# Patient Record
Sex: MALE | Race: WHITE | Employment: OTHER | URBAN - NONMETROPOLITAN AREA
[De-identification: names, ages, dates, MRNs, and addresses within clinical notes are randomized per-mention and may not be internally consistent; named-entity substitution may affect disease eponyms.]

---

## 2017-03-08 ENCOUNTER — OFFICE VISIT (OUTPATIENT)
Dept: FAMILY MEDICINE CLINIC | Age: 82
End: 2017-03-08

## 2017-03-08 VITALS
HEART RATE: 62 BPM | SYSTOLIC BLOOD PRESSURE: 138 MMHG | DIASTOLIC BLOOD PRESSURE: 74 MMHG | BODY MASS INDEX: 30.99 KG/M2 | WEIGHT: 216 LBS | RESPIRATION RATE: 20 BRPM | OXYGEN SATURATION: 98 %

## 2017-03-08 DIAGNOSIS — E11.9 TYPE 2 DIABETES MELLITUS WITHOUT COMPLICATION, WITHOUT LONG-TERM CURRENT USE OF INSULIN (HCC): Primary | ICD-10-CM

## 2017-03-08 DIAGNOSIS — I10 ESSENTIAL HYPERTENSION: ICD-10-CM

## 2017-03-08 DIAGNOSIS — D64.9 ANEMIA, UNSPECIFIED TYPE: ICD-10-CM

## 2017-03-08 DIAGNOSIS — Z13.220 SCREENING FOR LIPID DISORDERS: ICD-10-CM

## 2017-03-08 DIAGNOSIS — Z12.5 SPECIAL SCREENING FOR MALIGNANT NEOPLASM OF PROSTATE: ICD-10-CM

## 2017-03-08 LAB — HBA1C MFR BLD: 7 %

## 2017-03-08 PROCEDURE — G8417 CALC BMI ABV UP PARAM F/U: HCPCS | Performed by: NURSE PRACTITIONER

## 2017-03-08 PROCEDURE — G8484 FLU IMMUNIZE NO ADMIN: HCPCS | Performed by: NURSE PRACTITIONER

## 2017-03-08 PROCEDURE — G8427 DOCREV CUR MEDS BY ELIG CLIN: HCPCS | Performed by: NURSE PRACTITIONER

## 2017-03-08 PROCEDURE — 1036F TOBACCO NON-USER: CPT | Performed by: NURSE PRACTITIONER

## 2017-03-08 PROCEDURE — 83036 HEMOGLOBIN GLYCOSYLATED A1C: CPT | Performed by: NURSE PRACTITIONER

## 2017-03-08 PROCEDURE — 99214 OFFICE O/P EST MOD 30 MIN: CPT | Performed by: NURSE PRACTITIONER

## 2017-03-08 PROCEDURE — 1123F ACP DISCUSS/DSCN MKR DOCD: CPT | Performed by: NURSE PRACTITIONER

## 2017-03-08 PROCEDURE — 4040F PNEUMOC VAC/ADMIN/RCVD: CPT | Performed by: NURSE PRACTITIONER

## 2017-03-08 RX ORDER — LISINOPRIL 30 MG/1
30 TABLET ORAL DAILY
Qty: 90 TABLET | Refills: 1 | Status: SHIPPED | OUTPATIENT
Start: 2017-03-08 | End: 2017-08-30 | Stop reason: SDUPTHER

## 2017-03-08 RX ORDER — BISOPROLOL FUMARATE AND HYDROCHLOROTHIAZIDE 10; 6.25 MG/1; MG/1
2 TABLET ORAL DAILY
Qty: 180 TABLET | Refills: 1 | Status: SHIPPED | OUTPATIENT
Start: 2017-03-08 | End: 2017-08-30 | Stop reason: SDUPTHER

## 2017-03-08 ASSESSMENT — ENCOUNTER SYMPTOMS
DIARRHEA: 0
COLOR CHANGE: 0
BACK PAIN: 0
SORE THROAT: 0
SHORTNESS OF BREATH: 0
NAUSEA: 0
VOMITING: 0
ABDOMINAL PAIN: 0
SINUS PRESSURE: 0

## 2017-06-08 ENCOUNTER — OFFICE VISIT (OUTPATIENT)
Dept: FAMILY MEDICINE CLINIC | Age: 82
End: 2017-06-08

## 2017-06-08 VITALS
OXYGEN SATURATION: 98 % | HEIGHT: 70 IN | RESPIRATION RATE: 20 BRPM | BODY MASS INDEX: 31.07 KG/M2 | DIASTOLIC BLOOD PRESSURE: 72 MMHG | HEART RATE: 60 BPM | WEIGHT: 217 LBS | SYSTOLIC BLOOD PRESSURE: 136 MMHG

## 2017-06-08 DIAGNOSIS — I10 ESSENTIAL HYPERTENSION: ICD-10-CM

## 2017-06-08 DIAGNOSIS — N40.0 BENIGN PROSTATIC HYPERPLASIA, PRESENCE OF LOWER URINARY TRACT SYMPTOMS UNSPECIFIED, UNSPECIFIED MORPHOLOGY: ICD-10-CM

## 2017-06-08 DIAGNOSIS — E11.9 TYPE 2 DIABETES MELLITUS WITHOUT COMPLICATION, WITHOUT LONG-TERM CURRENT USE OF INSULIN (HCC): Primary | ICD-10-CM

## 2017-06-08 LAB — HBA1C MFR BLD: 6.7 %

## 2017-06-08 PROCEDURE — 1123F ACP DISCUSS/DSCN MKR DOCD: CPT | Performed by: NURSE PRACTITIONER

## 2017-06-08 PROCEDURE — G8417 CALC BMI ABV UP PARAM F/U: HCPCS | Performed by: NURSE PRACTITIONER

## 2017-06-08 PROCEDURE — 99214 OFFICE O/P EST MOD 30 MIN: CPT | Performed by: NURSE PRACTITIONER

## 2017-06-08 PROCEDURE — 4040F PNEUMOC VAC/ADMIN/RCVD: CPT | Performed by: NURSE PRACTITIONER

## 2017-06-08 PROCEDURE — G8427 DOCREV CUR MEDS BY ELIG CLIN: HCPCS | Performed by: NURSE PRACTITIONER

## 2017-06-08 PROCEDURE — 1036F TOBACCO NON-USER: CPT | Performed by: NURSE PRACTITIONER

## 2017-06-08 PROCEDURE — 83036 HEMOGLOBIN GLYCOSYLATED A1C: CPT | Performed by: NURSE PRACTITIONER

## 2017-06-10 ASSESSMENT — ENCOUNTER SYMPTOMS
BACK PAIN: 0
DIARRHEA: 0
SINUS PRESSURE: 0
VOMITING: 0
COUGH: 0
SORE THROAT: 0
NAUSEA: 0
WHEEZING: 0
SHORTNESS OF BREATH: 0
COLOR CHANGE: 0
ABDOMINAL PAIN: 0
ABDOMINAL DISTENTION: 0

## 2017-09-07 ENCOUNTER — OFFICE VISIT (OUTPATIENT)
Dept: FAMILY MEDICINE CLINIC | Age: 82
End: 2017-09-07
Payer: MEDICARE

## 2017-09-07 VITALS
OXYGEN SATURATION: 97 % | RESPIRATION RATE: 16 BRPM | BODY MASS INDEX: 31.14 KG/M2 | HEART RATE: 55 BPM | WEIGHT: 217 LBS | DIASTOLIC BLOOD PRESSURE: 78 MMHG | SYSTOLIC BLOOD PRESSURE: 152 MMHG

## 2017-09-07 DIAGNOSIS — E11.9 TYPE 2 DIABETES MELLITUS WITHOUT COMPLICATION, WITHOUT LONG-TERM CURRENT USE OF INSULIN (HCC): Primary | ICD-10-CM

## 2017-09-07 DIAGNOSIS — I10 ESSENTIAL HYPERTENSION: ICD-10-CM

## 2017-09-07 DIAGNOSIS — N40.0 BENIGN PROSTATIC HYPERPLASIA WITHOUT LOWER URINARY TRACT SYMPTOMS, UNSPECIFIED MORPHOLOGY: ICD-10-CM

## 2017-09-07 DIAGNOSIS — R01.1 HEART MURMUR: ICD-10-CM

## 2017-09-07 PROCEDURE — 99214 OFFICE O/P EST MOD 30 MIN: CPT | Performed by: NURSE PRACTITIONER

## 2017-09-07 PROCEDURE — 1036F TOBACCO NON-USER: CPT | Performed by: NURSE PRACTITIONER

## 2017-09-07 PROCEDURE — G8417 CALC BMI ABV UP PARAM F/U: HCPCS | Performed by: NURSE PRACTITIONER

## 2017-09-07 PROCEDURE — 4040F PNEUMOC VAC/ADMIN/RCVD: CPT | Performed by: NURSE PRACTITIONER

## 2017-09-07 PROCEDURE — G8427 DOCREV CUR MEDS BY ELIG CLIN: HCPCS | Performed by: NURSE PRACTITIONER

## 2017-09-07 PROCEDURE — 1123F ACP DISCUSS/DSCN MKR DOCD: CPT | Performed by: NURSE PRACTITIONER

## 2017-09-07 RX ORDER — LISINOPRIL 20 MG/1
40 TABLET ORAL DAILY
Qty: 180 TABLET | Refills: 0 | Status: SHIPPED | OUTPATIENT
Start: 2017-09-07 | End: 2017-12-04 | Stop reason: SDUPTHER

## 2017-09-07 ASSESSMENT — ENCOUNTER SYMPTOMS
ABDOMINAL PAIN: 0
DIARRHEA: 0
VOMITING: 0
WHEEZING: 0
SORE THROAT: 0
CONSTIPATION: 0
CHEST TIGHTNESS: 0
STRIDOR: 0
ABDOMINAL DISTENTION: 0
BACK PAIN: 0
SINUS PRESSURE: 0
COLOR CHANGE: 0
NAUSEA: 0
SHORTNESS OF BREATH: 0
COUGH: 0

## 2017-09-14 ENCOUNTER — HOSPITAL ENCOUNTER (OUTPATIENT)
Dept: NON INVASIVE DIAGNOSTICS | Age: 82
Discharge: HOME | End: 2017-09-14
Payer: MEDICARE

## 2017-09-14 DIAGNOSIS — R01.1 HEART MURMUR: ICD-10-CM

## 2017-09-14 LAB
EKG ATRIAL RATE: 56 BPM
EKG P AXIS: 53 DEGREES
EKG P-R INTERVAL: 168 MS
EKG Q-T INTERVAL: 456 MS
EKG QRS DURATION: 118 MS
EKG QTC CALCULATION (BAZETT): 440 MS
EKG R AXIS: -56 DEGREES
EKG T AXIS: 30 DEGREES
EKG VENTRICULAR RATE: 56 BPM
LV EF: 60 %
LVEF MODALITY: NORMAL

## 2017-09-14 PROCEDURE — 93005 ELECTROCARDIOGRAM TRACING: CPT

## 2017-09-14 PROCEDURE — 93306 TTE W/DOPPLER COMPLETE: CPT

## 2017-12-04 RX ORDER — LISINOPRIL 20 MG/1
TABLET ORAL
Qty: 180 TABLET | Refills: 0 | Status: SHIPPED | OUTPATIENT
Start: 2017-12-04 | End: 2018-03-08 | Stop reason: SDUPTHER

## 2017-12-07 ENCOUNTER — OFFICE VISIT (OUTPATIENT)
Dept: FAMILY MEDICINE CLINIC | Age: 82
End: 2017-12-07
Payer: MEDICARE

## 2017-12-07 VITALS
RESPIRATION RATE: 20 BRPM | HEIGHT: 70 IN | OXYGEN SATURATION: 98 % | SYSTOLIC BLOOD PRESSURE: 147 MMHG | BODY MASS INDEX: 30.49 KG/M2 | DIASTOLIC BLOOD PRESSURE: 76 MMHG | WEIGHT: 213 LBS | HEART RATE: 62 BPM

## 2017-12-07 DIAGNOSIS — I35.0 NONRHEUMATIC AORTIC VALVE STENOSIS: ICD-10-CM

## 2017-12-07 DIAGNOSIS — I10 ESSENTIAL HYPERTENSION: ICD-10-CM

## 2017-12-07 DIAGNOSIS — E11.9 TYPE 2 DIABETES MELLITUS WITHOUT COMPLICATION, WITHOUT LONG-TERM CURRENT USE OF INSULIN (HCC): Primary | ICD-10-CM

## 2017-12-07 DIAGNOSIS — I51.7 LEFT ATRIAL ENLARGEMENT: ICD-10-CM

## 2017-12-07 LAB — HBA1C MFR BLD: 6.7 %

## 2017-12-07 PROCEDURE — G8484 FLU IMMUNIZE NO ADMIN: HCPCS | Performed by: NURSE PRACTITIONER

## 2017-12-07 PROCEDURE — G8417 CALC BMI ABV UP PARAM F/U: HCPCS | Performed by: NURSE PRACTITIONER

## 2017-12-07 PROCEDURE — 83036 HEMOGLOBIN GLYCOSYLATED A1C: CPT | Performed by: NURSE PRACTITIONER

## 2017-12-07 PROCEDURE — 99214 OFFICE O/P EST MOD 30 MIN: CPT | Performed by: NURSE PRACTITIONER

## 2017-12-07 PROCEDURE — 4040F PNEUMOC VAC/ADMIN/RCVD: CPT | Performed by: NURSE PRACTITIONER

## 2017-12-07 PROCEDURE — 1123F ACP DISCUSS/DSCN MKR DOCD: CPT | Performed by: NURSE PRACTITIONER

## 2017-12-07 PROCEDURE — 1036F TOBACCO NON-USER: CPT | Performed by: NURSE PRACTITIONER

## 2017-12-07 PROCEDURE — G8427 DOCREV CUR MEDS BY ELIG CLIN: HCPCS | Performed by: NURSE PRACTITIONER

## 2017-12-07 NOTE — PROGRESS NOTES
unspecified type diabetes mellitus without mention of complication, not stated as uncontrolled       Past Surgical History:   Procedure Laterality Date    TONSILLECTOMY AND ADENOIDECTOMY Bilateral     age 10        Family History   Problem Relation Age of Onset    Diabetes Paternal Aunt     Diabetes Paternal Uncle     Cancer Paternal Uncle     Diabetes Maternal Grandmother     Diabetes Maternal Grandfather     Diabetes Paternal Grandmother     Cancer Paternal Grandmother     Diabetes Paternal Grandfather        Social History   Substance Use Topics    Smoking status: Former Smoker     Types: Cigars     Quit date: 1/1/1991    Smokeless tobacco: Never Used      Comment: rarely smoked a cigar     Alcohol use No      Current Outpatient Prescriptions   Medication Sig Dispense Refill    lisinopril (PRINIVIL;ZESTRIL) 20 MG tablet TAKE TWO TABLETS BY MOUTH ONCE DAILY 180 tablet 0    metFORMIN (GLUCOPHAGE) 1000 MG tablet TAKE ONE TABLET BY MOUTH ONCE DAILY WITH BREAKFAST 90 tablet 1    bisoprolol-hydrochlorothiazide (ZIAC) 10-6.25 MG per tablet TAKE TWO TABLETS BY MOUTH ONCE DAILY 180 tablet 1     No current facility-administered medications for this visit. Allergies   Allergen Reactions    Norvasc [Amlodipine Besylate]     Other Rash     States allergic to several BP meds (unsure of name)       Health Maintenance   Topic Date Due    DTaP/Tdap/Td vaccine (1 - Tdap) 01/30/1950    Pneumococcal low/med risk (1 of 2 - PCV13) 01/30/1996    Flu vaccine (1) 09/01/2017    Zostavax vaccine  Addressed       There is no immunization history on file for this patient. Subjective:      Review of Systems   Constitutional: Negative for activity change, appetite change, chills, diaphoresis, fatigue, fever and unexpected weight change. HENT: Negative for congestion, ear pain, postnasal drip, sinus pressure and sore throat. Eyes: Negative for visual disturbance.    Respiratory: Negative for cough, chest distension and no mass. There is no hepatosplenomegaly. There is no tenderness. There is no CVA tenderness. Musculoskeletal: Normal range of motion. He exhibits no edema, tenderness or deformity. Lymphadenopathy:     He has no cervical adenopathy. Neurological: He is alert and oriented to person, place, and time. No cranial nerve deficit or sensory deficit. He exhibits normal muscle tone. Coordination and gait normal.   Skin: Skin is warm and dry. No rash noted. No erythema. Psychiatric: He has a normal mood and affect. His behavior is normal. Judgment and thought content normal.   Nursing note and vitals reviewed. BP (!) 147/76   Pulse 62   Resp 20   Ht 5' 10\" (1.778 m)   Wt 213 lb (96.6 kg)   SpO2 98%   BMI 30.56 kg/m²     Assessment:      1. Type 2 diabetes mellitus without complication, without long-term current use of insulin (HCC)  POCT glycosylated hemoglobin (Hb A1C)   2. Essential hypertension     3. Nonrheumatic aortic valve stenosis     4. Left atrial enlargement         Plan:     Goals      Exercise 3x per week (30 min per time) (pt-stated)            Patient Self-Management Goal for Health Maintenance  Goal: I will exercise for 30 minutes 3-5 days per week. Barriers: none  Plan for overcoming my barriers: N/A  Confidence: 7/10  Anticipated Goal Completion Date: 6-2017            Discussed use, benefit, and side effects of prescribed medications. Barriers to medication compliance addressed. All patient questions answered. Pt voiced understanding. Return if symptoms worsen or fail to improve. A1C 6.7 today  Monitor aortic stenosis. Chronic illnesses stable    Orders Placed This Encounter   Procedures    POCT glycosylated hemoglobin (Hb A1C)     No orders of the defined types were placed in this encounter. Patient given educational materials - see patient instructions. Discussed use, benefit, and side effects of prescribed medications.   All patient questions

## 2017-12-11 ASSESSMENT — ENCOUNTER SYMPTOMS
SORE THROAT: 0
CONSTIPATION: 0
BACK PAIN: 0
SHORTNESS OF BREATH: 0
NAUSEA: 0
ABDOMINAL DISTENTION: 0
CHEST TIGHTNESS: 0
STRIDOR: 0
ABDOMINAL PAIN: 0
COUGH: 0
WHEEZING: 0
VOMITING: 0
COLOR CHANGE: 0
SINUS PRESSURE: 0
DIARRHEA: 0

## 2018-03-08 ENCOUNTER — OFFICE VISIT (OUTPATIENT)
Dept: FAMILY MEDICINE CLINIC | Age: 83
End: 2018-03-08
Payer: MEDICARE

## 2018-03-08 VITALS
HEART RATE: 60 BPM | OXYGEN SATURATION: 98 % | SYSTOLIC BLOOD PRESSURE: 130 MMHG | WEIGHT: 217 LBS | DIASTOLIC BLOOD PRESSURE: 75 MMHG | BODY MASS INDEX: 31.07 KG/M2 | RESPIRATION RATE: 20 BRPM | HEIGHT: 70 IN

## 2018-03-08 DIAGNOSIS — E11.9 TYPE 2 DIABETES MELLITUS WITHOUT COMPLICATION, WITHOUT LONG-TERM CURRENT USE OF INSULIN (HCC): Primary | ICD-10-CM

## 2018-03-08 DIAGNOSIS — Z13.220 SCREENING FOR LIPID DISORDERS: ICD-10-CM

## 2018-03-08 DIAGNOSIS — I10 ESSENTIAL HYPERTENSION: ICD-10-CM

## 2018-03-08 DIAGNOSIS — D50.8 IRON DEFICIENCY ANEMIA SECONDARY TO INADEQUATE DIETARY IRON INTAKE: ICD-10-CM

## 2018-03-08 PROCEDURE — G8417 CALC BMI ABV UP PARAM F/U: HCPCS | Performed by: NURSE PRACTITIONER

## 2018-03-08 PROCEDURE — G8427 DOCREV CUR MEDS BY ELIG CLIN: HCPCS | Performed by: NURSE PRACTITIONER

## 2018-03-08 PROCEDURE — G8484 FLU IMMUNIZE NO ADMIN: HCPCS | Performed by: NURSE PRACTITIONER

## 2018-03-08 PROCEDURE — 4040F PNEUMOC VAC/ADMIN/RCVD: CPT | Performed by: NURSE PRACTITIONER

## 2018-03-08 PROCEDURE — 1123F ACP DISCUSS/DSCN MKR DOCD: CPT | Performed by: NURSE PRACTITIONER

## 2018-03-08 PROCEDURE — 1036F TOBACCO NON-USER: CPT | Performed by: NURSE PRACTITIONER

## 2018-03-08 PROCEDURE — 99214 OFFICE O/P EST MOD 30 MIN: CPT | Performed by: NURSE PRACTITIONER

## 2018-03-08 RX ORDER — BISOPROLOL FUMARATE AND HYDROCHLOROTHIAZIDE 10; 6.25 MG/1; MG/1
TABLET ORAL
Qty: 180 TABLET | Refills: 1 | Status: SHIPPED | OUTPATIENT
Start: 2018-03-08 | End: 2018-08-20 | Stop reason: SDUPTHER

## 2018-03-08 RX ORDER — LISINOPRIL 20 MG/1
TABLET ORAL
Qty: 180 TABLET | Refills: 0 | Status: SHIPPED | OUTPATIENT
Start: 2018-03-08 | End: 2018-05-23 | Stop reason: SDUPTHER

## 2018-03-08 ASSESSMENT — PATIENT HEALTH QUESTIONNAIRE - PHQ9
1. LITTLE INTEREST OR PLEASURE IN DOING THINGS: 0
SUM OF ALL RESPONSES TO PHQ QUESTIONS 1-9: 0
2. FEELING DOWN, DEPRESSED OR HOPELESS: 0
SUM OF ALL RESPONSES TO PHQ9 QUESTIONS 1 & 2: 0

## 2018-03-08 NOTE — PROGRESS NOTES
distension and no mass. There is no hepatosplenomegaly. There is no tenderness. There is no CVA tenderness. Musculoskeletal: Normal range of motion. He exhibits no edema, tenderness or deformity. Lymphadenopathy:     He has no cervical adenopathy. Neurological: He is alert and oriented to person, place, and time. No cranial nerve deficit or sensory deficit. He exhibits normal muscle tone. Coordination and gait normal.   Skin: Skin is warm and dry. No rash noted. No erythema. Psychiatric: He has a normal mood and affect. His behavior is normal. Judgment and thought content normal.   Nursing note and vitals reviewed. /75   Pulse 60   Resp 20   Ht 5' 10\" (1.778 m)   Wt 217 lb (98.4 kg)   SpO2 98%   BMI 31.14 kg/m²     Assessment:      1. Type 2 diabetes mellitus without complication, without long-term current use of insulin (HCC)  metFORMIN (GLUCOPHAGE) 1000 MG tablet    Comprehensive Metabolic Panel    Lipid Panel   2. Essential hypertension  bisoprolol-hydrochlorothiazide (ZIAC) 10-6.25 MG per tablet    lisinopril (PRINIVIL;ZESTRIL) 20 MG tablet    Lipid Panel   3. Iron deficiency anemia secondary to inadequate dietary iron intake  CBC Auto Differential   4. Screening for lipid disorders  Lipid Panel       Plan:       Discussed use, benefit, and side effects of prescribed medications. Barriers to medication compliance addressed. All patient questions answered. Pt voiced understanding. Return in about 3 months (around 6/8/2018).     meds as prescribed  Chronic illnesses stable  Obtain labs  Continue to eat healthy and be active    Orders Placed This Encounter   Procedures    Comprehensive Metabolic Panel     Standing Status:   Future     Standing Expiration Date:   3/8/2019    Lipid Panel     Standing Status:   Future     Standing Expiration Date:   3/8/2019     Order Specific Question:   Is Patient Fasting?/# of Hours     Answer:   requires 12 hour fast    CBC Auto Differential Standing Status:   Future     Standing Expiration Date:   3/8/2019     Orders Placed This Encounter   Medications    bisoprolol-hydrochlorothiazide (ZIAC) 10-6.25 MG per tablet     Sig: TAKE TWO TABLETS BY MOUTH ONCE DAILY     Dispense:  180 tablet     Refill:  1    lisinopril (PRINIVIL;ZESTRIL) 20 MG tablet     Sig: TAKE TWO TABLETS BY MOUTH ONCE DAILY     Dispense:  180 tablet     Refill:  0    metFORMIN (GLUCOPHAGE) 1000 MG tablet     Sig: TAKE ONE TABLET BY MOUTH ONCE DAILY WITH BREAKFAST     Dispense:  90 tablet     Refill:  1           Patient given educational materials - see patient instructions. Discussed use, benefit, and side effects of prescribed medications. All patient questions answered. Pt voiced understanding. Reviewed health maintenance. Instructed to continue current medications, diet and exercise. Patient agreed with treatment plan. Follow up as directed.      Electronically signed by Feng Mello CNP on 3/12/2018 at 1:45 PM

## 2018-03-12 ASSESSMENT — ENCOUNTER SYMPTOMS
COUGH: 0
STRIDOR: 0
SHORTNESS OF BREATH: 0
WHEEZING: 0
ABDOMINAL PAIN: 0
SINUS PRESSURE: 0
SORE THROAT: 0
ABDOMINAL DISTENTION: 0
CONSTIPATION: 0
COLOR CHANGE: 0
CHEST TIGHTNESS: 0
VOMITING: 0
NAUSEA: 0
DIARRHEA: 0
BACK PAIN: 0

## 2018-05-14 ENCOUNTER — OFFICE VISIT (OUTPATIENT)
Dept: FAMILY MEDICINE CLINIC | Age: 83
End: 2018-05-14
Payer: MEDICARE

## 2018-05-14 VITALS
OXYGEN SATURATION: 97 % | BODY MASS INDEX: 30.52 KG/M2 | SYSTOLIC BLOOD PRESSURE: 138 MMHG | RESPIRATION RATE: 12 BRPM | DIASTOLIC BLOOD PRESSURE: 78 MMHG | HEART RATE: 72 BPM | WEIGHT: 212.7 LBS

## 2018-05-14 DIAGNOSIS — M54.42 ACUTE LEFT-SIDED LOW BACK PAIN WITH LEFT-SIDED SCIATICA: ICD-10-CM

## 2018-05-14 DIAGNOSIS — I10 ESSENTIAL HYPERTENSION: ICD-10-CM

## 2018-05-14 DIAGNOSIS — M25.552 LEFT HIP PAIN: ICD-10-CM

## 2018-05-14 DIAGNOSIS — E11.9 TYPE 2 DIABETES MELLITUS WITHOUT COMPLICATION, WITHOUT LONG-TERM CURRENT USE OF INSULIN (HCC): Primary | ICD-10-CM

## 2018-05-14 PROCEDURE — 1123F ACP DISCUSS/DSCN MKR DOCD: CPT | Performed by: NURSE PRACTITIONER

## 2018-05-14 PROCEDURE — G8427 DOCREV CUR MEDS BY ELIG CLIN: HCPCS | Performed by: NURSE PRACTITIONER

## 2018-05-14 PROCEDURE — 1036F TOBACCO NON-USER: CPT | Performed by: NURSE PRACTITIONER

## 2018-05-14 PROCEDURE — 99214 OFFICE O/P EST MOD 30 MIN: CPT | Performed by: NURSE PRACTITIONER

## 2018-05-14 PROCEDURE — G8417 CALC BMI ABV UP PARAM F/U: HCPCS | Performed by: NURSE PRACTITIONER

## 2018-05-14 PROCEDURE — 83036 HEMOGLOBIN GLYCOSYLATED A1C: CPT | Performed by: NURSE PRACTITIONER

## 2018-05-14 PROCEDURE — 96372 THER/PROPH/DIAG INJ SC/IM: CPT | Performed by: NURSE PRACTITIONER

## 2018-05-14 PROCEDURE — 4040F PNEUMOC VAC/ADMIN/RCVD: CPT | Performed by: NURSE PRACTITIONER

## 2018-05-14 RX ORDER — TRIAMCINOLONE ACETONIDE 40 MG/ML
60 INJECTION, SUSPENSION INTRA-ARTICULAR; INTRAMUSCULAR ONCE
Status: COMPLETED | OUTPATIENT
Start: 2018-05-14 | End: 2018-05-14

## 2018-05-14 RX ADMIN — TRIAMCINOLONE ACETONIDE 60 MG: 40 INJECTION, SUSPENSION INTRA-ARTICULAR; INTRAMUSCULAR at 14:15

## 2018-05-15 ENCOUNTER — HOSPITAL ENCOUNTER (OUTPATIENT)
Dept: GENERAL RADIOLOGY | Age: 83
Discharge: HOME | End: 2018-05-15
Payer: MEDICARE

## 2018-05-15 ENCOUNTER — HOSPITAL ENCOUNTER (OUTPATIENT)
Age: 83
Discharge: HOME | End: 2018-05-15
Payer: MEDICARE

## 2018-05-15 DIAGNOSIS — I10 ESSENTIAL HYPERTENSION: ICD-10-CM

## 2018-05-15 DIAGNOSIS — D50.8 IRON DEFICIENCY ANEMIA SECONDARY TO INADEQUATE DIETARY IRON INTAKE: ICD-10-CM

## 2018-05-15 DIAGNOSIS — E11.9 TYPE 2 DIABETES MELLITUS WITHOUT COMPLICATION, WITHOUT LONG-TERM CURRENT USE OF INSULIN (HCC): ICD-10-CM

## 2018-05-15 DIAGNOSIS — Z13.220 SCREENING FOR LIPID DISORDERS: ICD-10-CM

## 2018-05-15 DIAGNOSIS — M25.552 LEFT HIP PAIN: ICD-10-CM

## 2018-05-15 LAB
ALBUMIN SERPL-MCNC: 3.7 G/DL (ref 3.5–5.1)
ALP BLD-CCNC: 56 U/L (ref 38–126)
ALT SERPL-CCNC: 18 U/L (ref 11–66)
ANION GAP SERPL CALCULATED.3IONS-SCNC: 13 MEQ/L (ref 8–16)
AST SERPL-CCNC: 14 U/L (ref 5–40)
BASOPHILS # BLD: 0.3 %
BASOPHILS ABSOLUTE: 0 THOU/MM3 (ref 0–0.1)
BILIRUB SERPL-MCNC: 0.7 MG/DL (ref 0.3–1.2)
BUN BLDV-MCNC: 23 MG/DL (ref 7–22)
CALCIUM SERPL-MCNC: 9.4 MG/DL (ref 8.5–10.5)
CHLORIDE BLD-SCNC: 99 MEQ/L (ref 98–111)
CHOLESTEROL, TOTAL: 146 MG/DL (ref 100–199)
CO2: 25 MEQ/L (ref 23–33)
CREAT SERPL-MCNC: 0.9 MG/DL (ref 0.4–1.2)
EOSINOPHIL # BLD: 1.2 %
EOSINOPHILS ABSOLUTE: 0.2 THOU/MM3 (ref 0–0.4)
GFR SERPL CREATININE-BSD FRML MDRD: 80 ML/MIN/1.73M2
GLUCOSE BLD-MCNC: 155 MG/DL (ref 70–108)
HBA1C MFR BLD: 6.2 %
HCT VFR BLD CALC: 37.6 % (ref 42–52)
HDLC SERPL-MCNC: 38 MG/DL
HEMOGLOBIN: 12.4 GM/DL (ref 14–18)
HYPOCHROMIA: ABNORMAL
LDL CHOLESTEROL CALCULATED: 90 MG/DL
LYMPHOCYTES # BLD: 12.3 %
LYMPHOCYTES ABSOLUTE: 1.6 THOU/MM3 (ref 1–4.8)
MCH RBC QN AUTO: 26.7 PG (ref 27–31)
MCHC RBC AUTO-ENTMCNC: 33.1 GM/DL (ref 33–37)
MCV RBC AUTO: 80.6 FL (ref 80–94)
MICROCYTES: ABNORMAL
MONOCYTES # BLD: 8.4 %
MONOCYTES ABSOLUTE: 1.1 THOU/MM3 (ref 0.4–1.3)
NUCLEATED RED BLOOD CELLS: 0 /100 WBC
PDW BLD-RTO: 13.3 % (ref 11.5–14.5)
PLATELET # BLD: 248 THOU/MM3 (ref 130–400)
PMV BLD AUTO: 12.4 FL (ref 7.4–10.4)
POTASSIUM SERPL-SCNC: 4.4 MEQ/L (ref 3.5–5.2)
RBC # BLD: 4.66 MILL/MM3 (ref 4.7–6.1)
SEG NEUTROPHILS: 77.8 %
SEGMENTED NEUTROPHILS ABSOLUTE COUNT: 10.3 THOU/MM3 (ref 1.8–7.7)
SODIUM BLD-SCNC: 137 MEQ/L (ref 135–145)
TOTAL PROTEIN: 7.5 G/DL (ref 6.1–8)
TRIGL SERPL-MCNC: 90 MG/DL (ref 0–199)
WBC # BLD: 13.3 THOU/MM3 (ref 4.8–10.8)

## 2018-05-15 PROCEDURE — 80061 LIPID PANEL: CPT

## 2018-05-15 PROCEDURE — 36415 COLL VENOUS BLD VENIPUNCTURE: CPT

## 2018-05-15 PROCEDURE — 73502 X-RAY EXAM HIP UNI 2-3 VIEWS: CPT

## 2018-05-15 PROCEDURE — 80053 COMPREHEN METABOLIC PANEL: CPT

## 2018-05-15 PROCEDURE — 72100 X-RAY EXAM L-S SPINE 2/3 VWS: CPT

## 2018-05-15 PROCEDURE — 85025 COMPLETE CBC W/AUTO DIFF WBC: CPT

## 2018-05-16 ASSESSMENT — ENCOUNTER SYMPTOMS
ABDOMINAL PAIN: 0
CONSTIPATION: 0
STRIDOR: 0
SINUS PRESSURE: 0
COUGH: 0
VOMITING: 0
SHORTNESS OF BREATH: 0
NAUSEA: 0
DIARRHEA: 0
COLOR CHANGE: 0
CHEST TIGHTNESS: 0
ABDOMINAL DISTENTION: 0
SORE THROAT: 0
BACK PAIN: 1
WHEEZING: 0

## 2018-06-21 ENCOUNTER — OFFICE VISIT (OUTPATIENT)
Dept: FAMILY MEDICINE CLINIC | Age: 83
End: 2018-06-21
Payer: MEDICARE

## 2018-06-21 VITALS
SYSTOLIC BLOOD PRESSURE: 150 MMHG | RESPIRATION RATE: 20 BRPM | HEIGHT: 71 IN | OXYGEN SATURATION: 98 % | WEIGHT: 209 LBS | DIASTOLIC BLOOD PRESSURE: 82 MMHG | HEART RATE: 64 BPM | BODY MASS INDEX: 29.26 KG/M2

## 2018-06-21 DIAGNOSIS — E11.9 TYPE 2 DIABETES MELLITUS WITHOUT COMPLICATION, WITHOUT LONG-TERM CURRENT USE OF INSULIN (HCC): ICD-10-CM

## 2018-06-21 DIAGNOSIS — R97.20 ELEVATED PSA: ICD-10-CM

## 2018-06-21 DIAGNOSIS — I10 ESSENTIAL HYPERTENSION: Primary | ICD-10-CM

## 2018-06-21 LAB
BILIRUBIN, POC: NORMAL
BLOOD URINE, POC: NORMAL
CLARITY, POC: NORMAL
COLOR, POC: YELLOW
CREATININE URINE POCT: NORMAL
GLUCOSE URINE, POC: NORMAL
KETONES, POC: NORMAL
LEUKOCYTE EST, POC: NORMAL
MICROALBUMIN/CREAT 24H UR: NORMAL MG/G{CREAT}
MICROALBUMIN/CREAT UR-RTO: NORMAL
NITRITE, POC: NORMAL
PH, POC: 6
PROTEIN, POC: NORMAL
SPECIFIC GRAVITY, POC: 1.02
UROBILINOGEN, POC: 1

## 2018-06-21 PROCEDURE — G8427 DOCREV CUR MEDS BY ELIG CLIN: HCPCS | Performed by: NURSE PRACTITIONER

## 2018-06-21 PROCEDURE — 1036F TOBACCO NON-USER: CPT | Performed by: NURSE PRACTITIONER

## 2018-06-21 PROCEDURE — 1123F ACP DISCUSS/DSCN MKR DOCD: CPT | Performed by: NURSE PRACTITIONER

## 2018-06-21 PROCEDURE — 4040F PNEUMOC VAC/ADMIN/RCVD: CPT | Performed by: NURSE PRACTITIONER

## 2018-06-21 PROCEDURE — G8417 CALC BMI ABV UP PARAM F/U: HCPCS | Performed by: NURSE PRACTITIONER

## 2018-06-21 PROCEDURE — 99214 OFFICE O/P EST MOD 30 MIN: CPT | Performed by: NURSE PRACTITIONER

## 2018-06-21 PROCEDURE — 81002 URINALYSIS NONAUTO W/O SCOPE: CPT | Performed by: NURSE PRACTITIONER

## 2018-06-21 PROCEDURE — 82044 UR ALBUMIN SEMIQUANTITATIVE: CPT | Performed by: NURSE PRACTITIONER

## 2018-06-21 RX ORDER — HYDROCHLOROTHIAZIDE 12.5 MG/1
12.5 TABLET ORAL DAILY
Qty: 90 TABLET | Refills: 0 | Status: SHIPPED | OUTPATIENT
Start: 2018-06-21 | End: 2018-08-28 | Stop reason: SDUPTHER

## 2018-06-26 ASSESSMENT — ENCOUNTER SYMPTOMS
CHEST TIGHTNESS: 0
BACK PAIN: 0
DIARRHEA: 0
COUGH: 0
SORE THROAT: 0
NAUSEA: 0
COLOR CHANGE: 0
SHORTNESS OF BREATH: 0
VOMITING: 0
WHEEZING: 0
SINUS PRESSURE: 0
ABDOMINAL DISTENTION: 0
CONSTIPATION: 0
STRIDOR: 0
ABDOMINAL PAIN: 0

## 2018-08-20 DIAGNOSIS — I10 ESSENTIAL HYPERTENSION: ICD-10-CM

## 2018-08-20 RX ORDER — BISOPROLOL FUMARATE AND HYDROCHLOROTHIAZIDE 10; 6.25 MG/1; MG/1
TABLET ORAL
Qty: 180 TABLET | Refills: 2 | Status: SHIPPED | OUTPATIENT
Start: 2018-08-20 | End: 2019-05-15 | Stop reason: SDUPTHER

## 2018-08-28 DIAGNOSIS — E11.9 TYPE 2 DIABETES MELLITUS WITHOUT COMPLICATION, WITHOUT LONG-TERM CURRENT USE OF INSULIN (HCC): ICD-10-CM

## 2018-08-29 RX ORDER — HYDROCHLOROTHIAZIDE 12.5 MG/1
TABLET ORAL
Qty: 90 TABLET | Refills: 1 | Status: SHIPPED | OUTPATIENT
Start: 2018-08-29 | End: 2019-02-26 | Stop reason: SDUPTHER

## 2018-09-19 ENCOUNTER — HOSPITAL ENCOUNTER (OUTPATIENT)
Age: 83
Discharge: HOME | End: 2018-09-19
Payer: MEDICARE

## 2018-09-19 DIAGNOSIS — R97.20 ELEVATED PSA: ICD-10-CM

## 2018-09-19 LAB — PROSTATE SPECIFIC ANTIGEN: 1.08 NG/ML (ref 0–1)

## 2018-09-19 PROCEDURE — 84153 ASSAY OF PSA TOTAL: CPT

## 2018-09-19 PROCEDURE — 36415 COLL VENOUS BLD VENIPUNCTURE: CPT

## 2018-09-20 ENCOUNTER — OFFICE VISIT (OUTPATIENT)
Dept: FAMILY MEDICINE CLINIC | Age: 83
End: 2018-09-20
Payer: MEDICARE

## 2018-09-20 VITALS
HEART RATE: 68 BPM | DIASTOLIC BLOOD PRESSURE: 62 MMHG | RESPIRATION RATE: 18 BRPM | BODY MASS INDEX: 30.41 KG/M2 | OXYGEN SATURATION: 98 % | SYSTOLIC BLOOD PRESSURE: 128 MMHG | WEIGHT: 215 LBS

## 2018-09-20 DIAGNOSIS — E11.9 TYPE 2 DIABETES MELLITUS WITHOUT COMPLICATION, WITHOUT LONG-TERM CURRENT USE OF INSULIN (HCC): Primary | ICD-10-CM

## 2018-09-20 DIAGNOSIS — I10 ESSENTIAL HYPERTENSION: ICD-10-CM

## 2018-09-20 LAB — HBA1C MFR BLD: 6.7 %

## 2018-09-20 PROCEDURE — 83036 HEMOGLOBIN GLYCOSYLATED A1C: CPT | Performed by: NURSE PRACTITIONER

## 2018-09-20 PROCEDURE — 1036F TOBACCO NON-USER: CPT | Performed by: NURSE PRACTITIONER

## 2018-09-20 PROCEDURE — 4040F PNEUMOC VAC/ADMIN/RCVD: CPT | Performed by: NURSE PRACTITIONER

## 2018-09-20 PROCEDURE — 1123F ACP DISCUSS/DSCN MKR DOCD: CPT | Performed by: NURSE PRACTITIONER

## 2018-09-20 PROCEDURE — 99214 OFFICE O/P EST MOD 30 MIN: CPT | Performed by: NURSE PRACTITIONER

## 2018-09-20 PROCEDURE — G8417 CALC BMI ABV UP PARAM F/U: HCPCS | Performed by: NURSE PRACTITIONER

## 2018-09-20 PROCEDURE — 1101F PT FALLS ASSESS-DOCD LE1/YR: CPT | Performed by: NURSE PRACTITIONER

## 2018-09-20 PROCEDURE — G8427 DOCREV CUR MEDS BY ELIG CLIN: HCPCS | Performed by: NURSE PRACTITIONER

## 2018-09-20 NOTE — PROGRESS NOTES
Never Used      Comment: rarely smoked a cigar     Alcohol use No      Current Outpatient Prescriptions   Medication Sig Dispense Refill    hydrochlorothiazide (HYDRODIURIL) 12.5 MG tablet TAKE 1 TABLET BY MOUTH ONCE DAILY 90 tablet 1    metFORMIN (GLUCOPHAGE) 1000 MG tablet TAKE 1 TABLET BY MOUTH ONCE DAILY WITH  BREAKFAST 90 tablet 1    bisoprolol-hydrochlorothiazide (ZIAC) 10-6.25 MG per tablet TAKE TWO TABLETS BY MOUTH ONCE DAILY 180 tablet 2    lisinopril (PRINIVIL;ZESTRIL) 20 MG tablet TAKE 2 TABLETS BY MOUTH ONCE DAILY 180 tablet 3     No current facility-administered medications for this visit. Allergies   Allergen Reactions    Norvasc [Amlodipine Besylate]     Other Rash     States allergic to several BP meds (unsure of name)       Health Maintenance   Topic Date Due    DTaP/Tdap/Td vaccine (1 - Tdap) 01/30/1950    Shingles Vaccine (1 of 2 - 2 Dose Series) 01/30/1981    Pneumococcal low/med risk (1 of 2 - PCV13) 01/30/1996    Flu vaccine (1) 09/01/2018       Subjective:      Review of Systems   Constitutional: Negative for activity change, appetite change, chills, diaphoresis, fatigue, fever and unexpected weight change. HENT: Negative for congestion, ear pain, postnasal drip, sinus pressure and sore throat. Eyes: Negative for visual disturbance. Respiratory: Negative for cough, chest tightness, shortness of breath, wheezing and stridor. Cardiovascular: Negative for chest pain, palpitations and leg swelling. Gastrointestinal: Negative for abdominal distention, abdominal pain, constipation, diarrhea, nausea and vomiting. Endocrine: Negative for polydipsia, polyphagia and polyuria. Genitourinary: Negative for decreased urine volume, difficulty urinating, dysuria, flank pain, frequency, hematuria and urgency. Musculoskeletal: Negative for arthralgias, back pain, gait problem, joint swelling, myalgias and neck pain. Skin: Negative for color change, pallor and rash.

## 2018-09-24 ASSESSMENT — ENCOUNTER SYMPTOMS
COLOR CHANGE: 0
COUGH: 0
DIARRHEA: 0
ABDOMINAL DISTENTION: 0
BACK PAIN: 0
NAUSEA: 0
SHORTNESS OF BREATH: 0
SINUS PRESSURE: 0
WHEEZING: 0
STRIDOR: 0
VOMITING: 0
CHEST TIGHTNESS: 0
ABDOMINAL PAIN: 0
CONSTIPATION: 0
SORE THROAT: 0

## 2018-12-12 ENCOUNTER — OFFICE VISIT (OUTPATIENT)
Dept: FAMILY MEDICINE CLINIC | Age: 83
End: 2018-12-12
Payer: MEDICARE

## 2018-12-12 VITALS
BODY MASS INDEX: 31.35 KG/M2 | SYSTOLIC BLOOD PRESSURE: 136 MMHG | DIASTOLIC BLOOD PRESSURE: 86 MMHG | RESPIRATION RATE: 16 BRPM | WEIGHT: 219 LBS | HEART RATE: 57 BPM | HEIGHT: 70 IN | OXYGEN SATURATION: 98 %

## 2018-12-12 DIAGNOSIS — I10 ESSENTIAL HYPERTENSION: ICD-10-CM

## 2018-12-12 DIAGNOSIS — E11.9 TYPE 2 DIABETES MELLITUS WITHOUT COMPLICATION, WITHOUT LONG-TERM CURRENT USE OF INSULIN (HCC): Primary | ICD-10-CM

## 2018-12-12 LAB — HBA1C MFR BLD: 6.8 %

## 2018-12-12 PROCEDURE — G8484 FLU IMMUNIZE NO ADMIN: HCPCS | Performed by: NURSE PRACTITIONER

## 2018-12-12 PROCEDURE — 1036F TOBACCO NON-USER: CPT | Performed by: NURSE PRACTITIONER

## 2018-12-12 PROCEDURE — 1101F PT FALLS ASSESS-DOCD LE1/YR: CPT | Performed by: NURSE PRACTITIONER

## 2018-12-12 PROCEDURE — 83036 HEMOGLOBIN GLYCOSYLATED A1C: CPT | Performed by: NURSE PRACTITIONER

## 2018-12-12 PROCEDURE — 1123F ACP DISCUSS/DSCN MKR DOCD: CPT | Performed by: NURSE PRACTITIONER

## 2018-12-12 PROCEDURE — 99214 OFFICE O/P EST MOD 30 MIN: CPT | Performed by: NURSE PRACTITIONER

## 2018-12-12 PROCEDURE — G8427 DOCREV CUR MEDS BY ELIG CLIN: HCPCS | Performed by: NURSE PRACTITIONER

## 2018-12-12 PROCEDURE — G8417 CALC BMI ABV UP PARAM F/U: HCPCS | Performed by: NURSE PRACTITIONER

## 2018-12-12 PROCEDURE — 4040F PNEUMOC VAC/ADMIN/RCVD: CPT | Performed by: NURSE PRACTITIONER

## 2018-12-12 ASSESSMENT — ENCOUNTER SYMPTOMS
CHEST TIGHTNESS: 0
COUGH: 0
SHORTNESS OF BREATH: 0
BACK PAIN: 0
STRIDOR: 0
NAUSEA: 0
SORE THROAT: 0
CONSTIPATION: 0
VOMITING: 0
ABDOMINAL DISTENTION: 0
ABDOMINAL PAIN: 0
WHEEZING: 0
SINUS PRESSURE: 0
DIARRHEA: 0
COLOR CHANGE: 0

## 2018-12-12 NOTE — PROGRESS NOTES
 Diabetes Paternal Grandfather        Social History   Substance Use Topics    Smoking status: Former Smoker     Types: Cigars     Quit date: 1/1/1991    Smokeless tobacco: Never Used      Comment: rarely smoked a cigar     Alcohol use No      Current Outpatient Prescriptions   Medication Sig Dispense Refill    hydrochlorothiazide (HYDRODIURIL) 12.5 MG tablet TAKE 1 TABLET BY MOUTH ONCE DAILY 90 tablet 1    metFORMIN (GLUCOPHAGE) 1000 MG tablet TAKE 1 TABLET BY MOUTH ONCE DAILY WITH  BREAKFAST 90 tablet 1    bisoprolol-hydrochlorothiazide (ZIAC) 10-6.25 MG per tablet TAKE TWO TABLETS BY MOUTH ONCE DAILY 180 tablet 2    lisinopril (PRINIVIL;ZESTRIL) 20 MG tablet TAKE 2 TABLETS BY MOUTH ONCE DAILY 180 tablet 3     No current facility-administered medications for this visit. Allergies   Allergen Reactions    Norvasc [Amlodipine Besylate]     Other Rash     States allergic to several BP meds (unsure of name)       Health Maintenance   Topic Date Due    DTaP/Tdap/Td vaccine (1 - Tdap) 01/30/1950    Shingles Vaccine (1 of 2 - 2 Dose Series) 01/30/1981    Pneumococcal low/med risk (1 of 2 - PCV13) 01/30/1996    Flu vaccine (1) 09/01/2018       Subjective:      Review of Systems   Constitutional: Negative for activity change, appetite change, chills, diaphoresis, fatigue, fever and unexpected weight change. HENT: Negative for congestion, ear pain, postnasal drip, sinus pressure and sore throat. Eyes: Negative for visual disturbance. Respiratory: Negative for cough, chest tightness, shortness of breath, wheezing and stridor. Cardiovascular: Negative for chest pain, palpitations and leg swelling. Gastrointestinal: Negative for abdominal distention, abdominal pain, constipation, diarrhea, nausea and vomiting. Endocrine: Negative for polydipsia, polyphagia and polyuria.    Genitourinary: Negative for decreased urine volume, difficulty urinating, dysuria, flank pain, frequency, hematuria and urgency. Musculoskeletal: Negative for arthralgias, back pain, gait problem, joint swelling, myalgias and neck pain. Skin: Negative for color change, pallor and rash. Neurological: Negative for dizziness, syncope, weakness, light-headedness, numbness and headaches. Hematological: Negative for adenopathy. Psychiatric/Behavioral: Negative for behavioral problems, self-injury and sleep disturbance. The patient is not nervous/anxious. Objective:     Physical Exam   Constitutional: He is oriented to person, place, and time. He appears well-developed and well-nourished. HENT:   Head: Normocephalic. Right Ear: Tympanic membrane and external ear normal.   Left Ear: Tympanic membrane and external ear normal.   Nose: Nose normal. Right sinus exhibits no maxillary sinus tenderness. Left sinus exhibits no maxillary sinus tenderness. Mouth/Throat: Uvula is midline, oropharynx is clear and moist and mucous membranes are normal.   Eyes: Pupils are equal, round, and reactive to light. Neck: Trachea normal and normal range of motion. Neck supple. No JVD present. Carotid bruit is not present. No thyromegaly present. Cardiovascular: Normal rate, regular rhythm, normal heart sounds and intact distal pulses. No murmur heard. Pulmonary/Chest: Effort normal and breath sounds normal. No respiratory distress. He has no decreased breath sounds. He has no wheezes. He has no rhonchi. He has no rales. He exhibits no tenderness. Abdominal: Soft. Normal appearance and bowel sounds are normal. He exhibits no distension and no mass. There is no hepatosplenomegaly. There is no tenderness. There is no CVA tenderness. Musculoskeletal: Normal range of motion. He exhibits no edema, tenderness or deformity. Lymphadenopathy:     He has no cervical adenopathy. Neurological: He is alert and oriented to person, place, and time. No cranial nerve deficit or sensory deficit. He exhibits normal muscle tone.  Coordination and

## 2019-02-06 ENCOUNTER — HOSPITAL ENCOUNTER (EMERGENCY)
Age: 84
Discharge: HOME | End: 2019-02-06
Attending: EMERGENCY MEDICINE
Payer: MEDICARE

## 2019-02-06 VITALS
DIASTOLIC BLOOD PRESSURE: 65 MMHG | RESPIRATION RATE: 16 BRPM | HEIGHT: 70 IN | OXYGEN SATURATION: 98 % | HEART RATE: 72 BPM | WEIGHT: 215 LBS | BODY MASS INDEX: 30.78 KG/M2 | SYSTOLIC BLOOD PRESSURE: 173 MMHG | TEMPERATURE: 98.2 F

## 2019-02-06 DIAGNOSIS — H11.32 SUBCONJUNCTIVAL HEMORRHAGE OF LEFT EYE: Primary | ICD-10-CM

## 2019-02-06 DIAGNOSIS — I10 ESSENTIAL HYPERTENSION: ICD-10-CM

## 2019-02-06 DIAGNOSIS — R09.81 NASAL CONGESTION: ICD-10-CM

## 2019-02-06 PROCEDURE — 99282 EMERGENCY DEPT VISIT SF MDM: CPT

## 2019-02-06 RX ORDER — FLUTICASONE PROPIONATE 50 MCG
1 SPRAY, SUSPENSION (ML) NASAL DAILY
Qty: 1 BOTTLE | Refills: 0 | Status: SHIPPED | OUTPATIENT
Start: 2019-02-06 | End: 2019-08-15

## 2019-02-06 ASSESSMENT — ENCOUNTER SYMPTOMS
EYE REDNESS: 1
ABDOMINAL PAIN: 0
COUGH: 1
DIARRHEA: 0
BLOOD IN STOOL: 0
VOMITING: 0
SORE THROAT: 0
WHEEZING: 0
EYE PAIN: 0
SHORTNESS OF BREATH: 0

## 2019-02-06 ASSESSMENT — VISUAL ACUITY
OD: 20/15
OS: 20/30
OU: 20/15

## 2019-02-07 ENCOUNTER — TELEPHONE (OUTPATIENT)
Dept: FAMILY MEDICINE CLINIC | Age: 84
End: 2019-02-07

## 2019-02-26 DIAGNOSIS — E11.9 TYPE 2 DIABETES MELLITUS WITHOUT COMPLICATION, WITHOUT LONG-TERM CURRENT USE OF INSULIN (HCC): ICD-10-CM

## 2019-02-27 RX ORDER — HYDROCHLOROTHIAZIDE 12.5 MG/1
TABLET ORAL
Qty: 90 TABLET | Refills: 1 | Status: SHIPPED | OUTPATIENT
Start: 2019-02-27 | End: 2019-05-15 | Stop reason: SDUPTHER

## 2019-03-06 ENCOUNTER — PATIENT MESSAGE (OUTPATIENT)
Dept: FAMILY MEDICINE CLINIC | Age: 84
End: 2019-03-06

## 2019-03-06 DIAGNOSIS — D64.9 ANEMIA, UNSPECIFIED TYPE: ICD-10-CM

## 2019-03-06 DIAGNOSIS — Z13.220 SCREENING FOR LIPID DISORDERS: ICD-10-CM

## 2019-03-06 DIAGNOSIS — I10 ESSENTIAL HYPERTENSION: ICD-10-CM

## 2019-03-06 DIAGNOSIS — E11.9 TYPE 2 DIABETES MELLITUS WITHOUT COMPLICATION, WITHOUT LONG-TERM CURRENT USE OF INSULIN (HCC): Primary | ICD-10-CM

## 2019-03-08 ENCOUNTER — HOSPITAL ENCOUNTER (OUTPATIENT)
Age: 84
Discharge: HOME | End: 2019-03-08
Payer: MEDICARE

## 2019-03-08 DIAGNOSIS — E11.9 TYPE 2 DIABETES MELLITUS WITHOUT COMPLICATION, WITHOUT LONG-TERM CURRENT USE OF INSULIN (HCC): ICD-10-CM

## 2019-03-08 DIAGNOSIS — D64.9 ANEMIA, UNSPECIFIED TYPE: ICD-10-CM

## 2019-03-08 DIAGNOSIS — Z13.220 SCREENING FOR LIPID DISORDERS: ICD-10-CM

## 2019-03-08 DIAGNOSIS — I10 ESSENTIAL HYPERTENSION: ICD-10-CM

## 2019-03-08 LAB
ALBUMIN SERPL-MCNC: 4 G/DL (ref 3.5–5.1)
ALP BLD-CCNC: 61 U/L (ref 38–126)
ALT SERPL-CCNC: 13 U/L (ref 11–66)
ANION GAP SERPL CALCULATED.3IONS-SCNC: 13 MEQ/L (ref 8–16)
AST SERPL-CCNC: 13 U/L (ref 5–40)
BASOPHILS # BLD: 0.6 %
BASOPHILS ABSOLUTE: 0.1 THOU/MM3 (ref 0–0.1)
BILIRUB SERPL-MCNC: 0.7 MG/DL (ref 0.3–1.2)
BUN BLDV-MCNC: 26 MG/DL (ref 7–22)
CALCIUM SERPL-MCNC: 9.4 MG/DL (ref 8.5–10.5)
CHLORIDE BLD-SCNC: 103 MEQ/L (ref 98–111)
CHOLESTEROL, TOTAL: 150 MG/DL (ref 100–199)
CO2: 24 MEQ/L (ref 23–33)
CREAT SERPL-MCNC: 1 MG/DL (ref 0.4–1.2)
EOSINOPHIL # BLD: 12.7 %
EOSINOPHILS ABSOLUTE: 1.1 THOU/MM3 (ref 0–0.4)
ERYTHROCYTE [DISTWIDTH] IN BLOOD BY AUTOMATED COUNT: 13.5 % (ref 11.5–14.5)
ERYTHROCYTE [DISTWIDTH] IN BLOOD BY AUTOMATED COUNT: 41.5 FL (ref 35–45)
GFR SERPL CREATININE-BSD FRML MDRD: 71 ML/MIN/1.73M2
GLUCOSE BLD-MCNC: 143 MG/DL (ref 70–108)
HCT VFR BLD CALC: 39.5 % (ref 42–52)
HDLC SERPL-MCNC: 36 MG/DL
HEMOGLOBIN: 12.6 GM/DL (ref 14–18)
IMMATURE GRANS (ABS): 0.05 THOU/MM3 (ref 0–0.07)
IMMATURE GRANULOCYTES: 0.6 %
LDL CHOLESTEROL CALCULATED: 88 MG/DL
LYMPHOCYTES # BLD: 24 %
LYMPHOCYTES ABSOLUTE: 2.1 THOU/MM3 (ref 1–4.8)
MCH RBC QN AUTO: 27 PG (ref 26–33)
MCHC RBC AUTO-ENTMCNC: 31.9 GM/DL (ref 32.2–35.5)
MCV RBC AUTO: 84.8 FL (ref 80–94)
MONOCYTES # BLD: 8.1 %
MONOCYTES ABSOLUTE: 0.7 THOU/MM3 (ref 0.4–1.3)
NUCLEATED RED BLOOD CELLS: 0 /100 WBC
PLATELET # BLD: 141 THOU/MM3 (ref 130–400)
PMV BLD AUTO: 13.4 FL (ref 9.4–12.4)
POTASSIUM SERPL-SCNC: 4.8 MEQ/L (ref 3.5–5.2)
RBC # BLD: 4.66 MILL/MM3 (ref 4.7–6.1)
SEG NEUTROPHILS: 54 %
SEGMENTED NEUTROPHILS ABSOLUTE COUNT: 4.8 THOU/MM3 (ref 1.8–7.7)
SODIUM BLD-SCNC: 140 MEQ/L (ref 135–145)
TOTAL PROTEIN: 6.8 G/DL (ref 6.1–8)
TRIGL SERPL-MCNC: 128 MG/DL (ref 0–199)
WBC # BLD: 8.8 THOU/MM3 (ref 4.8–10.8)

## 2019-03-08 PROCEDURE — 36415 COLL VENOUS BLD VENIPUNCTURE: CPT

## 2019-03-08 PROCEDURE — 80061 LIPID PANEL: CPT

## 2019-03-08 PROCEDURE — 85025 COMPLETE CBC W/AUTO DIFF WBC: CPT

## 2019-03-08 PROCEDURE — 80053 COMPREHEN METABOLIC PANEL: CPT

## 2019-03-12 ENCOUNTER — OFFICE VISIT (OUTPATIENT)
Dept: FAMILY MEDICINE CLINIC | Age: 84
End: 2019-03-12
Payer: MEDICARE

## 2019-03-12 VITALS
HEART RATE: 62 BPM | WEIGHT: 215 LBS | BODY MASS INDEX: 30.85 KG/M2 | OXYGEN SATURATION: 99 % | DIASTOLIC BLOOD PRESSURE: 76 MMHG | RESPIRATION RATE: 16 BRPM | SYSTOLIC BLOOD PRESSURE: 132 MMHG

## 2019-03-12 DIAGNOSIS — D64.9 ANEMIA, UNSPECIFIED TYPE: ICD-10-CM

## 2019-03-12 DIAGNOSIS — E11.9 TYPE 2 DIABETES MELLITUS WITHOUT COMPLICATION, WITHOUT LONG-TERM CURRENT USE OF INSULIN (HCC): Primary | ICD-10-CM

## 2019-03-12 DIAGNOSIS — I10 ESSENTIAL HYPERTENSION: ICD-10-CM

## 2019-03-12 DIAGNOSIS — M15.9 PRIMARY OSTEOARTHRITIS INVOLVING MULTIPLE JOINTS: ICD-10-CM

## 2019-03-12 LAB — HBA1C MFR BLD: 7.1 %

## 2019-03-12 PROCEDURE — 1101F PT FALLS ASSESS-DOCD LE1/YR: CPT | Performed by: NURSE PRACTITIONER

## 2019-03-12 PROCEDURE — 83036 HEMOGLOBIN GLYCOSYLATED A1C: CPT | Performed by: NURSE PRACTITIONER

## 2019-03-12 PROCEDURE — 99214 OFFICE O/P EST MOD 30 MIN: CPT | Performed by: NURSE PRACTITIONER

## 2019-03-12 PROCEDURE — G8484 FLU IMMUNIZE NO ADMIN: HCPCS | Performed by: NURSE PRACTITIONER

## 2019-03-12 PROCEDURE — 4040F PNEUMOC VAC/ADMIN/RCVD: CPT | Performed by: NURSE PRACTITIONER

## 2019-03-12 PROCEDURE — G8417 CALC BMI ABV UP PARAM F/U: HCPCS | Performed by: NURSE PRACTITIONER

## 2019-03-12 PROCEDURE — 1036F TOBACCO NON-USER: CPT | Performed by: NURSE PRACTITIONER

## 2019-03-12 PROCEDURE — G8427 DOCREV CUR MEDS BY ELIG CLIN: HCPCS | Performed by: NURSE PRACTITIONER

## 2019-03-12 PROCEDURE — 1123F ACP DISCUSS/DSCN MKR DOCD: CPT | Performed by: NURSE PRACTITIONER

## 2019-03-12 ASSESSMENT — PATIENT HEALTH QUESTIONNAIRE - PHQ9
SUM OF ALL RESPONSES TO PHQ9 QUESTIONS 1 & 2: 0
2. FEELING DOWN, DEPRESSED OR HOPELESS: 0
SUM OF ALL RESPONSES TO PHQ QUESTIONS 1-9: 0
1. LITTLE INTEREST OR PLEASURE IN DOING THINGS: 0
SUM OF ALL RESPONSES TO PHQ QUESTIONS 1-9: 0

## 2019-03-13 ASSESSMENT — ENCOUNTER SYMPTOMS
STRIDOR: 0
ABDOMINAL DISTENTION: 0
CONSTIPATION: 0
COUGH: 0
COLOR CHANGE: 0
SORE THROAT: 0
BACK PAIN: 0
SINUS PRESSURE: 0
CHEST TIGHTNESS: 0
SHORTNESS OF BREATH: 0
VOMITING: 0
ABDOMINAL PAIN: 0
WHEEZING: 0
NAUSEA: 0
DIARRHEA: 0

## 2019-04-24 ENCOUNTER — OFFICE VISIT (OUTPATIENT)
Dept: FAMILY MEDICINE CLINIC | Age: 84
End: 2019-04-24
Payer: MEDICARE

## 2019-04-24 VITALS
DIASTOLIC BLOOD PRESSURE: 74 MMHG | HEIGHT: 70 IN | BODY MASS INDEX: 31.92 KG/M2 | HEART RATE: 68 BPM | SYSTOLIC BLOOD PRESSURE: 136 MMHG | WEIGHT: 223 LBS | OXYGEN SATURATION: 98 % | RESPIRATION RATE: 20 BRPM

## 2019-04-24 DIAGNOSIS — I35.0 NONRHEUMATIC AORTIC VALVE STENOSIS: ICD-10-CM

## 2019-04-24 DIAGNOSIS — R60.0 BILATERAL LEG EDEMA: ICD-10-CM

## 2019-04-24 DIAGNOSIS — R01.1 HEART MURMUR: Primary | ICD-10-CM

## 2019-04-24 DIAGNOSIS — I10 ESSENTIAL HYPERTENSION: ICD-10-CM

## 2019-04-24 PROCEDURE — 1123F ACP DISCUSS/DSCN MKR DOCD: CPT | Performed by: NURSE PRACTITIONER

## 2019-04-24 PROCEDURE — 99214 OFFICE O/P EST MOD 30 MIN: CPT | Performed by: NURSE PRACTITIONER

## 2019-04-24 PROCEDURE — G8417 CALC BMI ABV UP PARAM F/U: HCPCS | Performed by: NURSE PRACTITIONER

## 2019-04-24 PROCEDURE — 4040F PNEUMOC VAC/ADMIN/RCVD: CPT | Performed by: NURSE PRACTITIONER

## 2019-04-24 PROCEDURE — G8427 DOCREV CUR MEDS BY ELIG CLIN: HCPCS | Performed by: NURSE PRACTITIONER

## 2019-04-24 PROCEDURE — 1036F TOBACCO NON-USER: CPT | Performed by: NURSE PRACTITIONER

## 2019-04-24 ASSESSMENT — ENCOUNTER SYMPTOMS
ABDOMINAL DISTENTION: 0
COLOR CHANGE: 0
SHORTNESS OF BREATH: 0
COUGH: 0
ROS SKIN COMMENTS: LEG EDEMA
SINUS PRESSURE: 0
CHEST TIGHTNESS: 0
BACK PAIN: 0
NAUSEA: 0
VOMITING: 0
SORE THROAT: 0
STRIDOR: 0
ABDOMINAL PAIN: 0
DIARRHEA: 0
CONSTIPATION: 0
WHEEZING: 0

## 2019-04-24 NOTE — PROGRESS NOTES
[facility]ae[facility]  [address] [place] [id number]  Dept: [phone]  Dept Fax: [phone]  Loc: [phone]    Eitan Grigsby is a 80 y.o. male who presents today for his medical conditions/complaints as noted below. Chief Complaint   Patient presents with    Swelling     bilateral leg and ankle swelling that has gotten worse gradually since march            HPI:     HPI    This is a 26-year-old male who presents today for some bilateral lower leg edema. He states he always had a little bit of ankle edema. He states it is now going up the leg some. Getting worse over the last month. Has been sitting more at the computer for long periods of time. Denies any cp or sob. Does have moderate aortic stenosis. Had echo 2 years ago and EF 60%. No hx of MI. Has been playing with BP medications to see if the ziac is the culprit.    He is now only taking 1 tab instead of 2.         recheck /84    Past Medical History:   Diagnosis Date    Diabetes mellitus ([facility] [address]     Hypertension     Type II or unspecified type diabetes mellitus without mention of complication, not stated as uncontrolled       Past Surgical History:   Procedure Laterality Date    TONSILLECTOMY AND ADENOIDECTOMY Bilateral     age 10        Family History   Problem Relation Age of Onset    Diabetes Paternal Aunt     Diabetes Paternal Uncle     Cancer Paternal Uncle     Diabetes Maternal Grandmother     Diabetes Maternal Grandfather     Diabetes Paternal Grandmother     Cancer Paternal Grandmother     Diabetes Paternal Grandfather        Social History     Tobacco Use    Smoking status: Former Smoker     Types: Cigars     Last attempt to quit: 1991     Years since quittin.3    Smokeless tobacco: Never Used    Tobacco comment: rarely smoked a cigar    Substance Use Topics    Alcohol use: No     Alcohol/week: 0.0 oz      Current Outpatient Medications   Medication Sig Dispense Refill    metFORMIN (GLUCOPHAGE) 1000 MG tablet TAKE 1 TABLET BY MOUTH ONCE DAILY WITH BREAKFAST 90 tablet 1    hydrochlorothiazide (HYDRODIURIL) 12.5 MG tablet TAKE 1 TABLET BY MOUTH ONCE DAILY 90 tablet 1    fluticasone (FLONASE) 50 MCG/ACT nasal spray 1 spray by Each Nare route daily For 7 days 1 Bottle 0    bisoprolol-hydrochlorothiazide (ZIAC) 10-6.25 MG per tablet TAKE TWO TABLETS BY MOUTH ONCE DAILY 180 tablet 2    lisinopril (PRINIVIL;ZESTRIL) 20 MG tablet TAKE 2 TABLETS BY MOUTH ONCE DAILY 180 tablet 3     No current facility-administered medications for this visit. Allergies   Allergen Reactions    Norvasc [Amlodipine Besylate]     Other Rash     States allergic to several BP meds (unsure of name)       Health Maintenance   Topic Date Due    DTaP/Tdap/Td vaccine (1 - Tdap) 01/30/1950    Shingles Vaccine (1 of 2) 01/30/1981    Pneumococcal 65+ years Vaccine (1 of 2 - PCV13) 01/30/1996    Flu vaccine (Season Ended) 09/01/2019       Subjective:      Review of Systems   Constitutional: Negative for activity change, appetite change, chills, diaphoresis, fatigue, fever and unexpected weight change. HENT: Negative for congestion, ear pain, postnasal drip, sinus pressure and sore throat. Eyes: Negative for visual disturbance. Respiratory: Negative for cough, chest tightness, shortness of breath, wheezing and stridor. Cardiovascular: Negative for chest pain, palpitations and leg swelling. Gastrointestinal: Negative for abdominal distention, abdominal pain, constipation, diarrhea, nausea and vomiting. Endocrine: Negative for polydipsia, polyphagia and polyuria. Genitourinary: Negative for decreased urine volume, difficulty urinating, dysuria, flank pain, frequency, hematuria and urgency. Musculoskeletal: Negative for arthralgias, back pain, gait problem, joint swelling, myalgias and neck pain.    Skin: Negative for color change, pallor and rash. Leg edema   Neurological: Negative for dizziness, syncope, weakness, light-headedness, numbness and headaches. Hematological: Negative for adenopathy. Psychiatric/Behavioral: Negative for behavioral problems, self-injury and sleep disturbance. The patient is not nervous/anxious. Objective:     Physical Exam   Constitutional: He is oriented to person, place, and time. He appears well-developed and well-nourished. HENT:   Head: Normocephalic. Right Ear: Tympanic membrane and external ear normal.   Left Ear: Tympanic membrane and external ear normal.   Nose: Nose normal. Right sinus exhibits no maxillary sinus tenderness. Left sinus exhibits no maxillary sinus tenderness. Mouth/Throat: Uvula is midline and mucous membranes are normal.   Eyes: Pupils are equal, round, and reactive to light. Neck: Trachea normal and normal range of motion. Neck supple. No JVD present. Carotid bruit is not present. No thyromegaly present. Cardiovascular: Normal rate, regular rhythm and intact distal pulses. Murmur heard. Pulmonary/Chest: Effort normal and breath sounds normal. No respiratory distress. He has no decreased breath sounds. He has no wheezes. He has no rhonchi. He has no rales. He exhibits no tenderness. Abdominal: Soft. Normal appearance and bowel sounds are normal. He exhibits no distension and no mass. There is no hepatosplenomegaly. There is no tenderness. There is no CVA tenderness. Musculoskeletal: Normal range of motion. He exhibits no edema, tenderness or deformity. Lymphadenopathy:     He has no cervical adenopathy. Neurological: He is alert and oriented to person, place, and time. No cranial nerve deficit or sensory deficit. He exhibits normal muscle tone. Coordination and gait normal.   Skin: Skin is warm and dry. No rash noted. No erythema. Bilateral mild-moderate lower leg edema up to mid calf. Cap refill <3sec. Psychiatric: He has a normal mood and affect.  His behavior is normal. Judgment and thought content normal.   Nursing note and vitals reviewed. /74   Pulse 68   Resp 20   Ht 5' 10\" (1.778 m)   Wt 223 lb (101.2 kg)   SpO2 98%   BMI 32.00 kg/m²     Assessment:       Diagnosis Orders   1. Heart murmur  EKG 12 Lead    ECHO Complete 2D W Doppler W Color   2. Nonrheumatic aortic valve stenosis  EKG 12 Lead    ECHO Complete 2D W Doppler W Color   3. Bilateral leg edema  Basic Metabolic Panel   4. Essential hypertension         Plan:     Obtain repeat echo  Obtain EKG  Obtain bmp  Take meds as prescribed  Return in 3 weeks for recheck BP and to discuss testing  Avoid all sodium  Wear compression stocking  Elevate legs when sitting    Discussed use, benefit, and side effects of prescribed medications. Barriers tomedication compliance addressed. All patient questions answered. Pt voiced understanding. Return in about 3 weeks (around 5/15/2019). Orders Placed This Encounter   Procedures    Basic Metabolic Panel     Standing Status:   Future     Standing Expiration Date:   4/23/2020    EKG 12 Lead     Standing Status:   Future     Standing Expiration Date:   4/23/2020     Order Specific Question:   Reason for Exam?     Answer:   Murmur    ECHO Complete 2D W Doppler W Color     Standing Status:   Future     Standing Expiration Date:   4/23/2020     Order Specific Question:   Reason for exam:     Answer:   heart murmer     Order Specific Question:   Reason for exam:     Answer:   aortic stenosis     No orders of the defined types were placed in this encounter. Reccommended tobacco cessation options including pharmacologicmethods, counseled great than 3 minutes during this visit:  Yes  []  No  []     Patient given educational materials - see patient instructions. Discussed use, benefit, and sideeffects of prescribed medications. All patient questions answered. Pt voiced understanding. Reviewed health maintenance.   Instructed to continue current

## 2019-04-30 ENCOUNTER — HOSPITAL ENCOUNTER (OUTPATIENT)
Dept: NON INVASIVE DIAGNOSTICS | Age: 84
Discharge: HOME | End: 2019-04-30
Payer: MEDICARE

## 2019-04-30 DIAGNOSIS — I35.0 NONRHEUMATIC AORTIC VALVE STENOSIS: ICD-10-CM

## 2019-04-30 DIAGNOSIS — R01.1 HEART MURMUR: ICD-10-CM

## 2019-04-30 LAB
LV EF: 60 %
LVEF MODALITY: NORMAL

## 2019-04-30 PROCEDURE — 93306 TTE W/DOPPLER COMPLETE: CPT

## 2019-05-02 ENCOUNTER — HOSPITAL ENCOUNTER (OUTPATIENT)
Age: 84
Discharge: HOME | End: 2019-05-02
Payer: MEDICARE

## 2019-05-02 DIAGNOSIS — R60.0 BILATERAL LEG EDEMA: ICD-10-CM

## 2019-05-02 LAB
ANION GAP SERPL CALCULATED.3IONS-SCNC: 11 MEQ/L (ref 8–16)
BUN BLDV-MCNC: 28 MG/DL (ref 7–22)
CALCIUM SERPL-MCNC: 9.1 MG/DL (ref 8.5–10.5)
CHLORIDE BLD-SCNC: 101 MEQ/L (ref 98–111)
CO2: 25 MEQ/L (ref 23–33)
CREAT SERPL-MCNC: 1.2 MG/DL (ref 0.4–1.2)
GFR SERPL CREATININE-BSD FRML MDRD: 57 ML/MIN/1.73M2
GLUCOSE BLD-MCNC: 199 MG/DL (ref 70–108)
POTASSIUM SERPL-SCNC: 4.4 MEQ/L (ref 3.5–5.2)
SODIUM BLD-SCNC: 137 MEQ/L (ref 135–145)

## 2019-05-02 PROCEDURE — 36415 COLL VENOUS BLD VENIPUNCTURE: CPT

## 2019-05-02 PROCEDURE — 80048 BASIC METABOLIC PNL TOTAL CA: CPT

## 2019-05-15 ENCOUNTER — TELEPHONE (OUTPATIENT)
Dept: FAMILY MEDICINE CLINIC | Age: 84
End: 2019-05-15

## 2019-05-15 ENCOUNTER — OFFICE VISIT (OUTPATIENT)
Dept: FAMILY MEDICINE CLINIC | Age: 84
End: 2019-05-15
Payer: MEDICARE

## 2019-05-15 VITALS
DIASTOLIC BLOOD PRESSURE: 76 MMHG | RESPIRATION RATE: 20 BRPM | HEIGHT: 70 IN | SYSTOLIC BLOOD PRESSURE: 136 MMHG | OXYGEN SATURATION: 97 % | WEIGHT: 217 LBS | BODY MASS INDEX: 31.07 KG/M2 | HEART RATE: 70 BPM

## 2019-05-15 DIAGNOSIS — I10 ESSENTIAL HYPERTENSION: Primary | ICD-10-CM

## 2019-05-15 DIAGNOSIS — E11.9 TYPE 2 DIABETES MELLITUS WITHOUT COMPLICATION, WITHOUT LONG-TERM CURRENT USE OF INSULIN (HCC): ICD-10-CM

## 2019-05-15 DIAGNOSIS — I35.0 NONRHEUMATIC AORTIC VALVE STENOSIS: ICD-10-CM

## 2019-05-15 PROCEDURE — G8427 DOCREV CUR MEDS BY ELIG CLIN: HCPCS | Performed by: NURSE PRACTITIONER

## 2019-05-15 PROCEDURE — 1123F ACP DISCUSS/DSCN MKR DOCD: CPT | Performed by: NURSE PRACTITIONER

## 2019-05-15 PROCEDURE — 1036F TOBACCO NON-USER: CPT | Performed by: NURSE PRACTITIONER

## 2019-05-15 PROCEDURE — 4040F PNEUMOC VAC/ADMIN/RCVD: CPT | Performed by: NURSE PRACTITIONER

## 2019-05-15 PROCEDURE — G8417 CALC BMI ABV UP PARAM F/U: HCPCS | Performed by: NURSE PRACTITIONER

## 2019-05-15 PROCEDURE — 99214 OFFICE O/P EST MOD 30 MIN: CPT | Performed by: NURSE PRACTITIONER

## 2019-05-15 RX ORDER — LISINOPRIL 20 MG/1
20 TABLET ORAL DAILY
Qty: 180 TABLET | Refills: 3 | Status: SHIPPED | OUTPATIENT
Start: 2019-05-15 | End: 2019-05-15 | Stop reason: SDUPTHER

## 2019-05-15 RX ORDER — LISINOPRIL 20 MG/1
40 TABLET ORAL DAILY
Qty: 180 TABLET | Refills: 3 | Status: SHIPPED | OUTPATIENT
Start: 2019-05-15 | End: 2020-05-06

## 2019-05-15 RX ORDER — BISOPROLOL FUMARATE AND HYDROCHLOROTHIAZIDE 10; 6.25 MG/1; MG/1
1 TABLET ORAL DAILY
Qty: 90 TABLET | Refills: 3 | Status: SHIPPED | OUTPATIENT
Start: 2019-05-15 | End: 2019-08-15 | Stop reason: SDUPTHER

## 2019-05-15 RX ORDER — HYDROCHLOROTHIAZIDE 12.5 MG/1
TABLET ORAL
Qty: 90 TABLET | Refills: 3 | Status: SHIPPED | OUTPATIENT
Start: 2019-05-15 | End: 2019-08-15

## 2019-05-15 NOTE — PROGRESS NOTES
Dominick Chisholm  [address] [place] [address]  Dept: [phone]  Dept Fax: [phone]  Loc: [phone]    Kaleb Cabrera is a 80 y.o. male who presents today for his medical conditions/complaints as noted below. Chief Complaint   Patient presents with   [facility]     here for 3 week check up feels good no complaints            HPI:     HPI    This is a 80year old gentleman who presents today for HTN check. Pt has been adjusting his medications. He is down to 1 ziac tablet daily. He is taking the other as prescribed. He has been doing ok and denies any complaints. Denies cp or sob. He does have several other chronic illness which are stable. These include diabetes and arotic stenosis. He did recently have echo which was unchanged from prior. His EF was 60%. His leg swelling is much better. Does have some ckd which is being monitor.       Past Medical History:   Diagnosis Date    Diabetes mellitus ([facility] [place] [address]     Hypertension     Type II or unspecified type diabetes mellitus without mention of complication, not stated as uncontrolled       Past Surgical History:   Procedure Laterality Date    TONSILLECTOMY AND ADENOIDECTOMY Bilateral     age 10        Family History   Problem Relation Age of Onset    Diabetes Paternal Aunt     Diabetes Paternal Uncle     Cancer Paternal Uncle     Diabetes Maternal Grandmother     Diabetes Maternal Grandfather     Diabetes Paternal Grandmother     Cancer Paternal Grandmother     Diabetes Paternal Grandfather        Social History     Tobacco Use    Smoking status: Former Smoker     Types: Cigars     Last attempt to quit: 1991     Years since quittin.3    Smokeless tobacco: Never Used    Tobacco comment: rarely smoked a cigar    Substance Use Topics    Alcohol use: No     Alcohol/week: 0.0 oz      Current Outpatient Medications   Medication Sig Dispense Refill  bisoprolol-hydrochlorothiazide (ZIAC) 10-6.25 MG per tablet Take 1 tablet by mouth daily 90 tablet 3    hydrochlorothiazide (HYDRODIURIL) 12.5 MG tablet TAKE 1 TABLET BY MOUTH ONCE DAILY 90 tablet 3    metFORMIN (GLUCOPHAGE) 1000 MG tablet TAKE 1 TABLET BY MOUTH ONCE DAILY WITH BREAKFAST 90 tablet 3    lisinopril (PRINIVIL;ZESTRIL) 20 MG tablet Take 2 tablets by mouth daily 180 tablet 3    fluticasone (FLONASE) 50 MCG/ACT nasal spray 1 spray by Each Nare route daily For 7 days 1 Bottle 0     No current facility-administered medications for this visit. Allergies   Allergen Reactions    Norvasc [Amlodipine Besylate]     Other Rash     States allergic to several BP meds (unsure of name)       Health Maintenance   Topic Date Due    DTaP/Tdap/Td vaccine (1 - Tdap) 01/30/1950    Shingles Vaccine (1 of 2) 01/30/1981    Pneumococcal 65+ years Vaccine (1 of 2 - PCV13) 01/30/1996    Flu vaccine (Season Ended) 09/01/2019       Subjective:      Review of Systems   Constitutional: Negative for activity change, appetite change, chills, diaphoresis, fatigue, fever and unexpected weight change. HENT: Negative for congestion, ear pain, postnasal drip, sinus pressure and sore throat. Eyes: Negative for visual disturbance. Respiratory: Negative for cough, chest tightness, shortness of breath, wheezing and stridor. Cardiovascular: Negative for chest pain, palpitations and leg swelling. Gastrointestinal: Negative for abdominal distention, abdominal pain, constipation, diarrhea, nausea and vomiting. Endocrine: Negative for polydipsia, polyphagia and polyuria. Genitourinary: Negative for decreased urine volume, difficulty urinating, dysuria, flank pain, frequency, hematuria and urgency. Musculoskeletal: Negative for arthralgias, back pain, gait problem, joint swelling, myalgias and neck pain. Skin: Negative for color change, pallor and rash.    Neurological: Negative for dizziness, syncope, weakness, Discussed use, benefit, and sideeffects of prescribed medications. All patient questions answered. Pt voiced understanding. Reviewed health maintenance. Instructed to continue current medications, diet and exercise. Patient agreed with treatment plan. Follow up as directed.      Electronically signed by EMRE Love CNP on 5/19/2019 at 10:12 AM

## 2019-05-15 NOTE — TELEPHONE ENCOUNTER
Lencho griffith Overton called regarding the prescription for bisoprolol-hydrochlorothiazide prescription that was sent in today. She stated it was prescribed for one a day and he had previously been taking two a day. Please advise.

## 2019-05-19 ASSESSMENT — ENCOUNTER SYMPTOMS
STRIDOR: 0
CHEST TIGHTNESS: 0
SINUS PRESSURE: 0
ABDOMINAL PAIN: 0
SORE THROAT: 0
COUGH: 0
ABDOMINAL DISTENTION: 0
COLOR CHANGE: 0
VOMITING: 0
DIARRHEA: 0
BACK PAIN: 0
NAUSEA: 0
SHORTNESS OF BREATH: 0
WHEEZING: 0
CONSTIPATION: 0

## 2019-08-09 ENCOUNTER — HOSPITAL ENCOUNTER (OUTPATIENT)
Age: 84
Discharge: HOME | End: 2019-08-09
Payer: MEDICARE

## 2019-08-09 DIAGNOSIS — I10 ESSENTIAL HYPERTENSION: ICD-10-CM

## 2019-08-09 LAB
ANION GAP SERPL CALCULATED.3IONS-SCNC: 10 MEQ/L (ref 8–16)
BUN BLDV-MCNC: 22 MG/DL (ref 7–22)
CALCIUM SERPL-MCNC: 9.3 MG/DL (ref 8.5–10.5)
CHLORIDE BLD-SCNC: 101 MEQ/L (ref 98–111)
CO2: 25 MEQ/L (ref 23–33)
CREAT SERPL-MCNC: 1 MG/DL (ref 0.4–1.2)
GFR SERPL CREATININE-BSD FRML MDRD: 70 ML/MIN/1.73M2
GLUCOSE BLD-MCNC: 154 MG/DL (ref 70–108)
POTASSIUM SERPL-SCNC: 4.4 MEQ/L (ref 3.5–5.2)
SODIUM BLD-SCNC: 136 MEQ/L (ref 135–145)

## 2019-08-09 PROCEDURE — 80048 BASIC METABOLIC PNL TOTAL CA: CPT

## 2019-08-09 PROCEDURE — 36415 COLL VENOUS BLD VENIPUNCTURE: CPT

## 2019-08-15 ENCOUNTER — OFFICE VISIT (OUTPATIENT)
Dept: FAMILY MEDICINE CLINIC | Age: 84
End: 2019-08-15
Payer: MEDICARE

## 2019-08-15 VITALS
HEIGHT: 70 IN | DIASTOLIC BLOOD PRESSURE: 80 MMHG | BODY MASS INDEX: 32.07 KG/M2 | OXYGEN SATURATION: 97 % | SYSTOLIC BLOOD PRESSURE: 134 MMHG | RESPIRATION RATE: 20 BRPM | WEIGHT: 224 LBS | HEART RATE: 70 BPM

## 2019-08-15 DIAGNOSIS — I10 ESSENTIAL HYPERTENSION: Primary | ICD-10-CM

## 2019-08-15 DIAGNOSIS — D64.9 ANEMIA, UNSPECIFIED TYPE: ICD-10-CM

## 2019-08-15 DIAGNOSIS — E11.9 TYPE 2 DIABETES MELLITUS WITHOUT COMPLICATION, WITHOUT LONG-TERM CURRENT USE OF INSULIN (HCC): ICD-10-CM

## 2019-08-15 LAB — HBA1C MFR BLD: 7.2 %

## 2019-08-15 PROCEDURE — G8427 DOCREV CUR MEDS BY ELIG CLIN: HCPCS | Performed by: NURSE PRACTITIONER

## 2019-08-15 PROCEDURE — 1123F ACP DISCUSS/DSCN MKR DOCD: CPT | Performed by: NURSE PRACTITIONER

## 2019-08-15 PROCEDURE — 4040F PNEUMOC VAC/ADMIN/RCVD: CPT | Performed by: NURSE PRACTITIONER

## 2019-08-15 PROCEDURE — 1036F TOBACCO NON-USER: CPT | Performed by: NURSE PRACTITIONER

## 2019-08-15 PROCEDURE — 99214 OFFICE O/P EST MOD 30 MIN: CPT | Performed by: NURSE PRACTITIONER

## 2019-08-15 PROCEDURE — G8417 CALC BMI ABV UP PARAM F/U: HCPCS | Performed by: NURSE PRACTITIONER

## 2019-08-15 PROCEDURE — 83036 HEMOGLOBIN GLYCOSYLATED A1C: CPT | Performed by: NURSE PRACTITIONER

## 2019-08-15 RX ORDER — BISOPROLOL FUMARATE AND HYDROCHLOROTHIAZIDE 10; 6.25 MG/1; MG/1
2 TABLET ORAL DAILY
Qty: 180 TABLET | Refills: 3 | Status: SHIPPED | OUTPATIENT
Start: 2019-08-15 | End: 2020-05-11 | Stop reason: SDUPTHER

## 2019-08-15 NOTE — PROGRESS NOTES
joint swelling, myalgias and neck pain. Skin: Negative for color change, pallor and rash. Neurological: Negative for dizziness, syncope, weakness, light-headedness, numbness and headaches. Hematological: Negative for adenopathy. Psychiatric/Behavioral: Negative for behavioral problems, self-injury and sleep disturbance. The patient is not nervous/anxious. Objective:     Physical Exam   Constitutional: He is oriented to person, place, and time. He appears well-developed and well-nourished. HENT:   Head: Normocephalic. Right Ear: Tympanic membrane and external ear normal.   Left Ear: Tympanic membrane and external ear normal.   Nose: Nose normal. Right sinus exhibits no maxillary sinus tenderness. Left sinus exhibits no maxillary sinus tenderness. Mouth/Throat: Uvula is midline, oropharynx is clear and moist and mucous membranes are normal.   Eyes: Pupils are equal, round, and reactive to light. Neck: Trachea normal and normal range of motion. Neck supple. No JVD present. Carotid bruit is not present. No thyromegaly present. Cardiovascular: Normal rate, regular rhythm, normal heart sounds and intact distal pulses. No murmur heard. Pulmonary/Chest: Effort normal and breath sounds normal. No respiratory distress. He has no decreased breath sounds. He has no wheezes. He has no rhonchi. He has no rales. He exhibits no tenderness. Abdominal: Soft. Normal appearance and bowel sounds are normal. He exhibits no distension and no mass. There is no hepatosplenomegaly. There is no tenderness. There is no CVA tenderness. Musculoskeletal: Normal range of motion. He exhibits no edema, tenderness or deformity. Lymphadenopathy:     He has no cervical adenopathy. Neurological: He is alert and oriented to person, place, and time. No cranial nerve deficit or sensory deficit. He exhibits normal muscle tone. Coordination and gait normal.   Skin: Skin is warm and dry. No rash noted. No erythema.

## 2019-08-21 ASSESSMENT — ENCOUNTER SYMPTOMS
STRIDOR: 0
VOMITING: 0
CHEST TIGHTNESS: 0
ABDOMINAL PAIN: 0
SHORTNESS OF BREATH: 0
CONSTIPATION: 0
COUGH: 0
ABDOMINAL DISTENTION: 0
NAUSEA: 0
SORE THROAT: 0
WHEEZING: 0
DIARRHEA: 0
BACK PAIN: 0
SINUS PRESSURE: 0
COLOR CHANGE: 0

## 2019-11-06 ENCOUNTER — PATIENT MESSAGE (OUTPATIENT)
Dept: FAMILY MEDICINE CLINIC | Age: 84
End: 2019-11-06

## 2019-11-06 DIAGNOSIS — I10 ESSENTIAL HYPERTENSION: ICD-10-CM

## 2019-11-06 DIAGNOSIS — E11.9 TYPE 2 DIABETES MELLITUS WITHOUT COMPLICATION, WITHOUT LONG-TERM CURRENT USE OF INSULIN (HCC): Primary | ICD-10-CM

## 2019-11-07 ENCOUNTER — HOSPITAL ENCOUNTER (OUTPATIENT)
Age: 84
Discharge: HOME | End: 2019-11-07
Payer: MEDICARE

## 2019-11-07 DIAGNOSIS — I10 ESSENTIAL HYPERTENSION: ICD-10-CM

## 2019-11-07 DIAGNOSIS — E11.9 TYPE 2 DIABETES MELLITUS WITHOUT COMPLICATION, WITHOUT LONG-TERM CURRENT USE OF INSULIN (HCC): ICD-10-CM

## 2019-11-07 LAB
ANION GAP SERPL CALCULATED.3IONS-SCNC: 11 MEQ/L (ref 8–16)
BUN BLDV-MCNC: 22 MG/DL (ref 7–22)
CALCIUM SERPL-MCNC: 9.3 MG/DL (ref 8.5–10.5)
CHLORIDE BLD-SCNC: 101 MEQ/L (ref 98–111)
CO2: 25 MEQ/L (ref 23–33)
CREAT SERPL-MCNC: 1 MG/DL (ref 0.4–1.2)
GFR SERPL CREATININE-BSD FRML MDRD: 70 ML/MIN/1.73M2
GLUCOSE BLD-MCNC: 263 MG/DL (ref 70–108)
POTASSIUM SERPL-SCNC: 4.3 MEQ/L (ref 3.5–5.2)
SODIUM BLD-SCNC: 137 MEQ/L (ref 135–145)

## 2019-11-07 PROCEDURE — 80048 BASIC METABOLIC PNL TOTAL CA: CPT

## 2019-11-07 PROCEDURE — 36415 COLL VENOUS BLD VENIPUNCTURE: CPT

## 2019-11-11 ENCOUNTER — OFFICE VISIT (OUTPATIENT)
Dept: FAMILY MEDICINE CLINIC | Age: 84
End: 2019-11-11
Payer: MEDICARE

## 2019-11-11 VITALS
HEIGHT: 70 IN | HEART RATE: 60 BPM | DIASTOLIC BLOOD PRESSURE: 68 MMHG | SYSTOLIC BLOOD PRESSURE: 122 MMHG | RESPIRATION RATE: 20 BRPM | WEIGHT: 214 LBS | OXYGEN SATURATION: 97 % | BODY MASS INDEX: 30.64 KG/M2

## 2019-11-11 DIAGNOSIS — Z00.00 ROUTINE GENERAL MEDICAL EXAMINATION AT A HEALTH CARE FACILITY: Primary | ICD-10-CM

## 2019-11-11 DIAGNOSIS — E11.9 TYPE 2 DIABETES MELLITUS WITHOUT COMPLICATION, WITHOUT LONG-TERM CURRENT USE OF INSULIN (HCC): ICD-10-CM

## 2019-11-11 DIAGNOSIS — I10 ESSENTIAL HYPERTENSION: ICD-10-CM

## 2019-11-11 LAB — HBA1C MFR BLD: 7.1 %

## 2019-11-11 PROCEDURE — G8417 CALC BMI ABV UP PARAM F/U: HCPCS | Performed by: NURSE PRACTITIONER

## 2019-11-11 PROCEDURE — 1036F TOBACCO NON-USER: CPT | Performed by: NURSE PRACTITIONER

## 2019-11-11 PROCEDURE — G0439 PPPS, SUBSEQ VISIT: HCPCS | Performed by: NURSE PRACTITIONER

## 2019-11-11 PROCEDURE — 99214 OFFICE O/P EST MOD 30 MIN: CPT | Performed by: NURSE PRACTITIONER

## 2019-11-11 PROCEDURE — G8484 FLU IMMUNIZE NO ADMIN: HCPCS | Performed by: NURSE PRACTITIONER

## 2019-11-11 PROCEDURE — G8427 DOCREV CUR MEDS BY ELIG CLIN: HCPCS | Performed by: NURSE PRACTITIONER

## 2019-11-11 PROCEDURE — 83036 HEMOGLOBIN GLYCOSYLATED A1C: CPT | Performed by: NURSE PRACTITIONER

## 2019-11-11 PROCEDURE — 4040F PNEUMOC VAC/ADMIN/RCVD: CPT | Performed by: NURSE PRACTITIONER

## 2019-11-11 PROCEDURE — 1123F ACP DISCUSS/DSCN MKR DOCD: CPT | Performed by: NURSE PRACTITIONER

## 2019-11-11 ASSESSMENT — PATIENT HEALTH QUESTIONNAIRE - PHQ9
SUM OF ALL RESPONSES TO PHQ QUESTIONS 1-9: 0
SUM OF ALL RESPONSES TO PHQ QUESTIONS 1-9: 0

## 2019-11-11 ASSESSMENT — LIFESTYLE VARIABLES: HOW OFTEN DO YOU HAVE A DRINK CONTAINING ALCOHOL: 0

## 2019-11-15 ASSESSMENT — ENCOUNTER SYMPTOMS
CHEST TIGHTNESS: 0
NAUSEA: 0
SHORTNESS OF BREATH: 0
CONSTIPATION: 0
DIARRHEA: 0
ABDOMINAL PAIN: 0
WHEEZING: 0
SORE THROAT: 0
VOMITING: 0
BACK PAIN: 0
STRIDOR: 0
COLOR CHANGE: 0
ABDOMINAL DISTENTION: 0
SINUS PRESSURE: 0
COUGH: 0

## 2020-02-11 ENCOUNTER — OFFICE VISIT (OUTPATIENT)
Dept: FAMILY MEDICINE CLINIC | Age: 85
End: 2020-02-11
Payer: MEDICARE

## 2020-02-11 VITALS
RESPIRATION RATE: 18 BRPM | BODY MASS INDEX: 31.39 KG/M2 | DIASTOLIC BLOOD PRESSURE: 86 MMHG | WEIGHT: 218.8 LBS | OXYGEN SATURATION: 98 % | HEART RATE: 55 BPM | SYSTOLIC BLOOD PRESSURE: 138 MMHG

## 2020-02-11 LAB — HBA1C MFR BLD: 7 %

## 2020-02-11 PROCEDURE — G8427 DOCREV CUR MEDS BY ELIG CLIN: HCPCS | Performed by: NURSE PRACTITIONER

## 2020-02-11 PROCEDURE — 99214 OFFICE O/P EST MOD 30 MIN: CPT | Performed by: NURSE PRACTITIONER

## 2020-02-11 PROCEDURE — 3051F HG A1C>EQUAL 7.0%<8.0%: CPT | Performed by: NURSE PRACTITIONER

## 2020-02-11 PROCEDURE — 1036F TOBACCO NON-USER: CPT | Performed by: NURSE PRACTITIONER

## 2020-02-11 PROCEDURE — 83036 HEMOGLOBIN GLYCOSYLATED A1C: CPT | Performed by: NURSE PRACTITIONER

## 2020-02-11 PROCEDURE — G8484 FLU IMMUNIZE NO ADMIN: HCPCS | Performed by: NURSE PRACTITIONER

## 2020-02-11 PROCEDURE — 1123F ACP DISCUSS/DSCN MKR DOCD: CPT | Performed by: NURSE PRACTITIONER

## 2020-02-11 PROCEDURE — G8417 CALC BMI ABV UP PARAM F/U: HCPCS | Performed by: NURSE PRACTITIONER

## 2020-02-11 PROCEDURE — 4040F PNEUMOC VAC/ADMIN/RCVD: CPT | Performed by: NURSE PRACTITIONER

## 2020-02-11 SDOH — ECONOMIC STABILITY: FOOD INSECURITY: WITHIN THE PAST 12 MONTHS, THE FOOD YOU BOUGHT JUST DIDN'T LAST AND YOU DIDN'T HAVE MONEY TO GET MORE.: NEVER TRUE

## 2020-02-11 SDOH — ECONOMIC STABILITY: TRANSPORTATION INSECURITY
IN THE PAST 12 MONTHS, HAS LACK OF TRANSPORTATION KEPT YOU FROM MEETINGS, WORK, OR FROM GETTING THINGS NEEDED FOR DAILY LIVING?: NO

## 2020-02-11 SDOH — ECONOMIC STABILITY: TRANSPORTATION INSECURITY
IN THE PAST 12 MONTHS, HAS THE LACK OF TRANSPORTATION KEPT YOU FROM MEDICAL APPOINTMENTS OR FROM GETTING MEDICATIONS?: NO

## 2020-02-11 SDOH — ECONOMIC STABILITY: FOOD INSECURITY: WITHIN THE PAST 12 MONTHS, YOU WORRIED THAT YOUR FOOD WOULD RUN OUT BEFORE YOU GOT MONEY TO BUY MORE.: NEVER TRUE

## 2020-02-11 SDOH — ECONOMIC STABILITY: INCOME INSECURITY: HOW HARD IS IT FOR YOU TO PAY FOR THE VERY BASICS LIKE FOOD, HOUSING, MEDICAL CARE, AND HEATING?: NOT HARD AT ALL

## 2020-02-11 ASSESSMENT — PATIENT HEALTH QUESTIONNAIRE - PHQ9
SUM OF ALL RESPONSES TO PHQ9 QUESTIONS 1 & 2: 0
SUM OF ALL RESPONSES TO PHQ QUESTIONS 1-9: 0
1. LITTLE INTEREST OR PLEASURE IN DOING THINGS: 0
2. FEELING DOWN, DEPRESSED OR HOPELESS: 0
SUM OF ALL RESPONSES TO PHQ QUESTIONS 1-9: 0

## 2020-02-11 NOTE — PROGRESS NOTES
DAILY WITH BREAKFAST 90 tablet 3    lisinopril (PRINIVIL;ZESTRIL) 20 MG tablet Take 2 tablets by mouth daily 180 tablet 3     No current facility-administered medications for this visit. Allergies   Allergen Reactions    Norvasc [Amlodipine Besylate]     Other Rash     States allergic to several BP meds (unsure of name)       Health Maintenance   Topic Date Due    DTaP/Tdap/Td vaccine (1 - Tdap) 01/30/1942    Hepatitis B vaccine (1 of 3 - Risk 3-dose series) 01/30/1950    Shingles Vaccine (1 of 2) 01/30/1981    Pneumococcal 65+ years Vaccine (1 of 1 - PPSV23) 01/30/1996    Flu vaccine (1) 09/01/2019    Hepatitis A vaccine  Aged Out    Hib vaccine  Aged Out    Meningococcal (ACWY) vaccine  Aged Out       Subjective:      Review of Systems   Constitutional: Negative for activity change, appetite change, chills, diaphoresis, fatigue, fever and unexpected weight change. HENT: Negative for congestion, ear pain, postnasal drip, sinus pressure and sore throat. Eyes: Negative for visual disturbance. Respiratory: Negative for cough, chest tightness, shortness of breath, wheezing and stridor. Cardiovascular: Negative for chest pain, palpitations and leg swelling. Gastrointestinal: Negative for abdominal distention, abdominal pain, constipation, diarrhea, nausea and vomiting. Endocrine: Negative for polydipsia, polyphagia and polyuria. Genitourinary: Negative for decreased urine volume, difficulty urinating, dysuria, flank pain, frequency, hematuria and urgency. Musculoskeletal: Negative for arthralgias, back pain, gait problem, joint swelling, myalgias and neck pain. Skin: Negative for color change, pallor and rash. Neurological: Negative for dizziness, syncope, weakness, light-headedness, numbness and headaches. Hematological: Negative for adenopathy. Psychiatric/Behavioral: Negative for behavioral problems, self-injury and sleep disturbance. The patient is not nervous/anxious. 18   Wt 218 lb 12.8 oz (99.2 kg)   SpO2 98%   BMI 31.39 kg/m²     Assessment:       Diagnosis Orders   1. Type 2 diabetes mellitus without complication, without long-term current use of insulin (HCC)  POCT glycosylated hemoglobin (Hb A1C)    CBC Auto Differential    Comprehensive Metabolic Panel   2. Essential hypertension  CBC Auto Differential    Comprehensive Metabolic Panel   3. Screening for lipid disorders  Lipid Panel       Plan:     A1C good  Obtain labs as instructed  Continue current meds  Chronic illnesses stable  Watch diet  Low sodium    Discussed use, benefit, and side effects of prescribed medications. Barriers tomedication compliance addressed. All patient questions answered. Pt voiced understanding. Return in about 3 months (around 5/11/2020). Orders Placed This Encounter   Procedures    CBC Auto Differential     Standing Status:   Future     Standing Expiration Date:   2/10/2021    Comprehensive Metabolic Panel     Standing Status:   Future     Standing Expiration Date:   2/10/2021    Lipid Panel     Standing Status:   Future     Standing Expiration Date:   2/10/2021     Order Specific Question:   Is Patient Fasting?/# of Hours     Answer:   requires 12 hour fast    POCT glycosylated hemoglobin (Hb A1C)     No orders of the defined types were placed in this encounter. Reccommended tobacco cessation options including pharmacologicmethods, counseled great than 3 minutes during this visit:  Yes  []  No  []     Patient given educational materials - see patient instructions. Discussed use, benefit, and sideeffects of prescribed medications. All patient questions answered. Pt voiced understanding. Reviewed health maintenance. Instructed to continue current medications, diet and exercise. Patient agreed with treatment plan. Follow up as directed.      Electronically signed by EMRE Portillo CNP on 2/17/2020 at 8:22 AM

## 2020-02-17 ASSESSMENT — ENCOUNTER SYMPTOMS
VOMITING: 0
ABDOMINAL PAIN: 0
COUGH: 0
WHEEZING: 0
BACK PAIN: 0
SHORTNESS OF BREATH: 0
CONSTIPATION: 0
SORE THROAT: 0
STRIDOR: 0
DIARRHEA: 0
COLOR CHANGE: 0
SINUS PRESSURE: 0
CHEST TIGHTNESS: 0
NAUSEA: 0
ABDOMINAL DISTENTION: 0

## 2020-05-06 RX ORDER — LISINOPRIL 20 MG/1
TABLET ORAL
Qty: 180 TABLET | Refills: 0 | Status: SHIPPED | OUTPATIENT
Start: 2020-05-06 | End: 2020-08-10

## 2020-05-11 ENCOUNTER — PATIENT MESSAGE (OUTPATIENT)
Dept: FAMILY MEDICINE CLINIC | Age: 85
End: 2020-05-11

## 2020-05-11 RX ORDER — BISOPROLOL FUMARATE AND HYDROCHLOROTHIAZIDE 10; 6.25 MG/1; MG/1
2 TABLET ORAL DAILY
Qty: 180 TABLET | Refills: 3 | Status: SHIPPED | OUTPATIENT
Start: 2020-05-11 | End: 2021-05-02

## 2020-05-15 ENCOUNTER — HOSPITAL ENCOUNTER (OUTPATIENT)
Age: 85
Discharge: HOME | End: 2020-05-15
Payer: MEDICARE

## 2020-05-15 DIAGNOSIS — I10 ESSENTIAL HYPERTENSION: ICD-10-CM

## 2020-05-15 DIAGNOSIS — E11.9 TYPE 2 DIABETES MELLITUS WITHOUT COMPLICATION, WITHOUT LONG-TERM CURRENT USE OF INSULIN (HCC): ICD-10-CM

## 2020-05-15 DIAGNOSIS — Z13.220 SCREENING FOR LIPID DISORDERS: ICD-10-CM

## 2020-05-15 LAB
ALBUMIN SERPL-MCNC: 4.2 G/DL (ref 3.5–5.1)
ALP BLD-CCNC: 67 U/L (ref 38–126)
ALT SERPL-CCNC: 15 U/L (ref 11–66)
ANION GAP SERPL CALCULATED.3IONS-SCNC: 11 MEQ/L (ref 8–16)
AST SERPL-CCNC: 15 U/L (ref 5–40)
BASOPHILS # BLD: 0.6 %
BASOPHILS ABSOLUTE: 0.1 THOU/MM3 (ref 0–0.1)
BILIRUB SERPL-MCNC: 0.7 MG/DL (ref 0.3–1.2)
BUN BLDV-MCNC: 22 MG/DL (ref 7–22)
CALCIUM SERPL-MCNC: 9.6 MG/DL (ref 8.5–10.5)
CHLORIDE BLD-SCNC: 100 MEQ/L (ref 98–111)
CHOLESTEROL, TOTAL: 154 MG/DL (ref 100–199)
CO2: 26 MEQ/L (ref 23–33)
CREAT SERPL-MCNC: 1.1 MG/DL (ref 0.4–1.2)
EOSINOPHIL # BLD: 15.2 %
EOSINOPHILS ABSOLUTE: 1.3 THOU/MM3 (ref 0–0.4)
ERYTHROCYTE [DISTWIDTH] IN BLOOD BY AUTOMATED COUNT: 13.9 % (ref 11.5–14.5)
ERYTHROCYTE [DISTWIDTH] IN BLOOD BY AUTOMATED COUNT: 43.1 FL (ref 35–45)
GFR SERPL CREATININE-BSD FRML MDRD: 63 ML/MIN/1.73M2
GLUCOSE BLD-MCNC: 146 MG/DL (ref 70–108)
HCT VFR BLD CALC: 41.5 % (ref 42–52)
HDLC SERPL-MCNC: 37 MG/DL
HEMOGLOBIN: 12.8 GM/DL (ref 14–18)
IMMATURE GRANS (ABS): 0.03 THOU/MM3 (ref 0–0.07)
IMMATURE GRANULOCYTES: 0.3 %
LDL CHOLESTEROL CALCULATED: 88 MG/DL
LYMPHOCYTES # BLD: 28 %
LYMPHOCYTES ABSOLUTE: 2.4 THOU/MM3 (ref 1–4.8)
MCH RBC QN AUTO: 26.4 PG (ref 26–33)
MCHC RBC AUTO-ENTMCNC: 30.8 GM/DL (ref 32.2–35.5)
MCV RBC AUTO: 85.6 FL (ref 80–94)
MONOCYTES # BLD: 8.8 %
MONOCYTES ABSOLUTE: 0.8 THOU/MM3 (ref 0.4–1.3)
NUCLEATED RED BLOOD CELLS: 0 /100 WBC
PLATELET # BLD: 157 THOU/MM3 (ref 130–400)
PMV BLD AUTO: 13.2 FL (ref 9.4–12.4)
POTASSIUM SERPL-SCNC: 4.5 MEQ/L (ref 3.5–5.2)
RBC # BLD: 4.85 MILL/MM3 (ref 4.7–6.1)
SEG NEUTROPHILS: 47.1 %
SEGMENTED NEUTROPHILS ABSOLUTE COUNT: 4.1 THOU/MM3 (ref 1.8–7.7)
SODIUM BLD-SCNC: 137 MEQ/L (ref 135–145)
TOTAL PROTEIN: 7 G/DL (ref 6.1–8)
TRIGL SERPL-MCNC: 147 MG/DL (ref 0–199)
WBC # BLD: 8.7 THOU/MM3 (ref 4.8–10.8)

## 2020-05-15 PROCEDURE — 85025 COMPLETE CBC W/AUTO DIFF WBC: CPT

## 2020-05-15 PROCEDURE — 80061 LIPID PANEL: CPT

## 2020-05-15 PROCEDURE — 36415 COLL VENOUS BLD VENIPUNCTURE: CPT

## 2020-05-15 PROCEDURE — 80053 COMPREHEN METABOLIC PANEL: CPT

## 2020-05-21 ENCOUNTER — OFFICE VISIT (OUTPATIENT)
Dept: FAMILY MEDICINE CLINIC | Age: 85
End: 2020-05-21
Payer: MEDICARE

## 2020-05-21 VITALS
HEIGHT: 70 IN | SYSTOLIC BLOOD PRESSURE: 138 MMHG | DIASTOLIC BLOOD PRESSURE: 72 MMHG | BODY MASS INDEX: 31.3 KG/M2 | HEART RATE: 60 BPM | WEIGHT: 218.6 LBS | OXYGEN SATURATION: 98 % | TEMPERATURE: 97.8 F | RESPIRATION RATE: 20 BRPM

## 2020-05-21 LAB — HBA1C MFR BLD: 7 %

## 2020-05-21 PROCEDURE — 3051F HG A1C>EQUAL 7.0%<8.0%: CPT | Performed by: NURSE PRACTITIONER

## 2020-05-21 PROCEDURE — 1036F TOBACCO NON-USER: CPT | Performed by: NURSE PRACTITIONER

## 2020-05-21 PROCEDURE — 83036 HEMOGLOBIN GLYCOSYLATED A1C: CPT | Performed by: NURSE PRACTITIONER

## 2020-05-21 PROCEDURE — 99214 OFFICE O/P EST MOD 30 MIN: CPT | Performed by: NURSE PRACTITIONER

## 2020-05-21 PROCEDURE — G8417 CALC BMI ABV UP PARAM F/U: HCPCS | Performed by: NURSE PRACTITIONER

## 2020-05-21 PROCEDURE — G8427 DOCREV CUR MEDS BY ELIG CLIN: HCPCS | Performed by: NURSE PRACTITIONER

## 2020-05-21 PROCEDURE — 1123F ACP DISCUSS/DSCN MKR DOCD: CPT | Performed by: NURSE PRACTITIONER

## 2020-05-21 PROCEDURE — 4040F PNEUMOC VAC/ADMIN/RCVD: CPT | Performed by: NURSE PRACTITIONER

## 2020-05-21 NOTE — PROGRESS NOTES
Topics    Alcohol use: No     Alcohol/week: 0.0 standard drinks      Current Outpatient Medications   Medication Sig Dispense Refill    bisoprolol-hydrochlorothiazide (ZIAC) 10-6.25 MG per tablet Take 2 tablets by mouth daily 180 tablet 3    lisinopril (PRINIVIL;ZESTRIL) 20 MG tablet Take 2 tablets by mouth once daily 180 tablet 0    metFORMIN (GLUCOPHAGE) 1000 MG tablet TAKE 1 TABLET BY MOUTH ONCE DAILY WITH BREAKFAST 90 tablet 3     No current facility-administered medications for this visit. Allergies   Allergen Reactions    Norvasc [Amlodipine Besylate]     Other Rash     States allergic to several BP meds (unsure of name)       Health Maintenance   Topic Date Due    DTaP/Tdap/Td vaccine (1 - Tdap) 01/30/1950    Shingles Vaccine (1 of 2) 01/30/1981    Pneumococcal 65+ years Vaccine (1 of 1 - PPSV23) 01/30/1996    Flu vaccine (Season Ended) 09/01/2020    Annual Wellness Visit (AWV)  11/11/2020    Potassium monitoring  05/15/2021    Creatinine monitoring  05/15/2021    Hepatitis A vaccine  Aged Out    Hib vaccine  Aged Out    Meningococcal (ACWY) vaccine  Aged Out       Subjective:      Review of Systems   Constitutional: Negative for activity change, appetite change, chills, diaphoresis, fatigue, fever and unexpected weight change. HENT: Negative for congestion, ear pain, postnasal drip, sinus pressure and sore throat. Eyes: Negative for visual disturbance. Respiratory: Negative for cough, chest tightness, shortness of breath, wheezing and stridor. Cardiovascular: Negative for chest pain, palpitations and leg swelling. Gastrointestinal: Negative for abdominal distention, abdominal pain, constipation, diarrhea, nausea and vomiting. Endocrine: Negative for polydipsia, polyphagia and polyuria. Genitourinary: Negative for decreased urine volume, difficulty urinating, dysuria, flank pain, frequency, hematuria and urgency.    Musculoskeletal: Negative for arthralgias, back pain, gait problem, joint swelling, myalgias and neck pain. Skin: Negative for color change, pallor and rash. Neurological: Negative for dizziness, syncope, weakness, light-headedness, numbness and headaches. Hematological: Negative for adenopathy. Psychiatric/Behavioral: Negative for behavioral problems, self-injury and sleep disturbance. The patient is not nervous/anxious. Objective:     Physical Exam  Constitutional:       Appearance: Normal appearance. HENT:      Head: Normocephalic. Right Ear: Tympanic membrane normal.      Left Ear: Tympanic membrane normal.      Nose: Nose normal.   Eyes:      Conjunctiva/sclera: Conjunctivae normal.   Neck:      Musculoskeletal: Normal range of motion and neck supple. Cardiovascular:      Rate and Rhythm: Normal rate and regular rhythm. Pulses: Normal pulses. Heart sounds: Murmur present. Pulmonary:      Effort: Pulmonary effort is normal. No respiratory distress. Breath sounds: Normal breath sounds. Abdominal:      General: Abdomen is flat. There is no distension. Tenderness: There is no abdominal tenderness. Musculoskeletal: Normal range of motion. Lymphadenopathy:      Cervical: No cervical adenopathy. Skin:     General: Skin is warm and dry. Coloration: Skin is not pale. Findings: No erythema or rash. Neurological:      General: No focal deficit present. Mental Status: He is alert and oriented to person, place, and time. Psychiatric:         Mood and Affect: Mood normal.         Behavior: Behavior normal.         Thought Content: Thought content normal.         Judgment: Judgment normal.       /72   Pulse 60   Temp 97.8 °F (36.6 °C) (Temporal)   Resp 20   Ht 5' 10\" (1.778 m)   Wt 218 lb 9.6 oz (99.2 kg)   SpO2 98%   BMI 31.37 kg/m²     Assessment:       Diagnosis Orders   1.  Type 2 diabetes mellitus without complication, without long-term current use of insulin ([facility])  POCT glycosylated hemoglobin (Hb A1C)       Plan:     Discussed recent labs  Continue with current meds  htn and dm stable  No changes since last visit    Discussed use, benefit, and side effects of prescribed medications. Barriers tomedication compliance addressed. All patient questions answered. Pt voiced understanding. Return in about 3 months (around 8/21/2020). Orders Placed This Encounter   Procedures    POCT glycosylated hemoglobin (Hb A1C)     No orders of the defined types were placed in this encounter. Reccommended tobacco cessation options including pharmacologicmethods, counseled great than 3 minutes during this visit:  Yes  []  No  []     Patient given educational materials - see patient instructions. Discussed use, benefit, and sideeffects of prescribed medications. All patient questions answered. Pt voiced understanding. Reviewed health maintenance. Instructed to continue current medications, diet and exercise. Patient agreed with treatment plan. Follow up as directed.      Electronically signed by EMRE Harris CNP on 5/21/2020 at 1:40 PM

## 2020-05-27 ASSESSMENT — ENCOUNTER SYMPTOMS
SINUS PRESSURE: 0
CHEST TIGHTNESS: 0
STRIDOR: 0
VOMITING: 0
COUGH: 0
NAUSEA: 0
DIARRHEA: 0
ABDOMINAL PAIN: 0
WHEEZING: 0
CONSTIPATION: 0
COLOR CHANGE: 0
SHORTNESS OF BREATH: 0
SORE THROAT: 0
ABDOMINAL DISTENTION: 0
BACK PAIN: 0

## 2020-08-10 RX ORDER — LISINOPRIL 20 MG/1
TABLET ORAL
Qty: 180 TABLET | Refills: 0 | Status: SHIPPED | OUTPATIENT
Start: 2020-08-10 | End: 2021-02-08

## 2020-08-10 RX ORDER — LISINOPRIL 20 MG/1
TABLET ORAL
Qty: 180 TABLET | Refills: 0 | Status: SHIPPED | OUTPATIENT
Start: 2020-08-10 | End: 2020-08-20

## 2020-08-20 ENCOUNTER — OFFICE VISIT (OUTPATIENT)
Dept: FAMILY MEDICINE CLINIC | Age: 85
End: 2020-08-20
Payer: MEDICARE

## 2020-08-20 VITALS
BODY MASS INDEX: 31.28 KG/M2 | SYSTOLIC BLOOD PRESSURE: 140 MMHG | WEIGHT: 218 LBS | DIASTOLIC BLOOD PRESSURE: 82 MMHG | TEMPERATURE: 97.5 F | RESPIRATION RATE: 20 BRPM | HEART RATE: 57 BPM | OXYGEN SATURATION: 98 %

## 2020-08-20 LAB — HBA1C MFR BLD: 7 %

## 2020-08-20 PROCEDURE — G8427 DOCREV CUR MEDS BY ELIG CLIN: HCPCS | Performed by: NURSE PRACTITIONER

## 2020-08-20 PROCEDURE — 83036 HEMOGLOBIN GLYCOSYLATED A1C: CPT | Performed by: NURSE PRACTITIONER

## 2020-08-20 PROCEDURE — G8417 CALC BMI ABV UP PARAM F/U: HCPCS | Performed by: NURSE PRACTITIONER

## 2020-08-20 PROCEDURE — 3051F HG A1C>EQUAL 7.0%<8.0%: CPT | Performed by: NURSE PRACTITIONER

## 2020-08-20 PROCEDURE — 4040F PNEUMOC VAC/ADMIN/RCVD: CPT | Performed by: NURSE PRACTITIONER

## 2020-08-20 PROCEDURE — 99214 OFFICE O/P EST MOD 30 MIN: CPT | Performed by: NURSE PRACTITIONER

## 2020-08-20 PROCEDURE — 1036F TOBACCO NON-USER: CPT | Performed by: NURSE PRACTITIONER

## 2020-08-20 PROCEDURE — 1123F ACP DISCUSS/DSCN MKR DOCD: CPT | Performed by: NURSE PRACTITIONER

## 2020-08-20 NOTE — PROGRESS NOTES
use: No     Alcohol/week: 0.0 standard drinks      Current Outpatient Medications   Medication Sig Dispense Refill    lisinopril (PRINIVIL;ZESTRIL) 20 MG tablet Take 2 tablets by mouth once daily 180 tablet 0    metFORMIN (GLUCOPHAGE) 1000 MG tablet Take 1 tablet by mouth once daily with breakfast 90 tablet 0    bisoprolol-hydrochlorothiazide (ZIAC) 10-6.25 MG per tablet Take 2 tablets by mouth daily 180 tablet 3     No current facility-administered medications for this visit. Allergies   Allergen Reactions    Norvasc [Amlodipine Besylate]     Other Rash     States allergic to several BP meds (unsure of name)       Health Maintenance   Topic Date Due    DTaP/Tdap/Td vaccine (1 - Tdap) 01/30/1950    Shingles Vaccine (1 of 2) 01/30/1981    Pneumococcal 65+ years Vaccine (1 of 1 - PPSV23) 01/30/1996    Flu vaccine (1) 09/01/2020    Annual Wellness Visit (AWV)  11/11/2020    Potassium monitoring  05/15/2021    Creatinine monitoring  05/15/2021    Hepatitis A vaccine  Aged Out    Hib vaccine  Aged Out    Meningococcal (ACWY) vaccine  Aged Out       Subjective:      Review of Systems   Constitutional: Negative for activity change, appetite change, chills, diaphoresis, fatigue, fever and unexpected weight change. HENT: Negative for congestion, ear pain, postnasal drip, sinus pressure and sore throat. Eyes: Negative for visual disturbance. Respiratory: Negative for cough, chest tightness, shortness of breath, wheezing and stridor. Cardiovascular: Negative for chest pain, palpitations and leg swelling. Gastrointestinal: Negative for abdominal distention, abdominal pain, constipation, diarrhea, nausea and vomiting. Endocrine: Negative for polydipsia, polyphagia and polyuria. Genitourinary: Negative for decreased urine volume, difficulty urinating, dysuria, flank pain, frequency, hematuria and urgency.    Musculoskeletal: Negative for arthralgias, back pain, gait problem, joint swelling, myalgias and neck pain. Skin: Negative for color change, pallor and rash. Neurological: Negative for dizziness, syncope, weakness, light-headedness, numbness and headaches. Hematological: Negative for adenopathy. Psychiatric/Behavioral: Negative for behavioral problems, self-injury and sleep disturbance. The patient is not nervous/anxious. Objective:     Physical Exam  Vitals signs and nursing note reviewed. Constitutional:       Appearance: Normal appearance. He is well-developed. HENT:      Head: Normocephalic. Right Ear: Tympanic membrane and external ear normal.      Left Ear: Tympanic membrane and external ear normal.      Nose: Nose normal.      Right Sinus: No maxillary sinus tenderness. Left Sinus: No maxillary sinus tenderness. Mouth/Throat:      Pharynx: Uvula midline. Eyes:      Pupils: Pupils are equal, round, and reactive to light. Neck:      Musculoskeletal: Normal range of motion and neck supple. Thyroid: No thyromegaly. Vascular: No carotid bruit or JVD. Trachea: Trachea normal.   Cardiovascular:      Rate and Rhythm: Normal rate and regular rhythm. Heart sounds: Normal heart sounds. No murmur. Pulmonary:      Effort: Pulmonary effort is normal. No respiratory distress. Breath sounds: Normal breath sounds. No decreased breath sounds, wheezing, rhonchi or rales. Chest:      Chest wall: No tenderness. Abdominal:      General: Bowel sounds are normal. There is no distension. Palpations: Abdomen is soft. There is no mass. Tenderness: There is no abdominal tenderness. Musculoskeletal: Normal range of motion. General: No tenderness or deformity. Lymphadenopathy:      Cervical: No cervical adenopathy. Skin:     General: Skin is warm and dry. Findings: No erythema or rash. Neurological:      Mental Status: He is alert and oriented to person, place, and time. Cranial Nerves: No cranial nerve deficit. Sensory: No sensory deficit. Motor: No abnormal muscle tone. Coordination: Coordination normal.      Gait: Gait normal.   Psychiatric:         Behavior: Behavior normal.         Thought Content: Thought content normal.         Judgment: Judgment normal.       BP (!) 140/82 (Site: Right Upper Arm, Position: Sitting, Cuff Size: Medium Adult)   Pulse 57   Temp 97.5 °F (36.4 °C) (Temporal)   Resp 20   Wt 218 lb (98.9 kg)   SpO2 98%   BMI 31.28 kg/m²     Assessment:       Diagnosis Orders   1. Type 2 diabetes mellitus with stage 3 chronic kidney disease, without long-term current use of insulin (HCC)  POCT glycosylated hemoglobin (Hb A1C)   2. Essential hypertension         Plan:     Continue current meds  Last labs reviewed  a1c stable at 7  Monitor htn   If stays elevated consider adding htn med  Stay active and eat healthy  Avoid sodium    Discussed use, benefit, and side effects of prescribed medications. Barriers tomedication compliance addressed. All patient questions answered. Pt voiced understanding. Return in about 3 months (around 11/20/2020). Orders Placed This Encounter   Procedures    POCT glycosylated hemoglobin (Hb A1C)     No orders of the defined types were placed in this encounter. Patient given educational materials - see patient instructions. Discussed use, benefit, and sideeffects of prescribed medications. All patient questions answered. Pt voiced understanding. Reviewed health maintenance. Instructed to continue current medications, diet and exercise. Patient agreed with treatment plan. Follow up as directed.      Electronically signed by EMRE Daniel CNP on 8/25/2020 at 1:07 PM

## 2020-08-25 ASSESSMENT — ENCOUNTER SYMPTOMS
CHEST TIGHTNESS: 0
SINUS PRESSURE: 0
WHEEZING: 0
CONSTIPATION: 0
COUGH: 0
SORE THROAT: 0
COLOR CHANGE: 0
BACK PAIN: 0
VOMITING: 0
ABDOMINAL DISTENTION: 0
NAUSEA: 0
SHORTNESS OF BREATH: 0
ABDOMINAL PAIN: 0
DIARRHEA: 0
STRIDOR: 0

## 2020-11-23 ENCOUNTER — OFFICE VISIT (OUTPATIENT)
Dept: FAMILY MEDICINE CLINIC | Age: 85
End: 2020-11-23
Payer: MEDICARE

## 2020-11-23 VITALS
RESPIRATION RATE: 18 BRPM | WEIGHT: 221 LBS | HEIGHT: 70 IN | TEMPERATURE: 97.3 F | HEART RATE: 57 BPM | SYSTOLIC BLOOD PRESSURE: 138 MMHG | OXYGEN SATURATION: 97 % | BODY MASS INDEX: 31.64 KG/M2 | DIASTOLIC BLOOD PRESSURE: 76 MMHG

## 2020-11-23 PROCEDURE — 1036F TOBACCO NON-USER: CPT | Performed by: NURSE PRACTITIONER

## 2020-11-23 PROCEDURE — 99214 OFFICE O/P EST MOD 30 MIN: CPT | Performed by: NURSE PRACTITIONER

## 2020-11-23 PROCEDURE — G8484 FLU IMMUNIZE NO ADMIN: HCPCS | Performed by: NURSE PRACTITIONER

## 2020-11-23 PROCEDURE — 1123F ACP DISCUSS/DSCN MKR DOCD: CPT | Performed by: NURSE PRACTITIONER

## 2020-11-23 PROCEDURE — 3051F HG A1C>EQUAL 7.0%<8.0%: CPT | Performed by: NURSE PRACTITIONER

## 2020-11-23 PROCEDURE — 4040F PNEUMOC VAC/ADMIN/RCVD: CPT | Performed by: NURSE PRACTITIONER

## 2020-11-23 PROCEDURE — G8417 CALC BMI ABV UP PARAM F/U: HCPCS | Performed by: NURSE PRACTITIONER

## 2020-11-23 PROCEDURE — G8427 DOCREV CUR MEDS BY ELIG CLIN: HCPCS | Performed by: NURSE PRACTITIONER

## 2020-11-23 PROCEDURE — G0439 PPPS, SUBSEQ VISIT: HCPCS | Performed by: NURSE PRACTITIONER

## 2020-11-23 ASSESSMENT — PATIENT HEALTH QUESTIONNAIRE - PHQ9
1. LITTLE INTEREST OR PLEASURE IN DOING THINGS: 0
SUM OF ALL RESPONSES TO PHQ QUESTIONS 1-9: 0
SUM OF ALL RESPONSES TO PHQ9 QUESTIONS 1 & 2: 0
2. FEELING DOWN, DEPRESSED OR HOPELESS: 0
SUM OF ALL RESPONSES TO PHQ QUESTIONS 1-9: 0
SUM OF ALL RESPONSES TO PHQ QUESTIONS 1-9: 0

## 2020-11-23 ASSESSMENT — LIFESTYLE VARIABLES: HOW OFTEN DO YOU HAVE A DRINK CONTAINING ALCOHOL: 0

## 2020-11-23 NOTE — PROGRESS NOTES
[address] [place] [address] [address] [place] [address]  Dept: [phone]  Dept Fax: [phone]  Loc: [phone]    Leonor Vasquez is a 80 y.o. male who presents today for his medical conditions/complaints as noted below. Chief Complaint   Patient presents with    Medicare AWV    Diabetes     3 month check           HPI:     HPI    This is a 80year old pleasant gentleman here for chronic illness recheck. Overall doing good. Does have diabetes which last several a1c were around 7. States typically does not eat breakfast.  Does also eat small lunch and large supper. On metformin currently. Denies any lows. on ace, no statin. Blood pressure has been doing good. On ziac and ace. Does complain of some fatigue. Has chronic anemia his entire life he states. Is active but less active since pandemic. Still makes it out to eat occasionally. Denies any cp or sob.          Past Medical History:   Diagnosis Date    Diabetes mellitus ([facility] [address]     Hypertension     Type II or unspecified type diabetes mellitus without mention of complication, not stated as uncontrolled       Past Surgical History:   Procedure Laterality Date    TONSILLECTOMY AND ADENOIDECTOMY Bilateral     age 10        Family History   Problem Relation Age of Onset    Diabetes Paternal Aunt     Diabetes Paternal Uncle     Cancer Paternal Uncle     Diabetes Maternal Grandmother     Diabetes Maternal Grandfather     Diabetes Paternal Grandmother     Cancer Paternal Grandmother     Diabetes Paternal Grandfather        Social History     Tobacco Use    Smoking status: Former Smoker     Types: Cigars     Last attempt to quit: 1991     Years since quittin.9    Smokeless tobacco: Never Used    Tobacco comment: rarely smoked a cigar    Substance Use Topics    Alcohol use: No     Alcohol/week: 0.0 standard drinks      Current Outpatient Medications   Medication Sig Dispense Refill    lisinopril (PRINIVIL;ZESTRIL) 20 MG tablet Take 2 tablets by mouth once daily 180 tablet 0    metFORMIN (GLUCOPHAGE) 1000 MG tablet Take 1 tablet by mouth once daily with breakfast 90 tablet 0    bisoprolol-hydrochlorothiazide (ZIAC) 10-6.25 MG per tablet Take 2 tablets by mouth daily 180 tablet 3     No current facility-administered medications for this visit. Allergies   Allergen Reactions    Norvasc [Amlodipine Besylate]     Other Rash     States allergic to several BP meds (unsure of name)       Health Maintenance   Topic Date Due    DTaP/Tdap/Td vaccine (1 - Tdap) [birth year]/1950    Shingles Vaccine (1 of 2) 01/30/1981    Pneumococcal 65+ years Vaccine (1 of 1 - PPSV23) 01/30/1996    Annual Wellness Visit (AWV)  04/16/2020    Flu vaccine (1) 09/01/2020    Potassium monitoring  05/15/2021    Creatinine monitoring  05/15/2021    Hepatitis A vaccine  Aged Out    Hib vaccine  Aged Out    Meningococcal (ACWY) vaccine  Aged Out       Subjective:      Review of Systems   Constitutional: Positive for fatigue. Negative for activity change, appetite change, chills, diaphoresis, fever and unexpected weight change. HENT: Negative for congestion, ear pain, postnasal drip, sinus pressure and sore throat. Eyes: Negative for visual disturbance. Respiratory: Negative for cough, chest tightness, shortness of breath, wheezing and stridor. Cardiovascular: Negative for chest pain, palpitations and leg swelling. Gastrointestinal: Negative for abdominal distention, abdominal pain, constipation, diarrhea, nausea and vomiting. Endocrine: Negative for polydipsia, polyphagia and polyuria. Genitourinary: Negative for decreased urine volume, difficulty urinating, dysuria, flank pain, frequency, hematuria and urgency. Musculoskeletal: Negative for arthralgias, back pain, gait problem, joint swelling, myalgias and neck pain.    Skin: Negative for color change, pallor and rash. Neurological: Negative for dizziness, syncope, weakness, light-headedness, numbness and headaches. Hematological: Negative for adenopathy. Psychiatric/Behavioral: Negative for behavioral problems, self-injury and sleep disturbance. The patient is not nervous/anxious. Objective:     Physical Exam  Vitals signs and nursing note reviewed. Constitutional:       Appearance: Normal appearance. He is well-developed. HENT:      Head: Normocephalic. Right Ear: Tympanic membrane and external ear normal.      Left Ear: Tympanic membrane and external ear normal.      Nose: Nose normal.      Right Sinus: No maxillary sinus tenderness. Left Sinus: No maxillary sinus tenderness. Mouth/Throat:      Pharynx: Uvula midline. Eyes:      Pupils: Pupils are equal, round, and reactive to light. Neck:      Musculoskeletal: Normal range of motion and neck supple. Thyroid: No thyromegaly. Vascular: No carotid bruit or JVD. Trachea: Trachea normal.   Cardiovascular:      Rate and Rhythm: Normal rate and regular rhythm. Heart sounds: Normal heart sounds. No murmur. Pulmonary:      Effort: Pulmonary effort is normal. No respiratory distress. Breath sounds: Normal breath sounds. No decreased breath sounds, wheezing, rhonchi or rales. Chest:      Chest wall: No tenderness. Abdominal:      General: Bowel sounds are normal. There is no distension. Palpations: Abdomen is soft. There is no mass. Tenderness: There is no abdominal tenderness. Musculoskeletal: Normal range of motion. General: No tenderness or deformity. Lymphadenopathy:      Cervical: No cervical adenopathy. Skin:     General: Skin is warm and dry. Findings: No erythema or rash. Neurological:      Mental Status: He is alert and oriented to person, place, and time. Cranial Nerves: No cranial nerve deficit. Sensory: No sensory deficit.       Motor: No abnormal muscle tone.      Coordination: Coordination normal.      Gait: Gait normal.   Psychiatric:         Behavior: Behavior normal.         Thought Content: Thought content normal.         Judgment: Judgment normal.       /76 (Site: Left Upper Arm, Position: Sitting, Cuff Size: Large Adult)   Pulse 57   Temp 97.3 °F (36.3 °C) (Temporal)   Resp 18   Ht 5' 10\" (1.778 m)   Wt 221 lb (100.2 kg)   SpO2 97%   BMI 31.71 kg/m²     Assessment:       Diagnosis Orders        1. Essential hypertension  CBC Auto Differential    Comprehensive Metabolic Panel   2. Type 2 diabetes mellitus without complication, without long-term current use of insulin ([facility] [address]     3. Fatigue, unspecified type         Plan:     Declines flu shot  A1C did come up slightly  Continue metformin  Eat 3 balanced meals daily  Avoid carbs  Be active  HTN controlled on current meds  Increase activity  Monitor anemia  Obtain labs      Discussed use, benefit, and side effects of prescribed medications. Barriers tomedication compliance addressed. All patient questions answered. Pt voiced understanding. Return for Medicare Annual Wellness Visit in 1 year. Orders Placed This Encounter   Procedures    CBC Auto Differential     Standing Status:   Future     Standing Expiration Date:   11/23/2021    Comprehensive Metabolic Panel     Standing Status:   Future     Standing Expiration Date:   11/23/2021     No orders of the defined types were placed in this encounter. Patient given educational materials - see patient instructions. Discussed use, benefit, and sideeffects of prescribed medications. All patient questions answered. Pt voiced understanding. Reviewed health maintenance. Instructed to continue current medications, diet and exercise. Patient agreed with treatment plan. Follow up as directed.      Electronically signed by EMRE Ballard CNP on 11/29/2020 at 8:57 AM

## 2020-11-23 NOTE — PATIENT INSTRUCTIONS
Personalized Preventive Plan for Yuko Fung - 96/47/6531  Medicare offers a range of preventive health benefits. Some of the tests and screenings are paid in full while other may be subject to a deductible, co-insurance, and/or copay. Some of these benefits include a comprehensive review of your medical history including lifestyle, illnesses that may run in your family, and various assessments and screenings as appropriate. After reviewing your medical record and screening and assessments performed today your provider may have ordered immunizations, labs, imaging, and/or referrals for you. A list of these orders (if applicable) as well as your Preventive Care list are included within your After Visit Summary for your review. Other Preventive Recommendations:    · A preventive eye exam performed by an eye specialist is recommended every 1-2 years to screen for glaucoma; cataracts, macular degeneration, and other eye disorders. · A preventive dental visit is recommended every 6 months. · Try to get at least 150 minutes of exercise per week or 10,000 steps per day on a pedometer . · Order or download the FREE \"Exercise & Physical Activity: Your Everyday Guide\" from [employer]. Call 4-978.254.6617 or search [employer] online. · You need 6195-8656 mg of calcium and 6542-8610 IU of vitamin D per day. It is possible to meet your calcium requirement with diet alone, but a vitamin D supplement is usually necessary to meet this goal.  · When exposed to the sun, use a sunscreen that protects against both UVA and UVB radiation with an SPF of 30 or greater. Reapply every 2 to 3 hours or after sweating, drying off with a towel, or swimming. · Always wear a seat belt when traveling in a car. Always wear a helmet when riding a bicycle or motorcycle.   Personalized Preventive Plan for Yuko Fung - 01/83/0077  Medicare offers a range of preventive health benefits. Some of the tests and screenings are paid in full while other may be subject to a deductible, co-insurance, and/or copay. Some of these benefits include a comprehensive review of your medical history including lifestyle, illnesses that may run in your family, and various assessments and screenings as appropriate. After reviewing your medical record and screening and assessments performed today your provider may have ordered immunizations, labs, imaging, and/or referrals for you. A list of these orders (if applicable) as well as your Preventive Care list are included within your After Visit Summary for your review. Other Preventive Recommendations:    A preventive eye exam performed by an eye specialist is recommended every 1-2 years to screen for glaucoma; cataracts, macular degeneration, and other eye disorders. A preventive dental visit is recommended every 6 months. Try to get at least 150 minutes of exercise per week or 10,000 steps per day on a pedometer . Order or download the FREE \"Exercise & Physical Activity: Your Everyday Guide\" from [employer]. Call 5-665.540.3237 or search [employer] online. You need 3925-7152 mg of calcium and 4792-7334 IU of vitamin D per day. It is possible to meet your calcium requirement with diet alone, but a vitamin D supplement is usually necessary to meet this goal.  When exposed to the sun, use a sunscreen that protects against both UVA and UVB radiation with an SPF of 30 or greater. Reapply every 2 to 3 hours or after sweating, drying off with a towel, or swimming. Always wear a seat belt when traveling in a car. Always wear a helmet when riding a bicycle or motorcycle.

## 2020-11-29 ASSESSMENT — ENCOUNTER SYMPTOMS
STRIDOR: 0
COUGH: 0
SINUS PRESSURE: 0
COLOR CHANGE: 0
ABDOMINAL PAIN: 0
DIARRHEA: 0
SORE THROAT: 0
VOMITING: 0
CHEST TIGHTNESS: 0
ABDOMINAL DISTENTION: 0
NAUSEA: 0
CONSTIPATION: 0
SHORTNESS OF BREATH: 0
WHEEZING: 0
BACK PAIN: 0

## 2020-11-29 NOTE — PROGRESS NOTES
Medicare Annual Wellness Visit  Name: Eufemia Mathews Date:    MRN: [id number] Sex: Male   Age: 80 y.o. Ethnicity: Non-/Non    : 1931 Race: Eulice Claude is here for Medicare AWV and Diabetes (3 month check)    Screenings for behavioral, psychosocial and functional/safety risks, and cognitive dysfunction are all negative except as indicated below. These results, as well as other patient data from the [address] form, are documented in Flowsheets linked to this Encounter. Allergies   Allergen Reactions    Norvasc [Amlodipine Besylate]     Other Rash     States allergic to several BP meds (unsure of name)       Prior to Visit Medications    Medication Sig Taking?  Authorizing Provider   lisinopril (PRINIVIL;ZESTRIL) 20 MG tablet Take 2 tablets by mouth once daily Yes EMRE Helton CNP   metFORMIN (GLUCOPHAGE) 1000 MG tablet Take 1 tablet by mouth once daily with breakfast Yes EMRE Helton CNP   bisoprolol-hydrochlorothiazide (ZIAC) 10-6.25 MG per tablet Take 2 tablets by mouth daily Yes EMRE Helton CNP       Past Medical History:   Diagnosis Date    Diabetes mellitus ([facility] [address]     Hypertension     Type II or unspecified type diabetes mellitus without mention of complication, not stated as uncontrolled        Past Surgical History:   Procedure Laterality Date    TONSILLECTOMY AND ADENOIDECTOMY Bilateral     age 10        Family History   Problem Relation Age of Onset    Diabetes Paternal Aunt     Diabetes Paternal Uncle     Cancer Paternal Uncle     Diabetes Maternal Grandmother     Diabetes Maternal Grandfather     Diabetes Paternal Grandmother     Cancer Paternal Grandmother     Diabetes Paternal Grandfather        CareTeam (Including outside providers/suppliers regularly involved in providing care):   Patient Care Team:  EMRE Helton CNP as PCP - General  EMRE Helton CNP as PCP - ordered. Positive Risk Factor Screenings with Interventions:     General Health and ACP:  General  In general, how would you say your health is?: Good  In the past 7 days, have you experienced any of the following?  New or Increased Pain, New or Increased Fatigue, Loneliness, Social Isolation, Stress or Anger?: None of These  Do you get the social and emotional support that you need?: Yes  Do you have a Living Will?: Yes  Advance Directives     Power of  Living Will ACP-Advance Directive ACP-Power of     Not on File Not on File Filed [address] Risk Interventions:  · Fatigue: regular exercise recommended- 3-5 times per week, 30-45 minutes per session, labs ordered- CBC, BMP    Health Habits/Nutrition:  Health Habits/Nutrition  Do you exercise for at least 20 minutes 2-3 times per week?: (!) No  Have you lost any weight without trying in the past 3 months?: No  Do you eat fewer than 2 meals per day?: No  Have you seen a dentist within the past year?: (!) No  Body mass index: (!) 31.71  Health Habits/Nutrition Interventions:  · Inadequate physical activity:  patient is not ready to increase his/her physical activity level at this time    Hearing/Vision:  No exam data present  Hearing/Vision  Do you or your family notice any trouble with your hearing?: (!) Yes(left ear is garbled)  Do you have difficulty driving, watching TV, or doing any of your daily activities because of your eyesight?: No  Have you had an eye exam within the past year?: Yes  Hearing/Vision Interventions:  · Hearing concerns:  discussed    Safety:  Safety  Do you have working smoke detectors?: Yes  Have all throw rugs been removed or fastened?: Yes  Do you have non-slip mats or surfaces in all bathtubs/showers?: (!) No  Do all of your stairways have a railing or banister?: Yes  Are your doorways, halls and stairs free of clutter?: Yes  Do you always fasten your seatbelt when you are in a car?: (!) No  Safety

## 2021-02-17 ENCOUNTER — HOSPITAL ENCOUNTER (OUTPATIENT)
Age: 86
Discharge: HOME | End: 2021-02-17
Payer: MEDICARE

## 2021-02-17 DIAGNOSIS — I10 ESSENTIAL HYPERTENSION: ICD-10-CM

## 2021-02-17 LAB
BASOPHILS # BLD: 0.5 %
BASOPHILS ABSOLUTE: 0 THOU/MM3 (ref 0–0.1)
EOSINOPHIL # BLD: 9.2 %
EOSINOPHILS ABSOLUTE: 0.8 THOU/MM3 (ref 0–0.4)
ERYTHROCYTE [DISTWIDTH] IN BLOOD BY AUTOMATED COUNT: 13.6 % (ref 11.5–14.5)
ERYTHROCYTE [DISTWIDTH] IN BLOOD BY AUTOMATED COUNT: 42.3 FL (ref 35–45)
HCT VFR BLD CALC: 41.6 % (ref 42–52)
HEMOGLOBIN: 13.1 GM/DL (ref 14–18)
IMMATURE GRANS (ABS): 0.04 THOU/MM3 (ref 0–0.07)
IMMATURE GRANULOCYTES: 0.5 %
LYMPHOCYTES # BLD: 23.2 %
LYMPHOCYTES ABSOLUTE: 2 THOU/MM3 (ref 1–4.8)
MCH RBC QN AUTO: 27.2 PG (ref 26–33)
MCHC RBC AUTO-ENTMCNC: 31.5 GM/DL (ref 32.2–35.5)
MCV RBC AUTO: 86.5 FL (ref 80–94)
MONOCYTES # BLD: 9.2 %
MONOCYTES ABSOLUTE: 0.8 THOU/MM3 (ref 0.4–1.3)
NUCLEATED RED BLOOD CELLS: 0 /100 WBC
PLATELET # BLD: 161 THOU/MM3 (ref 130–400)
PMV BLD AUTO: 13.2 FL (ref 9.4–12.4)
RBC # BLD: 4.81 MILL/MM3 (ref 4.7–6.1)
SEG NEUTROPHILS: 57.4 %
SEGMENTED NEUTROPHILS ABSOLUTE COUNT: 4.9 THOU/MM3 (ref 1.8–7.7)
WBC # BLD: 8.5 THOU/MM3 (ref 4.8–10.8)

## 2021-02-17 PROCEDURE — 36415 COLL VENOUS BLD VENIPUNCTURE: CPT

## 2021-02-17 PROCEDURE — 80053 COMPREHEN METABOLIC PANEL: CPT

## 2021-02-17 PROCEDURE — 85025 COMPLETE CBC W/AUTO DIFF WBC: CPT

## 2021-02-18 LAB
ALBUMIN SERPL-MCNC: 4.3 G/DL (ref 3.5–5.1)
ALP BLD-CCNC: 69 U/L (ref 38–126)
ALT SERPL-CCNC: 13 U/L (ref 11–66)
ANION GAP SERPL CALCULATED.3IONS-SCNC: 8 MEQ/L (ref 8–16)
AST SERPL-CCNC: 13 U/L (ref 5–40)
BILIRUB SERPL-MCNC: 0.5 MG/DL (ref 0.3–1.2)
BUN BLDV-MCNC: 27 MG/DL (ref 7–22)
CALCIUM SERPL-MCNC: 9.4 MG/DL (ref 8.5–10.5)
CHLORIDE BLD-SCNC: 101 MEQ/L (ref 98–111)
CO2: 27 MEQ/L (ref 23–33)
CREAT SERPL-MCNC: 1.2 MG/DL (ref 0.4–1.2)
GFR SERPL CREATININE-BSD FRML MDRD: 57 ML/MIN/1.73M2
GLUCOSE BLD-MCNC: 212 MG/DL (ref 70–108)
POTASSIUM SERPL-SCNC: 4.4 MEQ/L (ref 3.5–5.2)
SODIUM BLD-SCNC: 136 MEQ/L (ref 135–145)
TOTAL PROTEIN: 7.4 G/DL (ref 6.1–8)

## 2021-02-23 ENCOUNTER — OFFICE VISIT (OUTPATIENT)
Dept: FAMILY MEDICINE CLINIC | Age: 86
End: 2021-02-23
Payer: MEDICARE

## 2021-02-23 VITALS
WEIGHT: 219.4 LBS | SYSTOLIC BLOOD PRESSURE: 164 MMHG | DIASTOLIC BLOOD PRESSURE: 80 MMHG | TEMPERATURE: 97.3 F | RESPIRATION RATE: 18 BRPM | HEART RATE: 57 BPM | OXYGEN SATURATION: 98 % | BODY MASS INDEX: 31.48 KG/M2

## 2021-02-23 DIAGNOSIS — I10 ESSENTIAL HYPERTENSION: ICD-10-CM

## 2021-02-23 DIAGNOSIS — E11.9 TYPE 2 DIABETES MELLITUS WITHOUT COMPLICATION, WITHOUT LONG-TERM CURRENT USE OF INSULIN (HCC): Primary | ICD-10-CM

## 2021-02-23 LAB — HBA1C MFR BLD: 7.8 %

## 2021-02-23 PROCEDURE — 99214 OFFICE O/P EST MOD 30 MIN: CPT | Performed by: NURSE PRACTITIONER

## 2021-02-23 PROCEDURE — 3051F HG A1C>EQUAL 7.0%<8.0%: CPT | Performed by: NURSE PRACTITIONER

## 2021-02-23 PROCEDURE — 83036 HEMOGLOBIN GLYCOSYLATED A1C: CPT | Performed by: NURSE PRACTITIONER

## 2021-02-23 PROCEDURE — 1036F TOBACCO NON-USER: CPT | Performed by: NURSE PRACTITIONER

## 2021-02-23 PROCEDURE — G8417 CALC BMI ABV UP PARAM F/U: HCPCS | Performed by: NURSE PRACTITIONER

## 2021-02-23 PROCEDURE — 4040F PNEUMOC VAC/ADMIN/RCVD: CPT | Performed by: NURSE PRACTITIONER

## 2021-02-23 PROCEDURE — 1123F ACP DISCUSS/DSCN MKR DOCD: CPT | Performed by: NURSE PRACTITIONER

## 2021-02-23 PROCEDURE — G8427 DOCREV CUR MEDS BY ELIG CLIN: HCPCS | Performed by: NURSE PRACTITIONER

## 2021-02-23 PROCEDURE — G8484 FLU IMMUNIZE NO ADMIN: HCPCS | Performed by: NURSE PRACTITIONER

## 2021-02-23 ASSESSMENT — PATIENT HEALTH QUESTIONNAIRE - PHQ9
SUM OF ALL RESPONSES TO PHQ9 QUESTIONS 1 & 2: 0
SUM OF ALL RESPONSES TO PHQ QUESTIONS 1-9: 0
2. FEELING DOWN, DEPRESSED OR HOPELESS: 0
1. LITTLE INTEREST OR PLEASURE IN DOING THINGS: 0

## 2021-02-23 NOTE — PROGRESS NOTES
Doug Everett (:  ) is a 80 y.o. male,Established patient, here for evaluation of the following chief complaint(s):  Diabetes and Other (COVID vaccine first dose done)      ASSESSMENT/PLAN:  1. Type 2 diabetes mellitus without complication, without long-term current use of insulin ([facility] [address]  2. Essential hypertension    Continue metformin  Watch diet  More activity  Monitor BP  Continue current meds  Labs reviewed and discussed with pt    Return in about 3 months (around 2021). SUBJECTIVE/OBJECTIVE:  HPI    DM   a1c 7.4 today. Slightly up. Not watching diet. On metformin. No problems. Doing good. Htn. Up some today. Typically in normal range for age. BP Readings from Last 3 Encounters:   21 (!) 164/80   20 138/76   20 (!) 140/82       Review of Systems   Constitutional: Negative for activity change, appetite change, chills, diaphoresis, fatigue, fever and unexpected weight change. HENT: Negative for congestion, ear pain, postnasal drip, sinus pressure and sore throat. Eyes: Negative for visual disturbance. Respiratory: Negative for cough, chest tightness, shortness of breath, wheezing and stridor. Cardiovascular: Negative for chest pain, palpitations and leg swelling. Gastrointestinal: Negative for abdominal distention, abdominal pain, constipation, diarrhea, nausea and vomiting. Endocrine: Negative for polydipsia, polyphagia and polyuria. Genitourinary: Negative for decreased urine volume, difficulty urinating, dysuria, flank pain, frequency, hematuria and urgency. Musculoskeletal: Negative for arthralgias, back pain, gait problem, joint swelling, myalgias and neck pain. Skin: Negative for color change, pallor and rash. Neurological: Negative for dizziness, syncope, weakness, light-headedness, numbness and headaches. Hematological: Negative for adenopathy. Psychiatric/Behavioral: Negative for behavioral problems, self-injury and sleep disturbance. The patient is not nervous/anxious. Physical Exam  Vitals signs reviewed. Constitutional:       Appearance: Normal appearance. He is well-developed. HENT:      Head: Normocephalic. Right Ear: Tympanic membrane and external ear normal.      Left Ear: Tympanic membrane and external ear normal.      Nose: Nose normal.      Right Sinus: No maxillary sinus tenderness. Left Sinus: No maxillary sinus tenderness. Mouth/Throat:      Pharynx: Uvula midline. Neck:      Musculoskeletal: Normal range of motion and neck supple. Thyroid: No thyromegaly. Vascular: No carotid bruit or JVD. Trachea: Trachea normal.   Cardiovascular:      Rate and Rhythm: Normal rate and regular rhythm. Pulses: Normal pulses. Heart sounds: Murmur present. Pulmonary:      Effort: Pulmonary effort is normal. No respiratory distress. Breath sounds: Normal breath sounds. No decreased breath sounds, wheezing, rhonchi or rales. Chest:      Chest wall: No tenderness. Abdominal:      General: Bowel sounds are normal. There is no distension. Palpations: Abdomen is soft. There is no mass. Tenderness: There is no abdominal tenderness. Musculoskeletal: Normal range of motion. General: No tenderness or deformity. Lymphadenopathy:      Cervical: No cervical adenopathy. Skin:     General: Skin is warm and dry. Findings: No erythema or rash. Neurological:      Mental Status: He is alert and oriented to person, place, and time. Cranial Nerves: No cranial nerve deficit. Sensory: No sensory deficit. Motor: No abnormal muscle tone. Coordination: Coordination normal.      Gait: Gait normal.   Psychiatric:         Mood and Affect: Mood normal.         Behavior: Behavior normal.         Thought Content:  Thought content normal.         Judgment: Judgment normal. On this date 02/23/21 I have spent 30 minutes reviewing previous notes, test results and face to face with the patient discussing the diagnosis and importance of compliance with the treatment plan as well as documenting on the day of the visit. An electronic signature was used to authenticate this note.     --Carolyn Varela, EMRE - CNP

## 2021-03-01 ASSESSMENT — ENCOUNTER SYMPTOMS
SHORTNESS OF BREATH: 0
DIARRHEA: 0
NAUSEA: 0
SINUS PRESSURE: 0
VOMITING: 0
COLOR CHANGE: 0
STRIDOR: 0
ABDOMINAL PAIN: 0
CHEST TIGHTNESS: 0
COUGH: 0
CONSTIPATION: 0
ABDOMINAL DISTENTION: 0
WHEEZING: 0
BACK PAIN: 0
SORE THROAT: 0

## 2021-05-01 DIAGNOSIS — I10 ESSENTIAL HYPERTENSION: ICD-10-CM

## 2021-05-02 RX ORDER — BISOPROLOL FUMARATE AND HYDROCHLOROTHIAZIDE 10; 6.25 MG/1; MG/1
TABLET ORAL
Qty: 180 TABLET | Refills: 0 | Status: SHIPPED | OUTPATIENT
Start: 2021-05-02 | End: 2021-08-09

## 2021-05-24 ENCOUNTER — OFFICE VISIT (OUTPATIENT)
Dept: FAMILY MEDICINE CLINIC | Age: 86
End: 2021-05-24
Payer: MEDICARE

## 2021-05-24 VITALS
SYSTOLIC BLOOD PRESSURE: 138 MMHG | TEMPERATURE: 97.5 F | DIASTOLIC BLOOD PRESSURE: 86 MMHG | RESPIRATION RATE: 18 BRPM | HEART RATE: 59 BPM | OXYGEN SATURATION: 97 % | WEIGHT: 218.4 LBS | BODY MASS INDEX: 31.34 KG/M2

## 2021-05-24 DIAGNOSIS — I10 ESSENTIAL HYPERTENSION: Primary | ICD-10-CM

## 2021-05-24 DIAGNOSIS — N18.31 TYPE 2 DIABETES MELLITUS WITH STAGE 3A CHRONIC KIDNEY DISEASE, WITHOUT LONG-TERM CURRENT USE OF INSULIN (HCC): ICD-10-CM

## 2021-05-24 DIAGNOSIS — E11.22 TYPE 2 DIABETES MELLITUS WITH STAGE 3A CHRONIC KIDNEY DISEASE, WITHOUT LONG-TERM CURRENT USE OF INSULIN (HCC): ICD-10-CM

## 2021-05-24 LAB — HBA1C MFR BLD: 7.4 %

## 2021-05-24 PROCEDURE — 1123F ACP DISCUSS/DSCN MKR DOCD: CPT | Performed by: NURSE PRACTITIONER

## 2021-05-24 PROCEDURE — 4040F PNEUMOC VAC/ADMIN/RCVD: CPT | Performed by: NURSE PRACTITIONER

## 2021-05-24 PROCEDURE — 83036 HEMOGLOBIN GLYCOSYLATED A1C: CPT | Performed by: NURSE PRACTITIONER

## 2021-05-24 PROCEDURE — 99214 OFFICE O/P EST MOD 30 MIN: CPT | Performed by: NURSE PRACTITIONER

## 2021-05-24 PROCEDURE — G8427 DOCREV CUR MEDS BY ELIG CLIN: HCPCS | Performed by: NURSE PRACTITIONER

## 2021-05-24 PROCEDURE — 1036F TOBACCO NON-USER: CPT | Performed by: NURSE PRACTITIONER

## 2021-05-24 PROCEDURE — 3051F HG A1C>EQUAL 7.0%<8.0%: CPT | Performed by: NURSE PRACTITIONER

## 2021-05-24 PROCEDURE — G8417 CALC BMI ABV UP PARAM F/U: HCPCS | Performed by: NURSE PRACTITIONER

## 2021-05-24 RX ORDER — METFORMIN HYDROCHLORIDE 500 MG/1
1000 TABLET, EXTENDED RELEASE ORAL
Qty: 60 TABLET | Refills: 5 | Status: SHIPPED | OUTPATIENT
Start: 2021-05-24 | End: 2021-05-26 | Stop reason: SDUPTHER

## 2021-05-24 NOTE — PROGRESS NOTES
Jason Grandavtar (:  [phone]) is a 80 y.o. male,Established patient, here for evaluation of the following chief complaint(s):  Diabetes         ASSESSMENT/PLAN:  1. Essential hypertension  -     Comprehensive Metabolic Panel; Future  -     CBC Auto Differential; Future  2. Type 2 diabetes mellitus with stage 3a chronic kidney disease, without long-term current use of insulin (HCC)  -     POCT glycosylated hemoglobin (Hb A1C)  -     Hemoglobin A1C; Future  -     Comprehensive Metabolic Panel; Future  -     CBC Auto Differential; Future    A1C decreased to 7.4  Goal <7.0  Doing good without complaints  Blood work next visit  Eat healthy avoid carbs  Continue current meds  Htn stable    Return in about 3 months (around 2021). Subjective   SUBJECTIVE/OBJECTIVE:  HPI    Doing good. No changes or complaints. DM improved slightly. No lows. Does still have murmer but no CP or SOB. [facility] health department for covid vaccine. Review of Systems   Constitutional: Negative for activity change, appetite change, chills, diaphoresis, fatigue, fever and unexpected weight change. HENT: Negative for congestion, ear pain, postnasal drip, sinus pressure and sore throat. Eyes: Negative for visual disturbance. Respiratory: Negative for cough, chest tightness, shortness of breath, wheezing and stridor. Cardiovascular: Negative for chest pain, palpitations and leg swelling. Gastrointestinal: Negative for abdominal distention, abdominal pain, constipation, diarrhea, nausea and vomiting. Endocrine: Negative for polydipsia, polyphagia and polyuria. Genitourinary: Negative for decreased urine volume, difficulty urinating, dysuria, flank pain, frequency, hematuria and urgency. Musculoskeletal: Negative for arthralgias, back pain, gait problem, joint swelling, myalgias and neck pain. Skin: Negative for color change, pallor and rash.    Neurological: Negative for dizziness, syncope, weakness,

## 2021-05-26 ENCOUNTER — PATIENT MESSAGE (OUTPATIENT)
Dept: FAMILY MEDICINE CLINIC | Age: 86
End: 2021-05-26

## 2021-05-26 RX ORDER — METFORMIN HYDROCHLORIDE 500 MG/1
1000 TABLET, EXTENDED RELEASE ORAL
Qty: 180 TABLET | Refills: 3 | Status: SHIPPED | OUTPATIENT
Start: 2021-05-26 | End: 2022-06-14 | Stop reason: SDUPTHER

## 2021-05-26 NOTE — TELEPHONE ENCOUNTER
From: Salome Arora  To: EMRE Daly - CNP  Sent: 5/26/2021 2:06 PM EDT  Subject: Visit Follow-Up Question    When you mentioned Metformin  mg, I said you are changing from 1000 mg and you said yes. But you did not mention that I was to take 2 at once daily and the reason. I left with the impression that my daily input was now 500 mg. Anyway why did you only give me a month's supply?  (Yes I learned that there isn't a 1000 mg sized tablet of ER)  NO REPLY NECESSARY

## 2021-06-01 ASSESSMENT — ENCOUNTER SYMPTOMS
COUGH: 0
DIARRHEA: 0
SORE THROAT: 0
STRIDOR: 0
WHEEZING: 0
CONSTIPATION: 0
SINUS PRESSURE: 0
SHORTNESS OF BREATH: 0
ABDOMINAL DISTENTION: 0
BACK PAIN: 0
VOMITING: 0
CHEST TIGHTNESS: 0
NAUSEA: 0
COLOR CHANGE: 0
ABDOMINAL PAIN: 0

## 2021-08-04 ENCOUNTER — OFFICE VISIT (OUTPATIENT)
Dept: FAMILY MEDICINE CLINIC | Age: 86
End: 2021-08-04
Payer: MEDICARE

## 2021-08-04 VITALS
DIASTOLIC BLOOD PRESSURE: 82 MMHG | RESPIRATION RATE: 18 BRPM | TEMPERATURE: 98.2 F | HEART RATE: 62 BPM | BODY MASS INDEX: 30.56 KG/M2 | OXYGEN SATURATION: 98 % | SYSTOLIC BLOOD PRESSURE: 138 MMHG | WEIGHT: 213 LBS

## 2021-08-04 DIAGNOSIS — E11.22 TYPE 2 DIABETES MELLITUS WITH STAGE 3A CHRONIC KIDNEY DISEASE, WITHOUT LONG-TERM CURRENT USE OF INSULIN (HCC): Primary | ICD-10-CM

## 2021-08-04 DIAGNOSIS — I35.0 NONRHEUMATIC AORTIC VALVE STENOSIS: ICD-10-CM

## 2021-08-04 DIAGNOSIS — N18.31 TYPE 2 DIABETES MELLITUS WITH STAGE 3A CHRONIC KIDNEY DISEASE, WITHOUT LONG-TERM CURRENT USE OF INSULIN (HCC): Primary | ICD-10-CM

## 2021-08-04 DIAGNOSIS — I10 ESSENTIAL HYPERTENSION: ICD-10-CM

## 2021-08-04 PROCEDURE — 1123F ACP DISCUSS/DSCN MKR DOCD: CPT | Performed by: NURSE PRACTITIONER

## 2021-08-04 PROCEDURE — 4040F PNEUMOC VAC/ADMIN/RCVD: CPT | Performed by: NURSE PRACTITIONER

## 2021-08-04 PROCEDURE — 3051F HG A1C>EQUAL 7.0%<8.0%: CPT | Performed by: NURSE PRACTITIONER

## 2021-08-04 PROCEDURE — 1036F TOBACCO NON-USER: CPT | Performed by: NURSE PRACTITIONER

## 2021-08-04 PROCEDURE — 99214 OFFICE O/P EST MOD 30 MIN: CPT | Performed by: NURSE PRACTITIONER

## 2021-08-04 PROCEDURE — G8427 DOCREV CUR MEDS BY ELIG CLIN: HCPCS | Performed by: NURSE PRACTITIONER

## 2021-08-04 PROCEDURE — G8417 CALC BMI ABV UP PARAM F/U: HCPCS | Performed by: NURSE PRACTITIONER

## 2021-08-04 SDOH — ECONOMIC STABILITY: FOOD INSECURITY: WITHIN THE PAST 12 MONTHS, THE FOOD YOU BOUGHT JUST DIDN'T LAST AND YOU DIDN'T HAVE MONEY TO GET MORE.: NEVER TRUE

## 2021-08-04 SDOH — ECONOMIC STABILITY: FOOD INSECURITY: WITHIN THE PAST 12 MONTHS, YOU WORRIED THAT YOUR FOOD WOULD RUN OUT BEFORE YOU GOT MONEY TO BUY MORE.: NEVER TRUE

## 2021-08-04 ASSESSMENT — SOCIAL DETERMINANTS OF HEALTH (SDOH): HOW HARD IS IT FOR YOU TO PAY FOR THE VERY BASICS LIKE FOOD, HOUSING, MEDICAL CARE, AND HEATING?: NOT HARD AT ALL

## 2021-08-04 NOTE — PROGRESS NOTES
Sahara Alan (:  ) is a 80 y.o. male,Established patient, here for evaluation of the following chief complaint(s):  Diabetes and Constipation (going on for a couple of weeks little pellets started when taking metformin XR)         ASSESSMENT/PLAN:  1. Type 2 diabetes mellitus with stage 3a chronic kidney disease, without long-term current use of insulin ([address]  2. Essential hypertension  3. Nonrheumatic aortic valve stenosis    Not do for A1C at this point  Obtain lab work in after 3 weeks from now  Eat healthy and exercise  Last echo reviewed for evaluation of murmur  Monitor murmur for symptoms  htn stable  DM doing good  Continue current meds  Colace and miralax as needed for constipation  Fiber in diet    Return in about 4 months (around 2021). Subjective   SUBJECTIVE/OBJECTIVE:  HPI     Hx of DM, HTN and heart murmur. All doing good. No problems. Taking meds as prescribed. Denies diarrhea. Denies cp or sob. Stool infrequent with difficulty- couple of weeks- sometimes significant, sometimes minimal- denies belly pain and bloating- 2 meals a day, sandwich or bowl of cereal around noon, pasta, steak and potato for dinner with veggies (beets, salad) large portion for dinner- drinks a lot of water throughout the day, 'never thirsty', 3-4 bottles of water 15 oz    Review of Systems   Constitutional: Negative for activity change, appetite change, chills, diaphoresis, fatigue, fever and unexpected weight change. HENT: Negative for congestion, ear pain, postnasal drip, sinus pressure and sore throat. Eyes: Negative for visual disturbance. Respiratory: Negative for cough, chest tightness, shortness of breath, wheezing and stridor. Cardiovascular: Negative for chest pain, palpitations and leg swelling. Gastrointestinal: Negative for abdominal distention, abdominal pain, constipation, diarrhea, nausea and vomiting.    Endocrine: Negative for polydipsia, polyphagia and Coordination: Coordination normal.      Gait: Gait normal.   Psychiatric:         Mood and Affect: Mood normal.         Behavior: Behavior normal.         Thought Content: Thought content normal.         Judgment: Judgment normal.            On this date 8/4/2021 I have spent 32 minutes reviewing previous notes, test results and face to face with the patient discussing the diagnosis and importance of compliance with the treatment plan as well as documenting on the day of the visit. An electronic signature was used to authenticate this note.     --Padilla Grade, APRN - CNP

## 2021-08-07 DIAGNOSIS — I10 ESSENTIAL HYPERTENSION: ICD-10-CM

## 2021-08-07 ASSESSMENT — ENCOUNTER SYMPTOMS
COLOR CHANGE: 0
ABDOMINAL DISTENTION: 0
WHEEZING: 0
STRIDOR: 0
ABDOMINAL PAIN: 0
VOMITING: 0
SINUS PRESSURE: 0
CHEST TIGHTNESS: 0
COUGH: 0
NAUSEA: 0
DIARRHEA: 0
CONSTIPATION: 0
SHORTNESS OF BREATH: 0
BACK PAIN: 0
SORE THROAT: 0

## 2021-08-09 RX ORDER — BISOPROLOL FUMARATE AND HYDROCHLOROTHIAZIDE 10; 6.25 MG/1; MG/1
TABLET ORAL
Qty: 180 TABLET | Refills: 0 | Status: SHIPPED | OUTPATIENT
Start: 2021-08-09 | End: 2021-11-08

## 2021-08-26 ENCOUNTER — HOSPITAL ENCOUNTER (OUTPATIENT)
Age: 86
Discharge: HOME | End: 2021-08-26
Payer: MEDICARE

## 2021-08-26 DIAGNOSIS — E11.22 TYPE 2 DIABETES MELLITUS WITH STAGE 3A CHRONIC KIDNEY DISEASE, WITHOUT LONG-TERM CURRENT USE OF INSULIN (HCC): ICD-10-CM

## 2021-08-26 DIAGNOSIS — I10 ESSENTIAL HYPERTENSION: ICD-10-CM

## 2021-08-26 DIAGNOSIS — N18.31 TYPE 2 DIABETES MELLITUS WITH STAGE 3A CHRONIC KIDNEY DISEASE, WITHOUT LONG-TERM CURRENT USE OF INSULIN (HCC): ICD-10-CM

## 2021-08-26 LAB
ALBUMIN SERPL-MCNC: 4.2 G/DL (ref 3.5–5.1)
ALP BLD-CCNC: 65 U/L (ref 38–126)
ALT SERPL-CCNC: 13 U/L (ref 11–66)
ANION GAP SERPL CALCULATED.3IONS-SCNC: 9 MEQ/L (ref 8–16)
AST SERPL-CCNC: 15 U/L (ref 5–40)
BASOPHILS # BLD: 0.6 %
BASOPHILS ABSOLUTE: 0.1 THOU/MM3 (ref 0–0.1)
BILIRUB SERPL-MCNC: 0.9 MG/DL (ref 0.3–1.2)
BUN BLDV-MCNC: 25 MG/DL (ref 7–22)
CALCIUM SERPL-MCNC: 9.4 MG/DL (ref 8.5–10.5)
CHLORIDE BLD-SCNC: 100 MEQ/L (ref 98–111)
CO2: 25 MEQ/L (ref 23–33)
CREAT SERPL-MCNC: 1.1 MG/DL (ref 0.4–1.2)
EOSINOPHIL # BLD: 12.4 %
EOSINOPHILS ABSOLUTE: 1.3 THOU/MM3 (ref 0–0.4)
ERYTHROCYTE [DISTWIDTH] IN BLOOD BY AUTOMATED COUNT: 13.5 % (ref 11.5–14.5)
ERYTHROCYTE [DISTWIDTH] IN BLOOD BY AUTOMATED COUNT: 42.5 FL (ref 35–45)
GFR SERPL CREATININE-BSD FRML MDRD: 63 ML/MIN/1.73M2
GLUCOSE BLD-MCNC: 136 MG/DL (ref 70–108)
HCT VFR BLD CALC: 40.8 % (ref 42–52)
HEMOGLOBIN: 12.9 GM/DL (ref 14–18)
IMMATURE GRANS (ABS): 0.04 THOU/MM3 (ref 0–0.07)
IMMATURE GRANULOCYTES: 0.4 %
LYMPHOCYTES # BLD: 27 %
LYMPHOCYTES ABSOLUTE: 2.8 THOU/MM3 (ref 1–4.8)
MCH RBC QN AUTO: 27.5 PG (ref 26–33)
MCHC RBC AUTO-ENTMCNC: 31.6 GM/DL (ref 32.2–35.5)
MCV RBC AUTO: 87 FL (ref 80–94)
MONOCYTES # BLD: 8.2 %
MONOCYTES ABSOLUTE: 0.9 THOU/MM3 (ref 0.4–1.3)
NUCLEATED RED BLOOD CELLS: 0 /100 WBC
PLATELET # BLD: 141 THOU/MM3 (ref 130–400)
PMV BLD AUTO: 13 FL (ref 9.4–12.4)
POTASSIUM SERPL-SCNC: 4.6 MEQ/L (ref 3.5–5.2)
RBC # BLD: 4.69 MILL/MM3 (ref 4.7–6.1)
SEG NEUTROPHILS: 51.4 %
SEGMENTED NEUTROPHILS ABSOLUTE COUNT: 5.4 THOU/MM3 (ref 1.8–7.7)
SODIUM BLD-SCNC: 134 MEQ/L (ref 135–145)
TOTAL PROTEIN: 7.2 G/DL (ref 6.1–8)
WBC # BLD: 10.5 THOU/MM3 (ref 4.8–10.8)

## 2021-08-26 PROCEDURE — 36415 COLL VENOUS BLD VENIPUNCTURE: CPT

## 2021-08-26 PROCEDURE — 80053 COMPREHEN METABOLIC PANEL: CPT

## 2021-08-26 PROCEDURE — 83036 HEMOGLOBIN GLYCOSYLATED A1C: CPT

## 2021-08-26 PROCEDURE — 85025 COMPLETE CBC W/AUTO DIFF WBC: CPT

## 2021-08-27 LAB
AVERAGE GLUCOSE: 144 MG/DL (ref 70–126)
HBA1C MFR BLD: 6.8 % (ref 4.4–6.4)

## 2021-12-01 ENCOUNTER — OFFICE VISIT (OUTPATIENT)
Dept: FAMILY MEDICINE CLINIC | Age: 86
End: 2021-12-01
Payer: MEDICARE

## 2021-12-01 VITALS
OXYGEN SATURATION: 97 % | BODY MASS INDEX: 31.42 KG/M2 | SYSTOLIC BLOOD PRESSURE: 138 MMHG | WEIGHT: 219 LBS | HEART RATE: 61 BPM | DIASTOLIC BLOOD PRESSURE: 72 MMHG | TEMPERATURE: 97.9 F | RESPIRATION RATE: 20 BRPM

## 2021-12-01 DIAGNOSIS — I10 ESSENTIAL HYPERTENSION: ICD-10-CM

## 2021-12-01 DIAGNOSIS — E11.22 TYPE 2 DIABETES MELLITUS WITH STAGE 3A CHRONIC KIDNEY DISEASE, WITHOUT LONG-TERM CURRENT USE OF INSULIN (HCC): ICD-10-CM

## 2021-12-01 DIAGNOSIS — I35.0 NONRHEUMATIC AORTIC VALVE STENOSIS: ICD-10-CM

## 2021-12-01 DIAGNOSIS — Z00.00 ROUTINE GENERAL MEDICAL EXAMINATION AT A HEALTH CARE FACILITY: Primary | ICD-10-CM

## 2021-12-01 DIAGNOSIS — N18.31 TYPE 2 DIABETES MELLITUS WITH STAGE 3A CHRONIC KIDNEY DISEASE, WITHOUT LONG-TERM CURRENT USE OF INSULIN (HCC): ICD-10-CM

## 2021-12-01 LAB — HBA1C MFR BLD: 6.5 %

## 2021-12-01 PROCEDURE — 4040F PNEUMOC VAC/ADMIN/RCVD: CPT | Performed by: NURSE PRACTITIONER

## 2021-12-01 PROCEDURE — G8427 DOCREV CUR MEDS BY ELIG CLIN: HCPCS | Performed by: NURSE PRACTITIONER

## 2021-12-01 PROCEDURE — 1036F TOBACCO NON-USER: CPT | Performed by: NURSE PRACTITIONER

## 2021-12-01 PROCEDURE — G0439 PPPS, SUBSEQ VISIT: HCPCS | Performed by: NURSE PRACTITIONER

## 2021-12-01 PROCEDURE — G8417 CALC BMI ABV UP PARAM F/U: HCPCS | Performed by: NURSE PRACTITIONER

## 2021-12-01 PROCEDURE — 99214 OFFICE O/P EST MOD 30 MIN: CPT | Performed by: NURSE PRACTITIONER

## 2021-12-01 PROCEDURE — 1123F ACP DISCUSS/DSCN MKR DOCD: CPT | Performed by: NURSE PRACTITIONER

## 2021-12-01 PROCEDURE — 83036 HEMOGLOBIN GLYCOSYLATED A1C: CPT | Performed by: NURSE PRACTITIONER

## 2021-12-01 PROCEDURE — G8484 FLU IMMUNIZE NO ADMIN: HCPCS | Performed by: NURSE PRACTITIONER

## 2021-12-01 ASSESSMENT — PATIENT HEALTH QUESTIONNAIRE - PHQ9
SUM OF ALL RESPONSES TO PHQ QUESTIONS 1-9: 0
2. FEELING DOWN, DEPRESSED OR HOPELESS: 0
SUM OF ALL RESPONSES TO PHQ9 QUESTIONS 1 & 2: 0
1. LITTLE INTEREST OR PLEASURE IN DOING THINGS: 0
SUM OF ALL RESPONSES TO PHQ QUESTIONS 1-9: 0
SUM OF ALL RESPONSES TO PHQ QUESTIONS 1-9: 0

## 2021-12-01 ASSESSMENT — LIFESTYLE VARIABLES: HOW OFTEN DO YOU HAVE A DRINK CONTAINING ALCOHOL: 0

## 2021-12-01 NOTE — PROGRESS NOTES
Hannah Castillo (:  [id number]) is a 80 y.o. male,Established patient, here for evaluation of the following chief complaint(s):  Diabetes (3 month recheck)         ASSESSMENT/PLAN:  1. Type 2 diabetes mellitus with stage 3a chronic kidney disease, without long-term current use of insulin ([facility] [address]  2. Essential hypertension  3. Nonrheumatic aortic valve stenosis    Continue current meds  Obtain echo  DM, HTN stable  Eat health  Remain active  fluids      Return in about 3 months (around 3/1/2022). Subjective   SUBJECTIVE/OBJECTIVE:  HPI    No changes since last visit    DM. Doing better 6.5 A1C. Taking meds as prescribed. On metformin and ace. HTN stable on current meds. Denies cp. Still has murmer. Getting louder. No recent ECHO. No cp or sob. Review of Systems   Constitutional: Negative for activity change, appetite change, chills, diaphoresis, fatigue, fever and unexpected weight change. HENT: Negative for congestion, ear pain, postnasal drip, sinus pressure and sore throat. Eyes: Negative for visual disturbance. Respiratory: Negative for cough, chest tightness, shortness of breath, wheezing and stridor. Cardiovascular: Negative for chest pain, palpitations and leg swelling. Gastrointestinal: Negative for abdominal distention, abdominal pain, constipation, diarrhea, nausea and vomiting. Endocrine: Negative for polydipsia, polyphagia and polyuria. Genitourinary: Negative for decreased urine volume, difficulty urinating, dysuria, flank pain, frequency, hematuria and urgency. Musculoskeletal: Negative for arthralgias, back pain, gait problem, joint swelling, myalgias and neck pain. Skin: Negative for color change, pallor and rash. Neurological: Negative for dizziness, syncope, weakness, light-headedness, numbness and headaches. Hematological: Negative for adenopathy.    Psychiatric/Behavioral: Negative for behavioral problems, self-injury and sleep signature was used to authenticate this note.     --Abdulaziz Goode, APRN - CNP

## 2021-12-08 ASSESSMENT — ENCOUNTER SYMPTOMS
ABDOMINAL DISTENTION: 0
DIARRHEA: 0
NAUSEA: 0
CONSTIPATION: 0
SORE THROAT: 0
SINUS PRESSURE: 0
CHEST TIGHTNESS: 0
ABDOMINAL PAIN: 0
BACK PAIN: 0
WHEEZING: 0
SHORTNESS OF BREATH: 0
VOMITING: 0
COLOR CHANGE: 0
STRIDOR: 0
COUGH: 0

## 2021-12-08 NOTE — PROGRESS NOTES
Medicare Annual Wellness Visit  Name: Sirena Hallman Date:    MRN: [id number] Sex: Male   Age: 80 y.o. Ethnicity: Non- / Non    : 1931 Race: White (non-)      Ledy Shepard is here for Diabetes (3 month recheck) and Medicare AWV    Screenings for behavioral, psychosocial and functional/safety risks, and cognitive dysfunction are all negative except as indicated below. These results, as well as other patient data from the [address] form, are documented in Flowsheets linked to this Encounter. Allergies   Allergen Reactions    Norvasc [Amlodipine Besylate]     Other Rash     States allergic to several BP meds (unsure of name)       Prior to Visit Medications    Medication Sig Taking?  Authorizing Provider   bisoprolol-hydroCHLOROthiazide (ZIAC) 10-6.25 MG per tablet Take 2 tablets by mouth once daily Yes EMRE Harmon CNP   metFORMIN (GLUCOPHAGE XR) 500 MG extended release tablet Take 2 tablets by mouth daily (with breakfast) Yes EMRE Harmon CNP   lisinopril (PRINIVIL;ZESTRIL) 20 MG tablet Take 2 tablets by mouth once daily Yes EMRE Harmon CNP       Past Medical History:   Diagnosis Date    Diabetes mellitus ([address]     Hypertension     Type II or unspecified type diabetes mellitus without mention of complication, not stated as uncontrolled        Past Surgical History:   Procedure Laterality Date    TONSILLECTOMY AND ADENOIDECTOMY Bilateral     age 10        Family History   Problem Relation Age of Onset    Diabetes Paternal Aunt     Diabetes Paternal Uncle     Cancer Paternal Uncle     Diabetes Maternal Grandmother     Diabetes Maternal Grandfather     Diabetes Paternal Grandmother     Cancer Paternal Grandmother     Diabetes Paternal Grandfather        CareTeam (Including outside providers/suppliers regularly involved in providing care):   Patient Care Team:  EMRE Harmon CNP as PCP - General  Gato Gamboa, APRN - CNP as PCP - [address] Provider    Wt Readings from Last 3 Encounters:   12/01/21 219 lb (99.3 kg)   08/04/21 213 lb (96.6 kg)   05/24/21 218 lb 6.4 oz (99.1 kg)     Vitals:    12/01/21 1305   BP: 138/72   Site: Left Upper Arm   Position: Sitting   Cuff Size: Medium Adult   Pulse: 61   Resp: 20   Temp: 97.9 °F (36.6 °C)   TempSrc: Temporal   SpO2: 97%   Weight: 219 lb (99.3 kg)     Body mass index is 31.42 kg/m². Based upon direct observation of the patient, evaluation of cognition reveals recent and remote memory intact. General Appearance: alert and oriented to person, place and time, well developed and well- nourished, in no acute distress  Skin: warm and dry, no rash or erythema  Head: normocephalic and atraumatic  Eyes: pupils equal, round, and reactive to light, extraocular eye movements intact, conjunctivae normal  ENT: tympanic membrane, external ear and ear canal normal bilaterally, nose without deformity, nasal mucosa and turbinates normal without polyps  Neck: supple and non-tender without mass, no thyromegaly or thyroid nodules, no cervical lymphadenopathy  Pulmonary/Chest: clear to auscultation bilaterally- no wheezes, rales or rhonchi, normal air movement, no respiratory distress  Cardiovascular: normal rate, regular rhythm, normal S1 and S2, no murmurs, rubs, clicks, or gallops, distal pulses intact, no carotid bruits  Abdomen: soft, non-tender, non-distended, normal bowel sounds, no masses or organomegaly  Extremities: no cyanosis, clubbing or edema  Musculoskeletal: normal range of motion, no joint swelling, deformity or tenderness  Neurologic: reflexes normal and symmetric, no cranial nerve deficit, gait, coordination and speech normal    Patient's complete Health Risk Assessment and screening values have been reviewed and are found in Flowsheets.  The following problems were reviewed today and where indicated follow up appointments were made and/or referrals ordered. Positive Risk Factor Screenings with Interventions:          General Health and ACP:  General  In general, how would you say your health is?: Very Good  In the past 7 days, have you experienced any of the following?  New or Increased Pain, New or Increased Fatigue, Loneliness, Social Isolation, Stress or Anger?: None of These  Do you get the social and emotional support that you need?: Yes  Do you have a Living Will?: Yes  Advance Directives     Power of  Living Will ACP-Advance Directive ACP-Power of     Not on File Not on File Not on File Not on File      General Health Risk Interventions:  · No problems    Health Habits/Nutrition:  Health Habits/Nutrition  Do you exercise for at least 20 minutes 2-3 times per week?: Yes  Have you lost any weight without trying in the past 3 months?: No  Do you eat only one meal per day?: No  Have you seen the dentist within the past year?: (!) No     Health Habits/Nutrition Interventions:  · Dental exam overdue:  patient encouraged to make appointment with his/her dentist     Safety:  Safety  Do you have working smoke detectors?: Yes  Have all throw rugs been removed or fastened?: Yes  Do you have non-slip mats or surfaces in all bathtubs/showers?: (!) No  Do all of your stairways have a railing or banister?: Yes  Are your doorways, halls and stairs free of clutter?: Yes  Do you always fasten your seatbelt when you are in a car?: Yes  Safety Interventions:  · Home safety tips provided     Personalized Preventive Plan   Current Health Maintenance Status  Immunization History   Administered Date(s) Administered    COVID-19, Wallace Kamara, PAUL, 30mcg/0.3mL 02/12/2021, 03/05/2021        Health Maintenance   Topic Date Due    DTaP/Tdap/Td vaccine (1 - Tdap) Never done    Shingles Vaccine (1 of 2) Never done    Pneumococcal 65+ years Vaccine (1 of 1 - PPSV23) Never done    COVID-19 Vaccine (3 - Booster for Wallace Kamara series) 09/05/2021    Annual Wellness Visit (AWV)  11/24/2021    Flu vaccine (1) 06/30/2022 (Originally 9/1/2021)    Potassium monitoring  08/26/2022    Creatinine monitoring  08/26/2022    Hepatitis A vaccine  Aged Out    Hib vaccine  Aged Out    Meningococcal (ACWY) vaccine  Aged Out     Recommendations for [employer] Due: see orders and patient instructions/AVS.  . Recommended screening schedule for the next 5-10 years is provided to the patient in written form: see Patient Instructions/AVS.    Vibha Ulloa was seen today for diabetes and medicare awv.     Diagnoses and all orders for this visit:    Routine general medical examination at a health care facility

## 2021-12-09 ENCOUNTER — HOSPITAL ENCOUNTER (OUTPATIENT)
Dept: NON INVASIVE DIAGNOSTICS | Age: 86
Discharge: HOME | End: 2021-12-09
Payer: MEDICARE

## 2021-12-09 DIAGNOSIS — I35.0 NONRHEUMATIC AORTIC VALVE STENOSIS: ICD-10-CM

## 2021-12-09 LAB
LV EF: 60 %
LVEF MODALITY: NORMAL

## 2021-12-09 PROCEDURE — 93306 TTE W/DOPPLER COMPLETE: CPT

## 2021-12-14 DIAGNOSIS — I35.0 NONRHEUMATIC AORTIC VALVE STENOSIS: Primary | ICD-10-CM

## 2022-01-01 ENCOUNTER — APPOINTMENT (OUTPATIENT)
Dept: CARDIAC CATH/INVASIVE PROCEDURES | Age: 87
DRG: 267 | End: 2022-01-01
Attending: INTERNAL MEDICINE
Payer: MEDICARE

## 2022-01-01 ENCOUNTER — CARE COORDINATION (OUTPATIENT)
Dept: CASE MANAGEMENT | Age: 87
End: 2022-01-01

## 2022-01-01 ENCOUNTER — PREP FOR PROCEDURE (OUTPATIENT)
Dept: CARDIOLOGY | Age: 87
End: 2022-01-01

## 2022-01-01 ENCOUNTER — HOSPITAL ENCOUNTER (EMERGENCY)
Age: 87
End: 2022-11-25
Payer: MEDICARE

## 2022-01-01 ENCOUNTER — TELEPHONE (OUTPATIENT)
Dept: CARDIOLOGY CLINIC | Age: 87
End: 2022-01-01

## 2022-01-01 ENCOUNTER — TELEPHONE (OUTPATIENT)
Dept: FAMILY MEDICINE CLINIC | Age: 87
End: 2022-01-01

## 2022-01-01 ENCOUNTER — CARE COORDINATION (OUTPATIENT)
Dept: CARE COORDINATION | Age: 87
End: 2022-01-01

## 2022-01-01 ENCOUNTER — HOSPITAL ENCOUNTER (INPATIENT)
Dept: INPATIENT UNIT | Age: 87
LOS: 1 days | Discharge: NURSING, REHAB OR LONG-TERM CARE | DRG: 267 | End: 2022-11-17
Attending: INTERNAL MEDICINE | Admitting: INTERNAL MEDICINE
Payer: MEDICARE

## 2022-01-01 ENCOUNTER — OFFICE VISIT (OUTPATIENT)
Dept: CARDIOTHORACIC SURGERY | Age: 87
End: 2022-01-01
Payer: MEDICARE

## 2022-01-01 VITALS
DIASTOLIC BLOOD PRESSURE: 78 MMHG | OXYGEN SATURATION: 97 % | SYSTOLIC BLOOD PRESSURE: 137 MMHG | HEART RATE: 120 BPM | WEIGHT: 199 LBS | BODY MASS INDEX: 28.49 KG/M2 | HEIGHT: 70 IN

## 2022-01-01 VITALS
HEIGHT: 70 IN | BODY MASS INDEX: 27.49 KG/M2 | HEART RATE: 58 BPM | RESPIRATION RATE: 18 BRPM | WEIGHT: 192 LBS | OXYGEN SATURATION: 97 % | DIASTOLIC BLOOD PRESSURE: 66 MMHG | SYSTOLIC BLOOD PRESSURE: 152 MMHG | TEMPERATURE: 98.5 F

## 2022-01-01 DIAGNOSIS — Z95.2 S/P TAVR (TRANSCATHETER AORTIC VALVE REPLACEMENT): Primary | ICD-10-CM

## 2022-01-01 DIAGNOSIS — I46.9 DEATH DUE TO CARDIAC ARREST (HCC): Primary | ICD-10-CM

## 2022-01-01 DIAGNOSIS — I35.0 SEVERE AORTIC STENOSIS: Primary | ICD-10-CM

## 2022-01-01 DIAGNOSIS — I35.0 NONRHEUMATIC AORTIC VALVE STENOSIS: Primary | ICD-10-CM

## 2022-01-01 DIAGNOSIS — R55 SYNCOPE AND COLLAPSE: ICD-10-CM

## 2022-01-01 LAB
ABO: NORMAL
ACTIVATED CLOTTING TIME: 275 SECONDS (ref 1–150)
ALBUMIN SERPL-MCNC: 4.7 G/DL (ref 3.5–5.1)
ALP BLD-CCNC: 89 U/L (ref 38–126)
ALT SERPL-CCNC: 19 U/L (ref 11–66)
ANION GAP SERPL CALCULATED.3IONS-SCNC: 10 MEQ/L (ref 8–16)
ANION GAP SERPL CALCULATED.3IONS-SCNC: 11 MEQ/L (ref 8–16)
ANTIBODY SCREEN: NORMAL
APTT: 30.5 SECONDS (ref 22–38)
APTT: 33.3 SECONDS (ref 22–38)
AST SERPL-CCNC: 17 U/L (ref 5–40)
BILIRUB SERPL-MCNC: 0.5 MG/DL (ref 0.3–1.2)
BUN BLDV-MCNC: 20 MG/DL (ref 7–22)
BUN BLDV-MCNC: 24 MG/DL (ref 7–22)
CALCIUM SERPL-MCNC: 8.8 MG/DL (ref 8.5–10.5)
CALCIUM SERPL-MCNC: 9 MG/DL (ref 8.5–10.5)
CHLORIDE BLD-SCNC: 100 MEQ/L (ref 98–111)
CHLORIDE BLD-SCNC: 103 MEQ/L (ref 98–111)
CO2: 27 MEQ/L (ref 23–33)
CO2: 27 MEQ/L (ref 23–33)
CREAT SERPL-MCNC: 1.1 MG/DL (ref 0.4–1.2)
CREAT SERPL-MCNC: 1.3 MG/DL (ref 0.4–1.2)
EKG ATRIAL RATE: 53 BPM
EKG ATRIAL RATE: 54 BPM
EKG ATRIAL RATE: 57 BPM
EKG P AXIS: 60 DEGREES
EKG P AXIS: 78 DEGREES
EKG P AXIS: 85 DEGREES
EKG P-R INTERVAL: 212 MS
EKG P-R INTERVAL: 220 MS
EKG P-R INTERVAL: 242 MS
EKG Q-T INTERVAL: 518 MS
EKG Q-T INTERVAL: 532 MS
EKG Q-T INTERVAL: 568 MS
EKG QRS DURATION: 134 MS
EKG QRS DURATION: 162 MS
EKG QRS DURATION: 166 MS
EKG QTC CALCULATION (BAZETT): 491 MS
EKG QTC CALCULATION (BAZETT): 517 MS
EKG QTC CALCULATION (BAZETT): 532 MS
EKG R AXIS: -30 DEGREES
EKG R AXIS: -47 DEGREES
EKG R AXIS: -50 DEGREES
EKG T AXIS: 111 DEGREES
EKG T AXIS: 72 DEGREES
EKG T AXIS: 94 DEGREES
EKG VENTRICULAR RATE: 53 BPM
EKG VENTRICULAR RATE: 54 BPM
EKG VENTRICULAR RATE: 57 BPM
ERYTHROCYTE [DISTWIDTH] IN BLOOD BY AUTOMATED COUNT: 13.7 % (ref 11.5–14.5)
ERYTHROCYTE [DISTWIDTH] IN BLOOD BY AUTOMATED COUNT: 13.8 % (ref 11.5–14.5)
ERYTHROCYTE [DISTWIDTH] IN BLOOD BY AUTOMATED COUNT: 42.4 FL (ref 35–45)
ERYTHROCYTE [DISTWIDTH] IN BLOOD BY AUTOMATED COUNT: 42.9 FL (ref 35–45)
GFR SERPL CREATININE-BSD FRML MDRD: 52 ML/MIN/1.73M2
GFR SERPL CREATININE-BSD FRML MDRD: > 60 ML/MIN/1.73M2
GLUCOSE BLD-MCNC: 131 MG/DL (ref 70–108)
GLUCOSE BLD-MCNC: 140 MG/DL (ref 70–108)
GLUCOSE BLD-MCNC: 145 MG/DL (ref 70–108)
GLUCOSE BLD-MCNC: 149 MG/DL (ref 70–108)
GLUCOSE BLD-MCNC: 149 MG/DL (ref 70–108)
GLUCOSE BLD-MCNC: 185 MG/DL (ref 70–108)
GLUCOSE BLD-MCNC: 214 MG/DL (ref 70–108)
GLUCOSE BLD-MCNC: 230 MG/DL (ref 70–108)
HCT VFR BLD CALC: 35.9 % (ref 42–52)
HCT VFR BLD CALC: 36.2 % (ref 42–52)
HEMOGLOBIN: 11.6 GM/DL (ref 14–18)
HEMOGLOBIN: 11.7 GM/DL (ref 14–18)
INR BLD: 1.06 (ref 0.85–1.13)
INR BLD: 1.11 (ref 0.85–1.13)
LV EF: 53 %
LVEF MODALITY: NORMAL
MCH RBC QN AUTO: 27.4 PG (ref 26–33)
MCH RBC QN AUTO: 27.9 PG (ref 26–33)
MCHC RBC AUTO-ENTMCNC: 32 GM/DL (ref 32.2–35.5)
MCHC RBC AUTO-ENTMCNC: 32.6 GM/DL (ref 32.2–35.5)
MCV RBC AUTO: 85.4 FL (ref 80–94)
MCV RBC AUTO: 85.7 FL (ref 80–94)
PLATELET # BLD: 122 THOU/MM3 (ref 130–400)
PLATELET # BLD: 128 THOU/MM3 (ref 130–400)
PMV BLD AUTO: 12.8 FL (ref 9.4–12.4)
PMV BLD AUTO: 13 FL (ref 9.4–12.4)
POTASSIUM REFLEX MAGNESIUM: 4.3 MEQ/L (ref 3.5–5.2)
POTASSIUM SERPL-SCNC: 4.5 MEQ/L (ref 3.5–5.2)
PRO-BNP: 1337 PG/ML (ref 0–1800)
RBC # BLD: 4.19 MILL/MM3 (ref 4.7–6.1)
RBC # BLD: 4.24 MILL/MM3 (ref 4.7–6.1)
RH FACTOR: NORMAL
SODIUM BLD-SCNC: 138 MEQ/L (ref 135–145)
SODIUM BLD-SCNC: 140 MEQ/L (ref 135–145)
TOTAL PROTEIN: 6.3 G/DL (ref 6.1–8)
WBC # BLD: 11.2 THOU/MM3 (ref 4.8–10.8)
WBC # BLD: 15 THOU/MM3 (ref 4.8–10.8)

## 2022-01-01 PROCEDURE — 85027 COMPLETE CBC AUTOMATED: CPT

## 2022-01-01 PROCEDURE — 36415 COLL VENOUS BLD VENIPUNCTURE: CPT

## 2022-01-01 PROCEDURE — G8484 FLU IMMUNIZE NO ADMIN: HCPCS | Performed by: PHYSICIAN ASSISTANT

## 2022-01-01 PROCEDURE — C1760 CLOSURE DEV, VASC: HCPCS

## 2022-01-01 PROCEDURE — 2580000003 HC RX 258: Performed by: PHYSICIAN ASSISTANT

## 2022-01-01 PROCEDURE — 93010 ELECTROCARDIOGRAM REPORT: CPT | Performed by: INTERNAL MEDICINE

## 2022-01-01 PROCEDURE — 82948 REAGENT STRIP/BLOOD GLUCOSE: CPT

## 2022-01-01 PROCEDURE — 93270 REMOTE 30 DAY ECG REV/REPORT: CPT

## 2022-01-01 PROCEDURE — 2580000003 HC RX 258: Performed by: INTERNAL MEDICINE

## 2022-01-01 PROCEDURE — 6370000000 HC RX 637 (ALT 250 FOR IP): Performed by: INTERNAL MEDICINE

## 2022-01-01 PROCEDURE — C1889 IMPLANT/INSERT DEVICE, NOC: HCPCS

## 2022-01-01 PROCEDURE — APPSS60 APP SPLIT SHARED TIME 46-60 MINUTES: Performed by: PHYSICIAN ASSISTANT

## 2022-01-01 PROCEDURE — 85347 COAGULATION TIME ACTIVATED: CPT

## 2022-01-01 PROCEDURE — 33361 REPLACE AORTIC VALVE PERQ: CPT | Performed by: INTERNAL MEDICINE

## 2022-01-01 PROCEDURE — 1123F ACP DISCUSS/DSCN MKR DOCD: CPT | Performed by: PHYSICIAN ASSISTANT

## 2022-01-01 PROCEDURE — 75710 ARTERY X-RAYS ARM/LEG: CPT

## 2022-01-01 PROCEDURE — 33361 REPLACE AORTIC VALVE PERQ: CPT

## 2022-01-01 PROCEDURE — G8427 DOCREV CUR MEDS BY ELIG CLIN: HCPCS | Performed by: PHYSICIAN ASSISTANT

## 2022-01-01 PROCEDURE — 1036F TOBACCO NON-USER: CPT | Performed by: PHYSICIAN ASSISTANT

## 2022-01-01 PROCEDURE — 6360000002 HC RX W HCPCS: Performed by: PHYSICIAN ASSISTANT

## 2022-01-01 PROCEDURE — 93005 ELECTROCARDIOGRAM TRACING: CPT | Performed by: PHYSICIAN ASSISTANT

## 2022-01-01 PROCEDURE — 92950 HEART/LUNG RESUSCITATION CPR: CPT

## 2022-01-01 PROCEDURE — 80053 COMPREHEN METABOLIC PANEL: CPT

## 2022-01-01 PROCEDURE — 85610 PROTHROMBIN TIME: CPT

## 2022-01-01 PROCEDURE — 33361 REPLACE AORTIC VALVE PERQ: CPT | Performed by: THORACIC SURGERY (CARDIOTHORACIC VASCULAR SURGERY)

## 2022-01-01 PROCEDURE — 83880 ASSAY OF NATRIURETIC PEPTIDE: CPT

## 2022-01-01 PROCEDURE — 80048 BASIC METABOLIC PNL TOTAL CA: CPT

## 2022-01-01 PROCEDURE — 93005 ELECTROCARDIOGRAM TRACING: CPT | Performed by: INTERNAL MEDICINE

## 2022-01-01 PROCEDURE — 86900 BLOOD TYPING SEROLOGIC ABO: CPT

## 2022-01-01 PROCEDURE — 6360000004 HC RX CONTRAST MEDICATION: Performed by: INTERNAL MEDICINE

## 2022-01-01 PROCEDURE — 6360000002 HC RX W HCPCS: Performed by: FAMILY MEDICINE

## 2022-01-01 PROCEDURE — C1894 INTRO/SHEATH, NON-LASER: HCPCS

## 2022-01-01 PROCEDURE — 2140000000 HC CCU INTERMEDIATE R&B

## 2022-01-01 PROCEDURE — 85730 THROMBOPLASTIN TIME PARTIAL: CPT

## 2022-01-01 PROCEDURE — 02RF38Z REPLACEMENT OF AORTIC VALVE WITH ZOOPLASTIC TISSUE, PERCUTANEOUS APPROACH: ICD-10-PCS | Performed by: INTERNAL MEDICINE

## 2022-01-01 PROCEDURE — C1769 GUIDE WIRE: HCPCS

## 2022-01-01 PROCEDURE — 1111F DSCHRG MED/CURRENT MED MERGE: CPT | Performed by: PHYSICIAN ASSISTANT

## 2022-01-01 PROCEDURE — 86850 RBC ANTIBODY SCREEN: CPT

## 2022-01-01 PROCEDURE — 99214 OFFICE O/P EST MOD 30 MIN: CPT | Performed by: PHYSICIAN ASSISTANT

## 2022-01-01 PROCEDURE — 86901 BLOOD TYPING SEROLOGIC RH(D): CPT

## 2022-01-01 PROCEDURE — 2720000010 HC SURG SUPPLY STERILE

## 2022-01-01 PROCEDURE — 93306 TTE W/DOPPLER COMPLETE: CPT

## 2022-01-01 PROCEDURE — C1725 CATH, TRANSLUMIN NON-LASER: HCPCS

## 2022-01-01 PROCEDURE — 6370000000 HC RX 637 (ALT 250 FOR IP)

## 2022-01-01 PROCEDURE — 2500000003 HC RX 250 WO HCPCS: Performed by: FAMILY MEDICINE

## 2022-01-01 PROCEDURE — 37799 UNLISTED PX VASCULAR SURGERY: CPT

## 2022-01-01 PROCEDURE — 86923 COMPATIBILITY TEST ELECTRIC: CPT

## 2022-01-01 PROCEDURE — 31500 INSERT EMERGENCY AIRWAY: CPT

## 2022-01-01 PROCEDURE — G8417 CALC BMI ABV UP PARAM F/U: HCPCS | Performed by: PHYSICIAN ASSISTANT

## 2022-01-01 PROCEDURE — 2500000003 HC RX 250 WO HCPCS

## 2022-01-01 PROCEDURE — 36246 INS CATH ABD/L-EXT ART 2ND: CPT

## 2022-01-01 PROCEDURE — 6360000002 HC RX W HCPCS

## 2022-01-01 PROCEDURE — 99285 EMERGENCY DEPT VISIT HI MDM: CPT | Performed by: FAMILY MEDICINE

## 2022-01-01 RX ORDER — EPINEPHRINE 0.1 MG/ML
SYRINGE (ML) INJECTION DAILY PRN
Status: COMPLETED | OUTPATIENT
Start: 2022-01-01 | End: 2022-01-01

## 2022-01-01 RX ORDER — CHLORHEXIDINE GLUCONATE 4 G/100ML
SOLUTION TOPICAL ONCE
Status: DISCONTINUED | OUTPATIENT
Start: 2022-01-01 | End: 2022-01-01 | Stop reason: HOSPADM

## 2022-01-01 RX ORDER — METFORMIN HYDROCHLORIDE 500 MG/1
1000 TABLET, EXTENDED RELEASE ORAL
Qty: 180 TABLET | Refills: 3 | Status: SHIPPED | OUTPATIENT
Start: 2022-01-01 | End: 2023-02-17

## 2022-01-01 RX ORDER — SODIUM CHLORIDE 0.9 % (FLUSH) 0.9 %
5-40 SYRINGE (ML) INJECTION PRN
Status: DISCONTINUED | OUTPATIENT
Start: 2022-01-01 | End: 2022-01-01 | Stop reason: HOSPADM

## 2022-01-01 RX ORDER — SODIUM CHLORIDE 0.9 % (FLUSH) 0.9 %
5-40 SYRINGE (ML) INJECTION EVERY 12 HOURS SCHEDULED
Status: DISCONTINUED | OUTPATIENT
Start: 2022-01-01 | End: 2022-01-01 | Stop reason: HOSPADM

## 2022-01-01 RX ORDER — FENTANYL CITRATE 50 UG/ML
50 INJECTION, SOLUTION INTRAMUSCULAR; INTRAVENOUS
Status: DISCONTINUED | OUTPATIENT
Start: 2022-01-01 | End: 2022-01-01 | Stop reason: HOSPADM

## 2022-01-01 RX ORDER — SODIUM CHLORIDE 9 MG/ML
INJECTION, SOLUTION INTRAVENOUS CONTINUOUS
Status: CANCELLED | OUTPATIENT
Start: 2022-01-01

## 2022-01-01 RX ORDER — SODIUM CHLORIDE 0.9 % (FLUSH) 0.9 %
5-40 SYRINGE (ML) INJECTION PRN
Status: CANCELLED | OUTPATIENT
Start: 2022-01-01

## 2022-01-01 RX ORDER — NITROGLYCERIN 0.4 MG/1
0.4 TABLET SUBLINGUAL EVERY 5 MIN PRN
Status: DISCONTINUED | OUTPATIENT
Start: 2022-01-01 | End: 2022-01-01 | Stop reason: HOSPADM

## 2022-01-01 RX ORDER — NITROGLYCERIN 0.4 MG/1
0.4 TABLET SUBLINGUAL EVERY 5 MIN PRN
Qty: 25 TABLET | Refills: 3 | Status: SHIPPED
Start: 2022-01-01

## 2022-01-01 RX ORDER — HYDRALAZINE HYDROCHLORIDE 20 MG/ML
10 INJECTION INTRAMUSCULAR; INTRAVENOUS EVERY 10 MIN PRN
Status: DISCONTINUED | OUTPATIENT
Start: 2022-01-01 | End: 2022-01-01 | Stop reason: HOSPADM

## 2022-01-01 RX ORDER — MIDAZOLAM HYDROCHLORIDE 1 MG/ML
1 INJECTION INTRAMUSCULAR; INTRAVENOUS EVERY 6 HOURS PRN
Status: DISCONTINUED | OUTPATIENT
Start: 2022-01-01 | End: 2022-01-01 | Stop reason: HOSPADM

## 2022-01-01 RX ORDER — CLOPIDOGREL BISULFATE 75 MG/1
75 TABLET ORAL DAILY
Status: DISCONTINUED | OUTPATIENT
Start: 2022-01-01 | End: 2022-01-01 | Stop reason: SDUPTHER

## 2022-01-01 RX ORDER — ATORVASTATIN CALCIUM 40 MG/1
40 TABLET, FILM COATED ORAL NIGHTLY
Status: DISCONTINUED | OUTPATIENT
Start: 2022-01-01 | End: 2022-01-01 | Stop reason: HOSPADM

## 2022-01-01 RX ORDER — SODIUM CHLORIDE 9 MG/ML
INJECTION, SOLUTION INTRAVENOUS PRN
Status: DISCONTINUED | OUTPATIENT
Start: 2022-01-01 | End: 2022-01-01 | Stop reason: HOSPADM

## 2022-01-01 RX ORDER — SODIUM CHLORIDE 0.9 % (FLUSH) 0.9 %
5-40 SYRINGE (ML) INJECTION EVERY 12 HOURS SCHEDULED
Status: CANCELLED | OUTPATIENT
Start: 2022-01-01

## 2022-01-01 RX ORDER — LISINOPRIL 20 MG/1
20 TABLET ORAL DAILY
Status: DISCONTINUED | OUTPATIENT
Start: 2022-01-01 | End: 2022-01-01 | Stop reason: HOSPADM

## 2022-01-01 RX ORDER — AMIODARONE HYDROCHLORIDE 200 MG/1
200 TABLET ORAL DAILY
Qty: 90 TABLET | Refills: 1
Start: 2022-01-01

## 2022-01-01 RX ORDER — SODIUM CHLORIDE 9 MG/ML
INJECTION, SOLUTION INTRAVENOUS PRN
Status: CANCELLED | OUTPATIENT
Start: 2022-01-01

## 2022-01-01 RX ORDER — CHLORHEXIDINE GLUCONATE 4 G/100ML
SOLUTION TOPICAL ONCE
Status: CANCELLED | OUTPATIENT
Start: 2022-01-01 | End: 2022-01-01

## 2022-01-01 RX ORDER — ONDANSETRON 2 MG/ML
4 INJECTION INTRAMUSCULAR; INTRAVENOUS EVERY 6 HOURS PRN
Status: DISCONTINUED | OUTPATIENT
Start: 2022-01-01 | End: 2022-01-01 | Stop reason: HOSPADM

## 2022-01-01 RX ORDER — METOPROLOL SUCCINATE 100 MG/1
100 TABLET, EXTENDED RELEASE ORAL DAILY
Qty: 90 TABLET | Refills: 3 | Status: SHIPPED | OUTPATIENT
Start: 2022-01-01 | End: 2022-01-01 | Stop reason: SDUPTHER

## 2022-01-01 RX ORDER — SODIUM CHLORIDE 9 MG/ML
INJECTION, SOLUTION INTRAVENOUS CONTINUOUS
Status: DISCONTINUED | OUTPATIENT
Start: 2022-01-01 | End: 2022-01-01 | Stop reason: HOSPADM

## 2022-01-01 RX ORDER — ASPIRIN 81 MG/1
81 TABLET ORAL DAILY
Status: DISCONTINUED | OUTPATIENT
Start: 2022-01-01 | End: 2022-01-01 | Stop reason: HOSPADM

## 2022-01-01 RX ORDER — AMIODARONE HYDROCHLORIDE 200 MG/1
200 TABLET ORAL DAILY
Qty: 90 TABLET | Refills: 1 | Status: SHIPPED
Start: 2022-01-01 | End: 2022-01-01 | Stop reason: SDUPTHER

## 2022-01-01 RX ORDER — ACETAMINOPHEN 325 MG/1
650 TABLET ORAL EVERY 4 HOURS PRN
Status: DISCONTINUED | OUTPATIENT
Start: 2022-01-01 | End: 2022-01-01 | Stop reason: HOSPADM

## 2022-01-01 RX ORDER — ATROPINE SULFATE 0.4 MG/ML
0.5 AMPUL (ML) INJECTION
Status: ACTIVE | OUTPATIENT
Start: 2022-01-01 | End: 2022-01-01

## 2022-01-01 RX ORDER — CLOPIDOGREL BISULFATE 75 MG/1
75 TABLET ORAL DAILY
Status: DISCONTINUED | OUTPATIENT
Start: 2022-01-01 | End: 2022-01-01 | Stop reason: HOSPADM

## 2022-01-01 RX ORDER — ASPIRIN 325 MG
325 TABLET ORAL ONCE
Status: CANCELLED | OUTPATIENT
Start: 2022-01-01 | End: 2022-01-01

## 2022-01-01 RX ORDER — METOPROLOL SUCCINATE 100 MG/1
100 TABLET, EXTENDED RELEASE ORAL DAILY
Qty: 90 TABLET | Refills: 3
Start: 2022-01-01

## 2022-01-01 RX ORDER — NITROGLYCERIN 0.4 MG/1
0.4 TABLET SUBLINGUAL EVERY 5 MIN PRN
Status: CANCELLED | OUTPATIENT
Start: 2022-01-01

## 2022-01-01 RX ADMIN — Medication 1 MG: at 07:55

## 2022-01-01 RX ADMIN — Medication 1 MG: at 07:58

## 2022-01-01 RX ADMIN — SODIUM CHLORIDE, PRESERVATIVE FREE 10 ML: 5 INJECTION INTRAVENOUS at 12:50

## 2022-01-01 RX ADMIN — LISINOPRIL 20 MG: 20 TABLET ORAL at 10:03

## 2022-01-01 RX ADMIN — Medication 1 MG: at 07:51

## 2022-01-01 RX ADMIN — SODIUM CHLORIDE, PRESERVATIVE FREE 10 ML: 5 INJECTION INTRAVENOUS at 10:04

## 2022-01-01 RX ADMIN — CEFAZOLIN SODIUM 2000 MG: 10 INJECTION, POWDER, FOR SOLUTION INTRAVENOUS at 06:20

## 2022-01-01 RX ADMIN — LISINOPRIL 20 MG: 20 TABLET ORAL at 12:47

## 2022-01-01 RX ADMIN — ASPIRIN 81 MG: 81 TABLET, COATED ORAL at 10:03

## 2022-01-01 RX ADMIN — SODIUM CHLORIDE, PRESERVATIVE FREE 10 ML: 5 INJECTION INTRAVENOUS at 12:49

## 2022-01-01 RX ADMIN — CLOPIDOGREL BISULFATE 75 MG: 75 TABLET ORAL at 10:03

## 2022-01-01 RX ADMIN — SODIUM CHLORIDE, PRESERVATIVE FREE 10 ML: 5 INJECTION INTRAVENOUS at 20:01

## 2022-01-01 RX ADMIN — ATORVASTATIN CALCIUM 40 MG: 40 TABLET, FILM COATED ORAL at 20:00

## 2022-01-01 RX ADMIN — IOPAMIDOL 75 ML: 755 INJECTION, SOLUTION INTRAVENOUS at 09:14

## 2022-01-01 RX ADMIN — Medication 50 MEQ: at 07:49

## 2022-01-01 RX ADMIN — Medication 1 MG: at 07:47

## 2022-01-01 RX ADMIN — SODIUM CHLORIDE: 9 INJECTION, SOLUTION INTRAVENOUS at 06:18

## 2022-01-01 ASSESSMENT — LIFESTYLE VARIABLES
HOW MANY STANDARD DRINKS CONTAINING ALCOHOL DO YOU HAVE ON A TYPICAL DAY: PATIENT DOES NOT DRINK
HOW OFTEN DO YOU HAVE A DRINK CONTAINING ALCOHOL: NEVER

## 2022-01-01 ASSESSMENT — PAIN DESCRIPTION - ORIENTATION: ORIENTATION: RIGHT;LEFT

## 2022-01-01 ASSESSMENT — PAIN SCALES - GENERAL: PAINLEVEL_OUTOF10: 0

## 2022-01-01 ASSESSMENT — PAIN DESCRIPTION - LOCATION: LOCATION: GROIN

## 2022-01-01 ASSESSMENT — PAIN DESCRIPTION - DESCRIPTORS: DESCRIPTORS: ACHING

## 2022-01-26 ENCOUNTER — OFFICE VISIT (OUTPATIENT)
Dept: CARDIOLOGY CLINIC | Age: 87
End: 2022-01-26
Payer: MEDICARE

## 2022-01-26 VITALS
BODY MASS INDEX: 31.42 KG/M2 | SYSTOLIC BLOOD PRESSURE: 154 MMHG | DIASTOLIC BLOOD PRESSURE: 72 MMHG | HEART RATE: 72 BPM | HEIGHT: 70 IN

## 2022-01-26 DIAGNOSIS — I35.0 NONRHEUMATIC AORTIC VALVE STENOSIS: Primary | ICD-10-CM

## 2022-01-26 PROCEDURE — 1036F TOBACCO NON-USER: CPT | Performed by: INTERNAL MEDICINE

## 2022-01-26 PROCEDURE — 4040F PNEUMOC VAC/ADMIN/RCVD: CPT | Performed by: INTERNAL MEDICINE

## 2022-01-26 PROCEDURE — G8417 CALC BMI ABV UP PARAM F/U: HCPCS | Performed by: INTERNAL MEDICINE

## 2022-01-26 PROCEDURE — G8484 FLU IMMUNIZE NO ADMIN: HCPCS | Performed by: INTERNAL MEDICINE

## 2022-01-26 PROCEDURE — 1123F ACP DISCUSS/DSCN MKR DOCD: CPT | Performed by: INTERNAL MEDICINE

## 2022-01-26 PROCEDURE — G8427 DOCREV CUR MEDS BY ELIG CLIN: HCPCS | Performed by: INTERNAL MEDICINE

## 2022-01-26 PROCEDURE — 99204 OFFICE O/P NEW MOD 45 MIN: CPT | Performed by: INTERNAL MEDICINE

## 2022-01-26 NOTE — PROGRESS NOTES
[facility] [address]  [place] [address]  Dept: [phone]  Dept Fax: [phone]  Loc: [phone]    Visit Date: 8/64/0413    Mr. Herberth Lentz is a 80 y.o. male  who presented for:  Chief Complaint   Patient presents with    New Patient     AS       HPI:   HPI   79 yo M hx of HTN, HLD, DM II who presents for evaluation of aortic stenosis. Progressive ZAHEER decreasing to 0.9 cm2, with mean gradient to 27 mmHg. He is getting slower in term of his function but attributes this to his age. He is taking all of his medications. No malignancies. He is  but has children that help with care. He at this time feels as if the valve is not a big issue but does state that he is not opposed to intervention if that is needed. He has never had a PCI, CABG, or prior cardiac intervention. He has no hx of PAD or PAD intervention. Denies syncope or worsening LE edema. Preserved EF. No major family hx. Denies a/t/d. Current Outpatient Medications:     bisoprolol-hydroCHLOROthiazide (ZIAC) 10-6.25 MG per tablet, Take 2 tablets by mouth once daily, Disp: 180 tablet, Rfl: 3    lisinopril (PRINIVIL;ZESTRIL) 20 MG tablet, Take 2 tablets by mouth once daily, Disp: 180 tablet, Rfl: 3    metFORMIN (GLUCOPHAGE XR) 500 MG extended release tablet, Take 2 tablets by mouth daily (with breakfast), Disp: 180 tablet, Rfl: 3    Past Medical History  Parvez Ross  has a past medical history of Diabetes mellitus ([address] Hypertension, and Type II or unspecified type diabetes mellitus without mention of complication, not stated as uncontrolled. Social History  Parvez Ross  reports that he has never smoked. He has never used smokeless tobacco. He reports that he does not drink alcohol and does not use drugs.     Family History  Parvez Ross family history includes Cancer in his paternal grandmother and paternal uncle; Diabetes in his maternal grandfather, maternal grandmother, paternal aunt, paternal grandfather, paternal grandmother, and paternal uncle. There is no family history of bicuspid aortic valve, aneurysms, heart transplant, pacemakers, defibrillators, or sudden cardiac death. Past Surgical History   Past Surgical History:   Procedure Laterality Date    TONSILLECTOMY AND ADENOIDECTOMY Bilateral     age 10        Review of Systems   Constitutional: Negative for chills and fever  HENT: Negative for congestion, sinus pressure, sneezing and sore throat. Eyes: Negative for pain, discharge, redness and itching. Respiratory: Negative for apnea, cough  Gastrointestinal: Negative for blood in stool, constipation, diarrhea   Endocrine: Negative for cold intolerance, heat intolerance, polydipsia. Genitourinary: Negative for dysuria, enuresis, flank pain and hematuria. Musculoskeletal: Negative for arthralgias, joint swelling and neck pain. Neurological: Negative for numbness and headaches. Psychiatric/Behavioral: Negative for agitation, confusion, decreased concentration and dysphoric mood. Objective:     BP (!) 154/72   Pulse 72   Ht 5' 10\" (1.778 m)   BMI 31.42 kg/m²     Wt Readings from Last 3 Encounters:   12/01/21 219 lb (99.3 kg)   08/04/21 213 lb (96.6 kg)   05/24/21 218 lb 6.4 oz (99.1 kg)     BP Readings from Last 3 Encounters:   01/26/22 (!) 154/72   12/01/21 138/72   08/04/21 138/82       Nursing note and vitals reviewed. Physical Exam   Constitutional: Oriented to person, place, and time. Appears well-developed and well-nourished. HENT:   Head: Normocephalic and atraumatic. Eyes: EOM are normal. Pupils are equal, round, and reactive to light. Neck: Normal range of motion. Neck supple. No JVD present. Cardiovascular: Normal rate, regular rhythm, normal heart sounds and intact distal pulses. 2/6 CHIOMA. Pulmonary/Chest: Effort normal and breath sounds normal. No respiratory distress. No wheezes. No rales.    Abdominal: Soft. Bowel sounds are normal. No distension. There is no tenderness. Musculoskeletal: Normal range of motion. No edema. Neurological: Alert and oriented to person, place, and time. No cranial nerve deficit. Coordination normal.   Skin: Skin is warm and dry. Psychiatric: Normal mood and affect.        No results found for: CKTOTAL, CKMB, CKMBINDEX    Lab Results   Component Value Date    WBC 10.5 08/26/2021    RBC 4.69 08/26/2021    HGB 12.9 08/26/2021    HCT 40.8 08/26/2021    MCV 87.0 08/26/2021    MCH 27.5 08/26/2021    MCHC 31.6 08/26/2021    RDW 13.3 05/15/2018     08/26/2021    MPV 13.0 08/26/2021       Lab Results   Component Value Date     08/26/2021    K 4.6 08/26/2021     08/26/2021    CO2 25 08/26/2021    BUN 25 08/26/2021    LABALBU 4.2 08/26/2021    CREATININE 1.1 08/26/2021    CALCIUM 9.4 08/26/2021    LABGLOM 63 08/26/2021    GLUCOSE 136 08/26/2021       Lab Results   Component Value Date    ALKPHOS 65 08/26/2021    ALT 13 08/26/2021    AST 15 08/26/2021    PROT 7.2 08/26/2021    BILITOT 0.9 08/26/2021    LABALBU 4.2 08/26/2021       No results found for: MG    No results found for: INR, PROTIME      Lab Results   Component Value Date    LABA1C 6.5 12/01/2021       Lab Results   Component Value Date    TRIG 147 05/15/2020    HDL 37 05/15/2020    [address] 05/15/2020       No results found for: TSH      Testing Reviewed:      I have individually reviewed the cardiac test below:    ECHO: Results for orders placed during the hospital encounter of 12/09/21    ECHO Complete 2D W Doppler W Color    Narrative  Transthoracic Echocardiography Report (TTE)    Demographics    Patient Name   [facility]         Gender              Male  Karmen Soares    MR #           [id number]         Race                    Ethnicity    Account #      [de-identified]         Room Number    Accession      [id number]         Date of Study       12/09/2021  Number    Date of Birth  01/30/1931        Referring Physician Arvin MCKEON CNP    Age            80 year(s)        Sonographer         Lexi Horton, RD,  RVT    Interpreting        Echo reader of the  Physician           week  Cheyanne Stahl MD    Procedure    Type of Study    TTE procedure:ECHOCARDIOGRAM COMPLETE 2D W DOPPLER W COLOR. Procedure Date  Date: 12/09/2021 Start: 01:07 PM    Study Location: Echo Lab  Technical Quality: Adequate visualization    Indications: Aortic stenosis. Additional Medical History:Diabetes, Hypertension, Aortic stenosis. Patient Status: Routine    Height: 70 inches Weight: 219 pounds BSA: 2.17 m^2 BMI: 31.42 kg/m^2    BP: 138/72 mmHg    Conclusions    Summary  Normal left ventricle size and systolic function. Ejection fraction was  estimated at 60 %. There were no regional left ventricular wall motion  abnormalities and wall thickness was within normal limits. Features were consistent with a pseudonormal left ventricular filling  pattern, with concomitant abnormal relaxation and increased filling  pressure (grade 2 diastolic dysfunction). The left atrium is Moderately dilated. There is moderate-to-severe aortic stenosis with valve area of 0.9 sq cm. The maximum aortic valve gradient is 47 mmHg, the mean gradient is 27  mmHg, and the peak velocity is 3.5 m/s. Signature    ----------------------------------------------------------------  Electronically signed by hCeyanne Stahl MD (Interpreting  physician) on 12/09/2021 at 06:41 PM  ----------------------------------------------------------------    Findings    Mitral Valve  The mitral valve structure was normal with normal leaflet separation. DOPPLER: The transmitral velocity was within the normal range with no  evidence for mitral stenosis. Mild mitral regurgitation is present. Aortic Valve  Aortic valve leaflets are Moderately calcified. Leaflets exhibited  moderately increased thickness and severely reduced cuspal separation of  the aortic valve.  Trivial aortic regurgitation is noted. There is  moderate-to-severe aortic stenosis with valve area of 0.9 sq cm. The  maximum aortic valve gradient is 47 mmHg, the mean gradient is 27 mmHg,  and the peak velocity is 3.5 m/s. Tricuspid Valve  The tricuspid valve structure was normal with normal leaflet separation. DOPPLER: There was no evidence of tricuspid stenosis. Mild tricuspid  regurgitation visualized. Right ventricular systolic pressure measures 55  mmhg. Pulmonic Valve  The pulmonic valve leaflets exhibited normal thickness, no calcification,  and normal cuspal separation. DOPPLER: The transpulmonic velocity was  within the normal range with no evidence for regurgitation. Left Atrium  The left atrium is Moderately dilated. Left Ventricle  Normal left ventricle size and systolic function. Ejection fraction was  estimated at 60 %. There were no regional left ventricular wall motion  abnormalities and wall thickness was within normal limits. Features were consistent with a pseudonormal left ventricular filling  pattern, with concomitant abnormal relaxation and increased filling  pressure (grade 2 diastolic dysfunction). Right Atrium  Right atrial size was normal.    Right Ventricle  The right ventricular size was normal with normal systolic function and  wall thickness. Pericardial Effusion  The pericardium was normal in appearance with no evidence of a pericardial  effusion. Pleural Effusion  No evidence of pleural effusion. Aorta / Great Vessels  -Aortic root dimension within normal limits.  -The Pulmonary artery is within normal limits. -IVC size is within normal limits with normal respiratory phasic changes.     M-Mode/2D Measurements & Calculations    LV Diastolic    LV Systolic Dimension:    AV Cusp Separation: 1.4 cmLA  Dimension: 5.6  3.9 cm                    Dimension: 4.3 cmAO Root  cm              LV Volume Diastolic: 662  Dimension: 3 cmLA Area: 26.6  LV FS:30.4 %    ml cm^2  LV PW           LV Volume Systolic: 35.0  Diastolic: 1 cm ml  Septum          LV EDV/LV EDV Index: 160  Diastolic: 0.9  RG/47 S^6DG ESV/LV ESV    RV Diastolic Dimension: 3.1 cm  cm              Index: 65.9 ml/30 m^2  EF Calculated: 57.2 %     LA/Aorta: 1.43    LA volume/Index: 85.8 ml /40m^2    LVOT: 2 cm    Doppler Measurements & Calculations    MV Peak E-Wave: 116 cm/s  AV Peak Velocity: 340  LVOT Peak Velocity: 100  MV Peak A-Wave: 49 cm/s   cm/s                   cm/s  MV E/A Ratio: 2.37        AV Peak Gradient:      LVOT Mean Velocity: 62.7  MV Peak Gradient: 5.38    46.24 mmHg             cm/s  mmHg                      AV Mean Velocity: 218  LVOT Peak Gradient: 4  MV Mean Gradient: 2 mmHg  cm/s                   mmHgLVOT Mean Gradient: 2  MV Mean Velocity: 64.6    AV Mean Gradient: 26   mmHg  cm/s                      mmHg  MV Deceleration Time: 176 AV VTI: 87.3 cm        TV Peak E-Wave: 45.8 cm/s  msec                      AV Area                TV Peak A-Wave: 42 cm/s  MV P1/2t: 146 msec        (Continuity):0.85 cm^2  MVA by PHT:1.51 cm^2                             TV Peak Gradient: 0.84  MV Area (continuity):     LVOT VTI: 23.5 cm      mmHg  2.18 cm^2                 AV P1/2t: 767 msec     TR Velocity:354 cm/s  MV Area (PISA): 0.06 cm^2 IVRT: 45 msec          TR Gradient:50.13 mmHg  MV E' Septal Velocity:                           PV Peak Velocity: 51 cm/s  5.3 cm/s                                         PV Peak Gradient: 1.04  MV A' Septal Velocity:    AV DVI (VTI): 0.27AV   mmHg  5.1 cm/s                  DVI (Vmax):0.29  MV E' Lateral Velocity:  9.7 cm/s  MV A' Lateral Velocity:  5.8 cm/s  E/E' septal: 21.89  E/E' lateral: 11.96  MR Velocity: 626 cm/s  MV MARS PISA: 0.06 cm^2  MR VTI: 214 cm  Alias Velocity: 38.5  cm/sPISA Radius: 0.4 cm  MV ERO Volumetric: 0.06  cm^2  PISA area: 1 cm^2MR flow  rate: 38.5 ml/sMR  volume:12.84

## 2022-03-01 ENCOUNTER — OFFICE VISIT (OUTPATIENT)
Dept: FAMILY MEDICINE CLINIC | Age: 87
End: 2022-03-01
Payer: MEDICARE

## 2022-03-01 VITALS
WEIGHT: 217 LBS | SYSTOLIC BLOOD PRESSURE: 138 MMHG | RESPIRATION RATE: 20 BRPM | DIASTOLIC BLOOD PRESSURE: 88 MMHG | HEART RATE: 66 BPM | OXYGEN SATURATION: 99 % | TEMPERATURE: 97.3 F | BODY MASS INDEX: 31.14 KG/M2

## 2022-03-01 DIAGNOSIS — N18.31 TYPE 2 DIABETES MELLITUS WITH STAGE 3A CHRONIC KIDNEY DISEASE, WITHOUT LONG-TERM CURRENT USE OF INSULIN (HCC): Primary | ICD-10-CM

## 2022-03-01 DIAGNOSIS — I10 ESSENTIAL HYPERTENSION: ICD-10-CM

## 2022-03-01 DIAGNOSIS — E11.22 TYPE 2 DIABETES MELLITUS WITH STAGE 3A CHRONIC KIDNEY DISEASE, WITHOUT LONG-TERM CURRENT USE OF INSULIN (HCC): Primary | ICD-10-CM

## 2022-03-01 DIAGNOSIS — I35.0 NONRHEUMATIC AORTIC VALVE STENOSIS: ICD-10-CM

## 2022-03-01 PROCEDURE — 4040F PNEUMOC VAC/ADMIN/RCVD: CPT | Performed by: NURSE PRACTITIONER

## 2022-03-01 PROCEDURE — 99214 OFFICE O/P EST MOD 30 MIN: CPT | Performed by: NURSE PRACTITIONER

## 2022-03-01 PROCEDURE — 1123F ACP DISCUSS/DSCN MKR DOCD: CPT | Performed by: NURSE PRACTITIONER

## 2022-03-01 PROCEDURE — 1036F TOBACCO NON-USER: CPT | Performed by: NURSE PRACTITIONER

## 2022-03-01 PROCEDURE — 83036 HEMOGLOBIN GLYCOSYLATED A1C: CPT | Performed by: NURSE PRACTITIONER

## 2022-03-01 PROCEDURE — G8417 CALC BMI ABV UP PARAM F/U: HCPCS | Performed by: NURSE PRACTITIONER

## 2022-03-01 PROCEDURE — G8484 FLU IMMUNIZE NO ADMIN: HCPCS | Performed by: NURSE PRACTITIONER

## 2022-03-01 PROCEDURE — G8427 DOCREV CUR MEDS BY ELIG CLIN: HCPCS | Performed by: NURSE PRACTITIONER

## 2022-03-01 NOTE — PROGRESS NOTES
Rosetta Sykes (:  ) is a 80 y.o. male,Established patient, here for evaluation of the following chief complaint(s):  Diabetes (3 month recheck)         ASSESSMENT/PLAN:  1. Type 2 diabetes mellitus with stage 3a chronic kidney disease, without long-term current use of insulin ([facility])  -     POCT glycosylated hemoglobin (Hb A1C)  2. Nonrheumatic aortic valve stenosis  3. Essential hypertension    A1C ok  Continue to watch diet  Follow up with cardio as planned and get echo done as planned  htn stable  No change in meds today    Return in about 3 months (around 2022). Subjective   SUBJECTIVE/OBJECTIVE:  HPI    This is a 60-year-old gentleman who I am seeing today for his 3-month follow-up. He does have a history of diabetes which has been relatively controlled. There has not been much change in his A1c. He is on an ACE for kidney protection. He is also taking it for blood pressure control. He does take his prelaw and hydrochlorothiazide as well. His blood pressure has been well controlled for his age. The other medicine he is taking his Metformin XR 1000 mg daily. Denies any problems or or side effects. He does have a history of aortic stenosis which is moderate to severe. He does follow-up with Dr. Robe Skinner. He did discuss with the patient regarding a TAVR procedure to replace the valve. At this time the patient was waiting. He does have some symptoms of aortic stenosis. He does get short of breath with any significant activity. Denies any chest pain. This aortic stenosis has been progressively getting worse. He is due to have another echo done in a couple months. At that time he will decide if he wants to continue with the procedure. Review of Systems   Constitutional: Negative for activity change, appetite change, chills, diaphoresis, fatigue, fever and unexpected weight change. HENT: Negative for congestion, ear pain, postnasal drip, sinus pressure and sore throat. Eyes: Negative for visual disturbance. Respiratory: Negative for cough, chest tightness, shortness of breath, wheezing and stridor. Cardiovascular: Negative for chest pain, palpitations and leg swelling. Gastrointestinal: Negative for abdominal distention, abdominal pain, constipation, diarrhea, nausea and vomiting. Endocrine: Negative for polydipsia, polyphagia and polyuria. Genitourinary: Negative for decreased urine volume, difficulty urinating, dysuria, flank pain, frequency, hematuria and urgency. Musculoskeletal: Negative for arthralgias, back pain, gait problem, joint swelling, myalgias and neck pain. Skin: Negative for color change, pallor and rash. Neurological: Negative for dizziness, syncope, weakness, light-headedness, numbness and headaches. Hematological: Negative for adenopathy. Psychiatric/Behavioral: Negative for behavioral problems, self-injury and sleep disturbance. The patient is not nervous/anxious. Objective   Physical Exam  Vitals reviewed. Constitutional:       General: He is not in acute distress. Appearance: Normal appearance. He is well-developed. HENT:      Head: Normocephalic. Right Ear: External ear normal.      Left Ear: External ear normal.      Nose: Nose normal.      Right Sinus: No maxillary sinus tenderness. Left Sinus: No maxillary sinus tenderness. Mouth/Throat:      Pharynx: Uvula midline. Neck:      Trachea: Trachea normal.   Cardiovascular:      Rate and Rhythm: Normal rate and regular rhythm. Heart sounds: Normal heart sounds. No murmur heard. Pulmonary:      Effort: Pulmonary effort is normal. No respiratory distress. Breath sounds: Normal breath sounds. No decreased breath sounds, wheezing, rhonchi or rales. Chest:      Chest wall: No tenderness. Abdominal:      General: There is no distension. Palpations: Abdomen is soft. There is no mass. Tenderness: There is no abdominal tenderness. Musculoskeletal:         General: No tenderness or deformity. Normal range of motion. Cervical back: Normal range of motion and neck supple. Lymphadenopathy:      Cervical: No cervical adenopathy. Skin:     General: Skin is warm and dry. Neurological:      Mental Status: He is alert and oriented to person, place, and time. Motor: No abnormal muscle tone. Coordination: Coordination normal.      Gait: Gait normal.   Psychiatric:         Mood and Affect: Mood normal.         Behavior: Behavior normal.         Thought Content: Thought content normal.         Judgment: Judgment normal.            On this date 3/1/2022 I have spent 35 minutes reviewing previous notes, test results and face to face with the patient discussing the diagnosis and importance of compliance with the treatment plan as well as documenting on the day of the visit. An electronic signature was used to authenticate this note.     --EMRE Ambrosio - CNP

## 2022-03-02 LAB — HBA1C MFR BLD: 6.6 %

## 2022-03-07 ASSESSMENT — ENCOUNTER SYMPTOMS
WHEEZING: 0
BACK PAIN: 0
SORE THROAT: 0
CHEST TIGHTNESS: 0
VOMITING: 0
DIARRHEA: 0
STRIDOR: 0
SINUS PRESSURE: 0
COLOR CHANGE: 0
CONSTIPATION: 0
NAUSEA: 0
ABDOMINAL PAIN: 0
COUGH: 0
ABDOMINAL DISTENTION: 0
SHORTNESS OF BREATH: 0

## 2022-04-16 ENCOUNTER — HOSPITAL ENCOUNTER (INPATIENT)
Age: 87
LOS: 7 days | Discharge: HOME | DRG: 246 | End: 2022-04-23
Attending: EMERGENCY MEDICINE | Admitting: INTERNAL MEDICINE
Payer: MEDICARE

## 2022-04-16 ENCOUNTER — APPOINTMENT (OUTPATIENT)
Dept: ULTRASOUND IMAGING | Age: 87
DRG: 246 | End: 2022-04-16
Payer: MEDICARE

## 2022-04-16 ENCOUNTER — APPOINTMENT (OUTPATIENT)
Dept: GENERAL RADIOLOGY | Age: 87
DRG: 246 | End: 2022-04-16
Payer: MEDICARE

## 2022-04-16 DIAGNOSIS — R77.8 ELEVATED TROPONIN: ICD-10-CM

## 2022-04-16 DIAGNOSIS — J18.9 PNEUMONIA OF LEFT LUNG DUE TO INFECTIOUS ORGANISM, UNSPECIFIED PART OF LUNG: ICD-10-CM

## 2022-04-16 DIAGNOSIS — I10 ESSENTIAL HYPERTENSION: ICD-10-CM

## 2022-04-16 DIAGNOSIS — R79.89 ELEVATED BRAIN NATRIURETIC PEPTIDE (BNP) LEVEL: ICD-10-CM

## 2022-04-16 DIAGNOSIS — A41.9 SEPTICEMIA (HCC): ICD-10-CM

## 2022-04-16 DIAGNOSIS — N17.9 AKI (ACUTE KIDNEY INJURY) (HCC): ICD-10-CM

## 2022-04-16 DIAGNOSIS — J81.0 FLASH PULMONARY EDEMA (HCC): Primary | ICD-10-CM

## 2022-04-16 PROBLEM — I50.9 DECOMPENSATED HEART FAILURE (HCC): Status: ACTIVE | Noted: 2022-01-01

## 2022-04-16 LAB
ALBUMIN SERPL-MCNC: 4 G/DL (ref 3.5–5.1)
ALP BLD-CCNC: 81 U/L (ref 38–126)
ALT SERPL-CCNC: 89 U/L (ref 11–66)
ANION GAP SERPL CALCULATED.3IONS-SCNC: 16 MEQ/L (ref 8–16)
AST SERPL-CCNC: 48 U/L (ref 5–40)
AVERAGE GLUCOSE: 144 MG/DL (ref 70–126)
BACTERIA: NORMAL
BASE EXCESS (CALCULATED): -1.5 MMOL/L (ref -2.5–2.5)
BASOPHILS # BLD: 0.4 %
BASOPHILS ABSOLUTE: 0.1 THOU/MM3 (ref 0–0.1)
BILIRUB SERPL-MCNC: 1.3 MG/DL (ref 0.3–1.2)
BILIRUBIN URINE: NEGATIVE
BLOOD, URINE: NEGATIVE
BORDETELLA PARAPERTUSSIS BY PCR: NOT DETECTED
BORDETELLA PERTUSSIS BY PCR: NOT DETECTED
BUN BLDV-MCNC: 37 MG/DL (ref 7–22)
CALCIUM SERPL-MCNC: 8.7 MG/DL (ref 8.5–10.5)
CASTS: NORMAL /LPF
CASTS: NORMAL /LPF
CHARACTER, URINE: CLEAR
CHLORIDE BLD-SCNC: 100 MEQ/L (ref 98–111)
CO2: 20 MEQ/L (ref 23–33)
COLLECTED BY:: ABNORMAL
COLOR: NORMAL
CREAT SERPL-MCNC: 1.6 MG/DL (ref 0.4–1.2)
CRYSTALS: NORMAL
DEVICE: ABNORMAL
EOSINOPHIL # BLD: 1.4 %
EOSINOPHILS ABSOLUTE: 0.3 THOU/MM3 (ref 0–0.4)
EPITHELIAL CELLS, UA: NORMAL /HPF
ERYTHROCYTE [DISTWIDTH] IN BLOOD BY AUTOMATED COUNT: 13.4 % (ref 11.5–14.5)
ERYTHROCYTE [DISTWIDTH] IN BLOOD BY AUTOMATED COUNT: 41.5 FL (ref 35–45)
FILM ARRAY ADENOVIRUS: NOT DETECTED
FILM ARRAY CHLAMYDOPHILIA PNEUMONIAE: NOT DETECTED
FILM ARRAY CORONAVIRUS 229E: NOT DETECTED
FILM ARRAY CORONAVIRUS HKU1: NOT DETECTED
FILM ARRAY CORONAVIRUS NL63: NOT DETECTED
FILM ARRAY CORONAVIRUS OC43: NOT DETECTED
FILM ARRAY INFLUENZA A VIRUS: NOT DETECTED
FILM ARRAY INFLUENZA B: NOT DETECTED
FILM ARRAY METAPNEUMOVIRUS: NOT DETECTED
FILM ARRAY MYCOPLASMA PNEUMONIAE: NOT DETECTED
FILM ARRAY PARAINFLUENZA VIRUS 1: NOT DETECTED
FILM ARRAY PARAINFLUENZA VIRUS 2: NOT DETECTED
FILM ARRAY PARAINFLUENZA VIRUS 3: NOT DETECTED
FILM ARRAY PARAINFLUENZA VIRUS 4: NOT DETECTED
FILM ARRAY RESPIRATORY SYNCITIAL VIRUS: NOT DETECTED
FILM ARRAY RHINOVIRUS/ENTEROVIRUS: NOT DETECTED
GFR SERPL CREATININE-BSD FRML MDRD: 41 ML/MIN/1.73M2
GLUCOSE BLD-MCNC: 252 MG/DL (ref 70–108)
GLUCOSE BLD-MCNC: 295 MG/DL (ref 70–108)
GLUCOSE BLD-MCNC: 299 MG/DL (ref 70–108)
GLUCOSE, URINE: NEGATIVE MG/DL
HBA1C MFR BLD: 6.8 % (ref 4.4–6.4)
HCO3: 22 MMOL/L (ref 23–28)
HCT VFR BLD CALC: 37.9 % (ref 42–52)
HEMOGLOBIN: 12.1 GM/DL (ref 14–18)
IFIO2: 70
IMMATURE GRANS (ABS): 0.23 THOU/MM3 (ref 0–0.07)
IMMATURE GRANULOCYTES: 1 %
KETONES, URINE: NEGATIVE
LACTIC ACID, SEPSIS: 2 MMOL/L (ref 0.5–1.9)
LACTIC ACID, SEPSIS: 2.4 MMOL/L (ref 0.5–1.9)
LEUKOCYTE ESTERASE, URINE: NEGATIVE
LYMPHOCYTES # BLD: 24 %
LYMPHOCYTES ABSOLUTE: 5.6 THOU/MM3 (ref 1–4.8)
MCH RBC QN AUTO: 27 PG (ref 26–33)
MCHC RBC AUTO-ENTMCNC: 31.9 GM/DL (ref 32.2–35.5)
MCV RBC AUTO: 84.6 FL (ref 80–94)
MISCELLANEOUS LAB TEST RESULT: NORMAL
MODE: ABNORMAL
MONOCYTES # BLD: 7 %
MONOCYTES ABSOLUTE: 1.6 THOU/MM3 (ref 0.4–1.3)
NITRITE, URINE: NEGATIVE
NUCLEATED RED BLOOD CELLS: 0 /100 WBC
O2 SATURATION: 100 %
OSMOLALITY CALCULATION: 291.6 MOSMOL/KG (ref 275–300)
PCO2: 34 MMHG (ref 35–45)
PH BLOOD GAS: 7.43 (ref 7.35–7.45)
PH UA: 5 (ref 5–9)
PLATELET # BLD: 173 THOU/MM3 (ref 130–400)
PLATELET ESTIMATE: ADEQUATE
PMV BLD AUTO: 13 FL (ref 9.4–12.4)
PO2: 256 MMHG (ref 71–104)
POTASSIUM REFLEX MAGNESIUM: 3.7 MEQ/L (ref 3.5–5.2)
PRO-BNP: 2599 PG/ML (ref 0–1800)
PROCALCITONIN: 0.18 NG/ML (ref 0.01–0.09)
PROTEIN UA: NEGATIVE MG/DL
RBC # BLD: 4.48 MILL/MM3 (ref 4.7–6.1)
RBC URINE: NORMAL /HPF
REASON FOR REJECTION: NORMAL
REJECTED TEST: NORMAL
RENAL EPITHELIAL, UA: NORMAL
SARS-COV-2, PCR: NOT DETECTED
SCAN OF BLOOD SMEAR: NORMAL
SEG NEUTROPHILS: 66.2 %
SEGMENTED NEUTROPHILS ABSOLUTE COUNT: 15.5 THOU/MM3 (ref 1.8–7.7)
SET PEEP: 12 MMHG
SET PRESS SUPP: 16 CMH2O
SODIUM BLD-SCNC: 136 MEQ/L (ref 135–145)
SOURCE, BLOOD GAS: ABNORMAL
SPECIFIC GRAVITY UA: 1.01 (ref 1–1.03)
T4 FREE: 1.22 NG/DL (ref 0.93–1.76)
TOTAL PROTEIN: 7.4 G/DL (ref 6.1–8)
TROPONIN T: 0.01 NG/ML
TROPONIN T: 0.09 NG/ML
TROPONIN T: 0.1 NG/ML
TSH SERPL DL<=0.05 MIU/L-ACNC: 2.79 UIU/ML (ref 0.4–4.2)
TSH SERPL DL<=0.05 MIU/L-ACNC: 4.49 UIU/ML (ref 0.4–4.2)
UROBILINOGEN, URINE: 0.2 EU/DL (ref 0–1)
WBC # BLD: 23.4 THOU/MM3 (ref 4.8–10.8)
WBC UA: NORMAL /HPF
YEAST: NORMAL

## 2022-04-16 PROCEDURE — 71045 X-RAY EXAM CHEST 1 VIEW: CPT

## 2022-04-16 PROCEDURE — 87040 BLOOD CULTURE FOR BACTERIA: CPT

## 2022-04-16 PROCEDURE — 94660 CPAP INITIATION&MGMT: CPT

## 2022-04-16 PROCEDURE — 96375 TX/PRO/DX INJ NEW DRUG ADDON: CPT

## 2022-04-16 PROCEDURE — 2500000003 HC RX 250 WO HCPCS

## 2022-04-16 PROCEDURE — 80053 COMPREHEN METABOLIC PANEL: CPT

## 2022-04-16 PROCEDURE — 6360000002 HC RX W HCPCS: Performed by: INTERNAL MEDICINE

## 2022-04-16 PROCEDURE — 6370000000 HC RX 637 (ALT 250 FOR IP): Performed by: INTERNAL MEDICINE

## 2022-04-16 PROCEDURE — 99285 EMERGENCY DEPT VISIT HI MDM: CPT

## 2022-04-16 PROCEDURE — 93005 ELECTROCARDIOGRAM TRACING: CPT | Performed by: INTERNAL MEDICINE

## 2022-04-16 PROCEDURE — 93307 TTE W/O DOPPLER COMPLETE: CPT

## 2022-04-16 PROCEDURE — 84439 ASSAY OF FREE THYROXINE: CPT

## 2022-04-16 PROCEDURE — 0202U NFCT DS 22 TRGT SARS-COV-2: CPT

## 2022-04-16 PROCEDURE — 96374 THER/PROPH/DIAG INJ IV PUSH: CPT

## 2022-04-16 PROCEDURE — 36415 COLL VENOUS BLD VENIPUNCTURE: CPT

## 2022-04-16 PROCEDURE — 83036 HEMOGLOBIN GLYCOSYLATED A1C: CPT

## 2022-04-16 PROCEDURE — 2580000003 HC RX 258: Performed by: INTERNAL MEDICINE

## 2022-04-16 PROCEDURE — 94761 N-INVAS EAR/PLS OXIMETRY MLT: CPT

## 2022-04-16 PROCEDURE — 6360000002 HC RX W HCPCS: Performed by: STUDENT IN AN ORGANIZED HEALTH CARE EDUCATION/TRAINING PROGRAM

## 2022-04-16 PROCEDURE — 84484 ASSAY OF TROPONIN QUANT: CPT

## 2022-04-16 PROCEDURE — 36600 WITHDRAWAL OF ARTERIAL BLOOD: CPT

## 2022-04-16 PROCEDURE — 83605 ASSAY OF LACTIC ACID: CPT

## 2022-04-16 PROCEDURE — 82803 BLOOD GASES ANY COMBINATION: CPT

## 2022-04-16 PROCEDURE — 2140000000 HC CCU INTERMEDIATE R&B

## 2022-04-16 PROCEDURE — 99223 1ST HOSP IP/OBS HIGH 75: CPT | Performed by: INTERNAL MEDICINE

## 2022-04-16 PROCEDURE — 81001 URINALYSIS AUTO W/SCOPE: CPT

## 2022-04-16 PROCEDURE — 83880 ASSAY OF NATRIURETIC PEPTIDE: CPT

## 2022-04-16 PROCEDURE — 96366 THER/PROPH/DIAG IV INF ADDON: CPT

## 2022-04-16 PROCEDURE — 84443 ASSAY THYROID STIM HORMONE: CPT

## 2022-04-16 PROCEDURE — 6370000000 HC RX 637 (ALT 250 FOR IP): Performed by: STUDENT IN AN ORGANIZED HEALTH CARE EDUCATION/TRAINING PROGRAM

## 2022-04-16 PROCEDURE — 96365 THER/PROPH/DIAG IV INF INIT: CPT

## 2022-04-16 PROCEDURE — 82948 REAGENT STRIP/BLOOD GLUCOSE: CPT

## 2022-04-16 PROCEDURE — 2700000000 HC OXYGEN THERAPY PER DAY

## 2022-04-16 PROCEDURE — 85025 COMPLETE CBC W/AUTO DIFF WBC: CPT

## 2022-04-16 PROCEDURE — 84145 PROCALCITONIN (PCT): CPT

## 2022-04-16 PROCEDURE — 6370000000 HC RX 637 (ALT 250 FOR IP)

## 2022-04-16 PROCEDURE — 94640 AIRWAY INHALATION TREATMENT: CPT

## 2022-04-16 PROCEDURE — 2580000003 HC RX 258: Performed by: STUDENT IN AN ORGANIZED HEALTH CARE EDUCATION/TRAINING PROGRAM

## 2022-04-16 RX ORDER — POTASSIUM CHLORIDE 7.45 MG/ML
10 INJECTION INTRAVENOUS PRN
Status: DISCONTINUED | OUTPATIENT
Start: 2022-04-16 | End: 2022-04-23 | Stop reason: HOSPADM

## 2022-04-16 RX ORDER — SODIUM CHLORIDE 0.9 % (FLUSH) 0.9 %
5-40 SYRINGE (ML) INJECTION EVERY 12 HOURS SCHEDULED
Status: DISCONTINUED | OUTPATIENT
Start: 2022-04-16 | End: 2022-04-19 | Stop reason: SDUPTHER

## 2022-04-16 RX ORDER — POTASSIUM CHLORIDE 20 MEQ/1
40 TABLET, EXTENDED RELEASE ORAL PRN
Status: DISCONTINUED | OUTPATIENT
Start: 2022-04-16 | End: 2022-04-23 | Stop reason: HOSPADM

## 2022-04-16 RX ORDER — SODIUM CHLORIDE 9 MG/ML
INJECTION, SOLUTION INTRAVENOUS PRN
Status: DISCONTINUED | OUTPATIENT
Start: 2022-04-16 | End: 2022-04-19 | Stop reason: SDUPTHER

## 2022-04-16 RX ORDER — NITROGLYCERIN 20 MG/100ML
5-200 INJECTION INTRAVENOUS CONTINUOUS
Status: DISCONTINUED | OUTPATIENT
Start: 2022-04-16 | End: 2022-04-16

## 2022-04-16 RX ORDER — DEXTROSE MONOHYDRATE 25 G/50ML
12.5 INJECTION, SOLUTION INTRAVENOUS PRN
Status: DISCONTINUED | OUTPATIENT
Start: 2022-04-16 | End: 2022-04-16 | Stop reason: SDUPTHER

## 2022-04-16 RX ORDER — MAGNESIUM SULFATE IN WATER 40 MG/ML
2000 INJECTION, SOLUTION INTRAVENOUS PRN
Status: DISCONTINUED | OUTPATIENT
Start: 2022-04-16 | End: 2022-04-23 | Stop reason: HOSPADM

## 2022-04-16 RX ORDER — IPRATROPIUM BROMIDE AND ALBUTEROL SULFATE 2.5; .5 MG/3ML; MG/3ML
1 SOLUTION RESPIRATORY (INHALATION) ONCE
Status: COMPLETED | OUTPATIENT
Start: 2022-04-16 | End: 2022-04-16

## 2022-04-16 RX ORDER — FUROSEMIDE 10 MG/ML
80 INJECTION INTRAMUSCULAR; INTRAVENOUS ONCE
Status: DISCONTINUED | OUTPATIENT
Start: 2022-04-16 | End: 2022-04-16

## 2022-04-16 RX ORDER — NICOTINE POLACRILEX 4 MG
15 LOZENGE BUCCAL PRN
Status: DISCONTINUED | OUTPATIENT
Start: 2022-04-16 | End: 2022-04-16 | Stop reason: SDUPTHER

## 2022-04-16 RX ORDER — METFORMIN HYDROCHLORIDE EXTENDED-RELEASE TABLETS 500 MG/1
1000 TABLET, FILM COATED, EXTENDED RELEASE ORAL
Status: ON HOLD | COMMUNITY
End: 2022-04-18

## 2022-04-16 RX ORDER — ACETAMINOPHEN 325 MG/1
650 TABLET ORAL EVERY 6 HOURS PRN
Status: DISCONTINUED | OUTPATIENT
Start: 2022-04-16 | End: 2022-04-19 | Stop reason: SDUPTHER

## 2022-04-16 RX ORDER — ONDANSETRON 2 MG/ML
4 INJECTION INTRAMUSCULAR; INTRAVENOUS EVERY 6 HOURS PRN
Status: DISCONTINUED | OUTPATIENT
Start: 2022-04-16 | End: 2022-04-20 | Stop reason: SDUPTHER

## 2022-04-16 RX ORDER — FUROSEMIDE 10 MG/ML
40 INJECTION INTRAMUSCULAR; INTRAVENOUS 2 TIMES DAILY
Status: DISCONTINUED | OUTPATIENT
Start: 2022-04-16 | End: 2022-04-17

## 2022-04-16 RX ORDER — ONDANSETRON 4 MG/1
4 TABLET, ORALLY DISINTEGRATING ORAL EVERY 8 HOURS PRN
Status: DISCONTINUED | OUTPATIENT
Start: 2022-04-16 | End: 2022-04-23 | Stop reason: HOSPADM

## 2022-04-16 RX ORDER — POLYETHYLENE GLYCOL 3350 17 G/17G
17 POWDER, FOR SOLUTION ORAL DAILY PRN
Status: DISCONTINUED | OUTPATIENT
Start: 2022-04-16 | End: 2022-04-23 | Stop reason: HOSPADM

## 2022-04-16 RX ORDER — ACETAMINOPHEN 650 MG/1
650 SUPPOSITORY RECTAL EVERY 6 HOURS PRN
Status: DISCONTINUED | OUTPATIENT
Start: 2022-04-16 | End: 2022-04-21 | Stop reason: SDUPTHER

## 2022-04-16 RX ORDER — SODIUM CHLORIDE 0.9 % (FLUSH) 0.9 %
5-40 SYRINGE (ML) INJECTION PRN
Status: DISCONTINUED | OUTPATIENT
Start: 2022-04-16 | End: 2022-04-19 | Stop reason: SDUPTHER

## 2022-04-16 RX ORDER — DEXTROSE MONOHYDRATE 50 MG/ML
100 INJECTION, SOLUTION INTRAVENOUS PRN
Status: DISCONTINUED | OUTPATIENT
Start: 2022-04-16 | End: 2022-04-23 | Stop reason: HOSPADM

## 2022-04-16 RX ORDER — FUROSEMIDE 10 MG/ML
40 INJECTION INTRAMUSCULAR; INTRAVENOUS ONCE
Status: COMPLETED | OUTPATIENT
Start: 2022-04-16 | End: 2022-04-16

## 2022-04-16 RX ORDER — NITROGLYCERIN 20 MG/100ML
INJECTION INTRAVENOUS
Status: COMPLETED
Start: 2022-04-16 | End: 2022-04-16

## 2022-04-16 RX ADMIN — SODIUM CHLORIDE, PRESERVATIVE FREE 10 ML: 5 INJECTION INTRAVENOUS at 20:14

## 2022-04-16 RX ADMIN — NITROGLYCERIN 50 MCG/MIN: 20 INJECTION INTRAVENOUS at 10:01

## 2022-04-16 RX ADMIN — IPRATROPIUM BROMIDE AND ALBUTEROL SULFATE 1 AMPULE: .5; 3 SOLUTION RESPIRATORY (INHALATION) at 10:14

## 2022-04-16 RX ADMIN — AZITHROMYCIN DIHYDRATE 500 MG: 500 INJECTION, POWDER, LYOPHILIZED, FOR SOLUTION INTRAVENOUS at 11:55

## 2022-04-16 RX ADMIN — WATER 1000 MG: 1 INJECTION INTRAMUSCULAR; INTRAVENOUS; SUBCUTANEOUS at 11:45

## 2022-04-16 RX ADMIN — INSULIN LISPRO 2 UNITS: 100 INJECTION, SOLUTION INTRAVENOUS; SUBCUTANEOUS at 21:20

## 2022-04-16 RX ADMIN — ENOXAPARIN SODIUM 40 MG: 40 INJECTION SUBCUTANEOUS at 14:56

## 2022-04-16 RX ADMIN — FUROSEMIDE 40 MG: 10 INJECTION, SOLUTION INTRAMUSCULAR; INTRAVENOUS at 10:52

## 2022-04-16 RX ADMIN — INSULIN LISPRO 3 UNITS: 100 INJECTION, SOLUTION INTRAVENOUS; SUBCUTANEOUS at 17:37

## 2022-04-16 RX ADMIN — FUROSEMIDE 40 MG: 10 INJECTION, SOLUTION INTRAMUSCULAR; INTRAVENOUS at 20:15

## 2022-04-16 ASSESSMENT — ENCOUNTER SYMPTOMS
SHORTNESS OF BREATH: 1
NAUSEA: 0
RHINORRHEA: 0
ABDOMINAL PAIN: 0
VOMITING: 0
SORE THROAT: 0
EYE REDNESS: 0
BACK PAIN: 0
SINUS PAIN: 1
DIARRHEA: 0
COUGH: 0

## 2022-04-16 ASSESSMENT — PAIN SCALES - GENERAL
PAINLEVEL_OUTOF10: 0

## 2022-04-16 NOTE — H&P
Hospitalist History and Physical          Patient: Brennan Alvarado  :   MRN: [id number]     Acct: [de-identified]    PCP: EMRE Arriaga CNP  Date of Admission: 2022  Date of Service: Pt seen/examined on 22  and Admitted to Inpatient with expected LOS greater than two midnights due to medical therapy. Assessment and Plan:   Acute Decompensated Diastolic HF with LVH: known AS and G2DD per  echo.  o Repeat echo to follow AS, check for drop in EF (more interested in whether drop in EF occurred). Of note he did get the COVID-vaccine booster #2 on , so may be consideration for myopericardial disease  o We will consider restarting beta-blocker once echo is completed  o Strict I and O, fluid restrict, low NA diet  o Cardiology consulted. o BiPAP support, patient did not have any hypoxia however arrived on CPAP due to work of breathing.  - We will check an ABG  - Continue weaning as able   Mild Troponin Elevation, Suspect Non-MI trop elevation: due to decompensated diastolic hf, juan c, AS, HTN. EKG shows known LVH however there is repull abnormality with T wave inversion in lead aVL and I.  o Trend troponins, patient denies chest pain at this time or previously. o If there is worsening troponins will aspirin load and start on statin   Leukocytosis / Suspected CAP: Bibasilar infiltrates may be related to prior scarring, atelectasis, pulmonary edema, or pneumonia. However he does have a elevated white count but denies any fevers. Does endorse rhinorrhea chronically. o Rocephin and azithro, culture resp, blood cultures obtained.    o Check procal   JUAN C: likely related to HF in setting of HCTZ and lisinopril use.    o Will hold HCTZ and lisinopril as well as metformin  o UA with microscopy  o Diuresis  o Monitor I/O, avoid hypotension  o Renal US will be ordered   Mild transaminitis: Suspect related to congestive hepatopathy, quite mild and will trend labs.  o Further work-up if not improving after diuresis.  Lactic Acidosis: mild, may be related to infection vs metformin in setting of JUAN C. o Hold off fluids given fluid overload and hypertension. o Will repeat in 4-6 hours.  Recent Covid Booster #2 on 4/14/22   Aortic Stenosis, Severe (LFLG) (ZAHEER 0.9, max gradient 47, mean 27, peak veloc 3.5)  o Avoid nitrates  o Patient had drop in BP with nitro IV in ed.   o Cardio consult.   o F/up echo, may need MIS and further eval for TAVR as his presentation may be related to symptomatic AS   NIDDM2, with hyperglycemia: on metformin PTA, hold. Glucose 295 on arrival, no prior steroids. o SSI, dm diet, hypo protocol. o Consider farxiga   HTN (with accelerated HTN on arrival): on lisinopril, bisoprolol, and HCTZ pta. Favor HTN urgency 2/2 anxiety and SOB rather than hypertensive emergency. o Hold antihypertensives with JUAN C. o If >367 systolic, will start on nitroprusside (d/w cardiology)   BPH: not on any meds pta. F/up renal US          =======================================================================      Chief Complaint:  SOB    History Of Present Illness: Meaghan Martinez is a 80 y.o. male with PMHx of hypertension, diabetes, severe aortic stenosis, diastolic dysfunction with LVH who presents to [address] [place] [address] with shortness of breath. Patient is on BiPAP during my examination and at times difficult to understand, so daughter assists with history. Patient reports that after waking up this morning he began to develop relatively rapid progression of shortness of breath, and subsequently called his daughter and eventually EMS for transfer to ED. He reports that he has been in his usual state of health, however his daughter reports that there have been some shortness of breath developing over the last week worse than his baseline shortness of breath. He denies any chest pain or palpitations, no dizziness or syncope.   Reports a mild cough but thinks this is due to his acute on chronic nasal congestion for which she has been taking nasal spray. There has been no fevers or chills or sputum production. He does report orthopnea, dyspnea on exertion, but denies any change in his chronic lower extremity edema. He has been compliant with his medications of bisoprolol, lisinopril, hydrochlorothiazide. He has known severe aortic stenosis being followed by Dr. Kaitlynn Hamilton, which was previously noted to be asymptomatic. He is a non-smoker and denies any significant alcohol use. No recent change in diet. No recent NSAIDs or steroids. Of note, the patient did get his COVID booster #2 on 4/14 and attributes some of his symptoms to this. ED course: Patient arrived on noninvasive ventilation, will switch to BiPAP while here due to work of breathing and chest x-ray is obtained showing pulmonary edema pattern, he was treated for suspected flash pulmonary edema as his blood pressure was greater than 535 systolic on arrival.  He was started on a nitro drip with brisk decrease in his blood pressure, this was subsequently stopped. He remained on BiPAP during my examination and states he was feeling improved. O2 saturations 99 to 100%. Tachypnea was noted, tachycardia was noted. There is no hypoxia documented. Labs are significant for an JUAN C with a creatinine of 1.6, glucose of 295, TSH of 4.4 within normal limit free T4, white blood cell count of 23, hemoglobin of 12.1, lactic acid of 2.4 that improved to 2.0, and a BNP of 2599 with a troponin of 0.015. His AST and ALT were 48 and 89 respectively.     Past Medical History:        Diagnosis Date    Diabetes mellitus ([facility] [address]     Hypertension     Type II or unspecified type diabetes mellitus without mention of complication, not stated as uncontrolled        Past Surgical History:        Procedure Laterality Date    TONSILLECTOMY AND ADENOIDECTOMY Bilateral     age 10        Medications Prior to Admission:   Prior to Admission medications    Medication Sig Start Date End Date Taking? Authorizing Provider   lisinopril (PRINIVIL;ZESTRIL) 20 MG tablet Take 2 tablets by mouth once daily 2/7/22   EMRE Ferreira CNP   bisoprolol-hydroCHLOROthiazide [facility]) 10-6.25 MG per tablet Take 2 tablets by mouth once daily 11/8/21   EMRE Ferreira CNP   metFORMIN (GLUCOPHAGE XR) 500 MG extended release tablet Take 2 tablets by mouth daily (with breakfast) 5/26/21 12/1/21  EMRE Ferreira CNP       Allergies:  Norvasc [amlodipine besylate] and Other    Social History:    The patient currently lives at home by self. Tobacco use:   reports that he has never smoked. He has never used smokeless tobacco.  Alcohol use:   reports no history of alcohol use. Drug use:  reports no history of drug use. Family History:   as follows:      Problem Relation Age of Onset    Diabetes Paternal Aunt     Diabetes Paternal Uncle     Cancer Paternal Uncle     Diabetes Maternal Grandmother     Diabetes Maternal Grandfather     Diabetes Paternal Grandmother     Cancer Paternal Grandmother     Diabetes Paternal Grandfather        Review of Systems:   Pertinent positives and negatives as noted in the HPI. All other systems reviewed and negative. Physical Exam:    /77   Pulse 95   Resp 29   Ht 5' 10\" (1.778 m)   Wt 217 lb (98.4 kg)   SpO2 99%   BMI 31.14 kg/m²       General appearance: Elderly male who appears in acute distress, well-groomed  Eyes:  Pupils equal, round, and reactive to light. Conjunctivae/corneas clear. HENT: Head normal in appearance. External nares normal.  Oral mucosa moist without lesions. Hearing grossly intact. Neck: Supple, with full range of motion. Trachea midline. Unable to eval for JVD. Respiratory: Tachypnea with bibasilar Rales, no wheezing or rhonchi. Cardiovascular: Tachycardia with difficulty hearing S1 and 2/murmur due to BiPAP.   1+ bilateral lower extremity edema  Abdomen: Soft, non-tender, non-distended with normal bowel sounds. Musculoskeletal: There is no joint swelling or tenderness. Normal tone. No abnormal movements. Skin: Warm and dry. No rashes or lesions. Neurologic:  No focal sensory/motor deficits in the upper and lower extremities. Cranial nerves:  grossly non-focal 2-12. Psychiatric: Alert and oriented, normal insight and thought content. Capillary Refill: Brisk,< 3 seconds. Peripheral Pulses: +2 palpable, equal bilaterally. Labs:     Recent Labs     04/16/22  1015   WBC 23.4*   HGB 12.1*   HCT 37.9*        Recent Labs     04/16/22  1015      K 3.7      CO2 20*   BUN 37*   CREATININE 1.6*   CALCIUM 8.7     Recent Labs     04/16/22  1015   AST 48*   ALT 89*   BILITOT 1.3*   ALKPHOS 81     No results for input(s): INR in the last 72 hours. No results for input(s): Les Randall in the last 72 hours. Lab Results   Component Value Date    NITRU NEGATIVE 06/19/2015    WBCUA NONE 06/19/2015    BACTERIA NONE 06/19/2015    RBCUA NONE 06/19/2015    BLOODU trace-lysed 06/21/2018    BLOODU NEGATIVE 06/19/2015    SPECGRAV 1.020 06/21/2018    SPECGRAV <1.005 06/19/2015    GLUCOSEU neg 06/21/2018         Radiology:     XR CHEST PORTABLE   Final Result   Interstitial opacities throughout both lungs. These have worsened in the left midlung zone and left lung base. This can are present pulmonary edema, infiltrates are not excluded. **This report has been created using voice recognition software. It may contain minor errors which are inherent in voice recognition technology. **      Final report electronically signed by Dr. Mira Olivarez on 4/16/2022 10:24 AM             EKG:  I have reviewed the EKG with the following interpretation: LVH with mild increase in QRS (similar to prior 2017) however there is new TWI in AvL and I      PT/OT Eval Status:  will be assessed  Diet: No diet orders on file  DVT prophylaxis: yes  Code Status: No Order  Disposition: admit to 3b step down     Thank you EMRE Padilla CNP for the opportunity to be involved in this patient's care.     Electronically signed by Rosa Lloyd DO on 4/16/2022 at 11:18 AM.

## 2022-04-16 NOTE — ED NOTES
Pt. Resting in bed with even and unlabored respirations. Pt. States pain is a 0/10 at this time. Pt. Updated about plan for antibiotics and admission. Family at bedside. Pt. Has no further concerns, questions or needs at this time. Call light within reach.         Addie Garcia RN  04/16/22 [id number] [place], RN  04/16/22 [id number]

## 2022-04-16 NOTE — ED PROVIDER NOTES
[facility] ENCOUNTER          Pt Name: Milo Collins  MRN: [id number]  [place] 1/30/1931  Date of evaluation: 4/16/2022  Treating Resident Physician: Telma De Leon MD  Supervising Physician: Dr Maliha Casanova       Chief Complaint   Patient presents with    Shortness of Breath     History obtained from EMS, patient, daughter. HISTORY OF PRESENT ILLNESS    HPI  Milo Collins is a 80 y.o. male with past medical history diabetes mellitus, hypertension, aortic stenosis nonrheumatic who presents to the emergency department for evaluation of severe shortness beginning acutely this morning and worsening throughout the morning. Patient's daughter describes him having some mild congestion, rhinorrhea a few days ago. Patient was brought in by EMS on CPAP, has significant work of breathing and was talking in short sentences. The patient has no other acute complaints at this time. REVIEW OF SYSTEMS   Review of Systems   Constitutional: Negative for chills and fever. HENT: Positive for sinus pain. Negative for rhinorrhea and sore throat. Eyes: Negative for redness. Respiratory: Positive for shortness of breath. Negative for cough. Cardiovascular: Negative for chest pain and leg swelling. Gastrointestinal: Negative for abdominal pain, diarrhea, nausea and vomiting. Genitourinary: Negative for dysuria. Musculoskeletal: Negative for back pain. Skin: Negative for rash. Neurological: Negative for light-headedness and headaches. Psychiatric/Behavioral: Negative for agitation.          PAST MEDICAL AND SURGICAL HISTORY     Past Medical History:   Diagnosis Date    Diabetes mellitus ([address]     Hypertension     Type II or unspecified type diabetes mellitus without mention of complication, not stated as uncontrolled      Past Surgical History:   Procedure Laterality Date    TONSILLECTOMY AND ADENOIDECTOMY Bilateral     age 10          MEDICATIONS     Current Facility-Administered Medications:     nitroGLYCERIN 50 mg in dextrose 5% 250 mL infusion, 5-200 mcg/min, IntraVENous, Continuous, Jose Francisco Manley MD, Last Rate: 15 mL/hr at 04/16/22 1103, 50 mcg/min at 04/16/22 1103    cefTRIAXone (ROCEPHIN) 1,000 mg in sterile water 10 mL IV syringe, 1,000 mg, IntraVENous, Once, Jose Francisco Manley MD    azithromycin (ZITHROMAX) 500 mg in D5W 250ml addavial, 500 mg, IntraVENous, Once, Jose Francisco Manley MD    Current Outpatient Medications:     lisinopril (PRINIVIL;ZESTRIL) 20 MG tablet, Take 2 tablets by mouth once daily, Disp: 180 tablet, Rfl: 3    bisoprolol-hydroCHLOROthiazide (ZIAC) 10-6.25 MG per tablet, Take 2 tablets by mouth once daily, Disp: 180 tablet, Rfl: 3    metFORMIN (GLUCOPHAGE XR) 500 MG extended release tablet, Take 2 tablets by mouth daily (with breakfast), Disp: 180 tablet, Rfl: 3      SOCIAL HISTORY     Social History     Social History Narrative    Not on file     Social History     Tobacco Use    Smoking status: Never Smoker    Smokeless tobacco: Never Used   Vaping Use    Vaping Use: Never used   Substance Use Topics    Alcohol use: No     Alcohol/week: 0.0 standard drinks    Drug use: No         ALLERGIES     Allergies   Allergen Reactions    Norvasc [Amlodipine Besylate]     Other Rash     States allergic to several BP meds (unsure of name)         FAMILY HISTORY     Family History   Problem Relation Age of Onset    Diabetes Paternal Aunt     Diabetes Paternal Uncle     Cancer Paternal Uncle     Diabetes Maternal Grandmother     Diabetes Maternal Grandfather     Diabetes Paternal Grandmother     Cancer Paternal [facility] [place] Diabetes Paternal Grandfather          PREVIOUS RECORDS   Previous records reviewed: Patient was seen here on 2/6/2019 for subconjunctival hemorrhage.       PHYSICAL EXAM     ED Triage Vitals   BP Temp Temp src Pulse Resp SpO2 Height Weight   -- -- -- -- -- -- -- --   Blood pressure 212/147, pulse 119, for rate 20, pulse oximetry 90% on BiPAP. Initial vital signs and nursing assessment reviewed and abnormal from tachycardia, hypertension. Pulsoximetry is abnormal per my interpretation. Additional Vital Signs:  Vitals:    04/16/22 1031   BP: 138/77   Pulse: 95   Resp: 29   SpO2: 99%       Physical Exam  Vitals and nursing note reviewed. Constitutional:       General: He is in acute distress. Appearance: He is obese. He is ill-appearing and toxic-appearing. HENT:      Head: Normocephalic and atraumatic. Right Ear: External ear normal.      Left Ear: External ear normal.      Nose: Nose normal.      Mouth/Throat:      Mouth: Mucous membranes are moist.      Pharynx: Oropharynx is clear. Eyes:      General: No scleral icterus. Conjunctiva/sclera: Conjunctivae normal.   Cardiovascular:      Rate and Rhythm: Normal rate and regular rhythm. Pulses: Normal pulses. Heart sounds: Normal heart sounds. Comments: Rps 2+ bilateral, DP pulses 2+ bilateral  Pulmonary:      Effort: Respiratory distress present. Breath sounds: Rales present. Chest:      Chest wall: No tenderness. Abdominal:      General: Abdomen is flat. There is no distension. Palpations: Abdomen is soft. Tenderness: There is no abdominal tenderness. There is no guarding or rebound. Musculoskeletal:         General: Normal range of motion. Cervical back: Normal range of motion and neck supple. No rigidity. No muscular tenderness. Right lower leg: No edema. Left lower leg: No edema. Lymphadenopathy:      Cervical: No cervical adenopathy. Skin:     General: Skin is warm and dry. Capillary Refill: Capillary refill takes less than 2 seconds. Coloration: Skin is not jaundiced. Neurological:      General: No focal deficit present. Mental Status: He is alert and oriented to person, place, and time. Psychiatric:         Mood and Affect: Mood normal.         Behavior: Behavior normal.         MEDICAL DECISION MAKING   Initial Assessment: This is a 89M with pmhx of nonrheumatic AS, HTN, DM who presents for severe shortness of breath beginning this morning symptoms were really worsening her last few hours. He came in via EMS on CPAP, has significant rales on auscultation has significant B-lines diffusely throughout his chest on ultrasound. Patient speaking in short phrases. Denies any chest pain, fever, chills. Does mention having some sinus pain and rhinorrhea a few days ago. Received his fourth overall Covid shot 3 days ago    Differential Diagnosis Included but not limited to: flash pulmonary edema, valvular rupture, ACS, Pneumonia,     MDM:   Patient was on a nitroglycerin infusion due to his flash pulmonary edema. He was started on BiPAP with PEEP increased. Significant work of breathing improved as well who has rales on auscultation. Will need to come in for further management evaluation. Cardiology was consulted. Recommended Lasix 80 mg, 40 mg were given today blood pressure concerns as well as JUAN C.       ED RESULTS   Laboratory results:  Labs Reviewed   CBC WITH AUTO DIFFERENTIAL - Abnormal; Notable for the following components:       Result Value    WBC 23.4 (*)     RBC 4.48 (*)     Hemoglobin 12.1 (*)     Hematocrit 37.9 (*)     MCHC 31.9 (*)     All other components within normal limits   TROPONIN - Abnormal; Notable for the following components:    Troponin T 0.015 (*)     All other components within normal limits   BRAIN NATRIURETIC PEPTIDE - Abnormal; Notable for the following components:    Pro-BNP 2599.0 (*)     All other components within normal limits   COMPREHENSIVE METABOLIC PANEL W/ REFLEX TO MG FOR LOW K - Abnormal; Notable for the following components:    Glucose 295 (*)     CREATININE 1.6 (*)     BUN 37 (*)     CO2 20 (*)     AST 48 (*)     Total Bilirubin 1.3 (*)     ALT 89 (*) All other components within normal limits   LACTATE, SEPSIS - Abnormal; Notable for the following components:    Lactic Acid, Sepsis 2.4 (*)     All other components within normal limits   TSH WITH REFLEX - Abnormal; Notable for the following components:    TSH 4.490 (*)     All other components within normal limits   GLOMERULAR FILTRATION RATE, ESTIMATED - Abnormal; Notable for the following components:    Est, Glom Filt Rate 41 (*)     All other components within normal limits   CULTURE, BLOOD 1   CULTURE, BLOOD 2   ANION GAP   OSMOLALITY   URINALYSIS WITH REFLEX TO CULTURE   LACTATE, SEPSIS   T4, FREE       Radiologic studies results:  XR CHEST PORTABLE   Final Result   Interstitial opacities throughout both lungs. These have worsened in the left midlung zone and left lung base. This can are present pulmonary edema, infiltrates are not excluded. **This report has been created using voice recognition software. It may contain minor errors which are inherent in voice recognition technology. **      Final report electronically signed by Dr. Sondra Mata on 4/16/2022 10:24 AM          ED Medications administered this visit:   Medications   nitroGLYCERIN 50 mg in dextrose 5% 250 mL infusion (50 mcg/min IntraVENous Rate/Dose Change 4/16/22 1103)   cefTRIAXone (ROCEPHIN) 1,000 mg in sterile water 10 mL IV syringe (has no administration in time range)   azithromycin (ZITHROMAX) 500 mg in D5W 250ml addavial (has no administration in time range)   ipratropium-albuterol (DUONEB) nebulizer solution 1 ampule (1 ampule Inhalation Given 4/16/22 1014)   furosemide (LASIX) injection 40 mg (40 mg IntraVENous Given 4/16/22 1052)         ED COURSE     ED Course as of 04/16/22 1113   Sat Apr 16, 2022   1018 Patient currently on BiPAP at this time with PEEP increased to 16, he is on nitroglycerin infusion currently running at 150 mg, his blood pressures are improving, his overall saturations are within normal limits. His work of breathing has minimally improved. He still has significant rales diffusely. [AL]   1030 Reevaluated has improvement in his overall work of breathing. His rales have improved significantly. His pressures are now between a systolic of 612W-266G. Triglyceride effusion is maintaining at 150 mg. [AL]   1039 WBC(!): 23.4 [AL]   1039 Hemoglobin Quant(!): 12.1 [AL]   1039 Hematocrit(!): 37.9 [AL]   1039 Platelet Count: 951 [AL]   0151 Dr. Dr. Pablo Knows regarding this patient's case, he advised weaning down on the nitroglycerin drip. Recommended ordering 80 mg of Lasix. We'll give 40mg at this time to monitor his blood pressures. Patient is nitroglycerin drip is now down 100mg. [AL]   1044 XR CHEST PORTABLE  \"  IMPRESSION:  Interstitial opacities throughout both lungs. These have worsened in the left midlung zone and left lung base. This can are present pulmonary edema, infiltrates are not excluded.   \" [AL]   1048 Lactic Acid, Sepsis(!): 2.4 [AL]   1049 GLUCOSE, FASTING,GF(!): 295 [AL]   1049 Creatinine(!): 1.6  Elevated from prior baseline, lasix adjusted to 40mg  [AL]   1049 Bilirubin(!): 1.3 [AL]   1049 AST(!): 48 [AL]   1049 CO2(!): 20 [AL]   1049 Patient will be given some antibiotics, Rocephin azithromycin he has some asymmetry on his chest x-ray concerning for possible infiltrate superimposed with his pulmonary edema. His lactic acid level is elevated we will recheck further evaluation may be secondary to his acute state at this time. Blood cultures were added on as well. [AL]   1051 Osmolality Ca.6 [AL]   1051 Anion Gap: 16.0 [AL]   1051 Although patient's tachycardia and elevated white blood count and elevated lactic acid are concerning for meeting SIRS criteria. Due to his flash pulmonary edema we will hold off any supplemental fluids at this time as there is a significant risk for imminent respiratory failure.  [AL]   1108 Patient is nitroglycerin drip was adjusted down to 50 mg secondary to some decreases in his overall stock blood pressure. [AL]   1108 Troponin T(!): 0.015 [AL]   1108 TSH(!): 4.490 [AL]   1108 Pro-BNP(!): 2599.0 [AL]   1113 The hospitalist was contacted for admission [AL]      ED Course User Index  [AL] Tan Bragg MD       MEDICATION CHANGES     New Prescriptions    No medications on file         FINAL DISPOSITION     Final diagnoses:   Flash pulmonary edema ([facility]   JUAN C (acute kidney injury) ([facility] [facility]   Septicemia ([facility] [address]   Pneumonia of left lung due to infectious organism, unspecified part of lung   Elevated brain natriuretic peptide (BNP) level   Elevated troponin     Condition: condition: critical  Dispo: Admit      This transcription was electronically signed. Parts of this transcriptions may have been dictated by use of voice recognition software and electronically transcribed, and parts may have been transcribed with the assistance of an ED scribe. The transcription may contain errors not detected in proofreading. Please refer to my supervising physician's documentation if my documentation differs.     Electronically Signed: Tan Bragg MD, 04/16/22, 11:13 AM         Tan Bragg MD  Resident  04/16/22 [id number]       Tan Bragg MD  Resident  04/16/22 [id number]

## 2022-04-16 NOTE — ED NOTES
Pt transported to 3B26 by cart in stable condition. Called [facility] and informed Geoff Zuniga that the patient was on their way to the unit.       Herbie Ho, DARA  93/03/01 [id number]

## 2022-04-16 NOTE — ED NOTES
ED to inpatient nurses report    Chief Complaint   Patient presents with    Shortness of Breath      Present to ED from home  LOC: alert and orientated to name, place, date  Vital signs   Vitals:    04/16/22 1020 04/16/22 1031 04/16/22 1130 04/16/22 1220   BP:  138/77 112/66    Pulse:  95 84    Resp: 30 29 15 22   SpO2:  99% 99%    Weight:       Height:          Oxygen Baseline Room air     Current needs required BIPAP Bipap/Cpap Yes  LDAs:   Peripheral IV 04/16/22 Distal;Left Upper arm (Active)       Peripheral IV 04/16/22 Right Forearm (Active)   Site Assessment Clean;Dry; Intact 04/16/22 1041   Line Status Normal saline locked; Flushed 04/16/22 1041   Dressing Status Clean;Dry; Intact 04/16/22 1041   Dressing Intervention New 04/16/22 1041     Mobility: Requires assistance * 1  Pending ED orders: none  Present condition: Pt restring comfortably in bed with BIPAP on. Pt given nitro, lasix, rocephin, zithromax in ED. Pt tolerating bipap well.  VSS  Person of contract, phone number   Our promise was given to patient    Electronically signed by Yolanda Moy RN on 4/16/2022 at 12:29 PM       Yolanda Moy RN  04/16/22 [id number]

## 2022-04-16 NOTE — PROGRESS NOTES
Pt admitted to  3B26 from ED and via cart/stretcher. Complaints: Shortness of breath. INT RFA and LFAl. IV sites free of s/s of infection or infiltration. Vital signs obtained. Assessment and data collection initiated. Two nurse skin assessment performed by Lennox Moros RN RN and Kianna RN. Oriented to room. Policies and procedures for [facility] explained. All questions answered with no further questions at this time. Fall prevention and safety brochure discussed with patient. Bed alarm on. Call light in reach.

## 2022-04-16 NOTE — ED NOTES
Pt. Resting in bed with even and unlabored respirations on BIPAP. Pt. States pain is a 0/10 at this time. Pt. Updated about plan for lasix. Family at bedside. Pt. Has no further concerns, questions or needs at this time. Call light within reach.         Gil Fleming RN  04/16/22 2588

## 2022-04-16 NOTE — ED NOTES
Pt arrives to the ED via [facility] for sob that has been occurring intermittently for a \"while. \" Pt states his sob worsened over the last hour. On arrival to pt home, pt 02 sat was 76% on room air. Pt placed on cpap, given solu-medrol and a duoneb en route. Pt on arrival to the ED was placed on BIPAP by RT. Pt denies any pain at this time. Pt respirations are labored.  Blood pressure is elevated      Cabrera James RN  04/16/22 [id number]

## 2022-04-17 ENCOUNTER — APPOINTMENT (OUTPATIENT)
Dept: ULTRASOUND IMAGING | Age: 87
DRG: 246 | End: 2022-04-17
Payer: MEDICARE

## 2022-04-17 ENCOUNTER — APPOINTMENT (OUTPATIENT)
Dept: GENERAL RADIOLOGY | Age: 87
DRG: 246 | End: 2022-04-17
Payer: MEDICARE

## 2022-04-17 LAB
ALBUMIN SERPL-MCNC: 3.5 G/DL (ref 3.5–5.1)
ALP BLD-CCNC: 62 U/L (ref 38–126)
ALT SERPL-CCNC: 67 U/L (ref 11–66)
ANION GAP SERPL CALCULATED.3IONS-SCNC: 12 MEQ/L (ref 8–16)
AST SERPL-CCNC: 28 U/L (ref 5–40)
BILIRUB SERPL-MCNC: 0.7 MG/DL (ref 0.3–1.2)
BILIRUBIN DIRECT: 0.3 MG/DL (ref 0–0.3)
BUN BLDV-MCNC: 46 MG/DL (ref 7–22)
CALCIUM SERPL-MCNC: 8.8 MG/DL (ref 8.5–10.5)
CHLORIDE BLD-SCNC: 97 MEQ/L (ref 98–111)
CO2: 24 MEQ/L (ref 23–33)
CREAT SERPL-MCNC: 1.7 MG/DL (ref 0.4–1.2)
EKG Q-T INTERVAL: 330 MS
EKG QRS DURATION: 126 MS
EKG QTC CALCULATION (BAZETT): 460 MS
EKG R AXIS: -56 DEGREES
EKG T AXIS: 98 DEGREES
EKG VENTRICULAR RATE: 117 BPM
ERYTHROCYTE [DISTWIDTH] IN BLOOD BY AUTOMATED COUNT: 13.2 % (ref 11.5–14.5)
ERYTHROCYTE [DISTWIDTH] IN BLOOD BY AUTOMATED COUNT: 40 FL (ref 35–45)
GFR SERPL CREATININE-BSD FRML MDRD: 38 ML/MIN/1.73M2
GLUCOSE BLD-MCNC: 141 MG/DL (ref 70–108)
GLUCOSE BLD-MCNC: 185 MG/DL (ref 70–108)
GLUCOSE BLD-MCNC: 201 MG/DL (ref 70–108)
GLUCOSE BLD-MCNC: 210 MG/DL (ref 70–108)
HCT VFR BLD CALC: 34.4 % (ref 42–52)
HEMOGLOBIN: 11.1 GM/DL (ref 14–18)
HEPARIN UNFRACTIONATED: 0.2 U/ML (ref 0.3–0.7)
INR BLD: 1.18 (ref 0.85–1.13)
MAGNESIUM: 1.8 MG/DL (ref 1.6–2.4)
MCH RBC QN AUTO: 26.9 PG (ref 26–33)
MCHC RBC AUTO-ENTMCNC: 32.3 GM/DL (ref 32.2–35.5)
MCV RBC AUTO: 83.3 FL (ref 80–94)
PLATELET # BLD: 145 THOU/MM3 (ref 130–400)
PMV BLD AUTO: 12.7 FL (ref 9.4–12.4)
POTASSIUM SERPL-SCNC: 4.3 MEQ/L (ref 3.5–5.2)
RBC # BLD: 4.13 MILL/MM3 (ref 4.7–6.1)
SODIUM BLD-SCNC: 133 MEQ/L (ref 135–145)
TOTAL PROTEIN: 6.9 G/DL (ref 6.1–8)
WBC # BLD: 13.1 THOU/MM3 (ref 4.8–10.8)

## 2022-04-17 PROCEDURE — 6370000000 HC RX 637 (ALT 250 FOR IP): Performed by: INTERNAL MEDICINE

## 2022-04-17 PROCEDURE — 85610 PROTHROMBIN TIME: CPT

## 2022-04-17 PROCEDURE — 82948 REAGENT STRIP/BLOOD GLUCOSE: CPT

## 2022-04-17 PROCEDURE — 71045 X-RAY EXAM CHEST 1 VIEW: CPT

## 2022-04-17 PROCEDURE — 2580000003 HC RX 258: Performed by: INTERNAL MEDICINE

## 2022-04-17 PROCEDURE — 99223 1ST HOSP IP/OBS HIGH 75: CPT | Performed by: INTERNAL MEDICINE

## 2022-04-17 PROCEDURE — 2500000003 HC RX 250 WO HCPCS: Performed by: INTERNAL MEDICINE

## 2022-04-17 PROCEDURE — 80053 COMPREHEN METABOLIC PANEL: CPT

## 2022-04-17 PROCEDURE — 83735 ASSAY OF MAGNESIUM: CPT

## 2022-04-17 PROCEDURE — 94660 CPAP INITIATION&MGMT: CPT

## 2022-04-17 PROCEDURE — 6360000002 HC RX W HCPCS: Performed by: INTERNAL MEDICINE

## 2022-04-17 PROCEDURE — 2140000000 HC CCU INTERMEDIATE R&B

## 2022-04-17 PROCEDURE — 85027 COMPLETE CBC AUTOMATED: CPT

## 2022-04-17 PROCEDURE — 94761 N-INVAS EAR/PLS OXIMETRY MLT: CPT

## 2022-04-17 PROCEDURE — 93005 ELECTROCARDIOGRAM TRACING: CPT | Performed by: INTERNAL MEDICINE

## 2022-04-17 PROCEDURE — 2500000003 HC RX 250 WO HCPCS: Performed by: STUDENT IN AN ORGANIZED HEALTH CARE EDUCATION/TRAINING PROGRAM

## 2022-04-17 PROCEDURE — 2700000000 HC OXYGEN THERAPY PER DAY

## 2022-04-17 PROCEDURE — 36415 COLL VENOUS BLD VENIPUNCTURE: CPT

## 2022-04-17 PROCEDURE — 99233 SBSQ HOSP IP/OBS HIGH 50: CPT | Performed by: INTERNAL MEDICINE

## 2022-04-17 PROCEDURE — 82248 BILIRUBIN DIRECT: CPT

## 2022-04-17 PROCEDURE — 6370000000 HC RX 637 (ALT 250 FOR IP): Performed by: STUDENT IN AN ORGANIZED HEALTH CARE EDUCATION/TRAINING PROGRAM

## 2022-04-17 PROCEDURE — 85520 HEPARIN ASSAY: CPT

## 2022-04-17 PROCEDURE — 94760 N-INVAS EAR/PLS OXIMETRY 1: CPT

## 2022-04-17 PROCEDURE — 76770 US EXAM ABDO BACK WALL COMP: CPT

## 2022-04-17 PROCEDURE — 93010 ELECTROCARDIOGRAM REPORT: CPT | Performed by: INTERNAL MEDICINE

## 2022-04-17 RX ORDER — INSULIN GLARGINE 100 [IU]/ML
3 INJECTION, SOLUTION SUBCUTANEOUS NIGHTLY
Status: DISCONTINUED | OUTPATIENT
Start: 2022-04-17 | End: 2022-04-23 | Stop reason: HOSPADM

## 2022-04-17 RX ORDER — HEPARIN SODIUM 10000 [USP'U]/100ML
5-30 INJECTION, SOLUTION INTRAVENOUS CONTINUOUS
Status: DISCONTINUED | OUTPATIENT
Start: 2022-04-17 | End: 2022-04-17

## 2022-04-17 RX ORDER — METOPROLOL TARTRATE 5 MG/5ML
5 INJECTION INTRAVENOUS ONCE
Status: COMPLETED | OUTPATIENT
Start: 2022-04-17 | End: 2022-04-17

## 2022-04-17 RX ORDER — HEPARIN SODIUM 1000 [USP'U]/ML
2000 INJECTION, SOLUTION INTRAVENOUS; SUBCUTANEOUS PRN
Status: DISCONTINUED | OUTPATIENT
Start: 2022-04-17 | End: 2022-04-23 | Stop reason: HOSPADM

## 2022-04-17 RX ORDER — HEPARIN SODIUM 1000 [USP'U]/ML
4000 INJECTION, SOLUTION INTRAVENOUS; SUBCUTANEOUS PRN
Status: DISCONTINUED | OUTPATIENT
Start: 2022-04-17 | End: 2022-04-23 | Stop reason: HOSPADM

## 2022-04-17 RX ORDER — HEPARIN SODIUM 1000 [USP'U]/ML
4000 INJECTION, SOLUTION INTRAVENOUS; SUBCUTANEOUS ONCE
Status: COMPLETED | OUTPATIENT
Start: 2022-04-17 | End: 2022-04-17

## 2022-04-17 RX ORDER — HEPARIN SODIUM 10000 [USP'U]/100ML
5-30 INJECTION, SOLUTION INTRAVENOUS CONTINUOUS
Status: DISCONTINUED | OUTPATIENT
Start: 2022-04-17 | End: 2022-04-22

## 2022-04-17 RX ADMIN — AZITHROMYCIN DIHYDRATE 500 MG: 500 INJECTION, POWDER, LYOPHILIZED, FOR SOLUTION INTRAVENOUS at 13:03

## 2022-04-17 RX ADMIN — HEPARIN SODIUM 4000 UNITS: 1000 INJECTION INTRAVENOUS; SUBCUTANEOUS at 19:23

## 2022-04-17 RX ADMIN — METOPROLOL TARTRATE 5 MG: 1 INJECTION, SOLUTION INTRAVENOUS at 11:43

## 2022-04-17 RX ADMIN — INSULIN GLARGINE 3 UNITS: 100 INJECTION, SOLUTION SUBCUTANEOUS at 21:04

## 2022-04-17 RX ADMIN — SODIUM CHLORIDE, PRESERVATIVE FREE 10 ML: 5 INJECTION INTRAVENOUS at 21:06

## 2022-04-17 RX ADMIN — METOPROLOL TARTRATE 25 MG: 25 TABLET, FILM COATED ORAL at 21:05

## 2022-04-17 RX ADMIN — INSULIN LISPRO 1 UNITS: 100 INJECTION, SOLUTION INTRAVENOUS; SUBCUTANEOUS at 08:49

## 2022-04-17 RX ADMIN — INSULIN LISPRO 1 UNITS: 100 INJECTION, SOLUTION INTRAVENOUS; SUBCUTANEOUS at 21:04

## 2022-04-17 RX ADMIN — METOPROLOL TARTRATE 25 MG: 25 TABLET, FILM COATED ORAL at 13:52

## 2022-04-17 RX ADMIN — WATER 1000 MG: 1 INJECTION INTRAMUSCULAR; INTRAVENOUS; SUBCUTANEOUS at 11:43

## 2022-04-17 RX ADMIN — FUROSEMIDE 40 MG: 10 INJECTION, SOLUTION INTRAMUSCULAR; INTRAVENOUS at 08:49

## 2022-04-17 RX ADMIN — HEPARIN SODIUM 4000 UNITS: 1000 INJECTION INTRAVENOUS; SUBCUTANEOUS at 11:42

## 2022-04-17 RX ADMIN — HEPARIN SODIUM 10 UNITS/KG/HR: 10000 INJECTION, SOLUTION INTRAVENOUS at 11:42

## 2022-04-17 RX ADMIN — SODIUM CHLORIDE, PRESERVATIVE FREE 10 ML: 5 INJECTION INTRAVENOUS at 08:49

## 2022-04-17 ASSESSMENT — PAIN SCALES - GENERAL: PAINLEVEL_OUTOF10: 0

## 2022-04-17 NOTE — CONSULTS
[facility]  Cardiology Consult        Patient:  Smiley Thakur  YOB: 1931  MRN: [id number]     Acct: [de-identified]    PCP: EMRE Weber CNP    Date of Admission: 4/16/2022      Reason for Consultation:  Pulmonary edema      History Of Present Illness:    80 y.o. pleasant male c hx of Mod-severe AS, DM, HTN who presented to the hospital with complaints of shortness of breath and found to be in pulmonary edema. Patient ZAHEER in 12/2021 was 0.9 cm2 with mean gradient of 27mm Hg. He followed up with Dr. Monico Nageotte but patient wanted to monitor for symptoms. Patient apparently felt like he couldn't breath as if he had a polyp in his nose, thought this started after getting a booster COVID19 short. He denies any chest pains or shortness of breath. He was placed on BiPAP, his BP was elevated >200. Currently he is much better, received diuretics. He underwent Echo which shows a drop in EF   While in the hospital he was noted to have new onset of afib. All labs, EKG's, diagnostic testing and images as well as cardiac cath, stress testing were reviewed during this encounter. Past Medical History:          Diagnosis Date    Diabetes mellitus ([facility] [address]     Hypertension     Type II or unspecified type diabetes mellitus without mention of complication, not stated as uncontrolled        Past Surgical History:          Procedure Laterality Date    TONSILLECTOMY AND ADENOIDECTOMY Bilateral     age 10        Medications Prior to Admission:      Prior to Admission medications    Medication Sig Start Date End Date Taking?  Authorizing Provider   metFORMIN, OSM, (FORTAMET) 500 MG extended release tablet Take 1,000 mg by mouth Daily with supper   Yes Historical Provider, MD   lisinopril (PRINIVIL;ZESTRIL) 20 MG tablet Take 2 tablets by mouth once daily 2/7/22   EMRE Weber CNP   bisoprolol-hydroCHLOROthiazide [facility]) 10-6.25 MG per tablet Take 2 tablets by mouth once daily 11/8/21   EMRE Flannery CNP   metFORMIN (GLUCOPHAGE XR) 500 MG extended release tablet Take 2 tablets by mouth daily (with breakfast) 5/26/21 12/1/21  EMRE Flannery CNP       Current Facility-Administered Medications   Medication Dose Route Frequency Provider Last Rate Last Admin    cefTRIAXone (ROCEPHIN) 1,000 mg in sterile water 10 mL IV syringe  1,000 mg IntraVENous Q24H Sophia Mario, DO   1,000 mg at 04/16/22 1145    sodium chloride flush 0.9 % injection 5-40 mL  5-40 mL IntraVENous 2 times per day Sophia Mario, DO   10 mL at 04/17/22 0849    sodium chloride flush 0.9 % injection 5-40 mL  5-40 mL IntraVENous PRN Sophia Leahyain, DO        0.9 % sodium chloride infusion   IntraVENous PRN Sophia Leahyain, DO        ondansetron (ZOFRAN-ODT) disintegrating tablet 4 mg  4 mg Oral Q8H PRN Sophia Leahyain, DO        Or    ondansetron [facility]) injection 4 mg  4 mg IntraVENous Q6H PRN Sophia Murrain, DO        polyethylene glycol (GLYCOLAX) packet 17 g  17 g Oral Daily PRN Sophia Leahyain, DO        acetaminophen (TYLENOL) tablet 650 mg  650 mg Oral Q6H PRN Sophia Leahyain, DO        Or    acetaminophen (TYLENOL) suppository 650 mg  650 mg Rectal Q6H PRN Sophia Murrain, DO        enoxaparin (LOVENOX) injection 40 mg  40 mg SubCUTAneous Q24H Sophia Mario, DO   40 mg at 04/16/22 1456    potassium chloride (KLOR-CON M) extended release tablet 40 mEq  40 mEq Oral PRN Schemerryie Murrain, DO        Or    potassium bicarb-citric acid (EFFER-K) effervescent tablet 40 mEq  40 mEq Oral PRN Schemerryie Murrain, DO        Or    potassium chloride 10 mEq/100 mL IVPB (Peripheral Line)  10 mEq IntraVENous PRN Sophia Murrain, DO        magnesium sulfate 2000 mg in 50 mL IVPB premix  2,000 mg IntraVENous PRN Melodyie Murrain, DO        furosemide (LASIX) injection 40 mg  40 mg IntraVENous BID Sophia Mario, DO   40 mg at 04/17/22 0849    insulin lispro (HUMALOG) injection vial 0-6 Units  0-6 Units SubCUTAneous TID JANE Mario,    1 Units at 04/17/22 0849    insulin lispro (HUMALOG) injection vial 0-3 Units  0-3 Units SubCUTAneous Nightly Hannah Neas, DO   2 Units at 04/16/22 2120    glucagon (rDNA) injection 1 mg  1 mg IntraMUSCular PRN Hannah Neas, DO        dextrose 5 % solution  100 mL/hr IntraVENous PRN Hannah Neas, DO        dextrose bolus (hypoglycemia) 10% 125 mL  125 mL IntraVENous PRN Hannah Neas, DO        Or    dextrose bolus (hypoglycemia) 10% 250 mL  250 mL IntraVENous PRN Hannah Neas, DO        glucose chewable tablet 4 each  4 tablet Oral PRN Hannah Neas, DO           Allergies:  Norvasc [amlodipine besylate] and Other    Social History:    TOBACCO:   reports that he has never smoked. He has never used smokeless tobacco.  ETOH:   reports no history of alcohol use. Family History:        Problem Relation Age of Onset    Diabetes Paternal Aunt     Diabetes Paternal Uncle     Cancer Paternal Uncle     Diabetes Maternal Grandmother     Diabetes Maternal Grandfather     Diabetes Paternal Grandmother     Cancer Paternal Grandmother     Diabetes Paternal Grandfather          Review of Systems -   General ROS: negative  Psychological ROS: negative  Hematological and Lymphatic ROS: No history of blood clots or bleeding disorder. Respiratory ROS: no cough, shortness of breath, or wheezing  Cardiovascular ROS: As per HPI  Gastrointestinal ROS: negative  Genito-Urinary ROS: no dysuria, trouble voiding, or hematuria  Musculoskeletal ROS: negative  Neurological ROS: no TIA or stroke symptoms  Dermatological ROS: negative    All others reviewed and are negative.        /71   Pulse 66   Temp 98 °F (36.7 °C) (Oral)   Resp 16   Ht 5' 10\" (1.778 m)   Wt 211 lb 3.2 oz (95.8 kg)   SpO2 99%   BMI 30.30 kg/m²       Physical Examination:   General appearance - alert, in no distress  Mental status - alert, oriented to person, place, and time  Neck - supple, no significant adenopathy, no JVD, or carotid bruits  Chest - clear to auscultation, no wheezes, rales or rhonchi, symmetric air entry  Heart - tachy, irregular rhythm, normal S1, S2, BIV5/8, rubs, clicks or gallops  Abdomen - soft, nontender, nondistended, no masses or organomegaly  Neurological - alert, oriented, normal speech, no focal findings or movement disorder noted  Musculoskeletal - no joint tenderness, deformity or swelling  Extremities - peripheral pulses normal, no pedal edema, no clubbing or cyanosis  Skin - normal coloration and turgor, no rashes, no suspicious skin lesions noted      LABS:    Recent Labs     04/16/22  1015 04/16/22  1408 04/16/22  1557   TROPONINT 0.015* 0.093* 0.101*     CBC:   Lab Results   Component Value Date    WBC 13.1 04/17/2022    RBC 4.13 04/17/2022    HGB 11.1 04/17/2022    HCT 34.4 04/17/2022    MCV 83.3 04/17/2022    MCH 26.9 04/17/2022    MCHC 32.3 04/17/2022    RDW 13.3 05/15/2018     04/17/2022    MPV 12.7 04/17/2022     BMP:    Lab Results   Component Value Date     04/17/2022    K 4.3 04/17/2022    K 3.7 04/16/2022    CL 97 04/17/2022    CO2 24 04/17/2022    BUN 46 04/17/2022    LABALBU 3.5 04/17/2022    CREATININE 1.7 04/17/2022    CALCIUM 8.8 04/17/2022    LABGLOM 38 04/17/2022    GLUCOSE 201 04/17/2022     Hepatic Function Panel:    Lab Results   Component Value Date    ALKPHOS 62 04/17/2022    ALT 67 04/17/2022    AST 28 04/17/2022    PROT 6.9 04/17/2022    BILITOT 0.7 04/17/2022    BILIDIR 0.3 04/17/2022    LABALBU 3.5 04/17/2022     Magnesium:    Lab Results   Component Value Date    MG 1.8 04/17/2022     Warfarin PT/INR:  No components found for: PTPATWAR, PTINRWAR  HgBA1c:    Lab Results   Component Value Date    LABA1C 6.8 04/16/2022     FLP:    Lab Results   Component Value Date    TRIG 147 05/15/2020    HDL 37 05/15/2020    LDLCALC 88 05/15/2020     TSH:    Lab Results   Component Value Date    TSH 2.790 04/16/2022     BNP: No components found for: PRO-BNP      Assessment/Plan:    Patient Active Problem List Diagnosis    Type 2 diabetes mellitus with stage 3a chronic kidney disease, without long-term current use of insulin (HCC)    BPH (benign prostatic hyperplasia)    Essential hypertension    Nonrheumatic aortic valve stenosis    Left atrial enlargement    Decompensated heart failure (HCC)     Pulmonary edema  Moderate to severe AS  New LV dysfunction EF 40-45%  New Onset Afib  Elevated troponins  HTN  HLD    Telemetry  Start IV heparin, monitor PTT  Renal evaluation  Needs LHC to evaluate coronaries due to new onset LV dysfunction  Needs MIS to evaluate AS by planimetry and also for DCCV  Will start low dose beta blocker  Continue rest of the management      Please do note hesitate to contact me for any further questions. Thank you for the opportunity to be involved in this patient's care.     Code Status: Full Code    Electronically signed by Alden Lemons MD on 4/17/2022 at 10:03 AM

## 2022-04-18 ENCOUNTER — ANESTHESIA (OUTPATIENT)
Dept: CARDIAC CATH/INVASIVE PROCEDURES | Age: 87
DRG: 246 | End: 2022-04-18
Payer: MEDICARE

## 2022-04-18 ENCOUNTER — APPOINTMENT (OUTPATIENT)
Dept: CARDIAC CATH/INVASIVE PROCEDURES | Age: 87
DRG: 246 | End: 2022-04-18
Payer: MEDICARE

## 2022-04-18 ENCOUNTER — ANESTHESIA EVENT (OUTPATIENT)
Dept: CARDIAC CATH/INVASIVE PROCEDURES | Age: 87
DRG: 246 | End: 2022-04-18
Payer: MEDICARE

## 2022-04-18 VITALS
DIASTOLIC BLOOD PRESSURE: 59 MMHG | OXYGEN SATURATION: 93 % | RESPIRATION RATE: 16 BRPM | SYSTOLIC BLOOD PRESSURE: 102 MMHG

## 2022-04-18 LAB
ANION GAP SERPL CALCULATED.3IONS-SCNC: 15 MEQ/L (ref 8–16)
BUN BLDV-MCNC: 47 MG/DL (ref 7–22)
CALCIUM SERPL-MCNC: 9.3 MG/DL (ref 8.5–10.5)
CHLORIDE BLD-SCNC: 99 MEQ/L (ref 98–111)
CO2: 22 MEQ/L (ref 23–33)
CREAT SERPL-MCNC: 1.4 MG/DL (ref 0.4–1.2)
EKG Q-T INTERVAL: 370 MS
EKG QRS DURATION: 128 MS
EKG QTC CALCULATION (BAZETT): 525 MS
EKG R AXIS: -50 DEGREES
EKG T AXIS: 96 DEGREES
EKG VENTRICULAR RATE: 121 BPM
GFR SERPL CREATININE-BSD FRML MDRD: 47 ML/MIN/1.73M2
GLUCOSE BLD-MCNC: 173 MG/DL (ref 70–108)
GLUCOSE BLD-MCNC: 175 MG/DL (ref 70–108)
GLUCOSE BLD-MCNC: 178 MG/DL (ref 70–108)
GLUCOSE BLD-MCNC: 191 MG/DL (ref 70–108)
GLUCOSE BLD-MCNC: 213 MG/DL (ref 70–108)
HEPARIN UNFRACTIONATED: 0.55 U/ML (ref 0.3–0.7)
HEPARIN UNFRACTIONATED: 0.61 U/ML (ref 0.3–0.7)
HEPARIN UNFRACTIONATED: 0.75 U/ML (ref 0.3–0.7)
HEPARIN UNFRACTIONATED: 0.75 U/ML (ref 0.3–0.7)
LV EF: 43 %
LVEF MODALITY: NORMAL
MAGNESIUM: 2 MG/DL (ref 1.6–2.4)
POTASSIUM SERPL-SCNC: 4 MEQ/L (ref 3.5–5.2)
SODIUM BLD-SCNC: 136 MEQ/L (ref 135–145)

## 2022-04-18 PROCEDURE — 3700000000 HC ANESTHESIA ATTENDED CARE

## 2022-04-18 PROCEDURE — 94660 CPAP INITIATION&MGMT: CPT

## 2022-04-18 PROCEDURE — 6370000000 HC RX 637 (ALT 250 FOR IP): Performed by: STUDENT IN AN ORGANIZED HEALTH CARE EDUCATION/TRAINING PROGRAM

## 2022-04-18 PROCEDURE — 97162 PT EVAL MOD COMPLEX 30 MIN: CPT

## 2022-04-18 PROCEDURE — 93005 ELECTROCARDIOGRAM TRACING: CPT | Performed by: INTERNAL MEDICINE

## 2022-04-18 PROCEDURE — 93010 ELECTROCARDIOGRAM REPORT: CPT | Performed by: INTERNAL MEDICINE

## 2022-04-18 PROCEDURE — 97530 THERAPEUTIC ACTIVITIES: CPT

## 2022-04-18 PROCEDURE — 2500000003 HC RX 250 WO HCPCS: Performed by: INTERNAL MEDICINE

## 2022-04-18 PROCEDURE — 5A2204Z RESTORATION OF CARDIAC RHYTHM, SINGLE: ICD-10-PCS | Performed by: INTERNAL MEDICINE

## 2022-04-18 PROCEDURE — 93320 DOPPLER ECHO COMPLETE: CPT

## 2022-04-18 PROCEDURE — 6360000002 HC RX W HCPCS

## 2022-04-18 PROCEDURE — 6360000002 HC RX W HCPCS: Performed by: ANESTHESIOLOGY

## 2022-04-18 PROCEDURE — 99233 SBSQ HOSP IP/OBS HIGH 50: CPT | Performed by: INTERNAL MEDICINE

## 2022-04-18 PROCEDURE — 6360000002 HC RX W HCPCS: Performed by: INTERNAL MEDICINE

## 2022-04-18 PROCEDURE — 2700000000 HC OXYGEN THERAPY PER DAY

## 2022-04-18 PROCEDURE — 2580000003 HC RX 258: Performed by: ANESTHESIOLOGY

## 2022-04-18 PROCEDURE — 93325 DOPPLER ECHO COLOR FLOW MAPG: CPT

## 2022-04-18 PROCEDURE — 82948 REAGENT STRIP/BLOOD GLUCOSE: CPT

## 2022-04-18 PROCEDURE — 80048 BASIC METABOLIC PNL TOTAL CA: CPT

## 2022-04-18 PROCEDURE — 85520 HEPARIN ASSAY: CPT

## 2022-04-18 PROCEDURE — 92960 CARDIOVERSION ELECTRIC EXT: CPT | Performed by: INTERNAL MEDICINE

## 2022-04-18 PROCEDURE — 6370000000 HC RX 637 (ALT 250 FOR IP): Performed by: INTERNAL MEDICINE

## 2022-04-18 PROCEDURE — 2580000003 HC RX 258: Performed by: INTERNAL MEDICINE

## 2022-04-18 PROCEDURE — 2580000003 HC RX 258: Performed by: STUDENT IN AN ORGANIZED HEALTH CARE EDUCATION/TRAINING PROGRAM

## 2022-04-18 PROCEDURE — 94761 N-INVAS EAR/PLS OXIMETRY MLT: CPT

## 2022-04-18 PROCEDURE — 92960 CARDIOVERSION ELECTRIC EXT: CPT

## 2022-04-18 PROCEDURE — 93312 ECHO TRANSESOPHAGEAL: CPT

## 2022-04-18 PROCEDURE — 97110 THERAPEUTIC EXERCISES: CPT

## 2022-04-18 PROCEDURE — 99223 1ST HOSP IP/OBS HIGH 75: CPT | Performed by: INTERNAL MEDICINE

## 2022-04-18 PROCEDURE — 3700000001 HC ADD 15 MINUTES (ANESTHESIA)

## 2022-04-18 PROCEDURE — B24BZZ4 ULTRASONOGRAPHY OF HEART WITH AORTA, TRANSESOPHAGEAL: ICD-10-PCS | Performed by: INTERNAL MEDICINE

## 2022-04-18 PROCEDURE — 99232 SBSQ HOSP IP/OBS MODERATE 35: CPT | Performed by: STUDENT IN AN ORGANIZED HEALTH CARE EDUCATION/TRAINING PROGRAM

## 2022-04-18 PROCEDURE — 36415 COLL VENOUS BLD VENIPUNCTURE: CPT

## 2022-04-18 PROCEDURE — 83735 ASSAY OF MAGNESIUM: CPT

## 2022-04-18 PROCEDURE — 2140000000 HC CCU INTERMEDIATE R&B

## 2022-04-18 RX ORDER — PROPOFOL 10 MG/ML
INJECTION, EMULSION INTRAVENOUS PRN
Status: DISCONTINUED | OUTPATIENT
Start: 2022-04-18 | End: 2022-04-18 | Stop reason: SDUPTHER

## 2022-04-18 RX ORDER — METOPROLOL TARTRATE 5 MG/5ML
5 INJECTION INTRAVENOUS EVERY 6 HOURS PRN
Status: DISCONTINUED | OUTPATIENT
Start: 2022-04-18 | End: 2022-04-23 | Stop reason: HOSPADM

## 2022-04-18 RX ORDER — SODIUM CHLORIDE 9 MG/ML
INJECTION, SOLUTION INTRAVENOUS CONTINUOUS PRN
Status: DISCONTINUED | OUTPATIENT
Start: 2022-04-18 | End: 2022-04-18 | Stop reason: SDUPTHER

## 2022-04-18 RX ORDER — SODIUM CHLORIDE, SODIUM LACTATE, POTASSIUM CHLORIDE, CALCIUM CHLORIDE 600; 310; 30; 20 MG/100ML; MG/100ML; MG/100ML; MG/100ML
INJECTION, SOLUTION INTRAVENOUS CONTINUOUS
Status: DISCONTINUED | OUTPATIENT
Start: 2022-04-18 | End: 2022-04-20

## 2022-04-18 RX ORDER — METOPROLOL TARTRATE 50 MG/1
50 TABLET, FILM COATED ORAL 2 TIMES DAILY
Status: DISCONTINUED | OUTPATIENT
Start: 2022-04-18 | End: 2022-04-20

## 2022-04-18 RX ADMIN — INSULIN GLARGINE 3 UNITS: 100 INJECTION, SOLUTION SUBCUTANEOUS at 21:37

## 2022-04-18 RX ADMIN — INSULIN LISPRO 1 UNITS: 100 INJECTION, SOLUTION INTRAVENOUS; SUBCUTANEOUS at 16:46

## 2022-04-18 RX ADMIN — SODIUM CHLORIDE, POTASSIUM CHLORIDE, SODIUM LACTATE AND CALCIUM CHLORIDE: 600; 310; 30; 20 INJECTION, SOLUTION INTRAVENOUS at 09:23

## 2022-04-18 RX ADMIN — SODIUM CHLORIDE, POTASSIUM CHLORIDE, SODIUM LACTATE AND CALCIUM CHLORIDE: 600; 310; 30; 20 INJECTION, SOLUTION INTRAVENOUS at 18:25

## 2022-04-18 RX ADMIN — HEPARIN SODIUM 13 UNITS/KG/HR: 10000 INJECTION, SOLUTION INTRAVENOUS at 07:43

## 2022-04-18 RX ADMIN — METOPROLOL TARTRATE 50 MG: 50 TABLET, FILM COATED ORAL at 21:36

## 2022-04-18 RX ADMIN — AZITHROMYCIN DIHYDRATE 500 MG: 500 INJECTION, POWDER, LYOPHILIZED, FOR SOLUTION INTRAVENOUS at 12:37

## 2022-04-18 RX ADMIN — PROPOFOL 40 MG: 10 INJECTION, EMULSION INTRAVENOUS at 15:27

## 2022-04-18 RX ADMIN — WATER 1000 MG: 1 INJECTION INTRAMUSCULAR; INTRAVENOUS; SUBCUTANEOUS at 11:52

## 2022-04-18 RX ADMIN — PHENYLEPHRINE HYDROCHLORIDE 200 MCG: 10 INJECTION INTRAVENOUS at 15:40

## 2022-04-18 RX ADMIN — PROPOFOL 50 MG: 10 INJECTION, EMULSION INTRAVENOUS at 15:47

## 2022-04-18 RX ADMIN — INSULIN LISPRO 1 UNITS: 100 INJECTION, SOLUTION INTRAVENOUS; SUBCUTANEOUS at 21:36

## 2022-04-18 RX ADMIN — PROPOFOL 60 MG: 10 INJECTION, EMULSION INTRAVENOUS at 15:33

## 2022-04-18 RX ADMIN — METOPROLOL TARTRATE 50 MG: 50 TABLET, FILM COATED ORAL at 09:17

## 2022-04-18 RX ADMIN — SODIUM CHLORIDE: 9 INJECTION, SOLUTION INTRAVENOUS at 15:12

## 2022-04-18 ASSESSMENT — ENCOUNTER SYMPTOMS
NAUSEA: 0
EYE PAIN: 0
VOMITING: 0
BACK PAIN: 0
DIARRHEA: 0
SHORTNESS OF BREATH: 1
EYES NEGATIVE: 1
ABDOMINAL PAIN: 0
COUGH: 0

## 2022-04-18 ASSESSMENT — PULMONARY FUNCTION TESTS
PIF_VALUE: 0
PIF_VALUE: 2
PIF_VALUE: 0
PIF_VALUE: 2
PIF_VALUE: 0

## 2022-04-18 NOTE — CARE COORDINATION
4/18/22, 8:10 AM EDT  DISCHARGE PLANNING EVALUATION:    Adelina Kuhn       Admitted: 4/16/2022/ [address] day: 2   Location: [address] [place] [address] Reason for admit: Septicemia ([facility] [address] [A41.9]  Flash pulmonary edema ([facility] [address] [J81.0]  Elevated troponin [R77.8]  JUAN C [facility]) ([facility] [N17.9]  Elevated brain natriuretic peptide (BNP) level [R79.89]  Pneumonia of left lung due to infectious organism, unspecified part of lung [J18.9]  Decompensated heart failure ([facility] [address] [I50.9]   PMH:  has a past medical history of Diabetes mellitus ([facility] Hypertension, and Type II or unspecified type diabetes mellitus without mention of complication, not stated as uncontrolled. Procedure:   4/16 CXR: Interstitial opacities throughout both lungs. These have worsened in the left midlung zone and left lung base. This can are present pulmonary edema, infiltrates are not excluded. 4/17 CXR: No infiltrate or mass. Improvement in reticular densities vs prior. Small effusions. 4/18 MIS/CV planned. Barriers to Discharge:  Admitted through ED with SOB. BUN 37,46 and 47. Creatinine 1.6, 1.7 and 1.4. Procal 0.18. Lactic acid 2.4, and 2.0. Zithromax iv daily, Rocephin iv daily, Heparin gtt. Diabetes management. Consulted Nephrology and Cardiology. HF RN, Dietitian consulted. PT/OT. Found to be in afib with RVR. Planning MIS/CV today. PCP: EMRE Goodwin CNP  Readmission Risk Score: 14.7 ( )%    Patient Goals/Plan/Treatment Preferences: Spoke with [facility]. He states he lives at home alone. He is independent and drives. Denies any DME or HH needs at this time. Verified PCP and insurance. Transportation/Food Security/Housekeeping Addressed:  No issues identified.

## 2022-04-18 NOTE — PROGRESS NOTES
Patient back from MIS/Cardioversion. Vitals taken, telemetry replaced, fluids running, dietary contacted regarding dinner meal, patient educated about need to not eat/drink for one hour.

## 2022-04-18 NOTE — ANESTHESIA POSTPROCEDURE EVALUATION
Department of Anesthesiology  Postprocedure Note    Patient: Elly [place]  MRN: [id number]  YOB: 1931  Date of evaluation: 4/18/2022  Time:  3:56 PM     Procedure Summary     Date: 04/18/22 Room / Location: [facility]    Anesthesia Start: [phone] Anesthesia Stop: 1550    Procedure: STR CARDIOVERSION W/ ANES Diagnosis:     Scheduled Providers:  Responsible Provider: Charmayne Crick, MD    Anesthesia Type: MAC ASA Status: 4          Anesthesia Type: MAC    Raymundo Phase I:      Raymundo Phase II:      Last vitals: Reviewed and per EMR flowsheets. Anesthesia Post Evaluation    Patient location during evaluation: bedside  Patient participation: complete - patient cannot participate  Level of consciousness: awake and alert  Airway patency: patent  Nausea & Vomiting: no nausea and no vomiting  Complications: no  Cardiovascular status: hemodynamically stable  Respiratory status: acceptable  Hydration status: euvolemic    [address]  POST-ANESTHESIA NOTE       Name:  Elly Coloardo                                         Age:  80 y.o. MRN:  [id number]      Last Vitals:  BP (!) 137/90   Pulse 108   Temp 98.2 °F (36.8 °C) (Tympanic)   Resp 14   Ht 5' 10\" (1.778 m)   Wt 211 lb 3.2 oz (95.8 kg)   SpO2 95%   BMI 30.30 kg/m²   No data found.     Level of Consciousness:  Awake    Respiratory:  Stable    Oxygen Saturation:  Stable    Cardiovascular:  Stable    Hydration:  Adequate    PONV:  Stable    Post-op Pain:  Adequate analgesia    Post-op Assessment:  No apparent anesthetic complications    Additional Follow-Up / Treatment / Comment:  None    Mercedes Ramos MD  April 18, 2022   3:57 PM

## 2022-04-18 NOTE — PROGRESS NOTES
Physician Progress Note      PATIENTCharma Kawasaki  [facility] #:                  [id number]  :                       1931  ADMIT DATE:       2022 9:51 AM  DISCH DATE:  RESPONDING  PROVIDER #:        Neftaly MELENDEZ DO          QUERY TEXT:    Patient admitted with decompensated diastolic CHF and documentation reflects   sepsis in ED provider note. If possible, please document in the progress   notes and discharge summary if sepsis was: The medical record reflects the following:  Risk Factors: Pneumonia  Clinical Indicators: on arrival WBC 23.4, PCT 0.18, lactic 2.4, temp 97.4, HR   119, RR 20, JUAN C  Treatment: Labs, imaging, Zithromax, Rocephin  Options provided:  -- Sepsis confirmed after study  -- Sepsis treated and resolved  -- Sepsis ruled out after study  -- Other - I will add my own diagnosis  -- Disagree - Not applicable / Not valid  -- Disagree - Clinically unable to determine / Unknown  -- Refer to Clinical Documentation Reviewer    PROVIDER RESPONSE TEXT:    Provider is clinically unable to determine a response to this query.     Query created by: Lisa Chong on 2022 7:52 AM      Electronically signed by:  Saloni Braswell DO 2022 2:31 PM

## 2022-04-18 NOTE — CONSULTS
[facility]          [facility]        [address]        [place] [address]        [phone]           Inpatient Initial consult note         4/18/2022 2:42 PM    Patient Name:   Janice Quick:    [birth year]  Primary Care Physician:  EMRE Casillas CNP     History Obtained From:  patient     Consultation requested by : Fito Kirkland DO    requested for  : Evaluation of  JUAN C/CKD/clearence for cath. History of presentingillness   Oneyda Ingram is a 80 y.o.   male with Past Medical History as stated below presented with a chief complaint of Shortness of Breath   on 4/16/2022 . Patient presented with chief complaints of shortness of breath which has been ongoing for the last few days, patient has seen the pharmacist was advised to take some medication which seems to have improved however started to get worse again on Saturday, moderate severity, no associated symptoms of chest pain nausea vomiting diarrhea. No dizziness or lightheadedness no specific modifying factors.     Upon arrival to the ER patient has to be placed on BiPAP due to increased work of breathing chest x-ray was consistent with pulmonary edema and he was having significantly elevated blood pressure he was started on a nitro drip he was also found to have an JUAN C with a creatinine of 1.7     His home medications include lisinopril and also hydrochlorothiazide  Past History      Past Medical History:   Diagnosis Date    Diabetes mellitus ([address]     Hypertension     Type II or unspecified type diabetes mellitus without mention of complication, not stated as uncontrolled      Past Surgical History:   Procedure Laterality Date    TONSILLECTOMY AND ADENOIDECTOMY Bilateral     age 10      Social History     Socioeconomic History    Marital status:      Spouse name: Not on file    Number of children: Not on file    Years of education: Not on file    Highest education level: Not on file   Occupational History    Not on file   Tobacco Use    Smoking status: Never Smoker    Smokeless tobacco: Never Used   Vaping Use    Vaping Use: Never used   Substance and Sexual Activity    Alcohol use: No     Alcohol/week: 0.0 standard drinks    Drug use: No    Sexual activity: Not Currently     Partners: Female   Other Topics Concern    Not on file   Social History Narrative    Not on file     Social Determinants of Health     Financial Resource Strain: Low Risk     Difficulty of Paying Living Expenses: Not hard at all   Food Insecurity: No Food Insecurity    Worried About Running Out of Food in the Last Year: Never true    Martin of Food in the Last Year: Never true   Transportation Needs: No Transportation Needs    Lack of Transportation (Medical): No    Lack of Transportation (Non-Medical):  No   Physical Activity:     Days of Exercise per Week: Not on file    Minutes of Exercise per Session: Not on file   Stress:     Feeling of Stress : Not on file   Social Connections:     Frequency of Communication with Friends and Family: Not on file    Frequency of Social Gatherings with Friends and Family: Not on file    Attends Congregational Services: Not on file    Active Member of [address] [place] [address] or Organizations: Not on file    Attends Club or Organization Meetings: Not on file    Marital Status: Not on file   Intimate Partner Violence:     Fear of Current or Ex-Partner: Not on file    Emotionally Abused: Not on file    Physically Abused: Not on file    Sexually Abused: Not on file   Housing Stability:     Unable to Pay for Housing in the Last Year: Not on file    Number of [place] in the Last Year: Not on file    Unstable Housing in the Last Year: Not on file     Family History   Problem Relation Age of Onset    Diabetes Paternal Aunt     Diabetes Paternal Uncle     Cancer Paternal Uncle     Diabetes Maternal Grandmother     Diabetes Maternal Grandfather     Diabetes Paternal Grandmother     Cancer Paternal Grandmother     Diabetes Paternal Grandfather      Medications & Allergies      Prior to Admission medications    Medication Sig Start Date End Date Taking? Authorizing Provider   lisinopril (PRINIVIL;ZESTRIL) 20 MG tablet Take 2 tablets by mouth once daily 2/7/22   EMRE Beck CNP   bisoprolol-hydroCHLOROthiazide [facility]) 10-6.25 MG per tablet Take 2 tablets by mouth once daily 11/8/21   EMRE Beck CNP   metFORMIN (GLUCOPHAGE XR) 500 MG extended release tablet Take 2 tablets by mouth daily (with breakfast) 5/26/21 12/1/21  EMRE Beck CNP     Allergies: Norvasc [amlodipine besylate] and Other  IP meds : Scheduled Meds:   metoprolol tartrate  50 mg Oral BID    insulin glargine  3 Units SubCUTAneous Nightly    cefTRIAXone (ROCEPHIN) IV  1,000 mg IntraVENous Q24H    sodium chloride flush  5-40 mL IntraVENous 2 times per day    insulin lispro  0-6 Units SubCUTAneous TID WC    insulin lispro  0-3 Units SubCUTAneous Nightly     Continuous Infusions:   lactated ringers 50 mL/hr at 04/18/22 0923    heparin (PORCINE) Infusion 13 Units/kg/hr (04/18/22 1238)    sodium chloride      dextrose       Review of Systems Physical Exam   Review of Systems   Constitutional: Negative for chills, fatigue and fever. HENT: Negative. Eyes: Negative. Negative for pain. Respiratory: Positive for shortness of breath. Negative for cough. Cardiovascular: Positive for leg swelling. Negative for chest pain. Gastrointestinal: Negative for abdominal pain, diarrhea, nausea and vomiting. Genitourinary: Negative for dysuria, frequency and hematuria. Musculoskeletal: Negative for back pain and neck pain. Skin: Negative for rash and wound. Neurological: Negative for dizziness, light-headedness and headaches. Psychiatric/Behavioral: Negative for agitation and confusion. Physical Exam  Vitals reviewed.    Constitutional:       General: He is not in acute distress. Appearance: Normal appearance. He is not diaphoretic. HENT:      Head: Normocephalic and atraumatic. Right Ear: External ear normal.      Left Ear: External ear normal.      Nose: Nose normal.      Mouth/Throat:      Mouth: Mucous membranes are moist.   Eyes:      General: No scleral icterus. Right eye: No discharge. Left eye: No discharge. Conjunctiva/sclera: Conjunctivae normal.   Neck:      Thyroid: No thyromegaly. Vascular: No JVD. Cardiovascular:      Rate and Rhythm: Normal rate and regular rhythm. Heart sounds: Normal heart sounds. No murmur heard. Pulmonary:      Effort: Pulmonary effort is normal. No respiratory distress. Breath sounds: Normal breath sounds. No stridor. Chest:      Chest wall: No tenderness. Abdominal:      General: Bowel sounds are normal. There is no distension. Palpations: Abdomen is soft. Tenderness: There is no abdominal tenderness. Musculoskeletal:         General: No swelling or tenderness. Cervical back: Normal range of motion and neck supple. Right lower leg: No edema. Left lower leg: No edema. Skin:     General: Skin is warm and dry. Findings: No erythema or rash. Neurological:      General: No focal deficit present. Mental Status: He is alert and oriented to person, place, and time.    Psychiatric:         Mood and Affect: Mood normal.         Behavior: Behavior normal.           Vitals:    04/18/22 1106   BP: (!) 137/90   Pulse: 108   Resp: 14   Temp: 98.2 °F (36.8 °C)   SpO2: 95%     Labs, Radiology and Tests       Recent Labs     04/16/22  1015 04/17/22  0921   WBC 23.4* 13.1*   RBC 4.48* 4.13*   HGB 12.1* 11.1*   HCT 37.9* 34.4*   MCV 84.6 83.3   MCH 27.0 26.9   MCHC 31.9* 32.3    145     Recent Labs     04/16/22  1015 04/17/22  0343 04/18/22  0614    133* 136   K 3.7 4.3 4.0    97* 99   CO2 20* 24 22*   BUN 37* 46* 47*   CREATININE 1.6* 1.7* 1.4*   CALCIUM 8.7 8.8 9.3   PROT 7.4 6.9  --    LABALBU 4.0 3.5  --    BILITOT 1.3* 0.7  --    ALKPHOS 81 62  --    AST 48* 28  --    ALT 89* 67*  --        Radiology : Renal ultrasound scan-right kidney 9.7 cm left kidney 10.5 cm    Other : Old lab data have been reviewed and noted patient baseline creatinine is probably around 1.1-1.2    Echocardiogram 4/22 shows ejection fraction of 40-45% and aortic valve area of 1.0 cm²    Assessment    1 Renal -acute kidney injury most likely secondary to uncontrolled hypertension. Cannot rule out a component of cardiorenal.  ? Clinically getting better once the blood pressure is improved his baseline is around 1.1  ? Renal ultrasound scan is negative for any pathology  ? Urine is negative for any protein    2 Electrolytes -mild hyponatremia stable  3 Metabolic acidosis  4 Essential hypertension significantly elevated at presentation now appears to be better  5 Coronary artery disease being planned for a cardiac catheterization patient should be okay for cardiac cath as long as renal function continues to improve tomorrow. However if possible hydrate the patient at least 6 hours prior. Discussed with the patient the risks of contrast-induced nephropathy patient understands and wishes to proceed. Avoid any further nephrotoxins at this time  6 Meds reviewed and discussed with patient      **This report has been created using voice recognition software. It maycontain minor  errors which are inherent in voice recognition technology. **    Rufino Langley MD,M.D  [facility].

## 2022-04-18 NOTE — PRE SEDATION
[address] [address]  Sedation/Analgesia History & Physical    Pt Name: Spencer Flores  MRN: [id number]  YOB: 1931  Provider Performing Procedure: Alysa Recinos MD  Primary Care Physician: EMRE Beavers - STEPH    PRE-PROCEDURE   DNR-CCA/DNR-CC []Yes [x]No  Brief History/Pre-Procedure Diagnosis:     rnew onset atr fib need MIS- CV  Risk and benefit well explianed and pat verbalized understanding and agreed to proceed          MEDICAL HISTORY  []CAD/Valve  []Liver Disease  []Lung Disease []Diabetes  []Hypertension []Renal Disease  []Additional information:       has a past medical history of Diabetes mellitus ([facility] [address] Hypertension, and Type II or unspecified type diabetes mellitus without mention of complication, not stated as uncontrolled. SURGICAL HISTORY   has a past surgical history that includes Tonsillectomy and adenoidectomy (Bilateral).   Additional information:       ALLERGIES   Allergies as of 04/16/2022 - Fully Reviewed 04/16/2022   Allergen Reaction Noted    Norvasc [amlodipine besylate]  08/12/2015    Other Rash 06/19/2015     Additional information:       MEDICATIONS   Coumadin Use Last 5 Days [x]No []Yes  Antiplatelet drug therapy use last 5 days  []No [x]Yes  Other anticoagulant use last 5 days  [x]No []Yes    Current Facility-Administered Medications:     metoprolol (LOPRESSOR) injection 5 mg, 5 mg, IntraVENous, Q6H PRN, Jessica Mend, DO    metoprolol tartrate (LOPRESSOR) tablet 50 mg, 50 mg, Oral, BID, Jessica Mend, DO, 50 mg at 04/18/22 2968    lactated ringers infusion, , IntraVENous, Continuous, Savanna Langley DO, Last Rate: 50 mL/hr at 04/18/22 0923, New Bag at 04/18/22 0923    heparin (porcine) injection 4,000 Units, 4,000 Units, IntraVENous, PRN, Jessica Mend, DO, 4,000 Units at 04/17/22 1923    heparin (porcine) injection 2,000 Units, 2,000 Units, IntraVENous, PRN, Jessica Mend, DO    heparin 25,000 unit in sodium chloride 0.45% 250 mL (premix) infusion, 5-30 Units/kg/hr, IntraVENous, Continuous, Sophia Mario, DO, Last Rate: 12.5 mL/hr at 04/18/22 1238, 13 Units/kg/hr at 04/18/22 1238    insulin glargine (LANTUS) injection vial 3 Units, 3 Units, SubCUTAneous, Nightly, Vinod Grade, DO, 3 Units at 04/17/22 2104    cefTRIAXone (ROCEPHIN) 1,000 mg in sterile water 10 mL IV syringe, 1,000 mg, IntraVENous, Q24H, Sophia Mario, DO, 1,000 mg at 04/18/22 1152    sodium chloride flush 0.9 % injection 5-40 mL, 5-40 mL, IntraVENous, 2 times per day, Sophia Mario, DO, 10 mL at 04/17/22 2106    sodium chloride flush 0.9 % injection 5-40 mL, 5-40 mL, IntraVENous, PRN, Sophia Mario, DO    0.9 % sodium chloride infusion, , IntraVENous, PRN, Sophia Mario, DO    ondansetron (ZOFRAN-ODT) disintegrating tablet 4 mg, 4 mg, Oral, Q8H PRN **OR** ondansetron (ZOFRAN) injection 4 mg, 4 mg, IntraVENous, Q6H PRN, Sophia Mario, DO    polyethylene glycol (GLYCOLAX) packet 17 g, 17 g, Oral, Daily PRN, Sophia Mario, DO    acetaminophen (TYLENOL) tablet 650 mg, 650 mg, Oral, Q6H PRN **OR** acetaminophen (TYLENOL) suppository 650 mg, 650 mg, Rectal, Q6H PRN, Sophia Mario, DO    potassium chloride (KLOR-CON M) extended release tablet 40 mEq, 40 mEq, Oral, PRN **OR** potassium bicarb-citric acid (EFFER-K) effervescent tablet 40 mEq, 40 mEq, Oral, PRN **OR** potassium chloride 10 mEq/100 mL IVPB (Peripheral Line), 10 mEq, IntraVENous, PRN, Sophia Mario, DO    magnesium sulfate 2000 mg in 50 mL IVPB premix, 2,000 mg, IntraVENous, PRN, Sophia Mario, DO    insulin lispro (HUMALOG) injection vial 0-6 Units, 0-6 Units, SubCUTAneous, TID WC, Sophia Mario, DO, 1 Units at 04/17/22 0849    insulin lispro (HUMALOG) injection vial 0-3 Units, 0-3 Units, SubCUTAneous, Nightly, Sophia Mario, DO, 1 Units at 04/17/22 2104    glucagon (rDNA) injection 1 mg, 1 mg, IntraMUSCular, PRN, Sophia Mario, DO    dextrose 5 % solution, 100 mL/hr, IntraVENous, PRN, Sophia Mario, DO    dextrose bolus (hypoglycemia) 10% 125 mL, 125 mL, IntraVENous, PRN **OR** dextrose bolus (hypoglycemia) 10% 250 mL, 250 mL, IntraVENous, PRN, Norrine Ang, DO    glucose chewable tablet 4 each, 4 tablet, Oral, PRN, Norrine Ang, DO  Prior to Admission medications    Medication Sig Start Date End Date Taking? Authorizing Provider   lisinopril (PRINIVIL;ZESTRIL) 20 MG tablet Take 2 tablets by mouth once daily 2/7/22   EMRE Reilly CNP   bisoprolol-hydroCHLOROthiazide [facility]) 10-6.25 MG per tablet Take 2 tablets by mouth once daily 11/8/21   EMRE Reilly CNP   metFORMIN (GLUCOPHAGE XR) 500 MG extended release tablet Take 2 tablets by mouth daily (with breakfast) 5/26/21 12/1/21  EMRE Reilly CNP     Additional information:       VITAL SIGNS   Vitals:    04/18/22 1106   BP: (!) 137/90   Pulse: 108   Resp: 14   Temp: 98.2 °F (36.8 °C)   SpO2: 95%       PHYSICAL:   Heart:  [x]Regular rate and rhythm  []Other:    Lungs:  [x]Clear    []Other:    Abdomen: [x]Soft    []Other:    Mental Status: [x]Alert & Oriented  []Other:      PLANNED PROCEDURE   []Cath  []PCI                []Pacemaker/AICD  [x]MIS             [x]Cardioversion []Peripheral angiography/PTA  []Other:      SEDATION  Planned agent:[x]Midazolam []Meperidine [x]Sublimaze []Morphine  []Diazepam  []Other:     ASA Classification:  []1 []2 [x]3 []4 []5  Class 1: A normal healthy patient  Class 2: Pt with mild to moderate systemic disease  Class 3: Severe systemic disease or disturbance  Class 4: Severe systemic disorders that are already life threatening. Class 5: Moribund pt with little chances of survival, for more than 24 hours. Mallampati I Airway Classification:   []1 [x]2 []3 []4    [x]Pre-procedure diagnostic studies complete and results available. Comment:    [x]Previous sedation/anesthesia experiences assessed. Comment:    [x]The patient is an appropriate candidate to undergo the planned procedure sedation and anesthesia.  (Refer to nursing sedation/analgesia documentation record)  [x]Formulation and discussion of sedation/procedure plan, risks, and expectations with patient and/or responsible adult completed. [x]Patient examined immediately prior to the procedure.  (Refer to nursing sedation/analgesia documentation record)    Eliceo Brothers MD  Electronically signed 4/18/2022 at 3:25 PM

## 2022-04-18 NOTE — PROGRESS NOTES
Pharmacy Medication History Note      List of current medications patient is taking is complete. Source of information: Patient and dispense history    Changes made to medication list:  Medications removed (include reason, ex. therapy complete or physician discontinued):  none    Medications added/doses adjusted:  none      Other notes (ex. Recent course of antibiotics, Coumadin dosing):  Denies use of other OTC or herbal medications.       Allergies reviewed      Electronically signed by Casandra Hancock on 4/18/2022 at 11:25 AM

## 2022-04-18 NOTE — PROGRESS NOTES
Nutrition Education    · Pt admitted d/t SOB and pulmonary edema. Pt on room air today. Pt states eating just fine prior to admission. No recent weight loss. Does not take insulin for DM. Last A1c 6.8 (4/16/22). Pt states he still purchases his own groceries and attempts to buy low-sodium and foods low in added sugars. Pt states he does eat fruit daily. Drinks boosts at home. Pt states he does need to work on drinking more water. · Verbally reviewed information with Patient  · Educated on heart healthy and DM education. Pt accepting and pleasant to talk to. · Written educational materials provided. · Contact name and number provided. · Refer to Patient Education activity for more details.     Electronically signed by Leslie Bob RD on 4/18/22 at 12:26 PM EDT    Contact: [phone]

## 2022-04-18 NOTE — ANESTHESIA PRE PROCEDURE
Department of Anesthesiology  Preprocedure Note       Name:  Sahara Alan   Age:  80 y.o.  :  1931                                          MRN:  [id number]         Date:  2022      Surgeon: * No surgeons listed *    Procedure: * No procedures listed *    Medications prior to admission:   Prior to Admission medications    Medication Sig Start Date End Date Taking?  Authorizing Provider   lisinopril (PRINIVIL;ZESTRIL) 20 MG tablet Take 2 tablets by mouth once daily 22   EMRE Barboza CNP   bisoprolol-hydroCHLOROthiazide [facility]) 10-6.25 MG per tablet Take 2 tablets by mouth once daily 21   EMRE Barboza CNP   metFORMIN (GLUCOPHAGE XR) 500 MG extended release tablet Take 2 tablets by mouth daily (with breakfast) 21  EMRE Barboza CNP       Current medications:    Current Facility-Administered Medications   Medication Dose Route Frequency Provider Last Rate Last Admin    metoprolol (LOPRESSOR) injection 5 mg  5 mg IntraVENous Q6H PRN Rakesh Falling, DO        metoprolol tartrate (LOPRESSOR) tablet 50 mg  50 mg Oral BID Rakesh Falling, DO   50 mg at 22 7503    lactated ringers infusion   IntraVENous Continuous Atul Lim, DO 50 mL/hr at 22 0923 New Bag at 22 0923    heparin (porcine) injection 4,000 Units  4,000 Units IntraVENous PRN Rakesh Falling, DO   4,000 Units at 22 1923    heparin (porcine) injection 2,000 Units  2,000 Units IntraVENous PRN Rakesh Falling, DO        heparin 25,000 unit in sodium chloride 0.45% 250 mL (premix) infusion  5-30 Units/kg/hr IntraVENous Continuous Rakesh Falling, DO 12.5 mL/hr at 22 1238 13 Units/kg/hr at 22 1238    insulin glargine (LANTUS) injection vial 3 Units  3 Units SubCUTAneous Nightly Shankar Lim, DO   3 Units at 22 2104    cefTRIAXone (ROCEPHIN) 1,000 mg in sterile water 10 mL IV syringe  1,000 mg IntraVENous Q24H Rakesh Falling, DO   1,000 mg at 22 1152    sodium chloride flush 0.9 % injection 5-40 mL  5-40 mL IntraVENous 2 times per day Matthew Marking, DO   10 mL at 04/17/22 2106    sodium chloride flush 0.9 % injection 5-40 mL  5-40 mL IntraVENous PRN Matthew Marking, DO        0.9 % sodium chloride infusion   IntraVENous PRN Matthew Marking, DO        ondansetron (ZOFRAN-ODT) disintegrating tablet 4 mg  4 mg Oral Q8H PRN Matthew Marking, DO        Or    [facility] injection 4 mg  4 mg IntraVENous Q6H PRN Matthew Marking, DO        polyethylene glycol (GLYCOLAX) packet 17 g  17 g Oral Daily PRN Matthew Marking, DO        acetaminophen (TYLENOL) tablet 650 mg  650 mg Oral Q6H PRN Matthew Marking, DO        Or    acetaminophen (TYLENOL) suppository 650 mg  650 mg Rectal Q6H PRN Matthew Marking, DO        potassium chloride (KLOR-CON M) extended release tablet 40 mEq  40 mEq Oral PRN Matthew Marking, DO        Or    potassium bicarb-citric acid (EFFER-K) effervescent tablet 40 mEq  40 mEq Oral PRN Matthew Marking, DO        Or    potassium chloride 10 mEq/100 mL IVPB (Peripheral Line)  10 mEq IntraVENous PRN Matthew Marking, DO        magnesium sulfate 2000 mg in 50 mL IVPB premix  2,000 mg IntraVENous PRN Matthew Marking, DO        insulin lispro (HUMALOG) injection vial 0-6 Units  0-6 Units SubCUTAneous TID WC Matthew Marking, DO   1 Units at 04/17/22 0849    insulin lispro (HUMALOG) injection vial 0-3 Units  0-3 Units SubCUTAneous Nightly Matthew Marking, DO   1 Units at 04/17/22 2104    glucagon (rDNA) injection 1 mg  1 mg IntraMUSCular PRN Matthew Marking, DO        dextrose 5 % solution  100 mL/hr IntraVENous PRN Matthew Marking, DO        dextrose bolus (hypoglycemia) 10% 125 mL  125 mL IntraVENous PRN Matthew Marking, DO        Or    dextrose bolus (hypoglycemia) 10% 250 mL  250 mL IntraVENous PRN Matthew Marking, DO        glucose chewable tablet 4 each  4 tablet Oral PRN Matthew Marking, DO           Allergies:     Allergies   Allergen Reactions    Norvasc [Amlodipine Besylate]     Other Rash     States allergic to several BP meds (unsure of name)       Problem List:    Patient Active Problem List   Diagnosis Code    Type 2 diabetes mellitus with stage 3a chronic kidney disease, without long-term current use of insulin (HCC) E11.22, N18.31    BPH (benign prostatic hyperplasia) N40.0    Essential hypertension I10    Nonrheumatic aortic valve stenosis I35.0    Left atrial enlargement I51.7    Decompensated heart failure (HCC) I50.9    JUAN C (acute kidney injury) ([facility] [address] N17.9    Flash pulmonary edema (HCC) J81.0       Past Medical History:        Diagnosis Date    [facility]     Hypertension     Type II or unspecified type diabetes mellitus without mention of complication, not stated as uncontrolled        Past Surgical History:        Procedure Laterality Date    TONSILLECTOMY AND ADENOIDECTOMY Bilateral     age 10        Social History:    Social History     Tobacco Use    Smoking status: Never Smoker    Smokeless tobacco: Never Used   Substance Use Topics    Alcohol use: No     Alcohol/week: 0.0 standard drinks                                Counseling given: Not Answered      Vital Signs (Current):   Vitals:    04/18/22 0545 04/18/22 0742 04/18/22 0912 04/18/22 1106   BP:   (!) 163/101 (!) 137/90   Pulse:   125 108   Resp: 26 25 12 14   Temp:   97.6 °F (36.4 °C) 98.2 °F (36.8 °C)   TempSrc:   Axillary Tympanic   SpO2:   98% 95%   Weight:       Height:                                                  BP Readings from Last 3 Encounters:   04/18/22 (!) 137/90   03/01/22 138/88   01/26/22 (!) 154/72       NPO Status:                                                                                 BMI:   Wt Readings from Last 3 Encounters:   04/16/22 211 lb 3.2 oz (95.8 kg)   03/01/22 217 lb (98.4 kg)   12/01/21 219 lb (99.3 kg)     Body mass index is 30.3 kg/m².     CBC:   Lab Results   Component Value Date    WBC 13.1 04/17/2022    RBC 4.13 04/17/2022    HGB 11.1 04/17/2022    HCT 34.4 04/17/2022    MCV 83.3 04/17/2022    RDW 13.3 05/15/2018     04/17/2022       CMP:   Lab Results   Component Value Date     04/18/2022    K 4.0 04/18/2022    K 3.7 04/16/2022    CL 99 04/18/2022    CO2 22 04/18/2022    BUN 47 04/18/2022    CREATININE 1.4 04/18/2022    LABGLOM 47 04/18/2022    GLUCOSE 173 04/18/2022    PROT 6.9 04/17/2022    CALCIUM 9.3 04/18/2022    BILITOT 0.7 04/17/2022    ALKPHOS 62 04/17/2022    AST 28 04/17/2022    ALT 67 04/17/2022       POC Tests:   Recent Labs     04/18/22  1145   POCGLU 175*       Coags:   Lab Results   Component Value Date    INR 1.18 04/17/2022       HCG (If Applicable): No results found for: PREGTESTUR, PREGSERUM, HCG, HCGQUANT     ABGs: No results found for: PHART, PO2ART, DHX3DNM, JKC5DJB, BEART, R3LSQPRY     Type & Screen (If Applicable):  No results found for: LABABO, LABRH    Drug/Infectious Status (If Applicable):  No results found for: HIV, HEPCAB    COVID-19 Screening (If Applicable):   Lab Results   Component Value Date    COVID19 Not Detected 04/16/2022           Anesthesia Evaluation    Airway: Mallampati: II        Dental:          Pulmonary:                              Cardiovascular:    (+) hypertension:,       ECG reviewed      Echocardiogram reviewed                  Neuro/Psych:               GI/Hepatic/Renal:             Endo/Other:    (+) Diabetes, . Abdominal:             Vascular: Other Findings:             Anesthesia Plan      MAC     ASA 4             Anesthetic plan and risks discussed with patient.                       Asaf Lozada MD   4/18/2022

## 2022-04-18 NOTE — PROGRESS NOTES
Cardiology Progress Note      Patient:  Liyah Vilchis  YOB: 1931  MRN: [id number]   Acct: [de-identified]  Admit Date:  4/16/2022  Primary Cardiologist: Gilberto Thacker MD  Reason for Consultation:  Pulmonary edema        History Of Present Illness:    80 y.o. pleasant male c hx of Mod-severe AS, DM, HTN who presented to the hospital with complaints of shortness of breath and found to be in pulmonary edema. Patient ZAHEER in 12/2021 was 0.9 cm2 with mean gradient of 27mm Hg. He followed up with Dr. Toshia Mederos but patient wanted to monitor for symptoms. Patient apparently felt like he couldn't breath as if he had a polyp in his nose, thought this started after getting a booster COVID19 short. He denies any chest pains or shortness of breath. He was placed on BiPAP, his BP was elevated >200. Currently he is much better, received diuretics. He underwent Echo which shows a drop in EF   While in the hospital he was noted to have new onset of afib.       All labs, EKG's, diagnostic testing and images as well as cardiac cath, stress testing were reviewed during this encounter. Subjective (Events in last 24 hours):   Pt awake, alert. On bipap currently for respir status. Still running afib rvr 120s-140s. For MIS/CV today. D/w patient about his symptoms including afib, AS, need for LHC and other w/u prior to valve procedure. From the discussion, it sounds like he feels he was rushed into fixing the valve and wanted to make sure his kidney function wasn't going to get worse and wanted to have all the information prior to proceeding. He sounds like he is agreeable to full workup. Is having MIS/CV for afib rvr today. Plan for [facility] tomorrow or Wednesday.      Objective:   BP (!) 163/101   Pulse 125   Temp 97.6 °F (36.4 °C) (Axillary)   Resp 12   Ht 5' 10\" (1.778 m)   Wt 211 lb 3.2 oz (95.8 kg)   SpO2 98%   BMI 30.30 kg/m²      BP elevated  TELEMETRY: afib rvr     Physical Exam:  General Appearance: alert and oriented to person, place and time, in no acute distress, on bipap   Cardiovascular: fast rate, irregularly irregular rhythm, normal S1 and S2, no murmurs, rubs, clicks, or gallops, distal pulses intact, no carotid bruits, no JVD  Pulmonary/Chest: clear to auscultation bilaterally- no wheezes, rales or rhonchi, normal air movement, no respiratory distress  Abdomen: soft, non-tender, non-distended, normal bowel sounds, no masses   Extremities: no cyanosis, clubbing or edema, pulse   Skin: warm and dry, no rash or erythema  Neurological: alert, oriented, normal speech, no focal findings or movement disorder noted    Medications:    metoprolol tartrate  50 mg Oral BID    azithromycin  500 mg IntraVENous Q24H    insulin glargine  3 Units SubCUTAneous Nightly    cefTRIAXone (ROCEPHIN) IV  1,000 mg IntraVENous Q24H    sodium chloride flush  5-40 mL IntraVENous 2 times per day    insulin lispro  0-6 Units SubCUTAneous TID WC    insulin lispro  0-3 Units SubCUTAneous Nightly      lactated ringers      heparin (PORCINE) Infusion 13 Units/kg/hr (04/18/22 0743)    sodium chloride      dextrose       metoprolol, 5 mg, Q6H PRN  heparin (porcine), 4,000 Units, PRN  heparin (porcine), 2,000 Units, PRN  sodium chloride flush, 5-40 mL, PRN  sodium chloride, , PRN  ondansetron, 4 mg, Q8H PRN   Or  ondansetron, 4 mg, Q6H PRN  polyethylene glycol, 17 g, Daily PRN  acetaminophen, 650 mg, Q6H PRN   Or  acetaminophen, 650 mg, Q6H PRN  potassium chloride, 40 mEq, PRN   Or  potassium alternative oral replacement, 40 mEq, PRN   Or  potassium chloride, 10 mEq, PRN  magnesium sulfate, 2,000 mg, PRN  glucagon (rDNA), 1 mg, PRN  dextrose, 100 mL/hr, PRN  dextrose bolus (hypoglycemia), 125 mL, PRN   Or  dextrose bolus (hypoglycemia), 250 mL, PRN  glucose, 4 tablet, PRN        Diagnostics:  EKG:   Atrial fibrillation with rapid ventricular response  Left axis deviation  LVH with QRS widening ( R in aVL , Gutierrez product )  Abnormal QRS-T angle, consider primary T wave abnormality  Abnormal ECG  When compared with ECG of 16-APR-2022 09:50,  Atrial fibrillation has replaced Sinus rhythm  ST more depressed in Lateral leads  T wave inversion less evident in Lateral leads  Confirmed by [address] ([id number]) on 4/18/2022   Echo:   Conclusions      Summary   Technically difficult examination. Left ventricular size is normal and systolic function is moderately   reduced. Ejection fraction was estimated at 40-45%. LV wall thickness is   within normal limits. Moderately dilated left atrium. Thickened and calcified aortic valve. Leaflets exhibited severely reduced cuspal separation of the aortic valve. Trivial aortic regurgitation is noted. There is moderate-to-severe aortic   stenosis with valve area of 1.0 cm2. The maximum aortic valve gradient is   32 mmHg, the mean gradient is 21 mmHg. Consider DSE or MIS for further   evaluation. Signature      ----------------------------------------------------------------   Electronically signed by Alonzo Chaparro MD (Interpreting   physician) on 04/17/2022   Stress:     Left Heart Cath:     Lab Data:    Cardiac Enzymes:  No results for input(s): CKTOTAL, CKMB, CKMBINDEX, TROPONINI in the last 72 hours.     CBC:   Lab Results   Component Value Date    WBC 13.1 04/17/2022    RBC 4.13 04/17/2022    HGB 11.1 04/17/2022    HCT 34.4 04/17/2022     04/17/2022       CMP:    Lab Results   Component Value Date     04/18/2022    K 4.0 04/18/2022    K 3.7 04/16/2022    CL 99 04/18/2022    CO2 22 04/18/2022    BUN 47 04/18/2022    CREATININE 1.4 04/18/2022    LABGLOM 47 04/18/2022    GLUCOSE 173 04/18/2022    CALCIUM 9.3 04/18/2022       Hepatic Function Panel:    Lab Results   Component Value Date    ALKPHOS 62 04/17/2022    ALT 67 04/17/2022    AST 28 04/17/2022    PROT 6.9 04/17/2022    BILITOT 0.7 04/17/2022    BILIDIR 0.3 04/17/2022    LABALBU 3.5 04/17/2022       Magnesium:    Lab Results Component Value Date    MG 2.0 04/18/2022       PT/INR:    Lab Results   Component Value Date    INR 1.18 04/17/2022       HgBA1c:    Lab Results   Component Value Date    LABA1C 6.8 04/16/2022       FLP:    Lab Results   Component Value Date    TRIG 147 05/15/2020    HDL 37 05/15/2020    LDLCALC 88 05/15/2020       TSH:    Lab Results   Component Value Date    TSH 2.790 04/16/2022         Assessment:  Pulmonary edema-2/2 AS, new afib rvr  Moderate to severe AS-agreeable to TAVR w/u, LHC   New onset Atrial fib with rvr--ongoing---for MIS/CV today   New LV dysfunction 40-45%--for LHC once optimized  Elevated trop- 0.015, 0.093, 0.101  HTN-elevated again today, 160s/100. HLD  Ck/? CKD-- creat 1.6 on admit, now 1.4---appreciate neph recs to optimize kidney function prior to [facility] and TAVR w/u. Plan: For MIS/CV today. On lopressor 50 mg BID. Also given IV lopressor by attending. Renal to evaluate and will optimize status prior to [facility]. He does have significant risk factors for kidney dysfunction including uncontrolled HTN, DM, new afib. Heparin continues, now getting LR at 50 ml/hr per attending. Will follow.    Electronically signed by Lynda Sanabria PA-C on 4/18/2022 at 9:20 AM

## 2022-04-18 NOTE — PROGRESS NOTES
[facility]  INPATIENT PHYSICAL THERAPY  EVALUATION  [facility] CCU-STEPDOWN 3B - [id number]    Time In: 0806  Time Out: 0840  Timed Code Treatment Minutes: 24 Minutes  Minutes: 34          Date: 2022  Patient Name: Lotus Parekh,  Gender:  male        MRN: [id number]  : 1931  (80 y.o.)      Referring Practitioner: Mary Cancino DO  Diagnosis: septicemia  Additional Pertinent Hx: Per EMR \"Jimbo Macias is a 80 y.o. male with PMHx of hypertension, diabetes, severe aortic stenosis, diastolic dysfunction with LVH who presents to [facility] with shortness of breath. Patient is on BiPAP during my examination and at times difficult to understand, so daughter assists with history. Patient reports that after waking up this morning he began to develop relatively rapid progression of shortness of breath, and subsequently called his daughter and eventually EMS for transfer to ED. He reports that he has been in his usual state of health, however his daughter reports that there have been some shortness of breath developing over the last week worse than his baseline shortness of breath. He denies any chest pain or palpitations, no dizziness or syncope. Reports a mild cough but thinks this is due to his acute on chronic nasal congestion for which she has been taking nasal spray. There has been no fevers or chills or sputum production. He does report orthopnea, dyspnea on exertion, but denies any change in his chronic lower extremity edema. He has been compliant with his medications of bisoprolol, lisinopril, hydrochlorothiazide. He has known severe aortic stenosis being followed by Dr. Radha Brambila, which was previously noted to be asymptomatic. He is a non-smoker and denies any significant alcohol use. No recent change in diet. No recent NSAIDs or steroids. Of note, the patient did get his COVID booster #2 on  and attributes some of his symptoms to this. \" Restrictions/Precautions:  Restrictions/Precautions: General Precautions,Fall Risk,Cardiac  Position Activity Restriction  Other position/activity restrictions: BIPAP 4/18, monitor vitals    Subjective:  Chart Reviewed: Yes  Patient assessed for rehabilitation services?: Yes  Family / Caregiver Present: No  Subjective: OK to see pt per nursing. Pt in bed when PT arrived, gown soiled, agreeable to sit in bedside chair. Nursing reporting pt to have cardioversion today. General:  Overall Orientation Status: Within Normal Limits  Follows Commands: Impaired    Vision: Within Functional Limits    Hearing: Exceptions to [facility] [address]  Hearing Exceptions: Hard of hearing/hearing concerns         Pain: denies    Vitals: Oxygen: BIPAP 30% FiO2, >96% during session  Heart Rate: 110s at rest, 120-150s with mobility    Social/Functional History:    Lives With: Alone  Type of Home: House  Home Layout: One level  Home Access: Stairs to enter without rails  Entrance Stairs - Number of Steps: 1+1  Home Equipment: Grab bars,Wheelchair-manual,Cane,Rolling walker     Bathroom Shower/Tub: Walk-in shower  Bathroom Toilet: Standard  Bathroom Equipment: Shower chair       ADL Assistance: Independent  Homemaking Assistance: Independent  Ambulation Assistance: Independent  Transfer Assistance: Independent    Active : Yes  Mode of Transportation: [place]ree Broad  Occupation: Retired  Additional Comments: Pt is IND and active prior with no use of an AD. Pt reports he has family in the area to help as needed.     OBJECTIVE:  Range of Motion:  Bilateral Lower Extremity: WNL    Strength:  Bilateral Lower Extremity: WFL    Balance:  Static Sitting Balance:  Supervision, Stand By Assistance, sitting on EOB  Dynamic Sitting Balance: Stand By Assistance, helped assist with gown change  Static Standing Balance: Contact Guard Assistance, X 1, no AD required  No AD required, cues for safety  Bed Mobility:  Rolling to Right: Stand By Assistance, X 1, with head of bed raised, with rail   Supine to Sit: Stand By Assistance, X 1, with head of bed raised, with rail, with verbal cues   Cues for safety, no LOB noted with completion  Transfers:  Sit to Stand: Contact Guard Assistance, X 1, with verbal cues  Stand to Sit:Contact Guard Assistance, X 1, with verbal cues  No AD required, cues for safety  Ambulation:  Contact Guard Assistance, X 1, with cues for safety, with verbal cues   Distance: 5 feet  Surface: Level Tile  Device:No Device  Gait Deviations:  Slow Norah, Decreased Step Length Bilaterally and Decreased Gait Speed    Exercise:  Patient was guided in 1 set(s) 10 reps of exercise to both lower extremities. Ankle pumps, Glut sets, Quad sets, Heelslides, Hip abduction/adduction and Straight leg raises. Exercises were completed for increased independence with functional mobility. VC and visual cues for proper technique of exercises for completion with good demo. Functional Outcome Measures: Completed  AM-PAC Inpatient Mobility Raw Score : 18  AM-PAC Inpatient T-Scale Score : 43.63    ASSESSMENT:  Activity Tolerance:  Patient tolerance of  treatment: fair. Treatment Initiated: Treatment and education initiated within context of evaluation. Evaluation time included review of current medical information, gathering information related to past medical, social and functional history, completion of standardized testing, formal and informal observation of tasks, assessment of data and development of plan of care and goals. Treatment time included skilled education and facilitation of tasks to increase safety and independence with functional mobility for improved independence and quality of life. Assessment: Body structures, Functions, Activity limitations: Decreased functional mobility ,Decreased endurance,Decreased balance,Decreased safe awareness  Assessment: Pt requires 1 person assist for mobility at this time.  Pt currently on BIPAP and limited with mobility at this time. Pt cont to require skilled PT services to progress  with overal mobility, increase IND with funtional tasks to return home safely and IND. Prognosis: Good    REQUIRES PT FOLLOW UP: Yes    Discharge Recommendations:  Discharge Recommendations: Continue to assess pending progress,Patient would benefit from continued therapy after discharge  Anticipate home with Ke Kenny  Patient Education:  PT Education: [facility],Functional Mobility Training,Transfer Training,Equipment  Patient Education: d/c planning    Equipment Recommendations:  Equipment Needed: No    Plan:  Times per week: 5x GM  Current Treatment Recommendations: Strengthening,Gait Training,Patient/Caregiver Education & Training,Equipment Evaluation, Education, & procurement,Balance Training,Stair training,Functional Mobility Training,Endurance Training,Home Exercise Program,Transfer Training,Safety Education & Training    Goals:  Patient goals : \"go home\"  Short term goals  Time Frame for Short term goals: by discharge  Short term goal 1: Pt will amb for >100 feet with LRAD for support with IND to progress towards PLOF. Short term goal 2: Pt will demo IND with transfers with LRAD for support to progress towards PLOF. Short term goal 3: Pt will demo IND with supine to and from sit transfers with bed flat to progress towards PLOF. Short term goal 4: Pt will negotiate steps for 2 GAGANDEEP the home with IND to return home safely. Long term goals  Time Frame for Long term goals : NA due to short ELOS    Following session, patient left in safe position with all fall risk precautions in place. In bed, chair alarm on.

## 2022-04-18 NOTE — PROGRESS NOTES
Educated patient regarding heart failure management by explaining the importance of obtaining daily weights at the same time every day with approximately the same amount of clothing, how to recognize symptoms, low sodium diet and taking prescribed medications as ordered. Patient was given our heart failure booklet, a new patient appointment scheduled in OP CHF clinic. Patient was receptive to the education given and verbalized understanding.

## 2022-04-18 NOTE — PROCEDURES
[address] [place] [address]                            CARDIAC CATHETERIZATION    PATIENT NAME: Kiel Maynard               :        1931  MED REC NO:   [id number]                           ROOM:       0026  ACCOUNT NO:   [de-identified]                           ADMIT DATE: 2022  PROVIDER:     Kathie Mireles. Kim Stephen M.D.    Faisal Coyne:  2022    MIS CARDIOVERSION    INDICATION:  This is a 70-year-old gentleman known to have  moderate-to-severe aortic stenosis, diabetes, hypertension; presented  with worsening of shortness of breath, found to be in pulmonary edema,  and the patient noted to be in new onset atrial fibrillation and been  started on appropriate medication, and has been evaluated by Dr. Khris Plummer  on 2022, and scheduled for MIS cardioversion to be done with me  today, on Monday. So, informed consent obtained, the patient verbalized understanding of  risks and benefits, and agreed with the plan of care. Sedation by anesthesiologist.    MIS was performed. Synchronized electrical shock. PROCEDURE IN DETAIL:  Under continuous EKG monitoring and intermittent  blood pressure monitoring, the patient was sedated. MIS was performed. No intracardiac thrombus has been noted. Following that synchronized  electrical shock of 300 joules has been delivered, direct current and  the patient converted into normal sinus rhythm and maintaining sinus  rhythm. CONCLUSION:  Successful synchronized direct current electrical  cardioversion with 300 joules x1. COMPLICATION:  None. PLAN AND RECOMMENDATION:  1. Continue with optimal medical therapy, continue rate and rhythm  control, and continue oral anticoagulation. 2.  Followup with his cardiologist as outpatient, and we will follow  here also. Pixie Boas.  Hawa Guillory M.D.    D: 2022 16:01:33       T: 2022 16:27:32     MAGNO/YOSELIN  Job#: [id number]     Doc#: [id number]    CC:

## 2022-04-18 NOTE — PROGRESS NOTES
Hospitalist Progress Note      Patient:  Candelaria Lea    ZGDV/XOA:[id number]  YOB: 1931  MRN: [id number]   Acct: [de-identified]     PCP: EMRE Roth CNP  Date of Admission: 4/16/2022        Assessment and Plan:  1. Acute Hypoxic Respiratory Failure: secondary to decompensated HFpEF in setting of severe aortic stenosis. Pulmonary edema noted on CXR on arrival. Improved s/p BiPAP and diuresis. -Continue supplemental oxygen  -BiPAP/CPAP as need for work of breathing   -wean for SpO2 >92%  2. Acute Decompensated Diastolic HF NYHA class III. suspect 2/2 severe aortic stenosis. Now symptomatic. LVH noted on TTE  AS and G2DD per 12/21. Repeat TTE shows ZAHEER 1.0 w/ MG 20-25 EF 40-45 (now mid-range EF drop down from 60% 4 months ago) Suspect low flow/low gradient. May consider stress echo to confirm if needed on discharge. Of note he did get the COVID-vaccine booster #2 on 4/14, so may be consideration for myopericardial disease  -Will change metoprolol tartrate to succinate pending dose titration  -Strict I and O, fluid restrict, low NA diet  -Cardiology following.   -2x dose Lasix 40mg on arrival + 1x 4/17/22 (holding drop BP)  -BiPAP support, patient did not have any hypoxia however arrived on CPAP due to work of breathing. And pulmonary congestion  -CHF clinic f/u  -Plan for C once Cr improves  3. Paroxysmal Atrial Fibrillation: Now in NSR s/p MIS/DCCV 4/18/22. No previously documented episodes in Epic. CHADS2-VASc 5 HAS-BLED 3. Rate controlled on bisoprolol. Not on [address] as outpatient  -Continue Heparin gtt   -Metoprolol tartrate 25mg BID  -plan to transition to succinate prior to discharge  4. Mild Troponin Elevation, Suspect Non-MI trop elevation: due to decompensated diastolic hf, jose elias, AS, HTN. EKG shows known LVH however there is repull abnormality with T wave inversion in lead aVL and I. tpn trended flat  5.  Leukocytosis / Suspected CAP: Bibasilar infiltrates may be related to prior scarring, atelectasis, pulmonary edema, or pneumonia. However he does have a elevated white count but denies any fevers. Mildly elevated procal. WBC tredning down. Does endorse rhinorrhea chronically.   -5d Rocephin and 3d azithro, culture resp, blood cultures obtained.   -Check procal  6. Stage 1 JUAN C: Cr 1.6 on arrival (baseline 1.1) Suspect CRS type I in context of acute HFrEF. Chronic HCTZ and lisinopril use. UA shows no hydronephrosis or postobstructive etiology  -IVF prior to [facility] in AM  -Nephrology following  -C 4/19/22   -Will hold HCTZ, lisinopril + metformin  -UA with microscopy  -Diuresis stopped while NPO  -Monitor I/O, avoid hypotension  7. Aortic Stenosis, Severe (LFLG) (ZAHEER 0.9, max gradient 47, mean 27, peak veloc 3.5)  -Avoid nitrates -Patient had drop in BP with nitro IV in ED  -Cardiology following consult.   -F/up echo, may need LHC and further eval for TAVR as his presentation may be related to symptomatic AS  -LHC per workup per above  8. Mild transaminitis: Suspect related to congestive hepatopathy, quite mild and will trend labs. -Further work-up if not improving after diuresis. 9. Recent Covid Booster #2 on 4/14/22  10. NIDDM2, with hyperglycemia: HbA1c 6.8 on arrival. on metformin PTA, hold. Glucose 295 on arrival, no prior steroids. -SSI, dm diet, hypoglycemia protocol.   -Consider farxiga on discharge given midrange EF  11. HTN (with accelerated HTN on arrival): on lisinopril, bisoprolol, and HCTZ pta. Favor HTN urgency 2/2 anxiety and SOB rather than hypertensive emergency.   -Hold antihypertensives with JUAN C. -If >847 systolic, will start on nitroprusside (d/w cardiology)  12. BPH: not on any meds pta. F/up renal US  13. Lactic Acidosis (resolved): mild, suspect secondary to metformin in setting of JUAN C. -Hold off fluids given fluid overload and hypertension.   -Repeat WNL.        Expected discharge date:  4/20/22    Disposition Plan: home    Chief Complaint: Shortness of [facility] Course:   Per hx provided by Dr Tino river:  Elly Colorado is a 80 y.o. male with PMHx of hypertension, diabetes, severe aortic stenosis, diastolic dysfunction with LVH who presents to [address] [place] [address] with shortness of breath. Patient is on BiPAP during my examination and at times difficult to understand, so daughter assists with history. Patient reports that after waking up this morning he began to develop relatively rapid progression of shortness of breath, and subsequently called his daughter and eventually EMS for transfer to ED. He reports that he has been in his usual state of health, however his daughter reports that there have been some shortness of breath developing over the last week worse than his baseline shortness of breath. He denies any chest pain or palpitations, no dizziness or syncope. Reports a mild cough but thinks this is due to his acute on chronic nasal congestion for which she has been taking nasal spray. There has been no fevers or chills or sputum production. He does report orthopnea, dyspnea on exertion, but denies any change in his chronic lower extremity edema. He has been compliant with his medications of bisoprolol, lisinopril, hydrochlorothiazide. He has known severe aortic stenosis being followed by Dr. Ken Sims, which was previously noted to be asymptomatic. He is a non-smoker and denies any significant alcohol use. No recent change in diet. No recent NSAIDs or steroids. Of note, the patient did get his COVID booster #2 on 4/14 and attributes some of his symptoms to this.     ED course: Patient arrived on noninvasive ventilation, will switch to BiPAP while here due to work of breathing and chest x-ray is obtained showing pulmonary edema pattern, he was treated for suspected flash pulmonary edema as his blood pressure was greater than 436 systolic on arrival.  He was started on a nitro drip with brisk decrease in his blood pressure, this was subsequently stopped. He remained on BiPAP during my examination and states he was feeling improved. O2 saturations 99 to 100%. Tachypnea was noted, tachycardia was noted. There is no hypoxia documented. Labs are significant for an JUAN C with a creatinine of 1.6, glucose of 295, TSH of 4.4 within normal limit free T4, white blood cell count of 23, hemoglobin of 12.1, lactic acid of 2.4 that improved to 2.0, and a BNP of 2599 with a troponin of 0.015. His AST and ALT were 48 and 89 respectively. Subjective (past 24 hours):    Patient seen and evaluated at bedside. Patient had numerous questions regarding being kept NPO. Started on CPAP 2/2 anxiety and work of breathing. No new complaints. Denies fevers, sweats or productive cough. Denies N/V/D. No bleeding    Plan for LHC in AM         Medications:  Reviewed    Infusion Medications    lactated ringers 50 mL/hr at 04/18/22 0923    heparin (PORCINE) Infusion 13 Units/kg/hr (04/18/22 1238)    sodium chloride      dextrose       Scheduled Medications    metoprolol tartrate  50 mg Oral BID    insulin glargine  3 Units SubCUTAneous Nightly    cefTRIAXone (ROCEPHIN) IV  1,000 mg IntraVENous Q24H    sodium chloride flush  5-40 mL IntraVENous 2 times per day    insulin lispro  0-6 Units SubCUTAneous TID WC    insulin lispro  0-3 Units SubCUTAneous Nightly     PRN Meds: metoprolol, heparin (porcine), heparin (porcine), sodium chloride flush, sodium chloride, ondansetron **OR** ondansetron, polyethylene glycol, acetaminophen **OR** acetaminophen, potassium chloride **OR** potassium alternative oral replacement **OR** potassium chloride, magnesium sulfate, glucagon (rDNA), dextrose, dextrose bolus (hypoglycemia) **OR** dextrose bolus (hypoglycemia), glucose      Intake/Output Summary (Last 24 hours) at 4/18/2022 1715  Last data filed at 4/18/2022 0924  Gross per 24 hour   Intake 250 ml   Output 1450 ml   Net -1200 ml       Diet:  ADULT DIET;  Regular; 4 carb 04/16/2022    BACTERIA NONE SEEN 04/16/2022    RBCUA NONE SEEN 04/16/2022    BLOODU NEGATIVE 04/16/2022    SPECGRAV 1.009 04/16/2022    GLUCOSEU neg 06/21/2018       Radiology:  US RENAL COMPLETE   Final Result    No evidence of hydronephrosis. **This report has been created using voice recognition software. It may contain minor errors which are inherent in voice recognition technology. **      Final report electronically signed by Dr. Leopoldo Rosario on 4/17/2022 8:55 AM      XR CHEST PORTABLE   Final Result   Impression:   No infiltrate or mass. Improvement in reticular densities vs prior. Small effusions. This document has been electronically signed by: Rakel Sullivan MD on    04/17/2022 04:10 AM      XR CHEST PORTABLE   Final Result   Interstitial opacities throughout both lungs. These have worsened in the left midlung zone and left lung base. This can are present pulmonary edema, infiltrates are not excluded. **This report has been created using voice recognition software. It may contain minor errors which are inherent in voice recognition technology. **      Final report electronically signed by Dr. Leopoldo Rosario on 4/16/2022 10:24 AM          DVT prophylaxis: [] Lovenox                                 [] SCDs                                 [] SQ Heparin                                 [] Encourage ambulation           [x] Already on Anticoagulation Heparin gtt     Code Status: Full Code    PT/OT Eval Status: ordered    Tele:   [x] yes             [] no        Electronically signed by Trae Mcginnis DO PGY-2 on 4/18/2022 at 5:15 PM

## 2022-04-19 LAB
ABO: NORMAL
ACTIVATED CLOTTING TIME: 291 SECONDS (ref 1–150)
ANION GAP SERPL CALCULATED.3IONS-SCNC: 9 MEQ/L (ref 8–16)
ANTIBODY SCREEN: NORMAL
APTT: > 200 SECONDS (ref 22–38)
BUN BLDV-MCNC: 36 MG/DL (ref 7–22)
CALCIUM SERPL-MCNC: 8.7 MG/DL (ref 8.5–10.5)
CHLORIDE BLD-SCNC: 101 MEQ/L (ref 98–111)
CHOLESTEROL, TOTAL: 149 MG/DL (ref 100–199)
CO2: 25 MEQ/L (ref 23–33)
COLLECTED BY:: ABNORMAL
CREAT SERPL-MCNC: 1.3 MG/DL (ref 0.4–1.2)
EKG ATRIAL RATE: 68 BPM
EKG P AXIS: 51 DEGREES
EKG P-R INTERVAL: 180 MS
EKG Q-T INTERVAL: 512 MS
EKG QRS DURATION: 122 MS
EKG QTC CALCULATION (BAZETT): 544 MS
EKG R AXIS: -47 DEGREES
EKG T AXIS: -20 DEGREES
EKG VENTRICULAR RATE: 68 BPM
ERYTHROCYTE [DISTWIDTH] IN BLOOD BY AUTOMATED COUNT: 13.3 % (ref 11.5–14.5)
ERYTHROCYTE [DISTWIDTH] IN BLOOD BY AUTOMATED COUNT: 41.1 FL (ref 35–45)
GFR SERPL CREATININE-BSD FRML MDRD: 52 ML/MIN/1.73M2
GLUCOSE BLD-MCNC: 146 MG/DL (ref 70–108)
GLUCOSE BLD-MCNC: 149 MG/DL (ref 70–108)
GLUCOSE BLD-MCNC: 161 MG/DL (ref 70–108)
GLUCOSE BLD-MCNC: 215 MG/DL (ref 70–108)
HCT VFR BLD CALC: 36.9 % (ref 42–52)
HDLC SERPL-MCNC: 39 MG/DL
HEMOGLOBIN: 11.6 GM/DL (ref 14–18)
HEPARIN UNFRACTIONATED: 0.33 U/ML (ref 0.3–0.7)
HEPARIN UNFRACTIONATED: 0.38 U/ML (ref 0.3–0.7)
INR BLD: 1.2 (ref 0.85–1.13)
LDL CHOLESTEROL CALCULATED: 90 MG/DL
MAGNESIUM: 1.9 MG/DL (ref 1.6–2.4)
MCH RBC QN AUTO: 26.5 PG (ref 26–33)
MCHC RBC AUTO-ENTMCNC: 31.4 GM/DL (ref 32.2–35.5)
MCV RBC AUTO: 84.4 FL (ref 80–94)
PLATELET # BLD: 152 THOU/MM3 (ref 130–400)
PMV BLD AUTO: 13.5 FL (ref 9.4–12.4)
POC ACTIVATED CLOTTING TIME KAOLIN: 112 SECONDS (ref 1–150)
POC O2 SATURATION: 66 % (ref 94–97)
POC O2 SATURATION: 67 % (ref 94–97)
POC O2 SATURATION: 90 % (ref 94–97)
POTASSIUM SERPL-SCNC: 3.8 MEQ/L (ref 3.5–5.2)
RBC # BLD: 4.37 MILL/MM3 (ref 4.7–6.1)
RH FACTOR: NORMAL
SODIUM BLD-SCNC: 135 MEQ/L (ref 135–145)
SOURCE, BLOOD GAS: ABNORMAL
TRIGL SERPL-MCNC: 100 MG/DL (ref 0–199)
WBC # BLD: 8.7 THOU/MM3 (ref 4.8–10.8)

## 2022-04-19 PROCEDURE — 6360000002 HC RX W HCPCS

## 2022-04-19 PROCEDURE — 85347 COAGULATION TIME ACTIVATED: CPT

## 2022-04-19 PROCEDURE — 2500000003 HC RX 250 WO HCPCS: Performed by: INTERNAL MEDICINE

## 2022-04-19 PROCEDURE — 82948 REAGENT STRIP/BLOOD GLUCOSE: CPT

## 2022-04-19 PROCEDURE — 6360000002 HC RX W HCPCS: Performed by: INTERNAL MEDICINE

## 2022-04-19 PROCEDURE — 2500000003 HC RX 250 WO HCPCS

## 2022-04-19 PROCEDURE — 4A023N8 MEASUREMENT OF CARDIAC SAMPLING AND PRESSURE, BILATERAL, PERCUTANEOUS APPROACH: ICD-10-PCS | Performed by: INTERNAL MEDICINE

## 2022-04-19 PROCEDURE — 86901 BLOOD TYPING SEROLOGIC RH(D): CPT

## 2022-04-19 PROCEDURE — 93460 R&L HRT ART/VENTRICLE ANGIO: CPT | Performed by: INTERNAL MEDICINE

## 2022-04-19 PROCEDURE — 86850 RBC ANTIBODY SCREEN: CPT

## 2022-04-19 PROCEDURE — 6370000000 HC RX 637 (ALT 250 FOR IP): Performed by: INTERNAL MEDICINE

## 2022-04-19 PROCEDURE — C1753 CATH, INTRAVAS ULTRASOUND: HCPCS

## 2022-04-19 PROCEDURE — C1769 GUIDE WIRE: HCPCS

## 2022-04-19 PROCEDURE — 2140000000 HC CCU INTERMEDIATE R&B

## 2022-04-19 PROCEDURE — 92978 ENDOLUMINL IVUS OCT C 1ST: CPT

## 2022-04-19 PROCEDURE — 99232 SBSQ HOSP IP/OBS MODERATE 35: CPT | Performed by: INTERNAL MEDICINE

## 2022-04-19 PROCEDURE — 83735 ASSAY OF MAGNESIUM: CPT

## 2022-04-19 PROCEDURE — 99233 SBSQ HOSP IP/OBS HIGH 50: CPT | Performed by: INTERNAL MEDICINE

## 2022-04-19 PROCEDURE — 99232 SBSQ HOSP IP/OBS MODERATE 35: CPT | Performed by: PHYSICIAN ASSISTANT

## 2022-04-19 PROCEDURE — 85520 HEPARIN ASSAY: CPT

## 2022-04-19 PROCEDURE — 92979 ENDOLUMINL IVUS OCT C EA: CPT

## 2022-04-19 PROCEDURE — 2580000003 HC RX 258: Performed by: INTERNAL MEDICINE

## 2022-04-19 PROCEDURE — 93005 ELECTROCARDIOGRAM TRACING: CPT | Performed by: PHYSICIAN ASSISTANT

## 2022-04-19 PROCEDURE — 93460 R&L HRT ART/VENTRICLE ANGIO: CPT

## 2022-04-19 PROCEDURE — 97166 OT EVAL MOD COMPLEX 45 MIN: CPT

## 2022-04-19 PROCEDURE — 92979 ENDOLUMINL IVUS OCT C EA: CPT | Performed by: INTERNAL MEDICINE

## 2022-04-19 PROCEDURE — 92978 ENDOLUMINL IVUS OCT C 1ST: CPT | Performed by: INTERNAL MEDICINE

## 2022-04-19 PROCEDURE — B2151ZZ FLUOROSCOPY OF LEFT HEART USING LOW OSMOLAR CONTRAST: ICD-10-PCS | Performed by: INTERNAL MEDICINE

## 2022-04-19 PROCEDURE — 2580000003 HC RX 258: Performed by: STUDENT IN AN ORGANIZED HEALTH CARE EDUCATION/TRAINING PROGRAM

## 2022-04-19 PROCEDURE — 97530 THERAPEUTIC ACTIVITIES: CPT

## 2022-04-19 PROCEDURE — 85610 PROTHROMBIN TIME: CPT

## 2022-04-19 PROCEDURE — 97535 SELF CARE MNGMENT TRAINING: CPT

## 2022-04-19 PROCEDURE — C1887 CATHETER, GUIDING: HCPCS

## 2022-04-19 PROCEDURE — 86900 BLOOD TYPING SEROLOGIC ABO: CPT

## 2022-04-19 PROCEDURE — B241ZZ3 ULTRASONOGRAPHY OF MULTIPLE CORONARY ARTERIES, INTRAVASCULAR: ICD-10-PCS | Performed by: INTERNAL MEDICINE

## 2022-04-19 PROCEDURE — 85730 THROMBOPLASTIN TIME PARTIAL: CPT

## 2022-04-19 PROCEDURE — 82810 BLOOD GASES O2 SAT ONLY: CPT

## 2022-04-19 PROCEDURE — 80061 LIPID PANEL: CPT

## 2022-04-19 PROCEDURE — 97116 GAIT TRAINING THERAPY: CPT

## 2022-04-19 PROCEDURE — 36415 COLL VENOUS BLD VENIPUNCTURE: CPT

## 2022-04-19 PROCEDURE — B2111ZZ FLUOROSCOPY OF MULTIPLE CORONARY ARTERIES USING LOW OSMOLAR CONTRAST: ICD-10-PCS | Performed by: INTERNAL MEDICINE

## 2022-04-19 PROCEDURE — C1894 INTRO/SHEATH, NON-LASER: HCPCS

## 2022-04-19 PROCEDURE — 80048 BASIC METABOLIC PNL TOTAL CA: CPT

## 2022-04-19 PROCEDURE — 6370000000 HC RX 637 (ALT 250 FOR IP): Performed by: STUDENT IN AN ORGANIZED HEALTH CARE EDUCATION/TRAINING PROGRAM

## 2022-04-19 PROCEDURE — 6360000004 HC RX CONTRAST MEDICATION: Performed by: INTERNAL MEDICINE

## 2022-04-19 PROCEDURE — 85027 COMPLETE CBC AUTOMATED: CPT

## 2022-04-19 PROCEDURE — 97110 THERAPEUTIC EXERCISES: CPT

## 2022-04-19 RX ORDER — FUROSEMIDE 10 MG/ML
20 INJECTION INTRAMUSCULAR; INTRAVENOUS 2 TIMES DAILY
Status: DISCONTINUED | OUTPATIENT
Start: 2022-04-19 | End: 2022-04-21

## 2022-04-19 RX ORDER — SODIUM CHLORIDE 0.9 % (FLUSH) 0.9 %
5-40 SYRINGE (ML) INJECTION PRN
Status: DISCONTINUED | OUTPATIENT
Start: 2022-04-19 | End: 2022-04-19 | Stop reason: SDUPTHER

## 2022-04-19 RX ORDER — ATORVASTATIN CALCIUM 40 MG/1
40 TABLET, FILM COATED ORAL NIGHTLY
Status: DISCONTINUED | OUTPATIENT
Start: 2022-04-19 | End: 2022-04-23 | Stop reason: HOSPADM

## 2022-04-19 RX ORDER — ACETAMINOPHEN 325 MG/1
650 TABLET ORAL EVERY 4 HOURS PRN
Status: DISCONTINUED | OUTPATIENT
Start: 2022-04-19 | End: 2022-04-23 | Stop reason: HOSPADM

## 2022-04-19 RX ORDER — SODIUM CHLORIDE 0.9 % (FLUSH) 0.9 %
5-40 SYRINGE (ML) INJECTION PRN
Status: DISCONTINUED | OUTPATIENT
Start: 2022-04-19 | End: 2022-04-21 | Stop reason: SDUPTHER

## 2022-04-19 RX ORDER — SODIUM CHLORIDE 9 MG/ML
25 INJECTION, SOLUTION INTRAVENOUS PRN
Status: DISCONTINUED | OUTPATIENT
Start: 2022-04-19 | End: 2022-04-19 | Stop reason: SDUPTHER

## 2022-04-19 RX ORDER — SODIUM CHLORIDE 0.9 % (FLUSH) 0.9 %
5-40 SYRINGE (ML) INJECTION EVERY 12 HOURS SCHEDULED
Status: DISCONTINUED | OUTPATIENT
Start: 2022-04-19 | End: 2022-04-19 | Stop reason: SDUPTHER

## 2022-04-19 RX ORDER — SODIUM CHLORIDE 0.9 % (FLUSH) 0.9 %
5-40 SYRINGE (ML) INJECTION EVERY 12 HOURS SCHEDULED
Status: DISCONTINUED | OUTPATIENT
Start: 2022-04-19 | End: 2022-04-21 | Stop reason: SDUPTHER

## 2022-04-19 RX ORDER — ASPIRIN 325 MG
325 TABLET ORAL ONCE
Status: DISCONTINUED | OUTPATIENT
Start: 2022-04-19 | End: 2022-04-23 | Stop reason: HOSPADM

## 2022-04-19 RX ORDER — SODIUM CHLORIDE 9 MG/ML
25 INJECTION, SOLUTION INTRAVENOUS PRN
Status: DISCONTINUED | OUTPATIENT
Start: 2022-04-19 | End: 2022-04-23 | Stop reason: HOSPADM

## 2022-04-19 RX ORDER — POTASSIUM CHLORIDE 20 MEQ/1
20 TABLET, EXTENDED RELEASE ORAL DAILY
Status: DISCONTINUED | OUTPATIENT
Start: 2022-04-19 | End: 2022-04-20

## 2022-04-19 RX ORDER — SODIUM CHLORIDE 9 MG/ML
INJECTION, SOLUTION INTRAVENOUS CONTINUOUS
Status: DISCONTINUED | OUTPATIENT
Start: 2022-04-19 | End: 2022-04-19

## 2022-04-19 RX ORDER — ASPIRIN 81 MG/1
81 TABLET ORAL DAILY
Status: DISCONTINUED | OUTPATIENT
Start: 2022-04-20 | End: 2022-04-23 | Stop reason: HOSPADM

## 2022-04-19 RX ADMIN — METOPROLOL TARTRATE 50 MG: 50 TABLET, FILM COATED ORAL at 20:32

## 2022-04-19 RX ADMIN — INSULIN LISPRO 1 UNITS: 100 INJECTION, SOLUTION INTRAVENOUS; SUBCUTANEOUS at 08:16

## 2022-04-19 RX ADMIN — POTASSIUM CHLORIDE 20 MEQ: 20 TABLET, EXTENDED RELEASE ORAL at 20:37

## 2022-04-19 RX ADMIN — INSULIN LISPRO 1 UNITS: 100 INJECTION, SOLUTION INTRAVENOUS; SUBCUTANEOUS at 20:35

## 2022-04-19 RX ADMIN — SODIUM CHLORIDE, POTASSIUM CHLORIDE, SODIUM LACTATE AND CALCIUM CHLORIDE: 600; 310; 30; 20 INJECTION, SOLUTION INTRAVENOUS at 13:10

## 2022-04-19 RX ADMIN — SODIUM CHLORIDE, PRESERVATIVE FREE 10 ML: 5 INJECTION INTRAVENOUS at 20:33

## 2022-04-19 RX ADMIN — ATORVASTATIN CALCIUM 40 MG: 40 TABLET, FILM COATED ORAL at 20:32

## 2022-04-19 RX ADMIN — FUROSEMIDE 20 MG: 10 INJECTION, SOLUTION INTRAMUSCULAR; INTRAVENOUS at 20:32

## 2022-04-19 RX ADMIN — WATER 1000 MG: 1 INJECTION INTRAMUSCULAR; INTRAVENOUS; SUBCUTANEOUS at 12:01

## 2022-04-19 RX ADMIN — IOPAMIDOL 140 ML: 755 INJECTION, SOLUTION INTRAVENOUS at 17:12

## 2022-04-19 RX ADMIN — HEPARIN SODIUM 12 UNITS/KG/HR: 10000 INJECTION, SOLUTION INTRAVENOUS at 04:48

## 2022-04-19 RX ADMIN — INSULIN GLARGINE 3 UNITS: 100 INJECTION, SOLUTION SUBCUTANEOUS at 20:34

## 2022-04-19 RX ADMIN — METOPROLOL TARTRATE 50 MG: 50 TABLET, FILM COATED ORAL at 08:16

## 2022-04-19 ASSESSMENT — PAIN SCALES - GENERAL
PAINLEVEL_OUTOF10: 0

## 2022-04-19 NOTE — PROGRESS NOTES
Hospitalist Progress Note      Patient:  Milo Collins    GXJB/ZCH:[id number]  YOB: 1931  MRN: [id number]   Acct: [de-identified]     PCP: EMRE Beck CNP  Date of Admission: 4/16/2022        Assessment and Plan:  1. Acute Hypoxic Respiratory Failure: secondary to decompensated HFpEF in setting of severe aortic stenosis. Pulmonary edema noted on CXR on arrival. Improved s/p BiPAP and diuresis. -Continue supplemental oxygen  -BiPAP/CPAP as need for work of breathing   -wean for SpO2 >92%  2. Acute Decompensated Diastolic HF NYHA class III. suspect 2/2 severe aortic stenosis. Now symptomatic. LVH noted on TTE  AS and G2DD per 12/21. Repeat TTE shows ZAHEER 1.0 w/ MG 20-25 EF 40-45 (now mid-range EF drop down from 60% 4 months ago) Suspect low flow/low gradient. May consider stress echo to confirm if needed on discharge. Of note he did get the COVID-vaccine booster #2 on 4/14, so may be consideration for myopericardial disease  -Will change metoprolol tartrate to succinate pending dose titration  -Strict I and O, fluid restrict, low NA diet  -Cardiology following.   -2x dose Lasix 40mg on arrival + 1x 4/17/22 (holding per drop BP [facility])  -BiPAP support, patient did not have any hypoxia however arrived on CPAP due to work of breathing. And pulmonary congestion  -CHF clinic f/u  -Plan for [facility] today  3. Paroxysmal Atrial Fibrillation: Now in NSR s/p MIS/DCCV 4/18/22. No previously documented episodes in Epic. CHADS2-VASc 5 HAS-BLED 3. Rate controlled on bisoprolol. Not on [address] as outpatient  -Continue Heparin gtt   -restart Eliquis prior to discharge  -Metoprolol tartrate 25mg BID  -plan to transition to succinate prior to discharge  4. Mild Troponin Elevation, Suspect Non-MI trop elevation: due to decompensated diastolic hf, jose elias, AS, HTN. EKG shows known LVH however there is repull abnormality with T wave inversion in lead aVL and I. tpn trended flat  5.  Leukocytosis / Suspected CAP: Bibasilar infiltrates may be related to prior scarring, atelectasis, pulmonary edema, or pneumonia. However he does have a elevated white count but denies any fevers. Mildly elevated procal. WBC tredning down. Does endorse rhinorrhea chronically.   -5d Rocephin and 3d azithro, culture resp, blood cultures obtained.   -Check procal  6. Stage 1 JUAN C: Cr 1.6 on arrival (baseline 1.1) Suspect CRS type I in context of acute HFrEF. Chronic HCTZ and lisinopril use. UA shows no hydronephrosis or postobstructive etiology  -IVF prior to [facility] in AM  -Nephrology following  -LHC 4/19/22   -Will hold HCTZ, lisinopril + metformin  -UA with microscopy  -Diuresis stopped while NPO  -Monitor I/O, avoid hypotension  7. Aortic Stenosis, Severe (LFLG) (ZAHEER 0.9, max gradient 47, mean 27, peak veloc 3.5)  -Avoid nitrates -Patient had drop in BP with nitro IV in ED  -Cardiology following consult.   -F/up echo, may need LHC and further eval for TAVR as his presentation may be related to symptomatic AS  -LHC per workup per above  8. Mild transaminitis: Suspect related to congestive hepatopathy, quite mild and will trend labs. -Further work-up if not improving after diuresis. 9. Recent Covid Booster #2 on 4/14/22  10. NIDDM2, with hyperglycemia: HbA1c 6.8 on arrival. on metformin PTA, hold. Glucose 295 on arrival, no prior steroids. -SSI, dm diet, hypoglycemia protocol.   -Consider farxiga on discharge given midrange EF  11. HTN (with accelerated HTN on arrival): on lisinopril, bisoprolol, and HCTZ pta. Favor HTN urgency 2/2 anxiety and SOB rather than hypertensive emergency.   -Hold antihypertensives with JUAN C. -If >490 systolic, will start on nitroprusside (d/w cardiology)  12. BPH: not on any meds pta. F/up renal US  13. Lactic Acidosis (resolved): mild, suspect secondary to metformin in setting of JUAN C. -Hold off fluids given fluid overload and hypertension.   -Repeat WNL.        Expected discharge date:  4/20/22    Disposition Plan: home    Chief Complaint: Shortness of breath    Hospital Course:   Per hx provided by Dr Kenan Mills:  Spencer Flores is a 80 y.o. male with PMHx of hypertension, diabetes, severe aortic stenosis, diastolic dysfunction with LVH who presents to [facility] with shortness of breath. Patient is on BiPAP during my examination and at times difficult to understand, so daughter assists with history. Patient reports that after waking up this morning he began to develop relatively rapid progression of shortness of breath, and subsequently called his daughter and eventually EMS for transfer to ED. He reports that he has been in his usual state of health, however his daughter reports that there have been some shortness of breath developing over the last week worse than his baseline shortness of breath. He denies any chest pain or palpitations, no dizziness or syncope. Reports a mild cough but thinks this is due to his acute on chronic nasal congestion for which she has been taking nasal spray. There has been no fevers or chills or sputum production. He does report orthopnea, dyspnea on exertion, but denies any change in his chronic lower extremity edema. He has been compliant with his medications of bisoprolol, lisinopril, hydrochlorothiazide. He has known severe aortic stenosis being followed by Dr. Samy Beckett, which was previously noted to be asymptomatic. He is a non-smoker and denies any significant alcohol use. No recent change in diet. No recent NSAIDs or steroids. Of note, the patient did get his COVID booster #2 on 4/14 and attributes some of his symptoms to this.     ED course: Patient arrived on noninvasive ventilation, will switch to BiPAP while here due to work of breathing and chest x-ray is obtained showing pulmonary edema pattern, he was treated for suspected flash pulmonary edema as his blood pressure was greater than 615 systolic on arrival.  He was started on a nitro drip with brisk decrease in his blood pressure, this was subsequently stopped. He remained on BiPAP during my examination and states he was feeling improved. O2 saturations 99 to 100%. Tachypnea was noted, tachycardia was noted. There is no hypoxia documented. Labs are significant for an JUAN C with a creatinine of 1.6, glucose of 295, TSH of 4.4 within normal limit free T4, white blood cell count of 23, hemoglobin of 12.1, lactic acid of 2.4 that improved to 2.0, and a BNP of 2599 with a troponin of 0.015. His AST and ALT were 48 and 89 respectively. Subjective (past 24 hours):    Patient seen and evaluated at bedside. NPO this morning prior to [facility] today. Reports respiratory status improved. Cr trending down with gentle IVF. Nephrology okay with [facility] today. No new complaints. Denies fevers, sweats or productive cough. Denies N/V/D. No bleeding.  Will monitor Cr in AM and discharge if continued improvement        Medications:  Reviewed    Infusion Medications    lactated ringers 50 mL/hr at 04/19/22 0723    heparin (PORCINE) Infusion 12 Units/kg/hr (04/19/22 0723)    sodium chloride      dextrose       Scheduled Medications    metoprolol tartrate  50 mg Oral BID    insulin glargine  3 Units SubCUTAneous Nightly    cefTRIAXone (ROCEPHIN) IV  1,000 mg IntraVENous Q24H    sodium chloride flush  5-40 mL IntraVENous 2 times per day    insulin lispro  0-6 Units SubCUTAneous TID WC    insulin lispro  0-3 Units SubCUTAneous Nightly     PRN Meds: metoprolol, heparin (porcine), heparin (porcine), sodium chloride flush, sodium chloride, ondansetron **OR** ondansetron, polyethylene glycol, acetaminophen **OR** acetaminophen, potassium chloride **OR** potassium alternative oral replacement **OR** potassium chloride, magnesium sulfate, glucagon (rDNA), dextrose, dextrose bolus (hypoglycemia) **OR** dextrose bolus (hypoglycemia), glucose      Intake/Output Summary (Last 24 hours) at 4/19/2022 1112  Last data filed at 4/19/2022 7605  Gross per 24 hour   Intake 1925.61 ml   Output 1200 ml   Net 725.61 ml       Diet:  Diet NPO    Exam:  BP (!) 143/88   Pulse 74   Temp 98.3 °F (36.8 °C) (Oral)   Resp 18   Ht 5' 10\" (1.778 m)   Wt 211 lb 3.2 oz (95.8 kg)   SpO2 97%   BMI 30.30 kg/m²     General appearance: Elderly male who appears in acute distress, well-groomed. On BiPAP  Eyes:  Pupils equal, round, and reactive to light. Conjunctivae/corneas clear. HENT: Head normal in appearance. External nares normal.  Oral mucosa moist without lesions. Hearing grossly intact. Neck: Supple, with full range of motion. Trachea midline. Unable to eval for JVD. Respiratory: Normal respiratory effort with bibasilar Rales, no wheezing or rhonchi. Cardiovascular: Tachycardia with difficulty hearing S1 and 2/murmur due to BiPAP. 1+ bilateral lower extremity edema  Abdomen: Soft, non-tender, non-distended with normal bowel sounds. Musculoskeletal: There is no joint swelling or tenderness. Normal tone. No abnormal movements. Skin: Warm and dry. No rashes or lesions. Neurologic:  No focal sensory/motor deficits in the upper and lower extremities. Cranial nerves:  grossly non-focal 2-12. Psychiatric: Alert and oriented, normal insight and thought content. Capillary Refill: Brisk,< 3 seconds. Peripheral Pulses: +2 palpable, equal bilaterally. Labs:   Recent Labs     04/17/22  0921 04/19/22  0531   WBC 13.1* 8.7   HGB 11.1* 11.6*   HCT 34.4* 36.9*    152     Recent Labs     04/17/22  0343 04/18/22  0614 04/19/22  0531   * 136 135   K 4.3 4.0 3.8   CL 97* 99 101   CO2 24 22* 25   BUN 46* 47* 36*   CREATININE 1.7* 1.4* 1.3*   CALCIUM 8.8 9.3 8.7     Recent Labs     04/17/22  0343   AST 28   ALT 67*   BILIDIR 0.3   BILITOT 0.7   ALKPHOS 62     Recent Labs     04/17/22  1204   INR 1.18*     No results for input(s): Jason Barrera in the last 72 hours.     Microbiology:      Urinalysis:      Lab Results   Component Value Date NITRU NEGATIVE 04/16/2022    WBCUA 0-2 04/16/2022    BACTERIA NONE SEEN 04/16/2022    RBCUA NONE SEEN 04/16/2022    BLOODU NEGATIVE 04/16/2022    SPECGRAV 1.009 04/16/2022    GLUCOSEU neg 06/21/2018       Radiology:  US RENAL COMPLETE   Final Result    No evidence of hydronephrosis. **This report has been created using voice recognition software. It may contain minor errors which are inherent in voice recognition technology. **      Final report electronically signed by Dr. Aramis Sanabria on 4/17/2022 8:55 AM      XR CHEST PORTABLE   Final Result   Impression:   No infiltrate or mass. Improvement in reticular densities vs prior. Small effusions. This document has been electronically signed by: Guzman Eaton MD on    04/17/2022 04:10 AM      XR CHEST PORTABLE   Final Result   Interstitial opacities throughout both lungs. These have worsened in the left midlung zone and left lung base. This can are present pulmonary edema, infiltrates are not excluded. **This report has been created using voice recognition software. It may contain minor errors which are inherent in voice recognition technology. **      Final report electronically signed by Dr. Aramis Sanabria on 4/16/2022 10:24 AM          DVT prophylaxis: [] Lovenox                                 [] SCDs                                 [] SQ Heparin                                 [] Encourage ambulation           [x] Already on Anticoagulation Heparin gtt     Code Status: Full Code    PT/OT Eval Status: ordered    Tele:   [x] yes             [] no        Electronically signed by Cata Liu, DO PGY-2 on 4/19/2022 at 11:12 AM

## 2022-04-19 NOTE — PROGRESS NOTES
Perfect serve to Dr. Selma Gilmore regarding restarting heparin drip and cardiovascular surgery consult.

## 2022-04-19 NOTE — PLAN OF CARE
Problem: RESPIRATORY  Goal: Able to breathe comfortably  Description: Able to breathe comfortably  Outcome: Ongoing

## 2022-04-19 NOTE — BRIEF OP NOTE
[address] [address]  Sedation/Analgesia Post Sedation Record    Pt Name: Jon Canela  Account number: [de-identified]  MRN: [id number]  YOB: 1931  Procedure Performed By: Maylin Berg MD MD   Primary Care Physician: Ulises Sofia, APRN - CNP  Date: 4/19/2022    POST-PROCEDURE    Physicians/Assistants: Maylin Berg MD MD     Procedure Performed:Cath      Sedation/Anesthesia: Versed/ Fentanyl and 2% xylocaine local anesthesia. Estimated Blood Loss: < 50 ml. Specimens Removed: None         Disposition of Specimen: N/A        Complications: No Immediate Complications. Post-procedure Diagnosis/Findings:        Severe RCA stenosis    Severe ostial/proximal LAD stenosis   Severe ostial LM stenosis (confirmed by IVUS)   H/o moderate to severe AS (AV was crossed during cath procedure today, mean gradient 24 mmHg. LFLG was suspected in the past)    Elevated filling pressures, decompensated CHF   NSTEMI   Mild ischemic cardiomyopathy    PAF, s/p DCCV on 4/18/2022, now in SR     Recommendations:  Bed rest for the next 4 hours  Access site care  Medical therapy and aggressive risk factor modification for CAD  ASA 81 mg PO daily  High intensity statin therapy  Continue to monitor on Telemetry   Resume Heparin gtt without an initial bolus 4 hours after VasdBand is removed, hemostasis is achieved   Need to optimize volume status. IV Diuretics, maintain a negative fluid balance. Monitor I/O, daily weight, daily BMP  Coronary angiogram findings as listed above. Patient was previously evaluated for AS, possible TAVR as outpatient. He has history of moderate to severe AS. AV was crossed during the cath procedure today, mean AV gradient 24 mmHg. ZAHEER by planimetry was 1-1.1 cm2 per MIS on 4/18/2022. LFLG was suspected in the past. Consult CT surgery.  Heart team discussion, will need revascularization prior to discharge      Above findings and plan of care were discussed with patient and his family, questions were answered, agreeable with plan.        Yara Ambriz MD, [facility]   Electronically signed 4/19/2022 at 5:35 PM  Interventional Cardiology

## 2022-04-19 NOTE — PROGRESS NOTES
[address] [address] 60  INPATIENT OCCUPATIONAL THERAPY  [facility] CCU-STEPDOWN 3B  EVALUATION    Time:   Time In: 1010  Time Out: 1042  Timed Code Treatment Minutes: 23 Minutes  Minutes: 32          Date: 2022  Patient Name: Jovan Singh,   Gender: male      MRN: [id number]  : 1931  (80 y.o.)  Referring Practitioner: Dr. Russell Valle. DO [place]  Diagnosis: Septicemia  Additional Pertinent Hx: Pt with PMHx of hypertension, diabetes, severe aortic stenosis, diastolic dysfunction with LVH who presents to [facility] with shortness of breath. Patient is on BiPAP during my examination and at times difficult to understand, so daughter assists with history. Patient reports that after waking up this morning he began to develop relatively rapid progression of shortness of breath, and subsequently called his daughter and eventually EMS for transfer to ED. He reports that he has been in his usual state of health, however his daughter reports that there have been some shortness of breath developing over the last week worse than his baseline shortness of breath. He denies any chest pain or palpitations, no dizziness or syncope. Reports a mild cough but thinks this is due to his acute on chronic nasal congestion for which she has been taking nasal spray. There has been no fevers or chills or sputum production. He does report orthopnea, dyspnea on exertion, but denies any change in his chronic lower extremity edema. Pt did have COVID booster shot on 22. Restrictions/Precautions:  Restrictions/Precautions: General Precautions,Fall Risk,Cardiac  Position Activity Restriction  Other position/activity restrictions: monitor vitals    Subjective  Chart Reviewed: Yes,Orders,Progress Notes  Patient assessed for rehabilitation services?: Yes  Response to previous treatment: Patient with no complaints from previous session  Family / Caregiver Present: No    Subjective: RN approved of OT session.  Pt sitting in recliner on OT arrival and just finished tasking a sponge bath. Comments: pleasant and cooperative    Pain:  Pain Assessment  Patient Currently in Pain: Denies    Vitals: Heart Rate: remained in the 60's during session     Social/Functional History:  Lives With: Alone  Type of Home: House  Home Layout: One level  Home Access: Stairs to enter without rails  Entrance Stairs - Number of Steps: 1+1  Home Equipment: Grab bars,Wheelchair-manual,Cane,Rolling walker   Bathroom Shower/Tub: Walk-in shower,Shower chair with back  Bathroom Toilet: Standard  Bathroom Equipment: Shower chair       ADL Assistance: Independent  Homemaking Assistance: Independent  Ambulation Assistance: Independent  Transfer Assistance: Independent    Active : Yes  Mode of Transportation: [place]  Occupation: Retired  Additional Comments: Pt is IND and active prior with no use of an AD. Pt reports he has family in the area to help as needed. VISION:WFL    HEARING:  slightly hard of hearing     COGNITION: WFL    RANGE OF MOTION:  Bilateral Upper Extremity:  WFL    STRENGTH:  Right Upper Extremity: shoulder flex/exten 5/5; elbow flex/exten 5/5;  (F+)  Left Upper Extremity:  shoulder flex/exten 5/5; elbow flex/exten 5/5;  (F+)    SENSATION:   WFL    ADL:   Grooming: Supervision. standing at sink for oral care, combing hair, and shaving face with electric razor . BALANCE:  Sitting Balance:  Modified Independent. Standing Balance: Supervision. with 2 UE release during ADL task with standing ~8 minutes     BED MOBILITY:  Not Tested    TRANSFERS:  Sit to Stand:  Stand By Assistance. Stand to Sit: Stand By Assistance. FUNCTIONAL MOBILITY:  Assistive Device: None  Assist Level:  Stand By Assistance. Distance: To and from bathroom and around room (declined walking in hallway d/t not having shoes)   No LOB noted       Activity Tolerance:  Patient tolerance of  treatment: good.        Assessment:  Assessment: Pt admitted to the hospital with the diagnosis of speticemia. Pt demo'ed performance deficits with requiring superivision for ADL tasks and requires SBA for functional mobility and transfers which effect ability to perform ADLs and IADLs. Pt displayed  decreased endurance, balance, safety awareness and ability to complete  ADL tasks and mobility at OF. Pt requires further skilled OT Services to improve performance in functional tasks and educate on safety awareness for more indep with ADL tasks and mobility to return  home. Performance deficits / Impairments: Decreased ADL status,Decreased endurance,Decreased functional mobility   Prognosis: Good  REQUIRES OT FOLLOW UP: Yes  Decision Making: Medium Complexity    Treatment Initiated: Treatment and education initiated within context of evaluation. Evaluation time included review of current medical information, gathering information related to past medical, social and functional history, completion of standardized testing, formal and informal observation of tasks, assessment of data and development of plan of care and goals. Treatment time included skilled education and facilitation of tasks to increase safety and independence with ADL's for improved functional independence and quality of life. Discharge Recommendations:  Home with home health OT     Patient Education:  OT Education: OT Role,Plan of [address] [place] [address],Transfer Training    Equipment Recommendations:  Equipment Needed: No    Plan:  Times per week: 3-5x  Times per day: Daily  Current Treatment Recommendations: Strengthening,Patient/Caregiver Education & Training,Functional Mobility Training,Endurance Training,Safety Education & Training,Self-Care / ADL. See long-term goal time frame for expected duration of plan of care. If no long-term goals established, a short length of stay is anticipated.     Goals:  Patient goals : return home  Short term goals  Time Frame for Short term goals: by discharge  Short term goal 1: pt will complete functional mobility within [facility] distances with mod-I to access kitchen to perform meal prep  Short term goal 2: pt will complete BADL task mod-I to increase indep with self care tasks  Short term goal 3: pt will complete dynamic standing x10 minutes mod-I with 1-2 UE release to increase ease with IADL tasks         Following session, patient left in safe position with all fall risk precautions in place.

## 2022-04-19 NOTE — CARE COORDINATION
4/19/22, 1:48 PM EDT    DISCHARGE ON GOING [address] 1938 day: 3  Location: [address] [place] [address]-A Reason for admit: Septicemia ([facility] [A41.9]  Flash pulmonary edema ([facility] [J81.0]  Elevated troponin [R77.8]  JUAN C (acute kidney injury) ([facility] [facility] [N17.9]  Elevated brain natriuretic peptide (BNP) level [R79.89]  Pneumonia of left lung due to infectious organism, unspecified part of lung [J18.9]  Decompensated heart failure ([facility] [I50.9]   Procedure:   4/16 CXR: Interstitial opacities throughout both lungs. These have worsened in the left midlung zone and left lung base. This can are present pulmonary edema, infiltrates are not excluded. 4/17 CXR: No infiltrate or mass. Improvement in reticular densities vs prior. Small effusions. 4/18 MIS/CV: Successful MIS- Cardioversion with 300 J x1.   4/19 Cardiac cath: Planned. Barriers to Discharge: Hospitalist, Nephrology and Cardiology following. POD #1 MIS, planning LHC today (possible RHC). PT/OT. BUN 36, creatinine 1.3. Rocephin iv daily. Heparin gtt. HF RN following. Dietitian consulted. PCP: EMRE Ortega CNP  Readmission Risk Score: 14.2 ( )%  Patient Goals/Plan/Treatment Preferences: Plans home alone. Monitor for needs, therapy recommending HH. SW consulted.

## 2022-04-19 NOTE — H&P
[facility]  Sedation/Analgesia History & Physical    Pt Name: Jovan Singh  Account number: [de-identified]  MRN: [id number]  YOB: 1931  Provider Performing Procedure: Ulices Watson MD MD  Referring Provider: Merlin Lagos, DO   Primary Care Physician: EMRE Fields - STEPH  Date: 4/19/2022    PRE-PROCEDURE    Code Status: FULL CODE  Brief History/Pre-Procedure Diagnosis:     Dyspnea, SOB  Elevated Troponin  Possible ACS/NSTEMI   Acute HFmrEF  New CMP  AORTIC STENOSIS     Consent: : I have discussed with the patient risks, benefits, and alternatives (and relevant risks, benefits, and side effects related to alternatives or not receiving care), and likelihood of the success. The patient and/or representative appear to understand and agree to proceed. The discussion encompasses risks, benefits, and side effects related to the alternatives and the risks related to not receiving the proposed care, treatment, and services. The indication, risks and benefits of the procedure and possible therapeutic consequences and alternatives were discussed with the patient. The patient was given the opportunity to ask questions and to have them answered to his/her satisfaction. Risks of the procedure include but are not limited to the following: Bleeding, hematoma including retroperitoneal hemmorhage, infection, pain and discomfort, injury to the aorta and other blood vessels, rhythm disturbance, low blood pressure, myocardial infarction, stroke, kidney damage/failure, myocardial perforation, allergic reactions to sedatives/contrast material, loss of pulse/vascular compromise requiring surgery, aneurysm/pseudoaneurysm formation, possible loss of a limb/hand/leg, needing blood transfusion, requiring emergent open heart surgery or emergent coronary intervention, the need for intubation/respiratory support, the requirement for defibrillation/cardioversion, and death.  Alternatives to and omission of the suggested procedure were discussed. The patient had no further questions and wished to proceed; the consent form was signed. MEDICAL HISTORY  []ASHD/ANGINA/MI/CHF   []Hypertension  []Diabetes  []Hyperlipidemia  []Smoking  []Family Hx of ASHD  []Additional information:       has a past medical history of Diabetes mellitus ([facility] [address] Hypertension, and Type II or unspecified type diabetes mellitus without mention of complication, not stated as uncontrolled. SURGICAL HISTORY   has a past surgical history that includes Tonsillectomy and adenoidectomy (Bilateral).   Additional information:       ALLERGIES   Allergies as of 04/16/2022 - Fully Reviewed 04/16/2022   Allergen Reaction Noted    Norvasc [amlodipine besylate]  08/12/2015    Other Rash 06/19/2015     Additional information:       MEDICATIONS   Aspirin  [] 81 mg  [] 325 mg  [] None  Antiplatelet drug therapy use last 5 days  []No []Yes  Coumadin Use Last 5 Days []No []Yes  Other anticoagulant use last 5 days  []No []Yes    Current Facility-Administered Medications:     metoprolol (LOPRESSOR) injection 5 mg, 5 mg, IntraVENous, Q6H PRN, Sridevi Del Rio, DO    metoprolol tartrate (LOPRESSOR) tablet 50 mg, 50 mg, Oral, BID, Sridevi Del Rio DO, 50 mg at 04/19/22 0816    lactated ringers infusion, , IntraVENous, Continuous, Shankar Lim, DO, Last Rate: 50 mL/hr at 04/19/22 1310, New Bag at 04/19/22 1310    heparin (porcine) injection 4,000 Units, 4,000 Units, IntraVENous, PRN, Sridevi Del Rio, DO, 4,000 Units at 04/17/22 1923    heparin (porcine) injection 2,000 Units, 2,000 Units, IntraVENous, PRN, Sridevi Del Rio, DO    heparin 25,000 unit in sodium chloride 0.45% 250 mL (premix) infusion, 5-30 Units/kg/hr, IntraVENous, Continuous, Sridevi Del Rio DO, Last Rate: 11.5 mL/hr at 04/19/22 0723, 12 Units/kg/hr at 04/19/22 0723    insulin glargine (LANTUS) injection vial 3 Units, 3 Units, SubCUTAneous, Nightly, Karyle Colder, DO, 3 Units at 04/18/22 2137    cefTRIAXone (ROCEPHIN) 1,000 mg in sterile water 10 mL IV syringe, 1,000 mg, IntraVENous, Q24H, Sridevi Mendietada, DO, 1,000 mg at 04/19/22 1201    sodium chloride flush 0.9 % injection 5-40 mL, 5-40 mL, IntraVENous, 2 times per day, Sridevi Eliane, DO, 10 mL at 04/17/22 2106    sodium chloride flush 0.9 % injection 5-40 mL, 5-40 mL, IntraVENous, PRN, Sridevi Eliane, DO    0.9 % sodium chloride infusion, , IntraVENous, PRN, Sridevi Eliane, DO    ondansetron (ZOFRAN-ODT) disintegrating tablet 4 mg, 4 mg, Oral, Q8H PRN **OR** ondansetron (ZOFRAN) injection 4 mg, 4 mg, IntraVENous, Q6H PRN, Sridevi Eliane, DO    polyethylene glycol (GLYCOLAX) packet 17 g, 17 g, Oral, Daily PRN, Sridevi Eliane, DO    acetaminophen (TYLENOL) tablet 650 mg, 650 mg, Oral, Q6H PRN **OR** acetaminophen (TYLENOL) suppository 650 mg, 650 mg, Rectal, Q6H PRN, Sridevi Eliane, DO    potassium chloride (KLOR-CON M) extended release tablet 40 mEq, 40 mEq, Oral, PRN **OR** potassium bicarb-citric acid (EFFER-K) effervescent tablet 40 mEq, 40 mEq, Oral, PRN **OR** potassium chloride 10 mEq/100 mL IVPB (Peripheral Line), 10 mEq, IntraVENous, PRN, Sridevi Eliane, DO    magnesium sulfate 2000 mg in 50 mL IVPB premix, 2,000 mg, IntraVENous, PRN, Sridevi Eliane, DO    insulin lispro (HUMALOG) injection vial 0-6 Units, 0-6 Units, SubCUTAneous, TID WC, Sridevi Eliane, DO, 1 Units at 04/19/22 0816    insulin lispro (HUMALOG) injection vial 0-3 Units, 0-3 Units, SubCUTAneous, Nightly, Sridevi Eliane, DO, 1 Units at 04/18/22 2136    glucagon (rDNA) injection 1 mg, 1 mg, IntraMUSCular, PRN, Sridevi Eliane, DO    dextrose 5 % solution, 100 mL/hr, IntraVENous, PRN, Sridevi Eliane, DO    dextrose bolus (hypoglycemia) 10% 125 mL, 125 mL, IntraVENous, PRN **OR** dextrose bolus (hypoglycemia) 10% 250 mL, 250 mL, IntraVENous, PRN, Sridevi Eliane, DO    glucose chewable tablet 4 each, 4 tablet, Oral, PRN, Sridevi Eliane, DO  Prior to Admission medications    Medication Sig Start Date End Date Taking? Authorizing Provider   lisinopril (PRINIVIL;ZESTRIL) 20 MG tablet Take 2 tablets by mouth once daily 2/7/22   EMRE Weber CNP   bisoprolol-hydroCHLOROthiazide [facility]) 10-6.25 MG per tablet Take 2 tablets by mouth once daily 11/8/21   EMRE Weber CNP   metFORMIN (GLUCOPHAGE XR) 500 MG extended release tablet Take 2 tablets by mouth daily (with breakfast) 5/26/21 12/1/21  EMRE Weber CNP     Additional information:       VITAL SIGNS   Vitals:    04/19/22 1136   BP: (!) 143/77   Pulse: 69   Resp: 18   Temp: 98.1 °F (36.7 °C)   SpO2: 98%       PHYSICAL:   General: No acute distress  HEENT:  Unremarkable for age  Neck: without increased JVD, carotid pulses 2+ bilaterally without bruits  Heart: RRR, S1 & S2 WNL. SE murmur   Lungs: Clear to auscultation    Abdomen: BS present, without HSM, masses, or tenderness    Extremities: without C,C,E.  Pulses 2+ bilaterally   Mental Status: Alert & Oriented        PLANNED PROCEDURE   [x]Cath  [x]PCI                []Pacemaker/AICD  []MIS             []Cardioversion []Peripheral angiography/PTA  []Other:      SEDATION  Planned agent:[x]Midazolam []Meperidine []Sublimaze []Morphine  []Diazepam  []Other:     ASA Classification:  []1 []2 [x]3 []4 []5   Class 1: A normal healthy patient  Class 2: Pt with mild to moderate systemic disease  Class 3: Severe systemic disease or disturbance  Class 4: Severe systemic disorders that are already life threatening. Class 5: Moribund pt with little chances of survival, for more than 24 hours. Mallampati I Airway Classification:   []1 []2 [x]3 []4     [x]Pre-procedure diagnostic studies complete and results available. Comment:    [x]Previous sedation/anesthesia experiences assessed. Comment:    [x]The patient is an appropriate candidate to undergo the planned procedure sedation and anesthesia.  (Refer to nursing sedation/analgesia documentation record)  [x]Formulation and discussion of sedation/procedure plan, risks, and expectations with patient and/or responsible adult completed. [x]Patient examined immediately prior to the procedure.  (Refer to nursing sedation/analgesia documentation record)      Codie Pérez MD, Tanner Hernández    Electronically signed 4/19/2022 at 1:41 PM

## 2022-04-19 NOTE — PROGRESS NOTES
[address] [place] [address]  INPATIENT PHYSICAL THERAPY  DAILY NOTE  [facility] CCU-STEPDOWN 3B - 3B-26/026-A    Time In: 8273  Time Out: 1134  Timed Code Treatment Minutes: 23 Minutes  Minutes: 23          Date: 2022  Patient Name: Leonor Vasquez,  Gender:  male        MRN: [id number]  : 1931  (80 y.o.)     Referring Practitioner: Nabil Garcia DO  Diagnosis: septicemia  Additional Pertinent Hx: Per EMR \"Jimbo Bello is a 80 y.o. male with PMHx of hypertension, diabetes, severe aortic stenosis, diastolic dysfunction with LVH who presents to [address] [place] [address] with shortness of breath. Patient is on BiPAP during my examination and at times difficult to understand, so daughter assists with history. Patient reports that after waking up this morning he began to develop relatively rapid progression of shortness of breath, and subsequently called his daughter and eventually EMS for transfer to ED. He reports that he has been in his usual state of health, however his daughter reports that there have been some shortness of breath developing over the last week worse than his baseline shortness of breath. He denies any chest pain or palpitations, no dizziness or syncope. Reports a mild cough but thinks this is due to his acute on chronic nasal congestion for which she has been taking nasal spray. There has been no fevers or chills or sputum production. He does report orthopnea, dyspnea on exertion, but denies any change in his chronic lower extremity edema. He has been compliant with his medications of bisoprolol, lisinopril, hydrochlorothiazide. He has known severe aortic stenosis being followed by Dr. Sneha Ahmadi, which was previously noted to be asymptomatic. He is a non-smoker and denies any significant alcohol use. No recent change in diet. No recent NSAIDs or steroids. Of note, the patient did get his COVID booster #2 on  and attributes some of his symptoms to this. \"     Prior Level of Function:  Lives With: Alone  Type of Home: House  Home Layout: One level  Home Access: Stairs to enter without rails  Entrance Stairs - Number of Steps: 1+1  Home Equipment: Grab bars,Wheelchair-manual,Cane,Rolling walker   Bathroom Shower/Tub: Walk-in shower,Shower chair with back  Bathroom Toilet: Standard  Bathroom Equipment: Shower chair    ADL Assistance: Independent  Homemaking Assistance: Independent  Ambulation Assistance: Independent  Transfer Assistance: Independent  Active : Yes  Additional Comments: Pt is IND and active prior with no use of an AD. Pt reports he has family in the area to help as needed. Restrictions/Precautions:  Restrictions/Precautions: General Precautions,Fall Risk,Cardiac  Position Activity Restriction  Other position/activity restrictions: monitor vitals     SUBJECTIVE: RN approved session. Pt in recliner upon arrival and agrees to therapy. Pt requires encouragement to ambulate due to not having his shoes here. pts states he thinks his daughter is bringing hs shoes today. PAIN: maggie feet \"from not having my shoes\"    Vitals: Oxygen: WFL  Heart Rate: WFL  On room air    OBJECTIVE:  Bed Mobility:  Not Tested    Transfers:  Sit to Stand: Stand By Assistance, Contact Guard Assistance  Stand to [facility], with verbal cues    Ambulation:  Stand By Assistance, [employer], with cues for safety, with verbal cues , with increased time for completion  Distance: 150ft  Surface: Level Tile  Device:No Device  Gait Deviations: Forward Flexed Posture, Decreased Step Length Bilaterally, Decreased Gait Speed, Moderate Path Deviations, Unsteady Gait, Decreased Terminal Knee Extension and mult cues to slow down for safety, pt moving quickly, poor awareness of iv lines, unsteady with path deviations L and R    Exercise:  Patient was guided in 1 set(s) 10-15 reps of exercise to both lower extremities.   Ankle pumps, Glut sets, Quad sets, Heelslides, Hip abduction/adduction and Straight leg raises. Exercises were completed for increased independence with functional mobility. Functional Outcome Measures: Completed  AM-PAC Inpatient Mobility Raw Score : 18  AM-PAC Inpatient T-Scale Score : 43.63    ASSESSMENT:  Assessment: Patient progressing toward established goals. Activity Tolerance:  Patient tolerance of  treatment: good. Pt tolerated session well. Pt demos decreased endurance, strength and safety awareness with mobility. Pt will benefit from cont PT at this time     Equipment Recommendations:Equipment Needed: No  Discharge Recommendations: Home with Home Health PT and Patient would benefit from continued PT at discharge  Plan: Times per week: 5x GM  Current Treatment Recommendations: Strengthening,Gait Training,Patient/Caregiver Education & Training,Equipment Evaluation, Education, & procurement,Balance Training,Stair training,Functional Mobility Training,Endurance Training,Home Exercise Program,Transfer Training,Safety Education & Training    Patient Education  Patient Education: Plan of Care, Transfers, Gait, Verbal Exercise Instruction    Goals:  Patient goals : \"go home\"  Short term goals  Time Frame for Short term goals: by discharge  Short term goal 1: Pt will amb for >100 feet with LRAD for support with IND to progress towards PLOF. Short term goal 2: Pt will demo IND with transfers with LRAD for support to progress towards PLOF. Short term goal 3: Pt will demo IND with supine to and from sit transfers with bed flat to progress towards PLOF. Short term goal 4: Pt will negotiate steps for 2 GAGANDEEP the home with IND to return home safely. Long term goals  Time Frame for Long term goals : NA due to short ELOS    Following session, patient left in safe position with all fall risk precautions in place.

## 2022-04-19 NOTE — PROGRESS NOTES
[facility]    Renal Inpatient Follow-up note  4/19/2022 9:21 AM       Pt Name:   Philip Elyse:   [id number]  Attending:   Debra Delgadillo DO     Chief Complaint : Smiley Thakur is a 80 y.o. male being followed by nephrology for JUAN C and clearance for cath. Interval History :   Patient seen and examined sitting upright in chair. No acute distress or complaints. No overnight events. Denies chest pain or shortness of breath or dyspnea on exertion. On RA. Having good urine output. Scheduled Medications :      metoprolol tartrate  50 mg Oral BID    insulin glargine  3 Units SubCUTAneous Nightly    cefTRIAXone (ROCEPHIN) IV  1,000 mg IntraVENous Q24H    sodium chloride flush  5-40 mL IntraVENous 2 times per day    insulin lispro  0-6 Units SubCUTAneous TID WC    insulin lispro  0-3 Units SubCUTAneous Nightly      lactated ringers 50 mL/hr at 04/19/22 0723    heparin (PORCINE) Infusion 12 Units/kg/hr (04/19/22 0723)    sodium chloride      dextrose         Vitals :  BP (!) 143/88   Pulse 74   Temp 98.3 °F (36.8 °C) (Oral)   Resp 18   Ht 5' 10\" (1.778 m)   Wt 211 lb 3.2 oz (95.8 kg)   SpO2 97%   BMI 30.30 kg/m²     24HR INTAKE/OUTPUT:      Intake/Output Summary (Last 24 hours) at 4/19/2022 0249  Last data filed at 4/19/2022 0816  Gross per 24 hour   Intake 1925.61 ml   Output 1450 ml   Net 475.61 ml        Physical Exam :  General appearance: No apparent distress, appears stated age and cooperative. HEENT: Pupils equal, round, and reactive to light. Conjunctivae/corneas clear. Neck: Supple, with full range of motion. No jugular venous distention. Trachea midline. Respiratory:  Normal respiratory effort. Clear to auscultation, bilaterally without Rales/Wheezes/Rhonchi. Cardiovascular: Regular rate and rhythm with normal S1/S2 without murmurs, rubs or gallops. Abdomen: Soft, non-tender, non-distended with normal bowel sounds.   Musculoskeletal: passive and active ROM x 4 extremities. Skin: Skin color, texture, turgor normal.  No rashes or lesions. Neurologic:  Neurovascularly intact without any focal sensory/motor deficits. Cranial nerves: II-XII intact, grossly non-focal.  Psychiatric: Alert and oriented, thought content appropriate, normal insight  Capillary Refill: Brisk,< 3 seconds   Peripheral Pulses: +2 palpable, equal bilaterally        Last 3 CBC  Recent Labs     04/16/22  1015 04/17/22  0921 04/19/22  0531   WBC 23.4* 13.1* 8.7   RBC 4.48* 4.13* 4.37*   HGB 12.1* 11.1* 11.6*   HCT 37.9* 34.4* 36.9*   MCV 84.6 83.3 84.4    145 152     Last 3 CMP  Recent Labs     04/16/22  1015 04/16/22  1015 04/17/22  0343 04/18/22  0614 04/19/22  0531      < > 133* 136 135   K 3.7  --  4.3 4.0 3.8      < > 97* 99 101   CO2 20*   < > 24 22* 25   BUN 37*   < > 46* 47* 36*   CREATININE 1.6*   < > 1.7* 1.4* 1.3*   CALCIUM 8.7   < > 8.8 9.3 8.7   LABALBU 4.0  --  3.5  --   --    BILITOT 1.3*  --  0.7  --   --     < > = values in this interval not displayed. Assessment / Plan :  1. Renal -acute kidney injury most likely secondary to uncontrolled hypertension. Cannot rule out a component of cardiorenal.  ? Clinically getting better once the blood pressure is improved his baseline is around 1.1  ? Renal ultrasound scan is negative for any pathology  ? Urine is negative for any protein  ? Continue IVF, okay to stop after left heart catheterization. 2. Electrolytes -mild hyponatremia has improved   3. Metabolic acidosis has resolved   4. Essential hypertension significantly elevated at presentation now appears to be better  5. Coronary artery disease planned for a cardiac catheterization today, patient should be okay for cardiac cath. Continue to hydrate the patient at least 6 hours prior. Discussed with the patient the risks of contrast-induced nephropathy patient understands and wishes to proceed. Avoid any further nephrotoxins at this time. 6. Meds reviewed and discussed with patient. Maria Isabel Bryant DO,   Case was discussed with Dr. Eladia Taylor M.D  [facility].

## 2022-04-19 NOTE — PROGRESS NOTES
Cardiology Progress Note      Patient:  Keegan Gaytan  YOB: 1931  MRN: [id number]   Acct: [de-identified]  Admit Date:  4/16/2022  Primary Cardiologist: Olga Lidia Seay MD    Note per dr Mc Garza for Consultation:  Pulmonary edema        History Of Present Illness:    80 y.o. pleasant male c hx of Mod-severe AS, DM, HTN who presented to the hospital with complaints of shortness of breath and found to be in pulmonary edema. Patient ZAHEER in 12/2021 was 0.9 cm2 with mean gradient of 27mm Hg. He followed up with Dr. Moustapha Reyes but patient wanted to monitor for symptoms. Patient apparently felt like he couldn't breath as if he had a polyp in his nose, thought this started after getting a booster COVID19 short. He denies any chest pains or shortness of breath. He was placed on BiPAP, his BP was elevated >200. Currently he is much better, received diuretics. He underwent Echo which shows a drop in EF   While in the hospital he was noted to have new onset of afib\"    Subjective (Events in last 24 hours): pt awake and alert. NAD. No cp or sob. Feels much better.   No edema  On heparin gtt      Objective:   BP (!) 143/88   Pulse 74   Temp 98.3 °F (36.8 °C) (Oral)   Resp 18   Ht 5' 10\" (1.778 m)   Wt 211 lb 3.2 oz (95.8 kg)   SpO2 97%   BMI 30.30 kg/m²        TELEMETRY: nsr    Physical Exam:  General Appearance: alert and oriented to person, place and time, in no acute distress  Cardiovascular: normal rate, regular rhythm, normal S1 and S2, +murmur  Pulmonary/Chest: clear to auscultation bilaterally- no wheezes, rales or rhonchi, normal air movement, no respiratory distress  Abdomen: soft, non-tender, non-distended, normal bowel sounds, no masses Extremities: no cyanosis, clubbing or edema, pulse   Skin: warm and dry  Head: normocephalic and atraumatic  Eyes: pupils equal, round, and reactive to light  Neck: supple and non-tender without mass, no thyromegaly   Neurological: alert, oriented, normal speech, no focal findings or movement disorder noted    Medications:    metoprolol tartrate  50 mg Oral BID    insulin glargine  3 Units SubCUTAneous Nightly    cefTRIAXone (ROCEPHIN) IV  1,000 mg IntraVENous Q24H    sodium chloride flush  5-40 mL IntraVENous 2 times per day    insulin lispro  0-6 Units SubCUTAneous TID WC    insulin lispro  0-3 Units SubCUTAneous Nightly      lactated ringers 50 mL/hr at 04/19/22 0723    heparin (PORCINE) Infusion 12 Units/kg/hr (04/19/22 0723)    sodium chloride      dextrose       metoprolol, 5 mg, Q6H PRN  heparin (porcine), 4,000 Units, PRN  heparin (porcine), 2,000 Units, PRN  sodium chloride flush, 5-40 mL, PRN  sodium chloride, , PRN  ondansetron, 4 mg, Q8H PRN   Or  ondansetron, 4 mg, Q6H PRN  polyethylene glycol, 17 g, Daily PRN  acetaminophen, 650 mg, Q6H PRN   Or  acetaminophen, 650 mg, Q6H PRN  potassium chloride, 40 mEq, PRN   Or  potassium alternative oral replacement, 40 mEq, PRN   Or  potassium chloride, 10 mEq, PRN  magnesium sulfate, 2,000 mg, PRN  glucagon (rDNA), 1 mg, PRN  dextrose, 100 mL/hr, PRN  dextrose bolus (hypoglycemia), 125 mL, PRN   Or  dextrose bolus (hypoglycemia), 250 mL, PRN  glucose, 4 tablet, PRN        Diagnostics:  TTE 4/18/22  Summary   No intra cardiac mass or thrombus. No embolic source. No Intracardiac shunt   Left ventricle size is normal.   Normal left ventricle wall thickness. There was moderate global hypokinesis of the left ventricle. Systolic function was moderately reduced. Ejection fraction is visually estimated in the range of 40% to 45%.    Left atrium moderately dilated   There is moderate-to-severe aortic stenosis with valve area of 1 to 1.1 sq   cm by Planimetry      Signature      ----------------------------------------------------------------   Electronically signed by Zunilda Washington MD (Interpreting   physician) on 04/18/2022 at 07:27 PM    Lab Data:    Cardiac Enzymes:  No results for input(s): CKTOTAL, CKMB, CKMBINDEX, TROPONINI in the last 72 hours.     CBC:   Lab Results   Component Value Date    WBC 8.7 04/19/2022    RBC 4.37 04/19/2022    HGB 11.6 04/19/2022    HCT 36.9 04/19/2022     04/19/2022       CMP:    Lab Results   Component Value Date     04/19/2022    K 3.8 04/19/2022    K 3.7 04/16/2022     04/19/2022    CO2 25 04/19/2022    BUN 36 04/19/2022    CREATININE 1.3 04/19/2022    LABGLOM 52 04/19/2022    GLUCOSE 146 04/19/2022    CALCIUM 8.7 04/19/2022       Hepatic Function Panel:    Lab Results   Component Value Date    ALKPHOS 62 04/17/2022    ALT 67 04/17/2022    AST 28 04/17/2022    PROT 6.9 04/17/2022    BILITOT 0.7 04/17/2022    BILIDIR 0.3 04/17/2022    LABALBU 3.5 04/17/2022       Magnesium:    Lab Results   Component Value Date    MG 1.9 04/19/2022       PT/INR:    Lab Results   Component Value Date    INR 1.18 04/17/2022       HgBA1c:    Lab Results   Component Value Date    LABA1C 6.8 04/16/2022       FLP:    Lab Results   Component Value Date    TRIG 147 05/15/2020    HDL 37 05/15/2020    LDLCALC 88 05/15/2020       TSH:    Lab Results   Component Value Date    TSH 2.790 04/16/2022         Assessment:    Acute hypoxic resp failure - resolved  Acute systolic CHF - improved  Acute pulmonary edema - improved  New LVD - ef 40-45 per MIS 4/18/22  Mod to severe AS - ZAHEER 1-1.1 cm2  New onset afib   S/p MIS/CV to NSR 4/18/22  Elevated troponins  HTN  HLD  DM  JUAN C/CKD - improved      Plan:    Nephrology is ok for cath today  Npo  Schedule LHC/RHC for today  Normal TSH  Keep mag >2 and K >4  Cont BB  Cont heparin gtt  Will need [address] upon discharge - start eliquis or xarelto upon dc   Pt understands risk of bleed and accepts risk to reduce risk of embolic phenomenon   Avoid nitrates       Electronically signed by Emaline Cohen, PA-C on 4/19/2022 at 8:46 AM

## 2022-04-20 LAB
ANION GAP SERPL CALCULATED.3IONS-SCNC: 10 MEQ/L (ref 8–16)
BUN BLDV-MCNC: 27 MG/DL (ref 7–22)
CALCIUM SERPL-MCNC: 8.9 MG/DL (ref 8.5–10.5)
CHLORIDE BLD-SCNC: 101 MEQ/L (ref 98–111)
CO2: 25 MEQ/L (ref 23–33)
CREAT SERPL-MCNC: 1.3 MG/DL (ref 0.4–1.2)
EKG ATRIAL RATE: 250 BPM
EKG ATRIAL RATE: 83 BPM
EKG P AXIS: 56 DEGREES
EKG P-R INTERVAL: 202 MS
EKG Q-T INTERVAL: 342 MS
EKG Q-T INTERVAL: 412 MS
EKG QRS DURATION: 122 MS
EKG QRS DURATION: 130 MS
EKG QTC CALCULATION (BAZETT): 484 MS
EKG QTC CALCULATION (BAZETT): 491 MS
EKG R AXIS: -50 DEGREES
EKG R AXIS: -51 DEGREES
EKG T AXIS: 88 DEGREES
EKG T AXIS: 90 DEGREES
EKG VENTRICULAR RATE: 124 BPM
EKG VENTRICULAR RATE: 83 BPM
GFR SERPL CREATININE-BSD FRML MDRD: 52 ML/MIN/1.73M2
GLUCOSE BLD-MCNC: 122 MG/DL (ref 70–108)
GLUCOSE BLD-MCNC: 164 MG/DL (ref 70–108)
GLUCOSE BLD-MCNC: 174 MG/DL (ref 70–108)
GLUCOSE BLD-MCNC: 209 MG/DL (ref 70–108)
GLUCOSE BLD-MCNC: 240 MG/DL (ref 70–108)
HEPARIN UNFRACTIONATED: 0.13 U/ML (ref 0.3–0.7)
HEPARIN UNFRACTIONATED: 0.35 U/ML (ref 0.3–0.7)
MAGNESIUM: 2 MG/DL (ref 1.6–2.4)
POTASSIUM SERPL-SCNC: 4.6 MEQ/L (ref 3.5–5.2)
SODIUM BLD-SCNC: 136 MEQ/L (ref 135–145)

## 2022-04-20 PROCEDURE — 6370000000 HC RX 637 (ALT 250 FOR IP): Performed by: INTERNAL MEDICINE

## 2022-04-20 PROCEDURE — 2500000003 HC RX 250 WO HCPCS: Performed by: INTERNAL MEDICINE

## 2022-04-20 PROCEDURE — 99232 SBSQ HOSP IP/OBS MODERATE 35: CPT | Performed by: INTERNAL MEDICINE

## 2022-04-20 PROCEDURE — 85520 HEPARIN ASSAY: CPT

## 2022-04-20 PROCEDURE — 6370000000 HC RX 637 (ALT 250 FOR IP): Performed by: STUDENT IN AN ORGANIZED HEALTH CARE EDUCATION/TRAINING PROGRAM

## 2022-04-20 PROCEDURE — 6360000002 HC RX W HCPCS: Performed by: INTERNAL MEDICINE

## 2022-04-20 PROCEDURE — 82948 REAGENT STRIP/BLOOD GLUCOSE: CPT

## 2022-04-20 PROCEDURE — 94760 N-INVAS EAR/PLS OXIMETRY 1: CPT

## 2022-04-20 PROCEDURE — 6370000000 HC RX 637 (ALT 250 FOR IP): Performed by: PHYSICIAN ASSISTANT

## 2022-04-20 PROCEDURE — 99223 1ST HOSP IP/OBS HIGH 75: CPT | Performed by: THORACIC SURGERY (CARDIOTHORACIC VASCULAR SURGERY)

## 2022-04-20 PROCEDURE — 2140000000 HC CCU INTERMEDIATE R&B

## 2022-04-20 PROCEDURE — 80048 BASIC METABOLIC PNL TOTAL CA: CPT

## 2022-04-20 PROCEDURE — 93005 ELECTROCARDIOGRAM TRACING: CPT | Performed by: PHYSICIAN ASSISTANT

## 2022-04-20 PROCEDURE — 99232 SBSQ HOSP IP/OBS MODERATE 35: CPT | Performed by: PHYSICIAN ASSISTANT

## 2022-04-20 PROCEDURE — 36415 COLL VENOUS BLD VENIPUNCTURE: CPT

## 2022-04-20 PROCEDURE — 83735 ASSAY OF MAGNESIUM: CPT

## 2022-04-20 RX ORDER — METOPROLOL TARTRATE 100 MG/1
100 TABLET ORAL 2 TIMES DAILY
Status: DISCONTINUED | OUTPATIENT
Start: 2022-04-20 | End: 2022-04-23 | Stop reason: HOSPADM

## 2022-04-20 RX ORDER — ONDANSETRON 2 MG/ML
4 INJECTION INTRAMUSCULAR; INTRAVENOUS EVERY 6 HOURS PRN
Status: DISCONTINUED | OUTPATIENT
Start: 2022-04-20 | End: 2022-04-23 | Stop reason: HOSPADM

## 2022-04-20 RX ORDER — POTASSIUM CHLORIDE 20 MEQ/1
20 TABLET, EXTENDED RELEASE ORAL
Status: DISCONTINUED | OUTPATIENT
Start: 2022-04-20 | End: 2022-04-20

## 2022-04-20 RX ORDER — INSULIN LISPRO 100 [IU]/ML
0-3 INJECTION, SOLUTION INTRAVENOUS; SUBCUTANEOUS NIGHTLY
Status: DISCONTINUED | OUTPATIENT
Start: 2022-04-20 | End: 2022-04-23 | Stop reason: HOSPADM

## 2022-04-20 RX ORDER — INSULIN LISPRO 100 [IU]/ML
0-6 INJECTION, SOLUTION INTRAVENOUS; SUBCUTANEOUS
Status: DISCONTINUED | OUTPATIENT
Start: 2022-04-20 | End: 2022-04-23 | Stop reason: HOSPADM

## 2022-04-20 RX ADMIN — ATORVASTATIN CALCIUM 40 MG: 40 TABLET, FILM COATED ORAL at 20:24

## 2022-04-20 RX ADMIN — INSULIN LISPRO 1 UNITS: 100 INJECTION, SOLUTION INTRAVENOUS; SUBCUTANEOUS at 20:24

## 2022-04-20 RX ADMIN — INSULIN LISPRO 2 UNITS: 100 INJECTION, SOLUTION INTRAVENOUS; SUBCUTANEOUS at 12:32

## 2022-04-20 RX ADMIN — AMIODARONE HYDROCHLORIDE 0.5 MG/MIN: 1.8 INJECTION, SOLUTION INTRAVENOUS at 12:41

## 2022-04-20 RX ADMIN — METOPROLOL TARTRATE 100 MG: 50 TABLET, FILM COATED ORAL at 10:07

## 2022-04-20 RX ADMIN — POTASSIUM CHLORIDE 20 MEQ: 20 TABLET, EXTENDED RELEASE ORAL at 10:04

## 2022-04-20 RX ADMIN — AMIODARONE HYDROCHLORIDE 150 MG: 1.5 INJECTION, SOLUTION INTRAVENOUS at 06:01

## 2022-04-20 RX ADMIN — INSULIN GLARGINE 3 UNITS: 100 INJECTION, SOLUTION SUBCUTANEOUS at 20:24

## 2022-04-20 RX ADMIN — INSULIN LISPRO 1 UNITS: 100 INJECTION, SOLUTION INTRAVENOUS; SUBCUTANEOUS at 17:54

## 2022-04-20 RX ADMIN — FUROSEMIDE 20 MG: 10 INJECTION, SOLUTION INTRAMUSCULAR; INTRAVENOUS at 10:08

## 2022-04-20 RX ADMIN — HEPARIN SODIUM 12.4 UNITS/KG/HR: 10000 INJECTION, SOLUTION INTRAVENOUS at 19:27

## 2022-04-20 RX ADMIN — METOPROLOL TARTRATE 100 MG: 50 TABLET, FILM COATED ORAL at 20:24

## 2022-04-20 RX ADMIN — METOPROLOL TARTRATE 5 MG: 1 INJECTION, SOLUTION INTRAVENOUS at 05:11

## 2022-04-20 RX ADMIN — HEPARIN SODIUM 10.4 UNITS/KG/HR: 10000 INJECTION, SOLUTION INTRAVENOUS at 03:44

## 2022-04-20 RX ADMIN — INSULIN LISPRO 1 UNITS: 100 INJECTION, SOLUTION INTRAVENOUS; SUBCUTANEOUS at 10:13

## 2022-04-20 RX ADMIN — AMIODARONE HYDROCHLORIDE 1 MG/MIN: 1.8 INJECTION, SOLUTION INTRAVENOUS at 06:25

## 2022-04-20 RX ADMIN — HEPARIN SODIUM 2000 UNITS: 1000 INJECTION, SOLUTION INTRAVENOUS; SUBCUTANEOUS at 11:10

## 2022-04-20 RX ADMIN — ASPIRIN 81 MG: 81 TABLET, COATED ORAL at 10:03

## 2022-04-20 ASSESSMENT — PAIN SCALES - GENERAL
PAINLEVEL_OUTOF10: 0

## 2022-04-20 ASSESSMENT — ENCOUNTER SYMPTOMS
COLOR CHANGE: 0
BACK PAIN: 1
SHORTNESS OF BREATH: 1
CHEST TIGHTNESS: 1
ABDOMINAL PAIN: 0
EYE DISCHARGE: 0

## 2022-04-20 NOTE — PROGRESS NOTES
Annabella Moses 60  OCCUPATIONAL THERAPY MISSED TREATMENT NOTE  [facility]STEPD[facility] 3B  [id number]-26/026-A      Date: 2022  Patient Name: Adelina Kuhn        CSN: [id number]   : 1931  (80 y.o.)  Gender: male   Referring Practitioner: Dr. Pamela Sparks. DO [place]  Diagnosis: Septicemia         REASON FOR MISSED TREATMENT: Upon arrival, pt first consents to OT session. Once instructed pt to complete any activity (ADLs, sitting up in chair or BUE exercise) pt denied and reports he just ate and did not want to get up. OTR further educated on importance of engaging in tasks, pt still declined and said \"try back another time\". OT staff to check back as time appropriate/able.

## 2022-04-20 NOTE — PROGRESS NOTES
Cardiology Progress Note      Patient:  Leonor Call  YOB: 1931  MRN: [id number]   Acct: [de-identified]  Admit Date:  4/16/2022  Primary Cardiologist: Sudha Orantes MD    Note per dr Davon Cam for Consultation:  Pulmonary edema        History Of Present Illness:    80 y.o. pleasant male c hx of Mod-severe AS, DM, HTN who presented to the hospital with complaints of shortness of breath and found to be in pulmonary edema. Patient ZAHEER in 12/2021 was 0.9 cm2 with mean gradient of 27mm Hg. He followed up with Dr. Sneha Ahmadi but patient wanted to monitor for symptoms. Patient apparently felt like he couldn't breath as if he had a polyp in his nose, thought this started after getting a booster COVID19 short. He denies any chest pains or shortness of breath. He was placed on BiPAP, his BP was elevated >200. Currently he is much better, received diuretics. He underwent Echo which shows a drop in EF   While in the hospital he was noted to have new onset of afib\"    Subjective (Events in last 24 hours):  Pt awake and alert. NAD   No cp or sob. No edema.   Ambulating without issues  On RA  Back into afib rvr this morning - amio bolus and gtt ordered by dr Del Davis I/o -2.8 L    Objective:   BP (!) 152/70   Pulse 120   Temp 98.8 °F (37.1 °C) (Oral)   Resp 18   Ht 5' 10\" (1.778 m)   Wt 203 lb 14.8 oz (92.5 kg)   SpO2 93%   BMI 29.26 kg/m²        TELEMETRY: afib rvr 120    Physical Exam:  General Appearance: alert and oriented to person, place and time, in no acute distress  Cardiovascular: irregularly irregular, +murmur  Pulmonary/Chest: bibasilar crackles  Abdomen: soft, non-tender, non-distended, normal bowel sounds, no masses Extremities: no cyanosis, clubbing or edema, pulse   Skin: warm and dry  Head: normocephalic and atraumatic  Eyes: pupils equal, round, and reactive to light  Neck: supple and non-tender without mass, no thyromegaly   Neurological: alert, oriented, normal speech, no focal findings or movement disorder noted  Right wrist - no hematoma, +2 radial pulse    Medications:    potassium chloride  20 mEq Oral Daily with breakfast    aspirin  325 mg Oral Once    furosemide  20 mg IntraVENous BID    aspirin  81 mg Oral Daily    sodium chloride flush  5-40 mL IntraVENous 2 times per day    atorvastatin  40 mg Oral Nightly    metoprolol tartrate  50 mg Oral BID    insulin glargine  3 Units SubCUTAneous Nightly    insulin lispro  0-6 Units SubCUTAneous TID WC    insulin lispro  0-3 Units SubCUTAneous Nightly      amiodarone 1 mg/min (04/20/22 0625)    Followed by   [facility] amiodarone      sodium chloride      heparin (PORCINE) Infusion 10.4 Units/kg/hr (04/20/22 0344)    dextrose       sodium chloride flush, 5-40 mL, PRN  sodium chloride, 25 mL, PRN  acetaminophen, 650 mg, Q4H PRN  metoprolol, 5 mg, Q6H PRN  heparin (porcine), 4,000 Units, PRN  heparin (porcine), 2,000 Units, PRN  ondansetron, 4 mg, Q8H PRN   Or  ondansetron, 4 mg, Q6H PRN  polyethylene glycol, 17 g, Daily PRN  acetaminophen, 650 mg, Q6H PRN  potassium chloride, 40 mEq, PRN   Or  potassium alternative oral replacement, 40 mEq, PRN   Or  potassium chloride, 10 mEq, PRN  magnesium sulfate, 2,000 mg, PRN  glucagon (rDNA), 1 mg, PRN  dextrose, 100 mL/hr, PRN  dextrose bolus (hypoglycemia), 125 mL, PRN   Or  dextrose bolus (hypoglycemia), 250 mL, PRN  glucose, 4 tablet, PRN        Diagnostics:  TTE 4/18/22  Summary   No intra cardiac mass or thrombus. No embolic source. No Intracardiac shunt   Left ventricle size is normal.   Normal left ventricle wall thickness. There was moderate global hypokinesis of the left ventricle. Systolic function was moderately reduced. Ejection fraction is visually estimated in the range of 40% to 45%.    Left atrium moderately dilated   There is moderate-to-severe aortic stenosis with valve area of 1 to 1.1 sq   cm by [employer] ----------------------------------------------------------------   Electronically signed by Kavya Conway MD (Interpreting   physician) on 04/18/2022 at 07:27 PM    Cath 4/19/22  FINDINGS:  HEMODYNAMICS AND RIGHT HEART CATHETERIZATION:  Pulmonary arterial oxygen  saturation 66%. Pulmonary arterial pressure 53/29. Mean pulmonary  arterial pressure 39 mmHg. Pulmonary capillary wedge pressure 35 mmHg. Right ventricular pressure 57/14. Right atrial pressure 90 mmHg. Left  ventricular end-diastolic pressure was 29 mmHg. Upon pullback across  the aortic valve, the mean pressure gradient was measured at 24 mmHg.     LEFT VENTRICULOGRAM:  Mild global hypokinesis. Ejection fraction  estimated at 45% to 50%.     CORONARY ANGIOGRAM:  1. Right coronary artery:  Proximal RCA has 10% stenosis. Mid RCA is  patent. Distal RCA has 80% to 90% stenosis. RCA is dominant vessel,  bifurcates into RPL and RPDA. RPL has moderate diffuse disease. RPL  has 30% stenosis. 2.  Left main coronary artery:  Ostial left main coronary artery has 50%  stenosis. Gives rise to LAD, ramus intermedius and left circumflex  arteries. 3.  Ramus intermedius:  Mild luminal irregularities, otherwise patent. 4.  Left circumflex artery:  Proximal LCX is patent. Distal left  circumflex artery has 30% to 40% stenosis. 5.  Left anterior descending artery:  Ostial to proximal LAD has 90% to  95% stenosis. Mid LAD has 70% to 80% stenosis. Distal LAD has mild  diffuse disease.     MEDICATIONS:  See MAR.     COMPLICATIONS:  None.     ESTIMATED BLOOD LOSS:  Less than 50 mL.     ACCESS:  Right radial artery access. Vasc Band was applied. Hemostasis  was achieved.     IMPRESSION:  1. Severe ostial left main stenosis. 2.  Severe stenosis of ostial proximal and mid LAD. 3.  Severe stenosis of the right coronary artery. 4.  Elevated filling pressures, decompensated congestive heart failure. 5.  Non-ST elevation myocardial infarction.   6.  Mild ischemic cardiomyopathy. 7.  History of moderate to severe aortic stenosis. Aortic valve was  crossed during the cath procedure today. Mean pressure gradient was 24  mmHg. Low-flow, low-gradient AS was suspected in the past.  8.  Paroxysmal atrial fibrillation. The patient is status post  cardioversion on 04/18/2022, now in sinus rhythm.     RECOMMENDATIONS AND PLAN OF CARE:  1.  Bedrest for the next four hours. Access site care. 2.  Medical therapy and aggressive risk factor modification for CAD. 3.  Aspirin 81 mg p.o. daily. 4.  High-intensity statin therapy. 5.  Continue to monitor the patient on telemetry. 6.  May resume heparin drip without initial bolus four hours after Vasc  Band is removed and hemostasis is achieved. 7.  Need to optimize volume status. IV diuretics to be resumed. Maintain negative fluid balance. 8.  Monitor intake and output. Daily weight and daily basic metabolic  panel. 9.  Coronary angiogram findings as listed above. The patient was  previously evaluated for TAVR. He has history of moderate to severe  aortic stenosis. Mean gradient across the aortic valve during cath  procedure today was 24 mmHg. Low-flow, low-gradient status has been  suspected in the past.  Aortic valve area by planimetry on MIS done on  04/18/2022 was 1 to 1.1 cm2. Consult Cardiovascular Surgery. Heart  team discussion, will need revascularization prior to discharge. Findings and plan of care discussed with the patient's family. They are  agreeable to the plan.           Paola Fay MD    Lab Data:    Cardiac Enzymes:  No results for input(s): CKTOTAL, CKMB, CKMBINDEX, TROPONINI in the last 72 hours.     CBC:   Lab Results   Component Value Date    WBC 8.7 04/19/2022    RBC 4.37 04/19/2022    HGB 11.6 04/19/2022    HCT 36.9 04/19/2022     04/19/2022       CMP:    Lab Results   Component Value Date     04/20/2022    K 4.6 04/20/2022    K 3.7 04/16/2022     04/20/2022 CO2 25 04/20/2022    BUN 27 04/20/2022    CREATININE 1.3 04/20/2022    LABGLOM 52 04/20/2022    GLUCOSE 122 04/20/2022    CALCIUM 8.9 04/20/2022       Hepatic Function Panel:    Lab Results   Component Value Date    ALKPHOS 62 04/17/2022    ALT 67 04/17/2022    AST 28 04/17/2022    PROT 6.9 04/17/2022    BILITOT 0.7 04/17/2022    BILIDIR 0.3 04/17/2022    LABALBU 3.5 04/17/2022       Magnesium:    Lab Results   Component Value Date    MG 2.0 04/20/2022       PT/INR:    Lab Results   Component Value Date    INR 1.20 04/19/2022       HgBA1c:    Lab Results   Component Value Date    LABA1C 6.8 04/16/2022       FLP:    Lab Results   Component Value Date    TRIG 100 04/19/2022    HDL 39 04/19/2022    LDLCALC 90 04/19/2022       TSH:    Lab Results   Component Value Date    TSH 2.790 04/16/2022         Assessment:    Acute hypoxic resp failure - resolved  Acute systolic CHF - improved  Acute pulmonary edema - improved  New LVD - ef 40-45 per MIS 4/18/22  Mod to severe AS - ZAHEER 1-1.1 cm2  New onset afib   S/p MIS/CV to NSR 4/18/22   Back in afib rvr 4/20/22 - amio bolus and gtt ordered    Elevated troponins/NSTEMI  S/p cath 4/19/22  · Severe RCA stenosis   · Severe ostial/proximal LAD stenosis  · Severe ostial LM stenosis (confirmed by IVUS)  · H/o moderate to severe AS (AV was crossed during cath procedure today, mean gradient 24 mmHg.  LFLG was suspected in the past)      · Elevated filling pressures, decompensated CHF  · NSTEMI  · Mild ischemic cardiomyopathy   · PAF, s/p DCCV on 4/18/2022, now in SR     HTN  HLD  DM  JUAN C/CKD - improved - neprho following      Plan:    Daily I/o and weights  2 liter fluid restriction and 2gm sodium diet  Normal TSH  Keep mag >2 and K >4  Cont diuresis  Daily BMP  Cont asa/BB/statin  Cont heparin gtt  Will need [address] upon discharge - start eliquis or xarelto upon dc   Pt understands risk of bleed and accepts risk to reduce risk of embolic phenomenon   Avoid nitrates  Increase lopressor to 100 bid  Cont amio gtt  ekg today  Start ace/arb once ok with nephro  [facility] consulted, needs heart team discussion, and will need revascularization prior to discharge     Electronically signed by Hiren Proctor PA-C on 4/20/2022 at 8:54 AM

## 2022-04-20 NOTE — PROCEDURES
[address] [place] [address]                            CARDIAC CATHETERIZATION    PATIENT NAME: Ra Mcpherson               :        1931  MED REC NO:   [id number]                           ROOM:       [id number]  ACCOUNT NO:   [de-identified]                           ADMIT DATE: 2022  PROVIDER:     Codie Pérez MD    DATE OF PROCEDURE:  2022    INDICATIONS FOR PROCEDURE:  1. Dyspnea, shortness of breath. 2.  Elevated troponin. 3.  Non-ST elevation myocardial infarction. 4.  Acute congestive heart failure with mildly reduced ejection  fraction. 5.  New-onset cardiomyopathy. Echocardiogram revealed new drop in  ejection fraction down to 40% to 45%. 6.  Aortic stenosis. PROCEDURES PERFORMED:  1. Left cardiac catheterization with selective coronary angiogram.  2.  Left ventriculogram.  3.  Right heart catheterization. 4.  Intravascular ultrasound of the left anterior descending artery. 5.  Intravascular ultrasound of the left main coronary artery. DESCRIPTION OF PROCEDURE/DETAILS:  After informed consent, the patient  was brought to the cardiac catheterization room. He was prepped and  draped in a sterile fashion. 2% lidocaine was injected in the skin and  subcutaneous tissue overlying the right radial artery. Under ultrasound  guidance using modified Seldinger technique, I obtained access in the  right radial artery. 5/6 Slender sheath was inserted. Standard  antithrombotic/antispasmodic medications were given. I used 6-Turkmen  JR-4, 6-Turkmen JL-3.5 diagnostic catheters to complete the diagnostic  angiogram.  In certain views, the ostium of the left main coronary  artery seemed to have a stenotic lesion. The patient also had evidence  of severe stenosis of ostial to proximal LAD, mid LAD and right coronary  artery. Heparin was given.   AST was confirmed to be above 250.   6-Turkmen EBU 3.5 guide catheter was used to cannulate the left main  coronary artery. Runthrough wire was used to cross the lesion and wire  the left main into LAD. IVUS catheter was prepared outside the  patient's body per 's recommendation. IVUS of left anterior  descending artery was performed. IVUS of LAD confirmed obstructive  stenosis with moderate calcification with involvement of the ostium of  the left anterior descending artery. I then proceeded with IVUS of left  main coronary artery. The ostium of left main coronary artery has  severely stenotic lesion with minimal luminal area of 5.5 mm2. Wire was  removed. Repeat angiogram revealed no complications including no  dissection or distal embolization. Prior to left cardiac  catheterization and IVUS, I have performed right heart catheterization. Brachial vein access was obtained under ultrasound guidance using  modified Seldinger technique. 6-Danish sheath was inserted and flushed. NuvoMed Depue catheter was utilized to complete the right heart  catheterization. FINDINGS:  HEMODYNAMICS AND RIGHT HEART CATHETERIZATION:  Pulmonary arterial oxygen  saturation 66%. Pulmonary arterial pressure 53/29. Mean pulmonary  arterial pressure 39 mmHg. Pulmonary capillary wedge pressure 35 mmHg. Right ventricular pressure 57/14. Right atrial pressure 90 mmHg. Left  ventricular end-diastolic pressure was 29 mmHg. Upon pullback across  the aortic valve, the mean pressure gradient was measured at 24 mmHg. LEFT VENTRICULOGRAM:  Mild global hypokinesis. Ejection fraction  estimated at 45% to 50%. CORONARY ANGIOGRAM:  1. Right coronary artery:  Proximal RCA has 10% stenosis. Mid RCA is  patent. Distal RCA has 80% to 90% stenosis. RCA is dominant vessel,  bifurcates into RPL and RPDA. RPL has moderate diffuse disease. RPL  has 30% stenosis. 2.  Left main coronary artery:  Ostial left main coronary artery has 50%  stenosis.   Gives rise to LAD, ramus intermedius and left circumflex  arteries. 3.  Ramus intermedius:  Mild luminal irregularities, otherwise patent. 4.  Left circumflex artery:  Proximal LCX is patent. Distal left  circumflex artery has 30% to 40% stenosis. 5.  Left anterior descending artery:  Ostial to proximal LAD has 90% to  95% stenosis. Mid LAD has 70% to 80% stenosis. Distal LAD has mild  diffuse disease. MEDICATIONS:  See MAR. COMPLICATIONS:  None. ESTIMATED BLOOD LOSS:  Less than 50 mL. ACCESS:  Right radial artery access. Vasc Band was applied. Hemostasis  was achieved. IMPRESSION:  1. Severe ostial left main stenosis. 2.  Severe stenosis of ostial proximal and mid LAD. 3.  Severe stenosis of the right coronary artery. 4.  Elevated filling pressures, decompensated congestive heart failure. 5.  Non-ST elevation myocardial infarction. 6.  Mild ischemic cardiomyopathy. 7.  History of moderate to severe aortic stenosis. Aortic valve was  crossed during the cath procedure today. Mean pressure gradient was 24  mmHg. Low-flow, low-gradient AS was suspected in the past.  8.  Paroxysmal atrial fibrillation. The patient is status post  cardioversion on 04/18/2022, now in sinus rhythm. RECOMMENDATIONS AND PLAN OF CARE:  1.  Bedrest for the next four hours. Access site care. 2.  Medical therapy and aggressive risk factor modification for CAD. 3.  Aspirin 81 mg p.o. daily. 4.  High-intensity statin therapy. 5.  Continue to monitor the patient on telemetry. 6.  May resume heparin drip without initial bolus four hours after Vasc  Band is removed and hemostasis is achieved. 7.  Need to optimize volume status. IV diuretics to be resumed. Maintain negative fluid balance. 8.  Monitor intake and output. Daily weight and daily basic metabolic  panel. 9.  Coronary angiogram findings as listed above. The patient was  previously evaluated for TAVR. He has history of moderate to severe  aortic stenosis.   Mean gradient across the aortic valve during cath  procedure today was 24 mmHg. Low-flow, low-gradient status has been  suspected in the past.  Aortic valve area by planimetry on MIS done on  04/18/2022 was 1 to 1.1 cm2. Consult Cardiovascular Surgery. Heart  team discussion, will need revascularization prior to discharge. Findings and plan of care discussed with the patient's family. They are  agreeable to the plan.         Ameya Childs MD    D: 04/19/2022 19:05:12       T: 04/19/2022 19:09:10     [email or web address]  Job#: [id number]     Doc#: [id number]    CC:

## 2022-04-20 NOTE — PROGRESS NOTES
Hospitalist Progress Note      Patient:  Kimberly Angel    SMGE/DXR:[id number]  YOB: 1931  MRN: [id number]   Acct: [de-identified]     PCP: EMRE Frazier CNP  Date of Admission: 4/16/2022        Assessment and Plan:  1. Acute Hypoxic Respiratory Failure: secondary to decompensated HFpEF in setting of severe aortic stenosis. Pulmonary edema noted on CXR on arrival. Improved s/p BiPAP and diuresis. -Continue supplemental oxygen  -BiPAP/CPAP as need for work of breathing   -wean for SpO2 >92%  2. Acute Decompensated Diastolic HF NYHA class III. suspect 2/2 severe aortic stenosis. Now symptomatic. LVH noted on TTE  AS and G2DD per 12/21. Repeat TTE shows ZAHEER 1.0 w/ MG 20-25 EF 40-45 (now mid-range EF drop down from 60% 4 months ago) Suspect low flow/low gradient. May consider stress echo to confirm if needed on discharge. Of note he did get the COVID-vaccine booster #2 on 4/14, so may be consideration for myopericardial disease  -Will change metoprolol tartrate to succinate pending dose titration  -Strict I and O, fluid restrict, low NA diet  -Cardiology following.   -Gentle Lasix 20mg BID started 4/19/22 by cardiology  -BiPAP support (pulmonary congestion). -CHF clinic f/u  -Plan for [facility] today  3. Paroxysmal Atrial Fibrillation: NSR in ED s/p MIS/DCCV 4/18/22. No previously documented episodes in Epic. CHADS2-VASc 5 HAS-BLED 3. Rate controlled on bisoprolol. Not on [facility] as outpatient. Converted back to afib on 4/20  -Continue Heparin gtt   -restart Eliquis prior to discharge  -Increased Metoprolol tartrate 100mg BID  -plan to transition to succinate prior to discharge  -Amiodarone bolus and gtt 4/20/22  -Transition to PO on discharge  4. NSTEMI/Obstructive multivessel CAD: Noted on [facility] 4/19/22. EKG showsedknown LVH however there is repull abnormality with Twave inversions aVL and I. tpn trended flat. Severe ostial stenosis LM, LAD and multiple stenotic lesions on RCA.  Poor candidate for PCI. LFLG noted on LVgram. No PCI not performed at time of intervention. Poor prognosis   -Evaluation by heart team  -CTS consult pending for possible intervention vs complex PCI  5. Aortic Stenosis, Severe (LFLG) (ZAHEER 0.9, max gradient 47, mean 27, peak veloc 3.5)  -Avoid nitrates -Patient had drop in BP with nitro IV in ED  -Cardiology following consult.   -F/up echo, may need LHC and further eval for TAVR as his presentation may be related to symptomatic AS  -LHC per workup per above  6. Recent Covid Booster #2 on 4/14/22  7. NIDDM2, with hyperglycemia: HbA1c 6.8 on arrival. on metformin PTA, hold. Glucose 295 on arrival, no prior steroids. -SSI, dm diet, hypoglycemia protocol.   -Consider farxiga on discharge given midrange EF  8. HTN (with accelerated HTN on arrival): on lisinopril, bisoprolol, and HCTZ pta. Favor HTN urgency 2/2 anxiety and SOB rather than hypertensive emergency.   -Hold antihypertensives with JUAN C. -If >362 systolic, will start on nitroprusside (d/w cardiology)  9. BPH: not on any meds pta. F/up renal US  10. Leukocytosis / Suspected CAP (resolved): Bibasilar infiltrates may be related to prior scarring, atelectasis, pulmonary edema, or pneumonia. However he does have a elevated white count but denies any fevers. Mildly elevated procal. WBC tredning down. Does endorse rhinorrhea chronically. -Received 4d Rocephin and 3d azithro, culture resp, blood cultures obtained. 11. Stage 1 JUAN C (resolved): Cr 1.6 on arrival (baseline 1.1) Suspect CRS type I in context of acute HFrEF. Chronic HCTZ and lisinopril use. UA shows no hydronephrosis or postobstructive etiology  -Cr stable following LHC (1.3)  4/19/22   -Nephrology following  -s/p LHC  -Will hold HCTZ, lisinopril + metformin  -Monitor I/O, avoid hypotension  12. Mild transaminitis (resolved): Suspect related to congestive hepatopathy, quite mild and will trend labs.   13. Lactic Acidosis (resolved): mild, suspect secondary to metformin in setting of JUAN C. -Hold off fluids given fluid overload and hypertension.   -Repeat WNL. Expected discharge date:  4/20/22    Disposition Plan: home    Chief Complaint: Shortness of breath    Hospital Course:   Per hx provided by Dr Felice Dempsey:  Liyah Vilchis is a 80 y.o. male with PMHx of hypertension, diabetes, severe aortic stenosis, diastolic dysfunction with LVH who presents to [address] [place] [address] with shortness of breath. Patient is on BiPAP during my examination and at times difficult to understand, so daughter assists with history. Patient reports that after waking up this morning he began to develop relatively rapid progression of shortness of breath, and subsequently called his daughter and eventually EMS for transfer to ED. He reports that he has been in his usual state of health, however his daughter reports that there have been some shortness of breath developing over the last week worse than his baseline shortness of breath. He denies any chest pain or palpitations, no dizziness or syncope. Reports a mild cough but thinks this is due to his acute on chronic nasal congestion for which she has been taking nasal spray. There has been no fevers or chills or sputum production. He does report orthopnea, dyspnea on exertion, but denies any change in his chronic lower extremity edema. He has been compliant with his medications of bisoprolol, lisinopril, hydrochlorothiazide. He has known severe aortic stenosis being followed by Dr. Toshia Mederos, which was previously noted to be asymptomatic. He is a non-smoker and denies any significant alcohol use. No recent change in diet. No recent NSAIDs or steroids. Of note, the patient did get his COVID booster #2 on 4/14 and attributes some of his symptoms to this.     ED course: Patient arrived on noninvasive ventilation, will switch to BiPAP while here due to work of breathing and chest x-ray is obtained showing pulmonary edema pattern, he was treated for suspected flash pulmonary edema as his blood pressure was greater than 633 systolic on arrival.  He was started on a nitro drip with brisk decrease in his blood pressure, this was subsequently stopped. He remained on BiPAP during my examination and states he was feeling improved. O2 saturations 99 to 100%. Tachypnea was noted, tachycardia was noted. There is no hypoxia documented. Labs are significant for an JUAN C with a creatinine of 1.6, glucose of 295, TSH of 4.4 within normal limit free T4, white blood cell count of 23, hemoglobin of 12.1, lactic acid of 2.4 that improved to 2.0, and a BNP of 2599 with a troponin of 0.015. His AST and ALT were 48 and 89 respectively. Subjective (past 24 hours):    Patient seen and evaluated at bedside. Converted to afib overnight. Discussed findings of LHC in LM ostial stenosis as well as LAD ostial stenosis. Awaiting CTS consult for recommendations. Patient unclear if he wants to pursue further intervention. Discussed severity of disease and multiple risk factors given advanced age, ostial disease, HFpEF ands severe AS. Awaiting recommendations from CTS and cardiology. Patient has no other complaints at this time.         Medications:  Reviewed    Infusion Medications    amiodarone 1 mg/min (04/20/22 0316)    Followed by   [facility] amiodarone      sodium chloride      heparin (PORCINE) Infusion 10.4 Units/kg/hr (04/20/22 6464)    dextrose       Scheduled Medications    potassium chloride  20 mEq Oral Daily with breakfast    aspirin  325 mg Oral Once    furosemide  20 mg IntraVENous BID    aspirin  81 mg Oral Daily    sodium chloride flush  5-40 mL IntraVENous 2 times per day    atorvastatin  40 mg Oral Nightly    metoprolol tartrate  50 mg Oral BID    insulin glargine  3 Units SubCUTAneous Nightly    insulin lispro  0-6 Units SubCUTAneous TID     insulin lispro  0-3 Units SubCUTAneous Nightly     PRN Meds: sodium chloride flush, sodium chloride, acetaminophen, metoprolol, heparin (porcine), heparin (porcine), ondansetron **OR** ondansetron, polyethylene glycol, [DISCONTINUED] acetaminophen **OR** acetaminophen, potassium chloride **OR** potassium alternative oral replacement **OR** potassium chloride, magnesium sulfate, glucagon (rDNA), dextrose, dextrose bolus (hypoglycemia) **OR** dextrose bolus (hypoglycemia), glucose      Intake/Output Summary (Last 24 hours) at 4/20/2022 0923  Last data filed at 4/20/2022 0346  Gross per 24 hour   Intake 240 ml   Output 2450 ml   Net -2210 ml       Diet:  ADULT DIET; Regular    Exam:  BP (!) 152/70   Pulse 120   Temp 98.8 °F (37.1 °C) (Oral)   Resp 18   Ht 5' 10\" (1.778 m)   Wt 203 lb 14.8 oz (92.5 kg)   SpO2 93%   BMI 29.26 kg/m²     General appearance: Elderly male who appears in acute distress, well-groomed. On BiPAP  Eyes:  Pupils equal, round, and reactive to light. Conjunctivae/corneas clear. HENT: Head normal in appearance. External nares normal.  Oral mucosa moist without lesions. Hearing grossly intact. Neck: Supple, with full range of motion. Trachea midline. Unable to eval for JVD. Respiratory: Normal respiratory effort with bibasilar Rales, no wheezing or rhonchi. Cardiovascular: Tachycardia with difficulty hearing S1 and 2/murmur due to BiPAP. 1+ bilateral lower extremity edema improved from days prior  Abdomen: Soft, non-tender, non-distended with normal bowel sounds. Musculoskeletal: There is no joint swelling or tenderness. Normal tone. No abnormal movements. Skin: Warm and dry. No rashes or lesions. Neurologic:  No focal sensory/motor deficits in the upper and lower extremities. Cranial nerves:  grossly non-focal 2-12. Psychiatric: Alert and oriented, normal insight and thought content. Capillary Refill: Brisk,< 3 seconds. Peripheral Pulses: +2 palpable, equal bilaterally.        Labs:   Recent Labs     04/19/22  0531   WBC 8.7   HGB 11.6*   HCT 36.9*        Recent Labs 04/18/22  0614 04/19/22  0531 04/20/22  0349    135 136   K 4.0 3.8 4.6   CL 99 101 101   CO2 22* 25 25   BUN 47* 36* 27*   CREATININE 1.4* 1.3* 1.3*   CALCIUM 9.3 8.7 8.9     No results for input(s): AST, ALT, BILIDIR, BILITOT, ALKPHOS in the last 72 hours. Recent Labs     04/17/22  1204 04/19/22  1805   INR 1.18* 1.20*     No results for input(s): Tony Leonard in the last 72 hours. Microbiology:      Urinalysis:      Lab Results   Component Value Date    NITRU NEGATIVE 04/16/2022    WBCUA 0-2 04/16/2022    BACTERIA NONE SEEN 04/16/2022    RBCUA NONE SEEN 04/16/2022    BLOODU NEGATIVE 04/16/2022    SPECGRAV 1.009 04/16/2022    GLUCOSEU neg 06/21/2018       Radiology:  US RENAL COMPLETE   Final Result    No evidence of hydronephrosis. **This report has been created using voice recognition software. It may contain minor errors which are inherent in voice recognition technology. **      Final report electronically signed by Dr. Diana Cuenca on 4/17/2022 8:55 AM      XR CHEST PORTABLE   Final Result   Impression:   No infiltrate or mass. Improvement in reticular densities vs prior. Small effusions. This document has been electronically signed by: Barbara Varela MD on    04/17/2022 04:10 AM      XR CHEST PORTABLE   Final Result   Interstitial opacities throughout both lungs. These have worsened in the left midlung zone and left lung base. This can are present pulmonary edema, infiltrates are not excluded. **This report has been created using voice recognition software. It may contain minor errors which are inherent in voice recognition technology. **      Final report electronically signed by Dr. Diana Cuenca on 4/16/2022 10:24 AM          DVT prophylaxis: [] Lovenox                                 [] SCDs                                 [] SQ Heparin                                 [] Encourage ambulation           [x] Already on Anticoagulation Heparin gtt Code Status: Full Code    PT/OT Eval Status: ordered    Tele:   [x] yes             [] no        Electronically signed by Anu Perez DO PGY-2 on 4/20/2022 at 9:23 AM

## 2022-04-20 NOTE — PROGRESS NOTES
[facility]    Renal Inpatient Follow-up note  4/20/2022 9:24 AM       Pt Name:   Laura Sorenson:   [id number]  Attending:   Nannette Watts MD     Chief Complaint : Jodee Adams is a 80 y.o. male being followed by nephrology for JUAN C and clearance for cath. Interval History :   Patient seen and examined at bedside. No acute distress or complaints. No overnight events. Denies chest pain or shortness of breath or dyspnea on exertion. On RA. Having good urine output. Scheduled Medications :      potassium chloride  20 mEq Oral Daily with breakfast    aspirin  325 mg Oral Once    furosemide  20 mg IntraVENous BID    aspirin  81 mg Oral Daily    sodium chloride flush  5-40 mL IntraVENous 2 times per day    atorvastatin  40 mg Oral Nightly    metoprolol tartrate  50 mg Oral BID    insulin glargine  3 Units SubCUTAneous Nightly    insulin lispro  0-6 Units SubCUTAneous TID WC    insulin lispro  0-3 Units SubCUTAneous Nightly      amiodarone 1 mg/min (04/20/22 0625)    Followed by   [place] amiodarone      sodium chloride      heparin (PORCINE) Infusion 10.4 Units/kg/hr (04/20/22 0344)    dextrose         Vitals :  BP (!) 152/70   Pulse 120   Temp 98.8 °F (37.1 °C) (Oral)   Resp 18   Ht 5' 10\" (1.778 m)   Wt 203 lb 14.8 oz (92.5 kg)   SpO2 93%   BMI 29.26 kg/m²     24HR INTAKE/OUTPUT:      Intake/Output Summary (Last 24 hours) at 4/20/2022 5728  Last data filed at 4/20/2022 0346  Gross per 24 hour   Intake 240 ml   Output 2450 ml   Net -2210 ml        Physical Exam :  General appearance: No apparent distress, appears stated age and cooperative. HEENT: Pupils equal, round, and reactive to light. Conjunctivae/corneas clear. Neck: Supple, with full range of motion. No jugular venous distention. Trachea midline. Respiratory:  Normal respiratory effort. Clear to auscultation, bilaterally without Rales/Wheezes/Rhonchi.   Cardiovascular: Regular rate and rhythm with normal S1/S2 without murmurs, rubs or gallops. Abdomen: Soft, non-tender, non-distended with normal bowel sounds. Musculoskeletal: passive and active ROM x 4 extremities. Skin: Skin color, texture, turgor normal.  No rashes or lesions. Neurologic:  Neurovascularly intact without any focal sensory/motor deficits. Cranial nerves: II-XII intact, grossly non-focal.  Psychiatric: Alert and oriented, thought content appropriate, normal insight  Capillary Refill: Brisk,< 3 seconds   Peripheral Pulses: +2 palpable, equal bilaterally        Last 3 CBC  Recent Labs     04/19/22  0531   WBC 8.7   RBC 4.37*   HGB 11.6*   HCT 36.9*   MCV 84.4        Last 3 CMP  Recent Labs     04/18/22  0614 04/19/22  0531 04/20/22  0349    135 136   K 4.0 3.8 4.6   CL 99 101 101   CO2 22* 25 25   BUN 47* 36* 27*   CREATININE 1.4* 1.3* 1.3*   CALCIUM 9.3 8.7 8.9        Assessment / Plan :  1. Renal -acute kidney injury most likely secondary to uncontrolled hypertension. Cannot rule out a component of cardiorenal.  ? Clinically getting better once the blood pressure is improved his baseline is around 1.1  ? Creatinine is stable today, except rise 48-72hrs after contrast exposure   ? Avoid any further nephrotoxins at this time. ? Renal ultrasound scan is negative for any pathology  ? Urine is negative for any protein  ? Encourage PO intake of liquids   2. Electrolytes -stable   3. Metabolic acidosis has resolved   4. Atrial fibrillation with RVR - on Amiodarone gtt   5. Essential hypertension significantly elevated at presentation now appears to be better  6. Coronary artery disease s/p left ventriculogram on 4/19/22. Meds reviewed and discussed with patient. Ronn Dill DO,   Case was discussed with Dr. Heydi Rothman M.D  [facility].

## 2022-04-20 NOTE — PLAN OF CARE
Problem: Falls - Risk of:  Goal: Absence of physical injury  Description: Absence of physical injury  Note: Patient safety maintained. No falls or injuries to this point in shift. Patient utilizing call light appropriately to make needs known. Will continue to monitor. Problem: Pain  Goal: Verbalizes/displays adequate comfort level or baseline comfort level  Note: Patient able to verbalize pain. Denies pain to this point in shift. Will continue to monitor.

## 2022-04-20 NOTE — H&P
[address] [address]  Sedation/Analgesia History & Physical    Pt Name: Shyam Albarran  Account number: [de-identified]  MRN: [id number]  YOB: 1931  Provider Performing Procedure: Salvatore Alves MD MD  Referring Provider: Mariola Ayala MD   Primary Care Physician: Owen Velásquez, APRN - [facility]  Date: 4/20/2022    PRE-PROCEDURE    Code Status: FULL CODE  Brief History/Pre-Procedure Diagnosis:     · NSTEMI  · CAD  · New onset ischemic cardiomyopathy   · Atrial fibrillation, s/p MIS guided DCCV 4/18/2022. Now with recurrent AF with RVR. On Amiodarone gtt, may need repeat DCCV in AM if AF remains uncontrolled. Continue Heparin gtt and Amiodarone gtt   · Moderate to severe AS      Consent: : I have discussed with the patient and his family (daughter at bedside) risks, benefits, and alternatives (and relevant risks, benefits, and side effects related to alternatives or not receiving care), and likelihood of the success. Patient was seen by CTS, d/w Dr Melissa Nelson, poor CABG candidate and patient refused. The patient and/or representative appear to understand and agree to proceed. The discussion encompasses risks, benefits, and side effects related to the alternatives and the risks related to not receiving the proposed care, treatment, and services. The indication, risks and benefits of the procedure and possible therapeutic consequences and alternatives were discussed with the patient. The patient was given the opportunity to ask questions and to have them answered to his/her satisfaction.  Risks of the procedure include but are not limited to the following: Bleeding, hematoma including retroperitoneal hemmorhage, infection, pain and discomfort, injury to the aorta and other blood vessels, rhythm disturbance, low blood pressure, myocardial infarction, stroke, kidney damage/failure, myocardial perforation, allergic reactions to sedatives/contrast material, loss of pulse/vascular compromise requiring surgery, aneurysm/pseudoaneurysm formation, possible loss of a limb/hand/leg, needing blood transfusion, requiring emergent open heart surgery or emergent coronary intervention, the need for intubation/respiratory support, the requirement for defibrillation/cardioversion, and death. Alternatives to and omission of the suggested procedure were discussed. The patient had no further questions and wished to proceed; the consent form was signed. MEDICAL HISTORY  []ASHD/ANGINA/MI/CHF   []Hypertension  []Diabetes  []Hyperlipidemia  []Smoking  []Family Hx of ASHD  []Additional information:       has a past medical history of Diabetes mellitus ([employer] [facility] [address] Hypertension, and Type II or unspecified type diabetes mellitus without mention of complication, not stated as uncontrolled. SURGICAL HISTORY   has a past surgical history that includes Tonsillectomy and adenoidectomy (Bilateral).   Additional information:       ALLERGIES   Allergies as of 2022 - Fully Reviewed 2022   Allergen Reaction Noted    Norvasc [amlodipine besylate]  2015    Other Rash 2015     Additional information:       MEDICATIONS   Aspirin  [] 81 mg  [] 325 mg  [] None  Antiplatelet drug therapy use last 5 days  []No []Yes  Coumadin Use Last 5 Days []No []Yes  Other anticoagulant use last 5 days  []No []Yes    Current Facility-Administered Medications:     [COMPLETED] amiodarone (NEXTERONE) 150 mg in dextrose 5% 100 ml, 150 mg, IntraVENous, Once, Stopped at 22 0628 **FOLLOWED BY** [] amiodarone (NEXTERONE) 360 mg in dextrose 5% 200 ml, 1 mg/min, IntraVENous, Continuous, Stopped at 22 1237 **FOLLOWED BY** amiodarone (NEXTERONE) 360 mg in dextrose 5% 200 ml, 0.5 mg/min, IntraVENous, Continuous, Toni Benson MD, Last Rate: 16.7 mL/hr at 22 1241, 0.5 mg/min at 22 1241    metoprolol (LOPRESSOR) tablet 100 mg, 100 mg, Oral, BID, Stoney Babinski, PA-C, 100 mg at 22 1007    insulin lispro (HUMALOG) injection vial 0-6 Units, 0-6 Units, SubCUTAneous, TID WC, [place], DO, 1 Units at 04/20/22 1754    insulin lispro (HUMALOG) injection vial 0-3 Units, 0-3 Units, SubCUTAneous, Nightly, [place], DO    ondansetron [facility]) injection 4 mg, 4 mg, IntraVENous, Q6H PRN, [place], DO    aspirin tablet 325 mg, 325 mg, Oral, Once, Candice Anders PA-C    [Held by provider] furosemide (LASIX) injection 20 mg, 20 mg, IntraVENous, BID, Marlen Chang MD, 20 mg at 04/20/22 1008    aspirin EC tablet 81 mg, 81 mg, Oral, Daily, Marlen Chang MD, 81 mg at 04/20/22 1003    sodium chloride flush 0.9 % injection 5-40 mL, 5-40 mL, IntraVENous, 2 times per day, Narendra Collier MD, 10 mL at 04/19/22 2033    sodium chloride flush 0.9 % injection 5-40 mL, 5-40 mL, IntraVENous, PRN, Marlen Chang MD    0.9 % sodium chloride infusion, 25 mL, IntraVENous, PRN, Narendra Collier MD    acetaminophen (TYLENOL) tablet 650 mg, 650 mg, Oral, Q4H PRN, Narendra Collier MD    atorvastatin (LIPITOR) tablet 40 mg, 40 mg, Oral, Nightly, Marlen Chang MD, 40 mg at 04/19/22 2032    metoprolol (LOPRESSOR) injection 5 mg, 5 mg, IntraVENous, Q6H PRN, [place], DO, 5 mg at 04/20/22 0511    heparin (porcine) injection 4,000 Units, 4,000 Units, IntraVENous, PRN, Finomial, DO, 4,000 Units at 04/17/22 1923    heparin (porcine) injection 2,000 Units, 2,000 Units, IntraVENous, PRN, [place], DO, 2,000 Units at 04/20/22 1110    heparin 25,000 unit in sodium chloride 0.45% 250 mL (premix) infusion, 5-30 Units/kg/hr, IntraVENous, Continuous, [place], DO, Last Rate: 11.9 mL/hr at 04/20/22 1113, 12.4 Units/kg/hr at 04/20/22 1113    insulin glargine (LANTUS) injection vial 3 Units, 3 Units, SubCUTAneous, Nightly, Dedrick Gaspar, , 3 Units at 04/19/22 2034    ondansetron (ZOFRAN-ODT) disintegrating tablet 4 mg, 4 mg, Oral, Q8H PRN **OR** [DISCONTINUED] ondansetron (ZOFRAN) injection 4 mg, 4 mg, IntraVENous, Q6H PRN,

## 2022-04-20 NOTE — PROGRESS NOTES
Pt. Alert and oriented x4. PERRL 4-3 mm. S1, S2 heard, sinus tachy, pt. Has hx of afib RVR. Fine crackles heard in RLL. LLL clear. Upper extremities active with no numbness or tingling present, bilaterally. Hand grasp moderate. Capillary refill <3 seconds, Skin turgor <3 seconds. Bowel sounds active in all 4 quadrants. Lower extremities, active with no numbness or tingling present, bilaterally. Edema present, +2, pitting. Pedal pulse weak. Skin dry, warm and intact, appropriate for ethnicity. Pt.'s call light and bedside table within reach. Lilly/RS.

## 2022-04-20 NOTE — CARE COORDINATION
4/20/22, 8:16 AM EDT    DISCHARGE ON 40 [facility] day: 4  Location: [address] [place] [address]-A Reason for admit: Septicemia ([facility] [A41.9]  Flash pulmonary edema ([facility] [J81.0]  Elevated troponin [R77.8]  JUAN C (acute kidney injury) ([facility] [N17.9]  Elevated brain natriuretic peptide (BNP) level [R79.89]  Pneumonia of left lung due to infectious organism, unspecified part of lung [J18.9]  Decompensated heart failure ([facility] [I50.9]   Procedure:   4/18 MIS/CV: Successful MIS- Cardioversion with 300 J x1.   4/19 Cardiac cath:    1. Severe ostial left main stenosis. 2.  Severe stenosis of ostial proximal and mid LAD. 3.  Severe stenosis of the right coronary artery. 4.  Elevated filling pressures, decompensated congestive heart failure. 5.  Non-ST elevation myocardial infarction. 6.  Mild ischemic cardiomyopathy. 7.  History of moderate to severe aortic stenosis. Aortic valve was   crossed during the cath procedure today. Mean pressure gradient was  24mmHg. Low-flow, low-gradient AS was suspected in the past.   8.  Paroxysmal atrial fibrillation. Barriers to Discharge: Hospitalist, Nephrology and Cardiology following. POD #2 MIS/CV, pt is now back in afib. Restarting Amio gtt. Heparin gtt, Lasix 20mg iv bid. [facility] consulted. BUN 27, creatinine 1.3. PCP: EMRE Ambrose CNP  Readmission Risk Score: 14 ( )%  Patient Goals/Plan/Treatment Preferences: Plans home alone. Monitor for needs, therapy recommending HH. SW consulted.

## 2022-04-20 NOTE — CARE COORDINATION
DISCHARGE/PLANNING EVALUATION  4/20/22, 2:16 PM EDT    Reason for Referral: Therapy recommending HH  Mental Status: Answered questions appropriately  Decision Making: Independent  Family/Social/Home Environment: Bulmaro lives at home alone. There is a daughter, Cony Everett in the room at the time of meeting. Current Services including food security, transportation and housekeeping: Patient reports being completely independent. He drives, cooks and cleans all on his own. Patient states that he gets around just fine at home and moving around will be all the exercise he needs. Current Equipment: None  Payment Source:Medicare  Concerns or Barriers to Discharge: Patient refusing home health  Post acute provider list with quality measures, geographic area and applicable managed care information provided. Questions regarding selection process answered: Attempted to offer home health services    Teach Back Method used with Bulmaro regarding care plan and discharge planning. Patient verbalize understanding of the plan of care and contribute to goal setting. Patient goals, treatment preferences and discharge plan: Bulmaro would like to return to home alone. Patient is refusing all services. SW will continue to follow for further needs.     -Social work student Roberta Ribeiro    Electronically signed by Wenceslao Sims.  GISELLE Cuevas on 4/20/2022 at 2:16 PM

## 2022-04-20 NOTE — PROGRESS NOTES
Pt. Return to bed from restroom, tolerated well. Pathway clear, call light and bedside table within reach. Lilly/RAMO.

## 2022-04-20 NOTE — PROGRESS NOTES
Pt. Alert and oriented x4. Lung sounds clear, diminished in lower lobes, bilaterally. No crackles heard. SHollandSN/RSC.

## 2022-04-20 NOTE — PLAN OF CARE
in reach, side-rails up x2, bed/chair alarm utilized, non-slip socks on when ambulating, reminded patient to use call light to call for assistance. Problem: Cardiovascular - Adult  Goal: Maintains optimal cardiac output and hemodynamic stability  Outcome: Progressing  Flowsheets (Taken 4/20/2022 1800)  Maintains optimal cardiac output and hemodynamic stability: Monitor blood pressure and heart rate  Note: Ongoing assessment & interventions provided throughout shift. Patient on continuous telemetry monitoring, heart tones, vitals & pulses checked at least 3 times per shift & PRN. Vitals within acceptable limits. Peripheral pulses palpable. Problem: Cardiovascular - Adult  Goal: Absence of cardiac dysrhythmias or at baseline  Outcome: Progressing     Problem: Infection - Adult  Goal: Absence of infection during hospitalization  Outcome: Progressing  Flowsheets (Taken 4/20/2022 1800)  Absence of infection during hospitalization: Assess and monitor for signs and symptoms of infection  Note: Afebrile so far this shift. Problem: Metabolic/Fluid and Electrolytes - Adult  Goal: Electrolytes maintained within normal limits  Outcome: Progressing     Problem: Metabolic/Fluid and Electrolytes - Adult  Goal: Hemodynamic stability and optimal renal function maintained  Outcome: Progressing     Problem: Metabolic/Fluid and Electrolytes - Adult  Goal: Glucose maintained within prescribed range  Outcome: Progressing  Note: Patient's glucose was checked routinely this shift. Care plan reviewed with patient. Patient verbalizes understanding of the care plan and contributed to goal setting.

## 2022-04-21 ENCOUNTER — APPOINTMENT (OUTPATIENT)
Dept: GENERAL RADIOLOGY | Age: 87
DRG: 246 | End: 2022-04-21
Payer: MEDICARE

## 2022-04-21 ENCOUNTER — APPOINTMENT (OUTPATIENT)
Dept: CARDIAC CATH/INVASIVE PROCEDURES | Age: 87
DRG: 246 | End: 2022-04-21
Payer: MEDICARE

## 2022-04-21 LAB
ACTIVATED CLOTTING TIME: 267 SECONDS (ref 1–150)
ANION GAP SERPL CALCULATED.3IONS-SCNC: 15 MEQ/L (ref 8–16)
BUN BLDV-MCNC: 27 MG/DL (ref 7–22)
CALCIUM SERPL-MCNC: 9.1 MG/DL (ref 8.5–10.5)
CHLORIDE BLD-SCNC: 100 MEQ/L (ref 98–111)
CO2: 20 MEQ/L (ref 23–33)
CREAT SERPL-MCNC: 1.3 MG/DL (ref 0.4–1.2)
EKG ATRIAL RATE: 57 BPM
EKG P AXIS: 70 DEGREES
EKG P-R INTERVAL: 206 MS
EKG Q-T INTERVAL: 358 MS
EKG Q-T INTERVAL: 378 MS
EKG Q-T INTERVAL: 496 MS
EKG QRS DURATION: 126 MS
EKG QRS DURATION: 128 MS
EKG QRS DURATION: 128 MS
EKG QTC CALCULATION (BAZETT): 482 MS
EKG QTC CALCULATION (BAZETT): 505 MS
EKG QTC CALCULATION (BAZETT): 515 MS
EKG R AXIS: -52 DEGREES
EKG R AXIS: -52 DEGREES
EKG R AXIS: -54 DEGREES
EKG T AXIS: -34 DEGREES
EKG T AXIS: 100 DEGREES
EKG T AXIS: 109 DEGREES
EKG VENTRICULAR RATE: 112 BPM
EKG VENTRICULAR RATE: 120 BPM
EKG VENTRICULAR RATE: 57 BPM
ERYTHROCYTE [DISTWIDTH] IN BLOOD BY AUTOMATED COUNT: 13.2 % (ref 11.5–14.5)
ERYTHROCYTE [DISTWIDTH] IN BLOOD BY AUTOMATED COUNT: 40.2 FL (ref 35–45)
GFR SERPL CREATININE-BSD FRML MDRD: 52 ML/MIN/1.73M2
GLUCOSE BLD-MCNC: 154 MG/DL (ref 70–108)
GLUCOSE BLD-MCNC: 163 MG/DL (ref 70–108)
GLUCOSE BLD-MCNC: 176 MG/DL (ref 70–108)
GLUCOSE BLD-MCNC: 304 MG/DL (ref 70–108)
HCT VFR BLD CALC: 38.2 % (ref 42–52)
HEMOGLOBIN: 12.3 GM/DL (ref 14–18)
HEPARIN UNFRACTIONATED: 0.3 U/ML (ref 0.3–0.7)
HEPARIN UNFRACTIONATED: 0.35 U/ML (ref 0.3–0.7)
HEPARIN UNFRACTIONATED: 0.44 U/ML (ref 0.3–0.7)
MAGNESIUM: 1.9 MG/DL (ref 1.6–2.4)
MCH RBC QN AUTO: 27 PG (ref 26–33)
MCHC RBC AUTO-ENTMCNC: 32.2 GM/DL (ref 32.2–35.5)
MCV RBC AUTO: 84 FL (ref 80–94)
PLATELET # BLD: 168 THOU/MM3 (ref 130–400)
PMV BLD AUTO: 13.3 FL (ref 9.4–12.4)
POTASSIUM SERPL-SCNC: 3.9 MEQ/L (ref 3.5–5.2)
RBC # BLD: 4.55 MILL/MM3 (ref 4.7–6.1)
SODIUM BLD-SCNC: 135 MEQ/L (ref 135–145)
WBC # BLD: 11.7 THOU/MM3 (ref 4.8–10.8)

## 2022-04-21 PROCEDURE — 2580000003 HC RX 258: Performed by: INTERNAL MEDICINE

## 2022-04-21 PROCEDURE — 80048 BASIC METABOLIC PNL TOTAL CA: CPT

## 2022-04-21 PROCEDURE — C9600 PERC DRUG-EL COR STENT SING: HCPCS

## 2022-04-21 PROCEDURE — 2500000003 HC RX 250 WO HCPCS: Performed by: INTERNAL MEDICINE

## 2022-04-21 PROCEDURE — 6360000002 HC RX W HCPCS: Performed by: INTERNAL MEDICINE

## 2022-04-21 PROCEDURE — 83735 ASSAY OF MAGNESIUM: CPT

## 2022-04-21 PROCEDURE — 36415 COLL VENOUS BLD VENIPUNCTURE: CPT

## 2022-04-21 PROCEDURE — 6370000000 HC RX 637 (ALT 250 FOR IP): Performed by: INTERNAL MEDICINE

## 2022-04-21 PROCEDURE — C1887 CATHETER, GUIDING: HCPCS

## 2022-04-21 PROCEDURE — 85347 COAGULATION TIME ACTIVATED: CPT

## 2022-04-21 PROCEDURE — C1760 CLOSURE DEV, VASC: HCPCS

## 2022-04-21 PROCEDURE — C1894 INTRO/SHEATH, NON-LASER: HCPCS

## 2022-04-21 PROCEDURE — 85520 HEPARIN ASSAY: CPT

## 2022-04-21 PROCEDURE — 4A023N7 MEASUREMENT OF CARDIAC SAMPLING AND PRESSURE, LEFT HEART, PERCUTANEOUS APPROACH: ICD-10-PCS | Performed by: INTERNAL MEDICINE

## 2022-04-21 PROCEDURE — C1874 STENT, COATED/COV W/DEL SYS: HCPCS

## 2022-04-21 PROCEDURE — 6360000004 HC RX CONTRAST MEDICATION: Performed by: INTERNAL MEDICINE

## 2022-04-21 PROCEDURE — 6370000000 HC RX 637 (ALT 250 FOR IP): Performed by: STUDENT IN AN ORGANIZED HEALTH CARE EDUCATION/TRAINING PROGRAM

## 2022-04-21 PROCEDURE — 92928 PRQ TCAT PLMT NTRAC ST 1 LES: CPT | Performed by: INTERNAL MEDICINE

## 2022-04-21 PROCEDURE — 99232 SBSQ HOSP IP/OBS MODERATE 35: CPT | Performed by: INTERNAL MEDICINE

## 2022-04-21 PROCEDURE — 82948 REAGENT STRIP/BLOOD GLUCOSE: CPT

## 2022-04-21 PROCEDURE — 2500000003 HC RX 250 WO HCPCS

## 2022-04-21 PROCEDURE — 92960 CARDIOVERSION ELECTRIC EXT: CPT | Performed by: INTERNAL MEDICINE

## 2022-04-21 PROCEDURE — C1725 CATH, TRANSLUMIN NON-LASER: HCPCS

## 2022-04-21 PROCEDURE — 2140000000 HC CCU INTERMEDIATE R&B

## 2022-04-21 PROCEDURE — 6360000002 HC RX W HCPCS

## 2022-04-21 PROCEDURE — 027135Z DILATION OF CORONARY ARTERY, TWO ARTERIES WITH TWO DRUG-ELUTING INTRALUMINAL DEVICES, PERCUTANEOUS APPROACH: ICD-10-PCS | Performed by: INTERNAL MEDICINE

## 2022-04-21 PROCEDURE — C1769 GUIDE WIRE: HCPCS

## 2022-04-21 PROCEDURE — 99232 SBSQ HOSP IP/OBS MODERATE 35: CPT | Performed by: PHYSICIAN ASSISTANT

## 2022-04-21 PROCEDURE — 93005 ELECTROCARDIOGRAM TRACING: CPT | Performed by: PHYSICIAN ASSISTANT

## 2022-04-21 PROCEDURE — 6370000000 HC RX 637 (ALT 250 FOR IP): Performed by: PHYSICIAN ASSISTANT

## 2022-04-21 PROCEDURE — 6370000000 HC RX 637 (ALT 250 FOR IP)

## 2022-04-21 PROCEDURE — B2111ZZ FLUOROSCOPY OF MULTIPLE CORONARY ARTERIES USING LOW OSMOLAR CONTRAST: ICD-10-PCS | Performed by: INTERNAL MEDICINE

## 2022-04-21 PROCEDURE — 85027 COMPLETE CBC AUTOMATED: CPT

## 2022-04-21 PROCEDURE — 93005 ELECTROCARDIOGRAM TRACING: CPT | Performed by: INTERNAL MEDICINE

## 2022-04-21 PROCEDURE — 71045 X-RAY EXAM CHEST 1 VIEW: CPT

## 2022-04-21 PROCEDURE — 99233 SBSQ HOSP IP/OBS HIGH 50: CPT | Performed by: INTERNAL MEDICINE

## 2022-04-21 RX ORDER — SODIUM CHLORIDE 0.9 % (FLUSH) 0.9 %
5-40 SYRINGE (ML) INJECTION PRN
Status: DISCONTINUED | OUTPATIENT
Start: 2022-04-21 | End: 2022-04-21 | Stop reason: ALTCHOICE

## 2022-04-21 RX ORDER — SODIUM CHLORIDE 0.9 % (FLUSH) 0.9 %
5-40 SYRINGE (ML) INJECTION EVERY 12 HOURS SCHEDULED
Status: DISCONTINUED | OUTPATIENT
Start: 2022-04-21 | End: 2022-04-23 | Stop reason: HOSPADM

## 2022-04-21 RX ORDER — SODIUM CHLORIDE 9 MG/ML
INJECTION, SOLUTION INTRAVENOUS CONTINUOUS
Status: ACTIVE | OUTPATIENT
Start: 2022-04-21 | End: 2022-04-21

## 2022-04-21 RX ORDER — AMIODARONE HYDROCHLORIDE 200 MG/1
200 TABLET ORAL DAILY
Status: DISCONTINUED | OUTPATIENT
Start: 2022-04-21 | End: 2022-04-23 | Stop reason: HOSPADM

## 2022-04-21 RX ORDER — DEXTROSE AND SODIUM CHLORIDE 5; .45 G/100ML; G/100ML
INJECTION, SOLUTION INTRAVENOUS CONTINUOUS
Status: DISCONTINUED | OUTPATIENT
Start: 2022-04-21 | End: 2022-04-22

## 2022-04-21 RX ORDER — SODIUM CHLORIDE 0.9 % (FLUSH) 0.9 %
5-40 SYRINGE (ML) INJECTION EVERY 12 HOURS SCHEDULED
Status: DISCONTINUED | OUTPATIENT
Start: 2022-04-21 | End: 2022-04-21 | Stop reason: ALTCHOICE

## 2022-04-21 RX ORDER — SODIUM CHLORIDE 9 MG/ML
INJECTION, SOLUTION INTRAVENOUS PRN
Status: DISCONTINUED | OUTPATIENT
Start: 2022-04-21 | End: 2022-04-21 | Stop reason: ALTCHOICE

## 2022-04-21 RX ORDER — SODIUM CHLORIDE 9 MG/ML
INJECTION, SOLUTION INTRAVENOUS PRN
Status: DISCONTINUED | OUTPATIENT
Start: 2022-04-21 | End: 2022-04-23 | Stop reason: HOSPADM

## 2022-04-21 RX ORDER — ACETAMINOPHEN 325 MG/1
650 TABLET ORAL EVERY 4 HOURS PRN
Status: DISCONTINUED | OUTPATIENT
Start: 2022-04-21 | End: 2022-04-21 | Stop reason: SDUPTHER

## 2022-04-21 RX ORDER — CLOPIDOGREL BISULFATE 75 MG/1
75 TABLET ORAL DAILY
Status: DISCONTINUED | OUTPATIENT
Start: 2022-04-22 | End: 2022-04-23 | Stop reason: HOSPADM

## 2022-04-21 RX ORDER — SODIUM CHLORIDE 0.9 % (FLUSH) 0.9 %
5-40 SYRINGE (ML) INJECTION PRN
Status: DISCONTINUED | OUTPATIENT
Start: 2022-04-21 | End: 2022-04-23 | Stop reason: HOSPADM

## 2022-04-21 RX ORDER — FUROSEMIDE 10 MG/ML
20 INJECTION INTRAMUSCULAR; INTRAVENOUS DAILY
Status: DISCONTINUED | OUTPATIENT
Start: 2022-04-21 | End: 2022-04-23 | Stop reason: HOSPADM

## 2022-04-21 RX ADMIN — AMIODARONE HYDROCHLORIDE 0.5 MG/MIN: 1.8 INJECTION, SOLUTION INTRAVENOUS at 01:31

## 2022-04-21 RX ADMIN — METOPROLOL TARTRATE 100 MG: 50 TABLET, FILM COATED ORAL at 08:50

## 2022-04-21 RX ADMIN — AMIODARONE HYDROCHLORIDE 200 MG: 200 TABLET ORAL at 14:42

## 2022-04-21 RX ADMIN — INSULIN LISPRO 2 UNITS: 100 INJECTION, SOLUTION INTRAVENOUS; SUBCUTANEOUS at 20:12

## 2022-04-21 RX ADMIN — INSULIN GLARGINE 3 UNITS: 100 INJECTION, SOLUTION SUBCUTANEOUS at 20:11

## 2022-04-21 RX ADMIN — SODIUM CHLORIDE: 9 INJECTION, SOLUTION INTRAVENOUS at 08:52

## 2022-04-21 RX ADMIN — ASPIRIN 81 MG: 81 TABLET, COATED ORAL at 08:50

## 2022-04-21 RX ADMIN — SODIUM CHLORIDE, PRESERVATIVE FREE 10 ML: 5 INJECTION INTRAVENOUS at 20:11

## 2022-04-21 RX ADMIN — ATORVASTATIN CALCIUM 40 MG: 40 TABLET, FILM COATED ORAL at 20:11

## 2022-04-21 RX ADMIN — IOPAMIDOL 115 ML: 755 INJECTION, SOLUTION INTRAVENOUS at 11:05

## 2022-04-21 RX ADMIN — AMIODARONE HYDROCHLORIDE 0.5 MG/MIN: 1.8 INJECTION, SOLUTION INTRAVENOUS at 14:44

## 2022-04-21 RX ADMIN — METOPROLOL TARTRATE 100 MG: 50 TABLET, FILM COATED ORAL at 20:10

## 2022-04-21 RX ADMIN — HEPARIN SODIUM 12.4 UNITS/KG/HR: 10000 INJECTION, SOLUTION INTRAVENOUS at 23:42

## 2022-04-21 RX ADMIN — ACETAMINOPHEN 650 MG: 325 TABLET ORAL at 20:11

## 2022-04-21 RX ADMIN — FUROSEMIDE 20 MG: 10 INJECTION, SOLUTION INTRAMUSCULAR; INTRAVENOUS at 14:43

## 2022-04-21 RX ADMIN — DEXTROSE AND SODIUM CHLORIDE: 5; 450 INJECTION, SOLUTION INTRAVENOUS at 08:50

## 2022-04-21 ASSESSMENT — PAIN SCALES - GENERAL
PAINLEVEL_OUTOF10: 3
PAINLEVEL_OUTOF10: 0
PAINLEVEL_OUTOF10: 0
PAINLEVEL_OUTOF10: 3

## 2022-04-21 ASSESSMENT — PAIN DESCRIPTION - DESCRIPTORS: DESCRIPTORS: ACHING;DISCOMFORT

## 2022-04-21 ASSESSMENT — PAIN DESCRIPTION - LOCATION
LOCATION: BACK

## 2022-04-21 ASSESSMENT — PAIN DESCRIPTION - FREQUENCY: FREQUENCY: INTERMITTENT

## 2022-04-21 ASSESSMENT — PAIN DESCRIPTION - PAIN TYPE: TYPE: CHRONIC PAIN

## 2022-04-21 ASSESSMENT — PAIN DESCRIPTION - ORIENTATION: ORIENTATION: MID

## 2022-04-21 NOTE — PROGRESS NOTES
Request to evaluate drug pricing for eliquis.   Patient has not prescription coverage so his cost would be $520/month  Milly Barrera R.Ph., BCPS., 4/21/2022,9:26 AM

## 2022-04-21 NOTE — PROGRESS NOTES
Cardiology Progress Note      Patient:  Haleigh Martel  YOB: 1931  MRN: [id number]   Acct: [de-identified]  Admit Date:  4/16/2022  Primary Cardiologist: Roz Moe MD    Note per dr Rachana Thibodeaux for Consultation:  Pulmonary edema        History Of Present Illness:    80 y.o. pleasant male c hx of Mod-severe AS, DM, HTN who presented to the hospital with complaints of shortness of breath and found to be in pulmonary edema. Patient ZAHEER in 12/2021 was 0.9 cm2 with mean gradient of 27mm Hg. He followed up with Dr. Wilfrid Haddad but patient wanted to monitor for symptoms. Patient apparently felt like he couldn't breath as if he had a polyp in his nose, thought this started after getting a booster COVID19 short. He denies any chest pains or shortness of breath. He was placed on BiPAP, his BP was elevated >200. Currently he is much better, received diuretics. He underwent Echo which shows a drop in EF   While in the hospital he was noted to have new onset of afib\"    Subjective (Events in last 24 hours):  Pt awake and alert. NAD.  No cp or sob  On RA  On amio gtt and heparin gtt    Net I/o -2.5 L    Objective:   BP (!) 142/75   Pulse 56   Temp 98 °F (36.7 °C) (Oral)   Resp 16   Ht 5' 10\" (1.778 m)   Wt 203 lb 14.8 oz (92.5 kg)   SpO2 94%   BMI 29.26 kg/m²        TELEMETRY: afib90-low 100s    Physical Exam:  General Appearance: alert and oriented to person, place and time, in no acute distress  Cardiovascular: irregularly irregular, +murmur  Pulmonary/Chest: right basilar crackles  Abdomen: soft, non-tender, non-distended, normal bowel sounds, no masses Extremities: no cyanosis, clubbing or edema, pulse   Skin: warm and dry  Head: normocephalic and atraumatic  Eyes: pupils equal, round, and reactive to light  Neck: supple and non-tender without mass, no thyromegaly   Neurological: alert, oriented, normal speech, no focal findings or movement disorder noted  Right wrist - no hematoma, +2 radial pulse    Medications:    metoprolol tartrate  100 mg Oral BID    insulin lispro  0-6 Units SubCUTAneous TID WC    insulin lispro  0-3 Units SubCUTAneous Nightly    aspirin  325 mg Oral Once    [Held by provider] furosemide  20 mg IntraVENous BID    aspirin  81 mg Oral Daily    sodium chloride flush  5-40 mL IntraVENous 2 times per day    atorvastatin  40 mg Oral Nightly    insulin glargine  3 Units SubCUTAneous Nightly      dextrose 5 % and 0.45 % NaCl      amiodarone 0.5 mg/min (04/21/22 0131)    sodium chloride      heparin (PORCINE) Infusion 12.4 Units/kg/hr (04/20/22 1927)    dextrose       ondansetron, 4 mg, Q6H PRN  sodium chloride flush, 5-40 mL, PRN  sodium chloride, 25 mL, PRN  acetaminophen, 650 mg, Q4H PRN  metoprolol, 5 mg, Q6H PRN  heparin (porcine), 4,000 Units, PRN  heparin (porcine), 2,000 Units, PRN  ondansetron, 4 mg, Q8H PRN  polyethylene glycol, 17 g, Daily PRN  acetaminophen, 650 mg, Q6H PRN  potassium chloride, 40 mEq, PRN   Or  potassium alternative oral replacement, 40 mEq, PRN   Or  potassium chloride, 10 mEq, PRN  magnesium sulfate, 2,000 mg, PRN  glucagon (rDNA), 1 mg, PRN  dextrose, 100 mL/hr, PRN  dextrose bolus (hypoglycemia), 125 mL, PRN   Or  dextrose bolus (hypoglycemia), 250 mL, PRN  glucose, 4 tablet, PRN        Diagnostics:  TTE 4/18/22  Summary   No intra cardiac mass or thrombus. No embolic source. No Intracardiac shunt   Left ventricle size is normal.   Normal left ventricle wall thickness. There was moderate global hypokinesis of the left ventricle. Systolic function was moderately reduced. Ejection fraction is visually estimated in the range of 40% to 45%.    Left atrium moderately dilated   There is moderate-to-severe aortic stenosis with valve area of 1 to 1.1 sq   cm by Planimetry      Signature      ----------------------------------------------------------------   Electronically signed by Caesar Parra MD (Interpreting   physician) on 04/18/2022 at 07:27 PM    Cath 4/19/22  FINDINGS:  HEMODYNAMICS AND RIGHT HEART CATHETERIZATION:  Pulmonary arterial oxygen  saturation 66%. Pulmonary arterial pressure 53/29. Mean pulmonary  arterial pressure 39 mmHg. Pulmonary capillary wedge pressure 35 mmHg. Right ventricular pressure 57/14. Right atrial pressure 90 mmHg. Left  ventricular end-diastolic pressure was 29 mmHg. Upon pullback across  the aortic valve, the mean pressure gradient was measured at 24 mmHg.     LEFT VENTRICULOGRAM:  Mild global hypokinesis. Ejection fraction  estimated at 45% to 50%.     CORONARY ANGIOGRAM:  1. Right coronary artery:  Proximal RCA has 10% stenosis. Mid RCA is  patent. Distal RCA has 80% to 90% stenosis. RCA is dominant vessel,  bifurcates into RPL and RPDA. RPL has moderate diffuse disease. RPL  has 30% stenosis. 2.  Left main coronary artery:  Ostial left main coronary artery has 50%  stenosis. Gives rise to LAD, ramus intermedius and left circumflex  arteries. 3.  Ramus intermedius:  Mild luminal irregularities, otherwise patent. 4.  Left circumflex artery:  Proximal LCX is patent. Distal left  circumflex artery has 30% to 40% stenosis. 5.  Left anterior descending artery:  Ostial to proximal LAD has 90% to  95% stenosis. Mid LAD has 70% to 80% stenosis. Distal LAD has mild  diffuse disease.     MEDICATIONS:  See MAR.     COMPLICATIONS:  None.     ESTIMATED BLOOD LOSS:  Less than 50 mL.     ACCESS:  Right radial artery access. Vasc Band was applied. Hemostasis  was achieved.     IMPRESSION:  1. Severe ostial left main stenosis. 2.  Severe stenosis of ostial proximal and mid LAD. 3.  Severe stenosis of the right coronary artery. 4.  Elevated filling pressures, decompensated congestive heart failure. 5.  Non-ST elevation myocardial infarction. 6.  Mild ischemic cardiomyopathy. 7.  History of moderate to severe aortic stenosis. Aortic valve was  crossed during the cath procedure today.   Mean pressure gradient was 24  mmHg. Low-flow, low-gradient AS was suspected in the past.  8.  Paroxysmal atrial fibrillation. The patient is status post  cardioversion on 04/18/2022, now in sinus rhythm.     RECOMMENDATIONS AND PLAN OF CARE:  1.  Bedrest for the next four hours. Access site care. 2.  Medical therapy and aggressive risk factor modification for CAD. 3.  Aspirin 81 mg p.o. daily. 4.  High-intensity statin therapy. 5.  Continue to monitor the patient on telemetry. 6.  May resume heparin drip without initial bolus four hours after Vasc  Band is removed and hemostasis is achieved. 7.  Need to optimize volume status. IV diuretics to be resumed. Maintain negative fluid balance. 8.  Monitor intake and output. Daily weight and daily basic metabolic  panel. 9.  Coronary angiogram findings as listed above. The patient was  previously evaluated for TAVR. He has history of moderate to severe  aortic stenosis. Mean gradient across the aortic valve during cath  procedure today was 24 mmHg. Low-flow, low-gradient status has been  suspected in the past.  Aortic valve area by planimetry on MIS done on  04/18/2022 was 1 to 1.1 cm2. Consult Cardiovascular Surgery. Heart  team discussion, will need revascularization prior to discharge. Findings and plan of care discussed with the patient's family. They are  agreeable to the plan.           Arnie Hernandez MD    Lab Data:    Cardiac Enzymes:  No results for input(s): CKTOTAL, CKMB, CKMBINDEX, TROPONINI in the last 72 hours.     CBC:   Lab Results   Component Value Date    WBC 11.7 04/21/2022    RBC 4.55 04/21/2022    HGB 12.3 04/21/2022    HCT 38.2 04/21/2022     04/21/2022       CMP:    Lab Results   Component Value Date     04/21/2022    K 3.9 04/21/2022    K 3.7 04/16/2022     04/21/2022    CO2 20 04/21/2022    BUN 27 04/21/2022    CREATININE 1.3 04/21/2022    LABGLOM 52 04/21/2022    GLUCOSE 154 04/21/2022    CALCIUM 9.1 04/21/2022 Hepatic Function Panel:    Lab Results   Component Value Date    ALKPHOS 62 04/17/2022    ALT 67 04/17/2022    AST 28 04/17/2022    PROT 6.9 04/17/2022    BILITOT 0.7 04/17/2022    BILIDIR 0.3 04/17/2022    LABALBU 3.5 04/17/2022       Magnesium:    Lab Results   Component Value Date    MG 1.9 04/21/2022       PT/INR:    Lab Results   Component Value Date    INR 1.20 04/19/2022       HgBA1c:    Lab Results   Component Value Date    LABA1C 6.8 04/16/2022       FLP:    Lab Results   Component Value Date    TRIG 100 04/19/2022    HDL 39 04/19/2022    LDLCALC 90 04/19/2022       TSH:    Lab Results   Component Value Date    TSH 2.790 04/16/2022         Assessment:    Acute hypoxic resp failure - resolved  Acute systolic CHF - improved  Acute pulmonary edema - improved  New LVD - ef 40-45 per MIS 4/18/22  Mod to severe AS - ZAHEER 1-1.1 cm2  New onset afib   S/p MIS/CV to NSR 4/18/22   Back in afib rvr 4/20/22 - amio bolus and gtt ordered    Elevated troponins/NSTEMI  S/p cath 4/19/22  · Severe RCA stenosis   · Severe ostial/proximal LAD stenosis  · Severe ostial LM stenosis (confirmed by IVUS)  · H/o moderate to severe AS (AV was crossed during cath procedure today, mean gradient 24 mmHg.  LFLG was suspected in the past)      · Elevated filling pressures, decompensated CHF  · NSTEMI  · Mild ischemic cardiomyopathy   · PAF, s/p DCCV on 4/18/2022, now in SR     HTN  HLD  DM  JUAN C/CKD - improved - neprho following      Plan:    Daily I/o and weights  2 liter fluid restriction and 2gm sodium diet  Normal TSH  Keep mag >2 and K >4  Daily BMP  Cont asa/BB/statin  Cont heparin gtt  Will need [address] upon discharge - start eliquis or xarelto upon dc   Pt understands risk of bleed and accepts risk to reduce risk of embolic phenomenon   Avoid nitrates  Cont amio gtt  Start ace/arb once ok with nephro  [facility] consulted, needs heart team discussion, and will need revascularization prior to discharge - note per dr Darell Rivas the patient's advanced age and relatively poor quality of LAD target, he is a high-risk candidate for CABG/AVR/maze. Further, he explicitly refuses open heart sugery. Case discussed also with Dr. Bernell Nissen PCI followed by TAVR. Patient understands and agrees. \"  Npo  PCI and cardioversion today with dr Sindy Norris to po amio soon     Electronically signed by Emilia Odom PA-C on 4/21/2022 at 7:50 AM

## 2022-04-21 NOTE — PROGRESS NOTES
[facility]  PHYSICAL THERAPY MISSED TREATMENT NOTE  [facility]    Date: 2022  Patient Name: Brooke Serra        MRN: [id number]   : 1931  (80 y.o.)  Gender: male   Referring Practitioner: Hyun Menezes DO  Diagnosis: septicemia         REASON FOR MISSED TREATMENT:  Attempted to see patient before lunch and was having test in room done at time. Attempted again after lunch and RN reports that patient needed to lay flat for 4 hours following heart cath procedure that was done earlier. Will try back next treatment date. Elvis Hedges Tildon Hashimoto PTA [id number]

## 2022-04-21 NOTE — BRIEF OP NOTE
[facility]  Sedation/Analgesia Post Sedation Record    Pt Name: Patricio Storm  Account number: [de-identified]  MRN: [id number]  YOB: 1931  Procedure Performed By: Avril Diane MD MD   Primary Care Physician: EMRE Salmeron - STEPH  Date: 4/21/2022    POST-PROCEDURE    Physicians/Assistants: Avril Diane MD MD     Procedure Performed:Cath/ PCI      Sedation/Anesthesia: Versed/ Fentanyl and 2% xylocaine local anesthesia. Estimated Blood Loss: < 50 ml. Specimens Removed: None         Disposition of Specimen: N/A        Complications: No Immediate Complications. Post-procedure Diagnosis/Findings:        S/p successful PCI/TARYN of LAD    S/p successful PCI/TARYN of LM   S/p successful DCCV, now in SR    Recommendations:  Bed rest for 4 hours post procedure   Monitor on Telemetry   Optimize medical therapy for Coronary Artery Disease  Aggressive risk factor modification   Access site care  Antiplatelet therapy: ASA 81 mg PO daily and Plavix 75 mg po daily (will eventually stop ASA after RCA PCI)   Needs [facility] for A Fib. May start Eliquis 2.5 mg po BID tomorrow in AM   Start Amiodarone 200 mg po daily  Stop Amiodarone gtt tonight at 8 pm   Resume Heparin gtt without initial bolus at 5pm today, if access site looks ok/no bleeding complications occur   High intensity statin therapy  AM Labs: BMP, CBC  May discharge home in AM from cardiology standpoint, if condition remains stable   Refer to CHF clinic  Cardiac rehab   Staged PCI of RCA in two weeks post discharge      Above findings and plan of care were discussed with patient and his daughter, questions were answered, agreeable with plan.        Avril Diane MD, Uriel Gross   Electronically signed 4/21/2022 at 11:18 AM  Interventional Cardiology

## 2022-04-21 NOTE — PROGRESS NOTES
[facility]  PHYSICAL THERAPY MISSED TREATMENT NOTE  [facility]    Date: 2022  Patient Name: Yuko Fung        MRN: [id number]   : 1931  (80 y.o.)  Gender: male   Referring Practitioner: Guerline Mckinney DO  Diagnosis: septicemia         REASON FOR MISSED TREATMENT:  Patient refused treatment. Attempted patient this am for PT session, refused despite encouragement from PTA. States feeling like movement is making is A-fib act up and doesn't want to do anything until cleared by his physician. Will try back later if time permits    Mar Pereira PTA [id number]

## 2022-04-21 NOTE — PROGRESS NOTES
Hospitalist Progress Note      Patient:  Rae Hurst    ETQU/TXN:[id number]  YOB: 1931  MRN: [id number]   Acct: [de-identified]     PCP: EMRE Kyle CNP  Date of Admission: 4/16/2022    Assessment and Plan:    1. Acute Decompensated Diastolic HF NYHA class III. suspect 2/2 severe aortic stenosis. Now symptomatic. LVH noted on TTE  AS and G2DD per 12/21. Repeat TTE shows ZAHEER 1.0 w/ MG 20-25 EF 40-45 (now mid-range EF drop down from 60% 4 months ago) Suspect low flow/low gradient. May consider stress echo to confirm if needed on discharge. Of note he did get the COVID-vaccine booster #2 on 4/14, so may be consideration for myopericardial disease  -Will change metoprolol tartrate to succinate pending dose titration  -Strict I and O, fluid restrict, low NA diet  -Cardiology following.   -Gentle Lasix 20mg BID started 4/19/22 by cardiology  -BiPAP support (pulmonary congestion). -CHF clinic f/u  -Post [facility] + PCI 4/21/22  2. Paroxysmal Atrial Fibrillation: NSR in ED s/p MIS/DCCV 4/18/22. No previously documented episodes in Epic. CHADS2-VASc 5 HAS-BLED 3. Rate controlled on bisoprolol. Not on [address] [place] [address] Road as outpatient. Converted back to afib on 4/20. DCCV 4/21/22  -Continue Heparin gtt   -restart Eliquis prior to discharge  -Increased Metoprolol tartrate 100mg BID  -plan to transition to succinate prior to discharge  -Amiodarone bolus and gtt 4/20/22  -Transition to PO on discharge  3. NSTEMI/Obstructive multivessel CAD: Noted on [facility] 4/19/22. EKG showsedknown LVH however there is repull abnormality with Twave inversions aVL and I. tpn trended flat. Severe ostial stenosis LM, LAD and multiple stenotic lesions on RCA. LFLG noted on LVgram. Patient refused CABG and SAVR. Underwent PCI LAD LM 4/21/22   -Evaluation by heart team  -DAPT on discharge  -Staged PCI of RCA 2 weeks post discharge  4.  Aortic Stenosis, Severe (LFLG) (ZAHEER 0.9, max gradient 47, mean 27, peak veloc 3.5)  -Avoid nitrates -Patient had drop in BP with nitro IV in ED  -Cardiology following consult.   -F/up echo, may need LHC and further eval for TAVR as his presentation may be related to symptomatic AS  5. Recent Covid Booster #2 on 4/14/22  6. NIDDM2, with hyperglycemia: HbA1c 6.8 on arrival. on metformin PTA, hold. Glucose 295 on arrival, no prior steroids. -SSI, dm diet, hypoglycemia protocol.   -Consider farxiga on discharge given midrange EF  7. HTN (with accelerated HTN on arrival): on lisinopril, bisoprolol, and HCTZ pta. Favor HTN urgency 2/2 anxiety and SOB rather than hypertensive emergency. -Restart antihypertensives with JUAN C. 8. BPH: not on any meds pta. F/up renal US  9. Leukocytosis / Suspected CAP (resolved): Bibasilar infiltrates may be related to prior scarring, atelectasis, pulmonary edema, or pneumonia. However he does have a elevated white count but denies any fevers. Mildly elevated procal. WBC tredning down. Does endorse rhinorrhea chronically. Received 4d Rocephin and 3d azithro, culture resp, blood cultures WNL   10. Stage 1 JUAN C (resolved): Cr 1.6 on arrival (baseline 1.1) Suspect CRS type I in context of acute HFrEF. Chronic HCTZ and lisinopril use. UA shows no hydronephrosis or postobstructive etiology  -Cr stable following LHC (1.3)  4/19/22   -Nephrology following  -s/p LHC  -Will hold HCTZ, lisinopril + metformin  -Monitor I/O, avoid hypotension  11. Mild transaminitis (resolved): Suspect related to congestive hepatopathy, quite mild and will trend labs. 12. Lactic Acidosis (resolved): mild, suspect secondary to metformin in setting of JUAN C. 13. Acute Hypoxic Respiratory Failure (resolved): secondary to decompensated HFpEF in setting of severe aortic stenosis. Pulmonary edema noted on CXR on arrival. Improved s/p BiPAP and diuresis.          Expected discharge date:  4/20/22    Disposition Plan: home    Chief Complaint: Shortness of breath    Hospital Course:   Per hx provided by Dr Tino river:  Bruno Bedolla Lorrie Trejo is a 80 y.o. male with PMHx of hypertension, diabetes, severe aortic stenosis, diastolic dysfunction with LVH who presents to [address] [place] [address] with shortness of breath. Patient is on BiPAP during my examination and at times difficult to understand, so daughter assists with history. Patient reports that after waking up this morning he began to develop relatively rapid progression of shortness of breath, and subsequently called his daughter and eventually EMS for transfer to ED. He reports that he has been in his usual state of health, however his daughter reports that there have been some shortness of breath developing over the last week worse than his baseline shortness of breath. He denies any chest pain or palpitations, no dizziness or syncope. Reports a mild cough but thinks this is due to his acute on chronic nasal congestion for which she has been taking nasal spray. There has been no fevers or chills or sputum production. He does report orthopnea, dyspnea on exertion, but denies any change in his chronic lower extremity edema. He has been compliant with his medications of bisoprolol, lisinopril, hydrochlorothiazide. He has known severe aortic stenosis being followed by Dr. Leelee Delarosa, which was previously noted to be asymptomatic. He is a non-smoker and denies any significant alcohol use. No recent change in diet. No recent NSAIDs or steroids. Of note, the patient did get his COVID booster #2 on 4/14 and attributes some of his symptoms to this. ED course: Patient arrived on noninvasive ventilation, will switch to BiPAP while here due to work of breathing and chest x-ray is obtained showing pulmonary edema pattern, he was treated for suspected flash pulmonary edema as his blood pressure was greater than 857 systolic on arrival.  He was started on a nitro drip with brisk decrease in his blood pressure, this was subsequently stopped.   He remained on BiPAP during my examination and states he was feeling improved. O2 saturations 99 to 100%. Tachypnea was noted, tachycardia was noted. There is no hypoxia documented. Labs are significant for an JUAN C with a creatinine of 1.6, glucose of 295, TSH of 4.4 within normal limit free T4, white blood cell count of 23, hemoglobin of 12.1, lactic acid of 2.4 that improved to 2.0, and a BNP of 2599 with a troponin of 0.015. His AST and ALT were 48 and 89 respectively. Subjective (past 24 hours):    Patient seen and evaluated at bedside pre and post [facility]. Successful PCI LM and LAD. Plan for f/u PCI of RCA in 2 weeks followed by TAVR. Continue OMT until that time. Patient reports some pain in groin otherwise has no complaints. Denies chest pain, palpitations, n/v/d.  Plan for d/c in AM if stable     Medications:  Reviewed    Infusion Medications    sodium chloride 50 mL/hr at 04/21/22 0852    dextrose 5 % and 0.45 % NaCl 100 mL/hr at 04/21/22 0850    amiodarone 0.5 mg/min (04/21/22 0131)    sodium chloride      heparin (PORCINE) Infusion 12.4 Units/kg/hr (04/20/22 1927)    dextrose       Scheduled Medications    metoprolol tartrate  100 mg Oral BID    insulin lispro  0-6 Units SubCUTAneous TID WC    insulin lispro  0-3 Units SubCUTAneous Nightly    aspirin  325 mg Oral Once    [Held by provider] furosemide  20 mg IntraVENous BID    aspirin  81 mg Oral Daily    sodium chloride flush  5-40 mL IntraVENous 2 times per day    atorvastatin  40 mg Oral Nightly    insulin glargine  3 Units SubCUTAneous Nightly     PRN Meds: ondansetron, sodium chloride flush, sodium chloride, acetaminophen, metoprolol, heparin (porcine), heparin (porcine), ondansetron **OR** [DISCONTINUED] ondansetron, polyethylene glycol, [DISCONTINUED] acetaminophen **OR** acetaminophen, potassium chloride **OR** potassium alternative oral replacement **OR** potassium chloride, magnesium sulfate, glucagon (rDNA), dextrose, dextrose bolus (hypoglycemia) **OR** dextrose bolus (hypoglycemia), glucose      Intake/Output Summary (Last 24 hours) at 4/21/2022 0855  Last data filed at 4/21/2022 0154  Gross per 24 hour   Intake 1728.88 ml   Output 1600 ml   Net 128.88 ml       Diet:  ADULT DIET; Regular  Diet NPO Exceptions are: Sips of Water with Meds    Exam:  BP (!) 170/105   Pulse 112   Temp 98 °F (36.7 °C) (Oral)   Resp 16   Ht 5' 10\" (1.778 m)   Wt 203 lb 14.8 oz (92.5 kg)   SpO2 95%   BMI 29.26 kg/m²     General appearance: Elderly male who appears in acute distress, well-groomed. On BiPAP  Eyes:  Pupils equal, round, and reactive to light. Conjunctivae/corneas clear. HENT: Head normal in appearance. External nares normal.  Oral mucosa moist without lesions. Hearing grossly intact. Neck: Supple, with full range of motion. Trachea midline. Unable to eval for JVD. Respiratory: Normal respiratory effort with bibasilar Rales, no wheezing or rhonchi. Cardiovascular: Tachycardia with difficulty hearing S1 and 2/murmur due to BiPAP. 1+ bilateral lower extremity edema improved from days prior  Abdomen: Soft, non-tender, non-distended with normal bowel sounds. Musculoskeletal: There is no joint swelling or tenderness. Normal tone. No abnormal movements. Skin: Warm and dry. No rashes or lesions. Neurologic:  No focal sensory/motor deficits in the upper and lower extremities. Cranial nerves:  grossly non-focal 2-12. Psychiatric: Alert and oriented, normal insight and thought content. Capillary Refill: Brisk,< 3 seconds. Peripheral Pulses: +2 palpable, equal bilaterally.        Labs:   Recent Labs     04/19/22  0531 04/21/22  0326   WBC 8.7 11.7*   HGB 11.6* 12.3*   HCT 36.9* 38.2*    168     Recent Labs     04/19/22  0531 04/20/22  0349 04/21/22  0326    136 135   K 3.8 4.6 3.9    101 100   CO2 25 25 20*   BUN 36* 27* 27*   CREATININE 1.3* 1.3* 1.3*   CALCIUM 8.7 8.9 9.1     No results for input(s): AST, ALT, BILIDIR, BILITOT, ALKPHOS in the last 72 hours. Recent Labs     04/19/22  1805   INR 1.20*     No results for input(s): Brian Oiler in the last 72 hours. Microbiology:      Urinalysis:      Lab Results   Component Value Date    NITRU NEGATIVE 04/16/2022    WBCUA 0-2 04/16/2022    BACTERIA NONE SEEN 04/16/2022    RBCUA NONE SEEN 04/16/2022    BLOODU NEGATIVE 04/16/2022    SPECGRAV 1.009 04/16/2022    GLUCOSEU neg 06/21/2018       Radiology:  XR CHEST PORTABLE   Final Result   Impression:      No acute processes on current film. This document has been electronically signed by: Jarred Danielle MD on    04/21/2022 01:02 AM      US RENAL COMPLETE   Final Result    No evidence of hydronephrosis. **This report has been created using voice recognition software. It may contain minor errors which are inherent in voice recognition technology. **      Final report electronically signed by Dr. Sondra Mata on 4/17/2022 8:55 AM      XR CHEST PORTABLE   Final Result   Impression:   No infiltrate or mass. Improvement in reticular densities vs prior. Small effusions. This document has been electronically signed by: Kade Goodman MD on    04/17/2022 04:10 AM      XR CHEST PORTABLE   Final Result   Interstitial opacities throughout both lungs. These have worsened in the left midlung zone and left lung base. This can are present pulmonary edema, infiltrates are not excluded. **This report has been created using voice recognition software. It may contain minor errors which are inherent in voice recognition technology. **      Final report electronically signed by Dr. Sondra Mata on 4/16/2022 10:24 AM          DVT prophylaxis: [] Lovenox                                 [] SCDs                                 [] SQ Heparin                                 [] Encourage ambulation           [x] Already on Anticoagulation Heparin gtt     Code Status: Full Code    PT/OT Eval Status: ordered    Tele:   [x] yes             [] no        Electronically signed by Purvi Osorio DO PGY-2 on 4/21/2022 at 8:55 AM

## 2022-04-21 NOTE — PROCEDURES
[address] [id number]                            CARDIAC CATHETERIZATION    PATIENT NAME: Jolie Saul               :        1931  MED REC NO:   [id number]                           ROOM:       6940  ACCOUNT NO:   [de-identified]                           ADMIT DATE: 2022  PROVIDER:     Richie Lerma MD    DATE OF PROCEDURE:  2022    INDICATIONS FOR PROCEDURE:  1.  Non-ST-elevation myocardial infarction. 2.  New-onset cardiomyopathy. 3.  Acute congestive heart failure with reduced ejection fraction. 4.  Atrial fibrillation with rapid ventricular response, resistant to  medical therapy status post recent MIS-guided direct current  cardioversion; however, the patient had recurrent atrial fibrillation  with rapid ventricular response. PROCEDURES PERFORMED:  1. Left cardiac catheterization with selective coronary angiogram.  2.  Successful PCI and stenting of left main coronary artery. 3.  Successful PCI and stenting of the left anterior descending artery. 4.  Successful direct current cardioversion. DESCRIPTION OF PROCEDURE/INTERVENTION DETAILS:  After informed consent,  the patient was brought to the cardiac catheterization room. He was  prepped and draped in a sterile fashion. 2% lidocaine was injected in  the skin and subcutaneous tissue overlying the right groin area. Under  ultrasound guidance using modified Seldinger technique, I obtained  access in the right common femoral artery. Common femoral artery  angiogram confirmed good stick. 7-French sheath was inserted and  flushed with normal saline. I used 7-French EBU 3.5 guide catheter to  cannulate the left main coronary artery. Angiogram was performed. ACT  was monitored throughout the procedure with repeat heparin as needed. First, I attempted to wire using Runthrough wire.   Due to the angulation  and severity of stenosis in the ostium of LAD, I could not wire using  the Runthrough wire. Eventually, I advanced a Fielder wire through the  Teleport microcatheter. I was able to wire into the ostium of the LAD  and guide the Teleport microcatheter beyond the ostium of LAD. The wire  was then switched to Runthrough wire which I used to wire the left  anterior descending artery and cross the lesion. The microcatheter was  removed after the wire being anchored using 2.25 mm x 12 mm balloon and  the guide. I then proceeded with predilation angioplasty of the  proximal LAD using 2.25 mm x 12 mm PTCA balloon. Afterwards, I  proceeded with stenting of proximal LAD using 2.5 mm x 26 mm Resolute  [place] drug-eluting stent. Postdilation using same stent balloon, I then  used the stent balloon for predilation of left main coronary artery. Afterwards, I proceeded with the stenting of the left main coronary  artery into the ostial LAD. Resolute Pavel 3.5 x 30 mm drug-eluting  stent was utilized. Multiple views were obtained to ensure full  coverage of the most of the stenotic segments including the ostium of  the left main coronary artery. Stent was successfully deployed. Stent  balloon was used for postdilation in the overlap area. I then used a  3.0 x 15 mm noncompliant balloon for postdilation of the left anterior  descending artery stent. A 4.0 x 8 mm noncompliant balloon was used for  postdilation at low pressure in the ostium of the LAD and then at high  pressure in the left main coronary artery. Wire was removed. Repeat  angiogram revealed well-expanded stent, 0% residual stenosis, CHELI-3  flow. No complications including no dissection, distal embolization or  perforation. The patient was given adequate sedation with fentanyl and  Versed. I then proceeded with direct current cardioversion. After  adequate sedation was given, 360 joules of synchronized cardioversion  was administered.   This resulted in restoration of normal sinus rhythm. I then turned my attention to the right groin access. The sheath was  removed, Perclose closure device was successfully deployed. Hemostasis  was achieved. MEDICATIONS:  See MAR. COMPLICATIONS:  None. ESTIMATED BLOOD LOSS:  Less than 50 mL. ACCESS:  Right common femoral artery access. 7-[place] sheath was  removed, Perclose closure device was successfully deployed. Hemostasis  was achieved. IMPRESSION:  1. Successful PCI and stenting of left anterior descending artery. 2.  Successful PCI and stenting of the left main coronary artery. 3.  Status post successful direct current cardioversion. RECOMMENDATIONS:  Bedrest for four hours. Monitor on telemetry,  optimize medical therapy for CAD. Aspirin 81 mg p.o. daily, Plavix 75  mg p.o. daily. The patient needs to be on oral anticoagulation for  history of atrial fibrillation. May start Eliquis 2.5 mg p.o. b.i.d.  tomorrow in the morning. Start amiodarone 200 mg p.o. daily. Stop  amiodarone drip tonight at 08:00 p.m. May resume heparin drip without  initial bolus five hours post the procedure if no bleeding complications  occur. High intensity statin therapy. Staged PCI of RCA in two weeks  post discharge. Cardiac rehab referral.  Referral to congestive heart  failure clinic.         Jelani Cruz MD    D: 04/21/2022 11:31:54       T: 04/21/2022 11:35:08     [email or web address]  Job#: [id number]     Doc#: [id number]    CC:

## 2022-04-21 NOTE — PLAN OF CARE
Problem: Falls - Risk of:  Goal: Will remain free from falls  Description: Will remain free from falls  Outcome: Progressing  Note: Bed locked & in low position, call light in reach, side-rails up x2, bed/chair alarm utilized, non-slip socks on when ambulating, reminded patient to use call light to call for assistance. Patient remained free from falls throughout shift. Problem: Discharge Planning  Goal: Discharge to home or other facility with appropriate resources  Outcome: Progressing  Flowsheets (Taken 4/21/2022 0844)  Discharge to home or other facility with appropriate resources: Identify barriers to discharge with patient and caregiver  Note: Patient readily discusses care with the care team.  Patient from home alone, cath and stent placement placed today and pending discharge barriers. Problem: Cardiovascular - Adult  Goal: Maintains optimal cardiac output and hemodynamic stability  Outcome: Progressing  Flowsheets (Taken 4/21/2022 0844)  Maintains optimal cardiac output and hemodynamic stability: Monitor blood pressure and heart rate  Note: Ongoing assessment & interventions provided throughout shift. Patient on continuous telemetry monitoring, heart tones, vitals & pulses checked at least 3 times per shift & PRN. Vitals within acceptable limits. Peripheral pulses palpable. Patient with cardiac cath done today and remains on amiodarone drip, converting to pill form. Care plan reviewed with patient. Patient verbalizes understanding of the care plan and contributed to goal setting.

## 2022-04-21 NOTE — PROGRESS NOTES
Physician Progress Note      Mary Diaz  CSN #:                  [id number]  :                       1931  ADMIT DATE:       2022 9:51 AM  DISCH DATE:  RESPONDING  PROVIDER #:        KURT Hoyos MD          QUERY TEXT:    Pt noted to have NSTEMI with troponin elevation post LHC on .  If possible,   please document in progress notes and discharge summary the present on   admission status of NSTEMI:    The medical record reflects the following:  Risk Factors: troponin elevation  Clinical Indicators: troponin trend from arrival: 0.015, 0.093, 0.101, NSTEMI   documented in LHC note  and thereafter in progress notes  Treatment: Cardiology consult, labs, imaging, LHC  Options provided:  -- Yes, NSTEMI was present at the time of the order to admit to the hospital  -- No, NSTEMI was not present on admission and developed during the inpatient   stay  -- Other - I will add my own diagnosis  -- Disagree - Not applicable / Not valid  -- Disagree - Clinically unable to determine / Unknown  -- Refer to Clinical Documentation Reviewer    PROVIDER RESPONSE TEXT:    Yes, NSTEMI was present at the time of the order to admit to the hospital.    Query created by: Israel Cloud on 2022 9:09 AM      Electronically signed by:  Dread Sousa MD 2022 11:10 AM

## 2022-04-21 NOTE — PROGRESS NOTES
[facility]    Renal Inpatient Follow-up note  4/21/2022 9:02 AM       Pt Name:   Mecca Rodas:   0[birth year]  Attending:   Bee Dillard MD     Chief Complaint : Lotus Parekh is a 80 y.o. male being followed by nephrology for JUAN C and clearance for cath. Interval History :   Patient seen and examined at bedside. No acute distress or complaints. Denies chest pain or shortness of breath or dyspnea on exertion. On RA. Having good urine output. Hypertensive, still in Afib RVR      Scheduled Medications :      metoprolol tartrate  100 mg Oral BID    insulin lispro  0-6 Units SubCUTAneous TID WC    insulin lispro  0-3 Units SubCUTAneous Nightly    aspirin  325 mg Oral Once    [Held by provider] furosemide  20 mg IntraVENous BID    aspirin  81 mg Oral Daily    sodium chloride flush  5-40 mL IntraVENous 2 times per day    atorvastatin  40 mg Oral Nightly    insulin glargine  3 Units SubCUTAneous Nightly      sodium chloride 50 mL/hr at 04/21/22 0852    dextrose 5 % and 0.45 % NaCl 100 mL/hr at 04/21/22 0850    amiodarone 0.5 mg/min (04/21/22 0131)    sodium chloride      heparin (PORCINE) Infusion 12.4 Units/kg/hr (04/20/22 1927)    dextrose         Vitals :  BP (!) 170/105   Pulse 112   Temp 98 °F (36.7 °C) (Oral)   Resp 16   Ht 5' 10\" (1.778 m)   Wt 203 lb 14.8 oz (92.5 kg)   SpO2 95%   BMI 29.26 kg/m²     24HR INTAKE/OUTPUT:      Intake/Output Summary (Last 24 hours) at 4/21/2022 0902  Last data filed at 4/21/2022 9252  Gross per 24 hour   Intake 1728.88 ml   Output 1600 ml   Net 128.88 ml        Physical Exam :  General appearance: No apparent distress, appears stated age and cooperative. HEENT: Pupils equal, round, and reactive to light. Conjunctivae/corneas clear. Neck: Supple, with full range of motion. No jugular venous distention. Trachea midline. Respiratory:  Normal respiratory effort.  Clear to auscultation, bilaterally without Rales/Wheezes/Rhonchi. Cardiovascular: Irregular rate and rhythm  Abdomen: Soft, non-tender, non-distended with normal bowel sounds. Musculoskeletal: passive and active ROM x 4 extremities. Skin: Skin color, texture, turgor normal.  No rashes or lesions. Neurologic:  Neurovascularly intact without any focal sensory/motor deficits. Cranial nerves: II-XII intact, grossly non-focal.  Psychiatric: Alert and oriented, thought content appropriate, normal insight  Capillary Refill: Brisk,< 3 seconds   Peripheral Pulses: +2 palpable, equal bilaterally        Last 3 CBC  Recent Labs     04/19/22  0531 04/21/22  0326   WBC 8.7 11.7*   RBC 4.37* 4.55*   HGB 11.6* 12.3*   HCT 36.9* 38.2*   MCV 84.4 84.0    168     Last 3 CMP  Recent Labs     04/19/22  0531 04/20/22  0349 04/21/22  0326    136 135   K 3.8 4.6 3.9    101 100   CO2 25 25 20*   BUN 36* 27* 27*   CREATININE 1.3* 1.3* 1.3*   CALCIUM 8.7 8.9 9.1        Assessment / Plan :  1. Renal -acute kidney injury most likely secondary to uncontrolled hypertension. Cannot rule out a component of cardiorenal.  ? Clinically getting better once the blood pressure is improved his baseline is around 1.1  ? Creatinine is stable today, except rise 48-72hrs after contrast exposure   ? Avoid any further nephrotoxins at this time. ? Renal ultrasound scan is negative for any pathology  ? Urine is negative for any protein  ? Encourage PO intake of liquids   2. Electrolytes -stable   3. Mild metabolic acidosis - encourage PO intake of liquids   4. Atrial fibrillation with RVR - on Amiodarone gtt and PO lopressor   5. Essential hypertension significantly elevated at presentation now appears to be better  6. Coronary artery disease s/p left ventriculogram on 4/19/22. Meds reviewed and discussed with patient. Mitesh Mccall DO,   Case was discussed with Dr. Julia Land M.D  [facility].

## 2022-04-21 NOTE — CARE COORDINATION
4/21/22, 1:06 PM EDT    DISCHARGE ON GOING [address] [place] 1938 day: 5  Location: [address] [place] [address]-A Reason for admit: Septicemia ([facility] [address] [A41.9]  Flash pulmonary edema ([facility] [address] [J81.0]  Elevated troponin [R77.8]  JUAN C (acute kidney injury) ([facility] [address] [N17.9]  Elevated brain natriuretic peptide (BNP) level [R79.89]  Pneumonia of left lung due to infectious organism, unspecified part of lung [J18.9]  Decompensated heart failure ([facility] [address] [I50.9]   Procedure:   4/18 MIS/CV: Successful MIS- Cardioversion with 300 J x1.   4/19 Cardiac cath:               1.  Severe ostial left main stenosis. 2.  Severe stenosis of ostial proximal and mid LAD. 3.  Severe stenosis of the right coronary artery. 4.  Elevated filling pressures, decompensated congestive heart failure. 5.  Non-ST elevation myocardial infarction. 6.  Mild ischemic cardiomyopathy. 7.  History of moderate to severe aortic stenosis.  Aortic valve was              crossed during the cath procedure today.  Mean pressure gradient was       24mmHg.  Low-flow, low-gradient AS was suspected in the past.              8.  Paroxysmal atrial fibrillation. 4/21 Cardiac Cath:     1. Successful PCI and stenting of left anterior descending artery. 2.  Successful PCI and stenting of the left main coronary artery. 3.  Status post successful direct current cardioversion. Barriers to Discharge: Hospitalist, Nephrology and Cardiology following. Pt had PCI with stents today (see above). Planning staged procedure in 2 weeks. Amio gtt to continue until 8pm today. Restart Heparin gtt 5 hours post procedure. Plan high intensity statin therapy, Plavix, baby ASA, plan to start Eliquis tomorrow. Possible discharge tomorrow or Saturday. IVF. Lasix iv daily. PCP: EMRE Richard CNP  Readmission Risk Score: 12.4 ( )%  Patient Goals/Plan/Treatment Preferences: Plans home alone.  Monitor

## 2022-04-22 LAB
ANION GAP SERPL CALCULATED.3IONS-SCNC: 15 MEQ/L (ref 8–16)
BLOOD CULTURE, ROUTINE: NORMAL
BLOOD CULTURE, ROUTINE: NORMAL
BUN BLDV-MCNC: 29 MG/DL (ref 7–22)
CALCIUM SERPL-MCNC: 8.8 MG/DL (ref 8.5–10.5)
CHLORIDE BLD-SCNC: 99 MEQ/L (ref 98–111)
CO2: 22 MEQ/L (ref 23–33)
CREAT SERPL-MCNC: 1.6 MG/DL (ref 0.4–1.2)
CREATININE, URINE: 71.7 MG/DL
ERYTHROCYTE [DISTWIDTH] IN BLOOD BY AUTOMATED COUNT: 13.3 % (ref 11.5–14.5)
ERYTHROCYTE [DISTWIDTH] IN BLOOD BY AUTOMATED COUNT: 41.1 FL (ref 35–45)
GFR SERPL CREATININE-BSD FRML MDRD: 41 ML/MIN/1.73M2
GLUCOSE BLD-MCNC: 145 MG/DL (ref 70–108)
GLUCOSE BLD-MCNC: 150 MG/DL (ref 70–108)
GLUCOSE BLD-MCNC: 150 MG/DL (ref 70–108)
GLUCOSE BLD-MCNC: 212 MG/DL (ref 70–108)
GLUCOSE BLD-MCNC: 267 MG/DL (ref 70–108)
HCT VFR BLD CALC: 33.6 % (ref 42–52)
HEMOGLOBIN: 10.8 GM/DL (ref 14–18)
HEPARIN UNFRACTIONATED: 0.43 U/ML (ref 0.3–0.7)
MCH RBC QN AUTO: 27 PG (ref 26–33)
MCHC RBC AUTO-ENTMCNC: 32.1 GM/DL (ref 32.2–35.5)
MCV RBC AUTO: 84 FL (ref 80–94)
MICROALBUMIN UR-MCNC: < 1.2 MG/DL
MICROALBUMIN/CREAT UR-RTO: 17 MG/G (ref 0–30)
PLATELET # BLD: 146 THOU/MM3 (ref 130–400)
PMV BLD AUTO: 13 FL (ref 9.4–12.4)
POTASSIUM SERPL-SCNC: 4.2 MEQ/L (ref 3.5–5.2)
RBC # BLD: 4 MILL/MM3 (ref 4.7–6.1)
SODIUM BLD-SCNC: 136 MEQ/L (ref 135–145)
WBC # BLD: 9.8 THOU/MM3 (ref 4.8–10.8)

## 2022-04-22 PROCEDURE — 82043 UR ALBUMIN QUANTITATIVE: CPT

## 2022-04-22 PROCEDURE — 85027 COMPLETE CBC AUTOMATED: CPT

## 2022-04-22 PROCEDURE — 6370000000 HC RX 637 (ALT 250 FOR IP): Performed by: INTERNAL MEDICINE

## 2022-04-22 PROCEDURE — 94761 N-INVAS EAR/PLS OXIMETRY MLT: CPT

## 2022-04-22 PROCEDURE — 36415 COLL VENOUS BLD VENIPUNCTURE: CPT

## 2022-04-22 PROCEDURE — 2140000000 HC CCU INTERMEDIATE R&B

## 2022-04-22 PROCEDURE — 80048 BASIC METABOLIC PNL TOTAL CA: CPT

## 2022-04-22 PROCEDURE — 2580000003 HC RX 258: Performed by: INTERNAL MEDICINE

## 2022-04-22 PROCEDURE — 97116 GAIT TRAINING THERAPY: CPT

## 2022-04-22 PROCEDURE — 82948 REAGENT STRIP/BLOOD GLUCOSE: CPT

## 2022-04-22 PROCEDURE — 99232 SBSQ HOSP IP/OBS MODERATE 35: CPT | Performed by: INTERNAL MEDICINE

## 2022-04-22 PROCEDURE — 99232 SBSQ HOSP IP/OBS MODERATE 35: CPT | Performed by: PHYSICIAN ASSISTANT

## 2022-04-22 PROCEDURE — 85520 HEPARIN ASSAY: CPT

## 2022-04-22 PROCEDURE — 2580000003 HC RX 258: Performed by: STUDENT IN AN ORGANIZED HEALTH CARE EDUCATION/TRAINING PROGRAM

## 2022-04-22 PROCEDURE — 6370000000 HC RX 637 (ALT 250 FOR IP): Performed by: PHYSICIAN ASSISTANT

## 2022-04-22 PROCEDURE — 97530 THERAPEUTIC ACTIVITIES: CPT

## 2022-04-22 PROCEDURE — 6370000000 HC RX 637 (ALT 250 FOR IP): Performed by: STUDENT IN AN ORGANIZED HEALTH CARE EDUCATION/TRAINING PROGRAM

## 2022-04-22 RX ORDER — METOPROLOL TARTRATE 100 MG/1
100 TABLET ORAL 2 TIMES DAILY
Qty: 60 TABLET | Refills: 3 | Status: CANCELLED | OUTPATIENT
Start: 2022-04-22

## 2022-04-22 RX ORDER — SODIUM CHLORIDE, SODIUM LACTATE, POTASSIUM CHLORIDE, CALCIUM CHLORIDE 600; 310; 30; 20 MG/100ML; MG/100ML; MG/100ML; MG/100ML
INJECTION, SOLUTION INTRAVENOUS CONTINUOUS
Status: DISCONTINUED | OUTPATIENT
Start: 2022-04-22 | End: 2022-04-23 | Stop reason: HOSPADM

## 2022-04-22 RX ADMIN — ASPIRIN 81 MG: 81 TABLET, COATED ORAL at 08:28

## 2022-04-22 RX ADMIN — METOPROLOL TARTRATE 100 MG: 50 TABLET, FILM COATED ORAL at 20:41

## 2022-04-22 RX ADMIN — AMIODARONE HYDROCHLORIDE 200 MG: 200 TABLET ORAL at 08:28

## 2022-04-22 RX ADMIN — SODIUM CHLORIDE, POTASSIUM CHLORIDE, SODIUM LACTATE AND CALCIUM CHLORIDE: 600; 310; 30; 20 INJECTION, SOLUTION INTRAVENOUS at 08:53

## 2022-04-22 RX ADMIN — INSULIN GLARGINE 3 UNITS: 100 INJECTION, SOLUTION SUBCUTANEOUS at 20:41

## 2022-04-22 RX ADMIN — INSULIN LISPRO 2 UNITS: 100 INJECTION, SOLUTION INTRAVENOUS; SUBCUTANEOUS at 20:41

## 2022-04-22 RX ADMIN — APIXABAN 2.5 MG: 2.5 TABLET, FILM COATED ORAL at 11:16

## 2022-04-22 RX ADMIN — CLOPIDOGREL BISULFATE 75 MG: 75 TABLET ORAL at 08:28

## 2022-04-22 RX ADMIN — APIXABAN 2.5 MG: 2.5 TABLET, FILM COATED ORAL at 20:41

## 2022-04-22 RX ADMIN — METOPROLOL TARTRATE 100 MG: 50 TABLET, FILM COATED ORAL at 08:28

## 2022-04-22 RX ADMIN — SODIUM CHLORIDE, PRESERVATIVE FREE 10 ML: 5 INJECTION INTRAVENOUS at 20:44

## 2022-04-22 RX ADMIN — ATORVASTATIN CALCIUM 40 MG: 40 TABLET, FILM COATED ORAL at 20:41

## 2022-04-22 NOTE — PROGRESS NOTES
[address] [place] [address]  INPATIENT PHYSICAL THERAPY  DAILY NOTE  [facility]Z CCU-STEPDOWN 3B - 3B-26/026-A    Time In: 8135  Time Out: 1240  Timed Code Treatment Minutes: 23 Minutes  Minutes: 23          Date: 2022  Patient Name: Leonor Vasquez,  Gender:  male        MRN: [id number]  : 1931  (80 y.o.)     Referring Practitioner: Nabil Garcia DO  Diagnosis: septicemia  Additional Pertinent Hx: Per EMR \"Jimbo Bello is a 80 y.o. male with PMHx of hypertension, diabetes, severe aortic stenosis, diastolic dysfunction with LVH who presents to [address] [place] [address] with shortness of breath. Patient is on BiPAP during my examination and at times difficult to understand, so daughter assists with history. Patient reports that after waking up this morning he began to develop relatively rapid progression of shortness of breath, and subsequently called his daughter and eventually EMS for transfer to ED. He reports that he has been in his usual state of health, however his daughter reports that there have been some shortness of breath developing over the last week worse than his baseline shortness of breath. He denies any chest pain or palpitations, no dizziness or syncope. Reports a mild cough but thinks this is due to his acute on chronic nasal congestion for which she has been taking nasal spray. There has been no fevers or chills or sputum production. He does report orthopnea, dyspnea on exertion, but denies any change in his chronic lower extremity edema. He has been compliant with his medications of bisoprolol, lisinopril, hydrochlorothiazide. He has known severe aortic stenosis being followed by Dr. Sneha Ahmadi, which was previously noted to be asymptomatic. He is a non-smoker and denies any significant alcohol use. No recent change in diet. No recent NSAIDs or steroids. Of note, the patient did get his COVID booster #2 on  and attributes some of his symptoms to this. \"  Pt had 2 cardiac stents placed and cardioversion 4/21. Prior Level of Function:  Lives With: Alone  Type of Home: House  Home Layout: One level  Home Access: Stairs to enter without rails  Entrance Stairs - Number of Steps: 1+1  Home Equipment: Grab bars,Wheelchair-manual,Cane,Rolling walker   Bathroom Shower/Tub: Walk-in shower,Shower chair with back  Bathroom Toilet: Standard  Bathroom Equipment: Shower chair    ADL Assistance: Independent  Homemaking Assistance: Independent  Ambulation Assistance: Independent  Transfer Assistance: Independent  Active : Yes  Additional Comments: Pt is IND and active prior with no use of an AD. Pt reports he has family in the area to help as needed. Restrictions/Precautions:  Restrictions/Precautions: General Precautions,Fall Risk,Cardiac  Position Activity Restriction  Other position/activity restrictions: monitor vitals     SUBJECTIVE: RN approved session, pt is seated in recliner, pleasant and agreeable. PAIN: denies    Vitals: Heart Rate: 60 at rest, 82-84 after ambulation    OBJECTIVE:  Bed Mobility:  Not Tested    Transfers:  Sit to Stand: Supervision, from recliner, toilet and EOB  Stand to Sit:Supervision    Ambulation:  Stand By Assistance  Distance: 10 feet to bathroom, 175 feet in hallway  Surface: Level Tile  Device:No Device  Gait Deviations: Forward Flexed Posture, Slow Norah, Decreased Step Length Bilaterally and Decreased Gait Speed    Balance:  Static Sitting Balance:  Independent  Dynamic Sitting Balance: Supervision; pt donns compression stockings and shoes while seated EOB, increased time to complete  Static Standing Balance: Stand By Assistance  Dynamic Standing Balance: Stand By Assistance    Functional Outcome Measures: Not completed     ASSESSMENT:  Assessment: Patient progressing toward established goals. Activity Tolerance:  Patient tolerance of  treatment: good.       Equipment Recommendations:Equipment Needed: No  Discharge Recommendations: Home with Home Health PT  Plan:   Current Treatment Recommendations: Strengthening,Gait Training,Patient/Caregiver Education & Training,Equipment Evaluation, Education, & procurement,Balance Training,Stair training,Functional Mobility Training,Endurance Training,Home Exercise Program,Transfer Training,Safety Education & Training  Plan:  (3-5x GM)    Patient Education  Patient Education: Transfers, Gait    Goals:  Patient goals : \"go home\"  Short Term Goals  Time Frame for Short term goals: by discharge  Short term goal 1: Pt will amb for >100 feet with LRAD for support with IND to progress towards PLOF. Short term goal 2: Pt will demo IND with transfers with LRAD for support to progress towards PLOF. Short term goal 3: Pt will demo IND with supine to and from sit transfers with bed flat to progress towards PLOF. Short term goal 4: Pt will negotiate steps for 2 GAGANDEEP the home with IND to return home safely. Long Term Goals  Time Frame for Long term goals : NA due to short ELOS    Following session, patient left in safe position with all fall risk precautions in place.

## 2022-04-22 NOTE — PROGRESS NOTES
Hospitalist Progress Note      Patient:  Wesly VASQUEZ/[facility]:0P-59/375-T  YOB: 1931  MRN: [id number]   Acct: [de-identified]     PCP: MERE Hernandez CNP  Date of Admission: 4/16/2022    Assessment and Plan:    1. Acute Decompensated Diastolic HF NYHA class III. suspect 2/2 severe aortic stenosis. Now symptomatic. LVH noted on TTE  AS and G2DD per 12/21. Repeat TTE shows ZAHEER 1.0 w/ MG 20-25 EF 40-45 (now mid-range EF drop down from 60% 4 months ago) Suspect low flow/low gradient. May consider stress echo to confirm if needed on discharge. Of note he did get the COVID-vaccine booster #2 on 4/14, so may be consideration for myopericardial disease  -Will change metoprolol tartrate to succinate pending dose titration  -Strict I and O, fluid restrict, low NA diet  -Cardiology following.   -Holding Lasix JUAN C  -BiPAP support (pulmonary congestion). -CHF clinic f/u  -Post C + PCI 4/21/22  2. Paroxysmal Atrial Fibrillation: NSR in ED s/p MIS/DCCV 4/18/22. No previously documented episodes in Epic. CHADS2-VASc 5 HAS-BLED 3. Rate controlled on bisoprolol. Not on [address] [place] [address] Road as outpatient. Converted back to afib on 4/20. DCCV 4/21/22  -Continue Heparin gtt   -restart Eliquis prior to discharge  -Increased Metoprolol tartrate 100mg BID  -plan to transition to succinate prior to discharge  -Amiodarone bolus and gtt 4/20/22  -Transition to PO on discharge  3. NSTEMI/Obstructive multivessel CAD: POA. Noted on [facility] 4/19/22. EKG showed Twave inversions aVL and I. tpn trended flat elevation on admission. Severe ostial stenosis LM, LAD and multiple stenotic lesions on RCA. LFLG noted on LVgram. CTS consulted however, patient refused CABG and SAVR. Underwent PCI w/ TARYN LAD LM 4/21/22    -Evaluation by heart team  -DAPT on discharge  -continued BB  -lipitor  -Staged PCI of RCA 2 weeks post discharge  4.  Aortic Stenosis, Severe (LFLG) (ZAHEER 0.9, max gradient 47, mean 27, peak veloc 3.5)  -Avoid nitrates -Patient had drop in BP with nitro IV in ED  -Cardiology following consult.   -F/up echo, may need LHC and further eval for TAVR as his presentation may be related to symptomatic AS  5. Recent Covid Booster #2 on 4/14/22  6. NIDDM2, with hyperglycemia: HbA1c 6.8 on arrival. on metformin PTA, hold. Glucose 295 on arrival, no prior steroids. -SSI, dm diet, hypoglycemia protocol.   -[address] not covered on patient's current insurance plan  7. HTN (with accelerated HTN on arrival): on lisinopril, bisoprolol, and HCTZ pta. Favor HTN urgency 2/2 anxiety and SOB rather than hypertensive emergency. -Restart antihypertensives with JUAN C. 8. BPH: not on any meds pta. F/up renal US  9. Stage 1 JUAN C: Cr 1.6 on arrival (baseline 1.1) Suspect CRS type I in context of acute HFrEF. Chronic HCTZ and lisinopril use. UA shows no hydronephrosis or postobstructive etiology. Subsequent increase in creatine following LHC 4/21/22  -Cr stable trending up  following LHC (1.6)  4/22/22   -Nephrology following  -s/p LHC  -continue IVF  -Will hold HCTZ, lisinopril + metformin  -Monitor I/O, avoid hypotension  10. Mild transaminitis (resolved): Suspect related to congestive hepatopathy, quite mild and will trend labs. 11. Lactic Acidosis (resolved): mild, suspect secondary to metformin in setting of JUAN C. 12. Acute Hypoxic Respiratory Failure (resolved): secondary to decompensated HFpEF in setting of severe aortic stenosis. Pulmonary edema noted on CXR on arrival. Improved s/p BiPAP and diuresis. 13. Leukocytosis / Suspected CAP (resolved): Bibasilar infiltrates may be related to prior scarring, atelectasis, pulmonary edema, or pneumonia. However he does have a elevated white count but denies any fevers. Mildly elevated procal. WBC tredning down. Does endorse rhinorrhea chronically.  Received 4d Rocephin and 3d azithro, culture resp, blood cultures WNL         Expected discharge date:  4/20/22    Disposition Plan: home    Chief Complaint: Shortness of breath    Hospital Course:   Per hx provided by Dr Silva Guerrero:  Smiley Thakur is a 80 y.o. male with PMHx of hypertension, diabetes, severe aortic stenosis, diastolic dysfunction with LVH who presents to [address] [place] [address] with shortness of breath. Patient is on BiPAP during my examination and at times difficult to understand, so daughter assists with history. Patient reports that after waking up this morning he began to develop relatively rapid progression of shortness of breath, and subsequently called his daughter and eventually EMS for transfer to ED. He reports that he has been in his usual state of health, however his daughter reports that there have been some shortness of breath developing over the last week worse than his baseline shortness of breath. He denies any chest pain or palpitations, no dizziness or syncope. Reports a mild cough but thinks this is due to his acute on chronic nasal congestion for which she has been taking nasal spray. There has been no fevers or chills or sputum production. He does report orthopnea, dyspnea on exertion, but denies any change in his chronic lower extremity edema. He has been compliant with his medications of bisoprolol, lisinopril, hydrochlorothiazide. He has known severe aortic stenosis being followed by Dr. Monico Nageotte, which was previously noted to be asymptomatic. He is a non-smoker and denies any significant alcohol use. No recent change in diet. No recent NSAIDs or steroids. Of note, the patient did get his COVID booster #2 on 4/14 and attributes some of his symptoms to this.     ED course: Patient arrived on noninvasive ventilation, will switch to BiPAP while here due to work of breathing and chest x-ray is obtained showing pulmonary edema pattern, he was treated for suspected flash pulmonary edema as his blood pressure was greater than 782 systolic on arrival.  He was started on a nitro drip with brisk decrease in his blood pressure, this was subsequently stopped. He remained on BiPAP during my examination and states he was feeling improved. O2 saturations 99 to 100%. Tachypnea was noted, tachycardia was noted. There is no hypoxia documented. Labs are significant for an JUAN C with a creatinine of 1.6, glucose of 295, TSH of 4.4 within normal limit free T4, white blood cell count of 23, hemoglobin of 12.1, lactic acid of 2.4 that improved to 2.0, and a BNP of 2599 with a troponin of 0.015. His AST and ALT were 48 and 89 respectively. Subjective (past 24 hours):    Patient seen and evaluated at bedside day 1 post C. No acute complains however discharge delayed 2/2 . Plan for f/u PCI of RCA in 2 weeks followed by TAVR. Continue OMT until that time. Patient reports some pain in groin otherwise has no complaints. Denies chest pain, palpitations, n/v/d.  Plan for d/c in AM if stable and creatinine improved    Medications:  Reviewed    Infusion Medications    lactated ringers      sodium chloride      sodium chloride      heparin (PORCINE) Infusion 12.4 Units/kg/hr (04/21/22 9185)    dextrose       Scheduled Medications    amiodarone  200 mg Oral Daily    sodium chloride flush  5-40 mL IntraVENous 2 times per day    clopidogrel  75 mg Oral Daily    [Held by provider] furosemide  20 mg IntraVENous Daily    metoprolol tartrate  100 mg Oral BID    insulin lispro  0-6 Units SubCUTAneous TID WC    insulin lispro  0-3 Units SubCUTAneous Nightly    aspirin  325 mg Oral Once    aspirin  81 mg Oral Daily    atorvastatin  40 mg Oral Nightly    insulin glargine  3 Units SubCUTAneous Nightly     PRN Meds: sodium chloride flush, sodium chloride, ondansetron, sodium chloride, acetaminophen, metoprolol, heparin (porcine), heparin (porcine), ondansetron **OR** [DISCONTINUED] ondansetron, polyethylene glycol, potassium chloride **OR** potassium alternative oral replacement **OR** potassium chloride, magnesium sulfate, glucagon (rDNA), dextrose, dextrose bolus (hypoglycemia) **OR** dextrose bolus (hypoglycemia), glucose      Intake/Output Summary (Last 24 hours) at 4/22/2022 7174  Last data filed at 4/22/2022 0595  Gross per 24 hour   Intake 1377.46 ml   Output 1225 ml   Net 152.46 ml       Diet:  Diet NPO Exceptions are: Sips of Water with Meds    Exam:  /73   Pulse 63   Temp 98 °F (36.7 °C) (Oral)   Resp 18   Ht 5' 10\" (1.778 m)   Wt 203 lb 14.8 oz (92.5 kg)   SpO2 98%   BMI 29.26 kg/m²     General appearance: Elderly male who appears in acute distress, well-groomed. On BiPAP  Eyes:  Pupils equal, round, and reactive to light. Conjunctivae/corneas clear. HENT: Head normal in appearance. External nares normal.  Oral mucosa moist without lesions. Hearing grossly intact. Neck: Supple, with full range of motion. Trachea midline. Unable to eval for JVD. Respiratory: Normal respiratory effort with bibasilar Rales, no wheezing or rhonchi. Cardiovascular: Tachycardia with difficulty hearing S1 and 2/murmur due to BiPAP. 1+ bilateral lower extremity edema improved from days prior  Abdomen: Soft, non-tender, non-distended with normal bowel sounds. Musculoskeletal: There is no joint swelling or tenderness. Normal tone. No abnormal movements. Skin: Warm and dry. No rashes or lesions. Neurologic:  No focal sensory/motor deficits in the upper and lower extremities. Cranial nerves:  grossly non-focal 2-12. Psychiatric: Alert and oriented, normal insight and thought content. Capillary Refill: Brisk,< 3 seconds. Peripheral Pulses: +2 palpable, equal bilaterally.        Labs:   Recent Labs     04/21/22  0326 04/22/22  0525   WBC 11.7* 9.8   HGB 12.3* 10.8*   HCT 38.2* 33.6*    146     Recent Labs     04/20/22  0349 04/21/22  0326 04/22/22  0525    135 136   K 4.6 3.9 4.2    100 99   CO2 25 20* 22*   BUN 27* 27* 29*   CREATININE 1.3* 1.3* 1.6*   CALCIUM 8.9 9.1 8.8     No results for input(s): AST, ALT, BILIDIR, BILITOT, ALKPHOS in the last 72 hours. Recent Labs     04/19/22  1805   INR 1.20*     No results for input(s): Anjelica Herrera in the last 72 hours. Microbiology:      Urinalysis:      Lab Results   Component Value Date    NITRU NEGATIVE 04/16/2022    WBCUA 0-2 04/16/2022    BACTERIA NONE SEEN 04/16/2022    RBCUA NONE SEEN 04/16/2022    BLOODU NEGATIVE 04/16/2022    SPECGRAV 1.009 04/16/2022    GLUCOSEU neg 06/21/2018       Radiology:  XR CHEST PORTABLE   Final Result   Impression:      No acute processes on current film. This document has been electronically signed by: Nidia Palumbo MD on    04/21/2022 01:02 AM      US RENAL COMPLETE   Final Result    No evidence of hydronephrosis. **This report has been created using voice recognition software. It may contain minor errors which are inherent in voice recognition technology. **      Final report electronically signed by Dr. Oni Hayes on 4/17/2022 8:55 AM      XR CHEST PORTABLE   Final Result   Impression:   No infiltrate or mass. Improvement in reticular densities vs prior. Small effusions. This document has been electronically signed by: Francy Green MD on    04/17/2022 04:10 AM      XR CHEST PORTABLE   Final Result   Interstitial opacities throughout both lungs. These have worsened in the left midlung zone and left lung base. This can are present pulmonary edema, infiltrates are not excluded. **This report has been created using voice recognition software. It may contain minor errors which are inherent in voice recognition technology. **      Final report electronically signed by Dr. Oni Hayes on 4/16/2022 10:24 AM          DVT prophylaxis: [] Lovenox                                 [] SCDs                                 [] SQ Heparin                                 [] Encourage ambulation           [x] Already on Anticoagulation Heparin gtt     Code Status: Full Code    PT/OT Eval Status: ordered    Tele: [x] yes             [] no        Electronically signed by Kasandra Solis, DO PGY-2 on 4/22/2022 at 8:14 AM

## 2022-04-22 NOTE — PROGRESS NOTES
Cardiology Progress Note      Patient:  Elly Colorado  YOB: 1931  MRN: [id number]   Acct: [de-identified]  Admit Date:  4/16/2022  Primary Cardiologist: Adryan Castillo MD    Note per dr Patito Manriquez for Consultation:  Pulmonary edema        History Of Present Illness:    80 y.o. pleasant male c hx of Mod-severe AS, DM, HTN who presented to the hospital with complaints of shortness of breath and found to be in pulmonary edema. Patient ZAHEER in 12/2021 was 0.9 cm2 with mean gradient of 27mm Hg. He followed up with Dr. Ken Sims but patient wanted to monitor for symptoms. Patient apparently felt like he couldn't breath as if he had a polyp in his nose, thought this started after getting a booster COVID19 short. He denies any chest pains or shortness of breath. He was placed on BiPAP, his BP was elevated >200. Currently he is much better, received diuretics. He underwent Echo which shows a drop in EF   While in the hospital he was noted to have new onset of afib\"    Subjective (Events in last 24 hours):  Pt awake and alert. NAD. No cp or sob.   No complaints  On RA  On heparin gtt    Net I/o -2.3 L    Objective:   BP (!) 145/78   Pulse 66   Temp 97.8 °F (36.6 °C) (Oral)   Resp 16   Ht 5' 10\" (1.778 m)   Wt 203 lb 14.8 oz (92.5 kg)   SpO2 99%   BMI 29.26 kg/m²        TELEMETRY: afib90-low 100s    Physical Exam:  General Appearance: alert and oriented to person, place and time, in no acute distress  Cardiovascular: irregularly irregular, +murmur  Pulmonary/Chest: right basilar crackles  Abdomen: soft, non-tender, non-distended, normal bowel sounds, no masses Extremities: no cyanosis, clubbing or edema, pulse   Skin: warm and dry  Head: normocephalic and atraumatic  Eyes: pupils equal, round, and reactive to light  Neck: supple and non-tender without mass, no thyromegaly   Neurological: alert, oriented, normal speech, no focal findings or movement disorder noted  Right wrist - no hematoma, +2 radial pulse    Medications:    amiodarone  200 mg Oral Daily    sodium chloride flush  5-40 mL IntraVENous 2 times per day    clopidogrel  75 mg Oral Daily    [Held by provider] furosemide  20 mg IntraVENous Daily    metoprolol tartrate  100 mg Oral BID    insulin lispro  0-6 Units SubCUTAneous TID WC    insulin lispro  0-3 Units SubCUTAneous Nightly    aspirin  325 mg Oral Once    aspirin  81 mg Oral Daily    atorvastatin  40 mg Oral Nightly    insulin glargine  3 Units SubCUTAneous Nightly      lactated ringers 50 mL/hr at 04/22/22 0853    sodium chloride      sodium chloride      heparin (PORCINE) Infusion 12.4 Units/kg/hr (04/21/22 2342)    dextrose       sodium chloride flush, 5-40 mL, PRN  sodium chloride, , PRN  ondansetron, 4 mg, Q6H PRN  sodium chloride, 25 mL, PRN  acetaminophen, 650 mg, Q4H PRN  metoprolol, 5 mg, Q6H PRN  heparin (porcine), 4,000 Units, PRN  heparin (porcine), 2,000 Units, PRN  ondansetron, 4 mg, Q8H PRN  polyethylene glycol, 17 g, Daily PRN  potassium chloride, 40 mEq, PRN   Or  potassium alternative oral replacement, 40 mEq, PRN   Or  potassium chloride, 10 mEq, PRN  magnesium sulfate, 2,000 mg, PRN  glucagon (rDNA), 1 mg, PRN  dextrose, 100 mL/hr, PRN  dextrose bolus (hypoglycemia), 125 mL, PRN   Or  dextrose bolus (hypoglycemia), 250 mL, PRN  glucose, 4 tablet, PRN        Diagnostics:  TTE 4/18/22  Summary   No intra cardiac mass or thrombus. No embolic source. No Intracardiac shunt   Left ventricle size is normal.   Normal left ventricle wall thickness. There was moderate global hypokinesis of the left ventricle. Systolic function was moderately reduced. Ejection fraction is visually estimated in the range of 40% to 45%.    Left atrium moderately dilated   There is moderate-to-severe aortic stenosis with valve area of 1 to 1.1 sq   cm by Planimetry      Signature      ----------------------------------------------------------------   Electronically signed by Shukri Salmon MD (Interpreting   physician) on 04/18/2022 at 07:27 PM    Cath 4/19/22  FINDINGS:  HEMODYNAMICS AND RIGHT HEART CATHETERIZATION:  Pulmonary arterial oxygen  saturation 66%. Pulmonary arterial pressure 53/29. Mean pulmonary  arterial pressure 39 mmHg. Pulmonary capillary wedge pressure 35 mmHg. Right ventricular pressure 57/14. Right atrial pressure 90 mmHg. Left  ventricular end-diastolic pressure was 29 mmHg. Upon pullback across  the aortic valve, the mean pressure gradient was measured at 24 mmHg.     LEFT VENTRICULOGRAM:  Mild global hypokinesis. Ejection fraction  estimated at 45% to 50%.     CORONARY ANGIOGRAM:  1. Right coronary artery:  Proximal RCA has 10% stenosis. Mid RCA is  patent. Distal RCA has 80% to 90% stenosis. RCA is dominant vessel,  bifurcates into RPL and RPDA. RPL has moderate diffuse disease. RPL  has 30% stenosis. 2.  Left main coronary artery:  Ostial left main coronary artery has 50%  stenosis. Gives rise to LAD, ramus intermedius and left circumflex  arteries. 3.  Ramus intermedius:  Mild luminal irregularities, otherwise patent. 4.  Left circumflex artery:  Proximal LCX is patent. Distal left  circumflex artery has 30% to 40% stenosis. 5.  Left anterior descending artery:  Ostial to proximal LAD has 90% to  95% stenosis. Mid LAD has 70% to 80% stenosis. Distal LAD has mild  diffuse disease.     MEDICATIONS:  See MAR.     COMPLICATIONS:  None.     ESTIMATED BLOOD LOSS:  Less than 50 mL.     ACCESS:  Right radial artery access. Vasc Band was applied. Hemostasis  was achieved.     IMPRESSION:  1. Severe ostial left main stenosis. 2.  Severe stenosis of ostial proximal and mid LAD. 3.  Severe stenosis of the right coronary artery. 4.  Elevated filling pressures, decompensated congestive heart failure. 5.  Non-ST elevation myocardial infarction. 6.  Mild ischemic cardiomyopathy. 7.  History of moderate to severe aortic stenosis.   Aortic valve was  crossed during the cath procedure today. Mean pressure gradient was 24  mmHg. Low-flow, low-gradient AS was suspected in the past.  8.  Paroxysmal atrial fibrillation. The patient is status post  cardioversion on 04/18/2022, now in sinus rhythm.     RECOMMENDATIONS AND PLAN OF CARE:  1.  Bedrest for the next four hours. Access site care. 2.  Medical therapy and aggressive risk factor modification for CAD. 3.  Aspirin 81 mg p.o. daily. 4.  High-intensity statin therapy. 5.  Continue to monitor the patient on telemetry. 6.  May resume heparin drip without initial bolus four hours after Vasc  Band is removed and hemostasis is achieved. 7.  Need to optimize volume status. IV diuretics to be resumed. Maintain negative fluid balance. 8.  Monitor intake and output. Daily weight and daily basic metabolic  panel. 9.  Coronary angiogram findings as listed above. The patient was  previously evaluated for TAVR. He has history of moderate to severe  aortic stenosis. Mean gradient across the aortic valve during cath  procedure today was 24 mmHg. Low-flow, low-gradient status has been  suspected in the past.  Aortic valve area by planimetry on MIS done on  04/18/2022 was 1 to 1.1 cm2. Consult Cardiovascular Surgery. Heart  team discussion, will need revascularization prior to discharge. Findings and plan of care discussed with the patient's family. They are  agreeable to the plan.           Ghislaine Guevara MD    Lab Data:    Cardiac Enzymes:  No results for input(s): CKTOTAL, CKMB, CKMBINDEX, TROPONINI in the last 72 hours.     CBC:   Lab Results   Component Value Date    WBC 9.8 04/22/2022    RBC 4.00 04/22/2022    HGB 10.8 04/22/2022    HCT 33.6 04/22/2022     04/22/2022       CMP:    Lab Results   Component Value Date     04/22/2022    K 4.2 04/22/2022    K 3.7 04/16/2022    CL 99 04/22/2022    CO2 22 04/22/2022    BUN 29 04/22/2022    CREATININE 1.6 04/22/2022    LABGLOM 41 04/22/2022 GLUCOSE 150 04/22/2022    CALCIUM 8.8 04/22/2022       Hepatic Function Panel:    Lab Results   Component Value Date    ALKPHOS 62 04/17/2022    ALT 67 04/17/2022    AST 28 04/17/2022    PROT 6.9 04/17/2022    BILITOT 0.7 04/17/2022    BILIDIR 0.3 04/17/2022    LABALBU 3.5 04/17/2022       Magnesium:    Lab Results   Component Value Date    MG 1.9 04/21/2022       PT/INR:    Lab Results   Component Value Date    INR 1.20 04/19/2022       HgBA1c:    Lab Results   Component Value Date    LABA1C 6.8 04/16/2022       FLP:    Lab Results   Component Value Date    TRIG 100 04/19/2022    HDL 39 04/19/2022    LDLCALC 90 04/19/2022       TSH:    Lab Results   Component Value Date    TSH 2.790 04/16/2022         Assessment:    Acute hypoxic resp failure - resolved  Acute systolic CHF - improved  Acute pulmonary edema - improved  New LVD - ef 40-45 per MIS 4/18/22  Mod to severe AS - ZAHEER 1-1.1 cm2  New onset afib   S/p MIS/CV to NSR 4/18/22   Back in afib rvr 4/20/22 - amio bolus and gtt ordered   S/p CV to NSR 4/21/22    Elevated troponins/NSTEMI  S/p cath 4/19/22  · Severe RCA stenosis   · Severe ostial/proximal LAD stenosis  · Severe ostial LM stenosis (confirmed by IVUS)  · H/o moderate to severe AS (AV was crossed during cath procedure today, mean gradient 24 mmHg. LFLG was suspected in the past)      · Elevated filling pressures, decompensated CHF  · NSTEMI  · Mild ischemic cardiomyopathy   · PAF, s/p DCCV on 4/18/2022, now in May JESÚS Villatoro     S/p cath 4/21/22  · S/p successful PCI/TARYN of LAD           · S/p successful PCI/TARYN of LM  · S/p successful DCCV, now in SR    HTN  HLD  DM  JUAN C/CKD - improved - neprho following      Plan:    Daily I/o and weights  2 liter fluid restriction and 2gm sodium diet  Normal TSH  Keep mag >2 and K >4  Cont asa/plavix. BB/statin/amio  Stop heparin gtt and start eliquis 2.5 bid  Will need [address] upon discharge - Pt understands risk of bleed and accepts risk to reduce risk of embolic phenomenon Avoid nitrates  Start ace/arb once ok with nephro  Recommend starting lasix 20 daily upon dc if ok with neprho  Cardiac rehab  No sl ntg upon dc due to mod to severe AS  Referral to chf clinic  Staged PCI scheduled for 5/4/22 - will need to come night before for prehydration - office notified and will arrange for pateint  F/up dr Zev Lawler 4 weeks     Electronically signed by Brandan Saldivar PA-C on 4/22/2022 at 9:12 AM

## 2022-04-22 NOTE — PLAN OF CARE
Tommy Jsoe RN  Outcome: Progressing     Problem: Metabolic/Fluid and Electrolytes - Adult  Goal: Electrolytes maintained within normal limits  4/22/2022 0924 by Adalgisa Monte RN  Outcome: Progressing  4/22/2022 0551 by Prince Gustavo RN  Outcome: Progressing  Goal: Hemodynamic stability and optimal renal function maintained  4/22/2022 0924 by Adalgisa Monte RN  Outcome: Progressing  4/22/2022 0551 by Prince Gustavo RN  Outcome: Progressing  Goal: Glucose maintained within prescribed range  4/22/2022 0924 by Adalgisa Monte RN  Outcome: Progressing  4/22/2022 0551 by Prince Gustavo RN  Outcome: Progressing

## 2022-04-22 NOTE — CARE COORDINATION
4/22/22, 8:12 AM EDT    DISCHARGE ON 40  Kulwinder Abad day: 6  Location: [address] [place] [address]-A Reason for admit: Septicemia [facility]) [A41.9]  Flash pulmonary edema ([address] [J81.0]  Elevated troponin [R77.8]  JUAN C (acute kidney injury) ([address] [N17.9]  Elevated brain natriuretic peptide (BNP) level [R79.89]  Pneumonia of left lung due to infectious organism, unspecified part of lung [J18.9]  Decompensated heart failure ([address] [I50.9]   Procedure:   4/18 MIS/CV: Successful MIS- Cardioversion with 300 J x1.   4/19 Cardiac cath:               1. [facility] ostial left main stenosis. Anniece Boros stenosis of ostial proximal and mid LAD. Anniece Boros stenosis of the right coronary artery.             4.  Elevated filling pressures, decompensated congestive heart failure.              5.  Non-ST elevation myocardial infarction.              6.  Mild ischemic cardiomyopathy.              7.  History of moderate to severe aortic stenosis.  Aortic valve was              crossed during the cath procedure today.  Mean pressure gradient was          24mmHg.  Low-flow, low-gradient AS was suspected in the past.              8.  Paroxysmal atrial fibrillation.      4/21 Cardiac Cath:                1.  Successful PCI and stenting of left anterior descending artery. 2.  Successful PCI and stenting of the left main coronary artery. 3.  Status post successful direct current cardioversion. Barriers to Discharge: Hospitalist, Nephrology and Cardiology following. Heparin gtt. Room air. BUN 29, creatinine 1.6. Lasix iv daily (held). Planning staged procedure in 2 weeks. IVF for creatinine. Cardiac Rehab. PCP: EMRE Chew CNP  Readmission Risk Score: 13.3 ( )%  Patient Goals/Plan/Treatment Preferences: Plans home alone. Monitor for needs, therapy recommending HH.  SW following, pt declined HH.     4/22/22, 11:20 AM EDT    Patient goals/plan/ treatment

## 2022-04-22 NOTE — PROGRESS NOTES
[facility]         Renal Inpatient Follow-Up note         4/22/2022 7:56 AM    Pt Name:   Janice Quick:   [birth year]  Attending:   Jacquelyn Duke MD    Chief Complaint : Oneyda Ingram is a 80 y.o. male being followed by nephrology for Nathen/CKD    Interval History :   Patient seen and examined by me. No distress  Feels well. No cp or SOB. Says he has not urinated for a while . Scheduled Medications :    amiodarone  200 mg Oral Daily    sodium chloride flush  5-40 mL IntraVENous 2 times per day    clopidogrel  75 mg Oral Daily    furosemide  20 mg IntraVENous Daily    metoprolol tartrate  100 mg Oral BID    insulin lispro  0-6 Units SubCUTAneous TID WC    insulin lispro  0-3 Units SubCUTAneous Nightly    aspirin  325 mg Oral Once    aspirin  81 mg Oral Daily    atorvastatin  40 mg Oral Nightly    insulin glargine  3 Units SubCUTAneous Nightly      sodium chloride      sodium chloride      heparin (PORCINE) Infusion 12.4 Units/kg/hr (04/21/22 2342)    dextrose         Vitals :  /73   Pulse 63   Temp 98 °F (36.7 °C) (Oral)   Resp 18   Ht 5' 10\" (1.778 m)   Wt 203 lb 14.8 oz (92.5 kg)   SpO2 98%   BMI 29.26 kg/m²     24HR INTAKE/OUTPUT:      Intake/Output Summary (Last 24 hours) at 4/22/2022 0756  Last data filed at 4/22/2022 0743  Gross per 24 hour   Intake 1377.46 ml   Output 1225 ml   Net 152.46 ml     Last 3 weights  Wt Readings from Last 3 Encounters:   04/20/22 203 lb 14.8 oz (92.5 kg)   03/01/22 217 lb (98.4 kg)   12/01/21 219 lb (99.3 kg)           Physical Exam :  General Appearance:  Well developed.  No distress  Mouth/Throat:  Oral mucosa moist  Neck:  Supple, no JVD  Lungs:  Breath sounds: clear  Heart[de-identified]  S1,S2 heard  Abdomen:  Soft, non - tender  Musculoskeletal:  Edema - none         Last 3 CBC   Recent Labs     04/21/22  0326 04/22/22  0525   WBC 11.7* 9.8   RBC 4.55* 4.00*   HGB 12.3* 10.8*   HCT 38.2* 33.6*    146     Last 3 CMP  Recent Labs     04/20/22  0349 04/21/22  0326 04/22/22  0525    135 136   K 4.6 3.9 4.2    100 99   CO2 25 20* 22*   BUN 27* 27* 29*   CREATININE 1.3* 1.3* 1.6*   CALCIUM 8.9 9.1 8.8             ASSESSMENT / Plan   1 Renal -mild acute kidney injury on chronic kidney disease stage III most likely secondary to the recent IV contrast exposure  ? He has also been getting some Lasix which we will hold at this time  ? Start gentle IV fluids  ? BMP in the morning    2 Electrolytes -appear to be within normal limits  3 Mild acidosis stable  4 Anemia  5 Coronary artery disease status post cath with stent placement, 4/19 and 4/21  6 Essential hypertension stable  7 Hx of diabetes mellitus  8 Meds reviewed and discussed with patient and nursing staff  9 If creatinine is stable can potentially be discharged tomorrow with an outpatient follow-up with me in 4 weeks    Dr. Dania Preston MD, M,D.  [facility].

## 2022-04-23 VITALS
WEIGHT: 209.9 LBS | TEMPERATURE: 98 F | OXYGEN SATURATION: 96 % | RESPIRATION RATE: 18 BRPM | SYSTOLIC BLOOD PRESSURE: 129 MMHG | BODY MASS INDEX: 30.05 KG/M2 | HEIGHT: 70 IN | HEART RATE: 76 BPM | DIASTOLIC BLOOD PRESSURE: 60 MMHG

## 2022-04-23 LAB
ANION GAP SERPL CALCULATED.3IONS-SCNC: 12 MEQ/L (ref 8–16)
BUN BLDV-MCNC: 28 MG/DL (ref 7–22)
CALCIUM SERPL-MCNC: 8.8 MG/DL (ref 8.5–10.5)
CHLORIDE BLD-SCNC: 98 MEQ/L (ref 98–111)
CO2: 25 MEQ/L (ref 23–33)
CREAT SERPL-MCNC: 1.6 MG/DL (ref 0.4–1.2)
ERYTHROCYTE [DISTWIDTH] IN BLOOD BY AUTOMATED COUNT: 13.4 % (ref 11.5–14.5)
ERYTHROCYTE [DISTWIDTH] IN BLOOD BY AUTOMATED COUNT: 41 FL (ref 35–45)
GFR SERPL CREATININE-BSD FRML MDRD: 41 ML/MIN/1.73M2
GLUCOSE BLD-MCNC: 128 MG/DL (ref 70–108)
GLUCOSE BLD-MCNC: 132 MG/DL (ref 70–108)
HCT VFR BLD CALC: 35.4 % (ref 42–52)
HEMOGLOBIN: 11.2 GM/DL (ref 14–18)
MCH RBC QN AUTO: 26.5 PG (ref 26–33)
MCHC RBC AUTO-ENTMCNC: 31.6 GM/DL (ref 32.2–35.5)
MCV RBC AUTO: 83.9 FL (ref 80–94)
PLATELET # BLD: 154 THOU/MM3 (ref 130–400)
PMV BLD AUTO: 12.9 FL (ref 9.4–12.4)
POTASSIUM SERPL-SCNC: 4.1 MEQ/L (ref 3.5–5.2)
RBC # BLD: 4.22 MILL/MM3 (ref 4.7–6.1)
SODIUM BLD-SCNC: 135 MEQ/L (ref 135–145)
WBC # BLD: 11 THOU/MM3 (ref 4.8–10.8)

## 2022-04-23 PROCEDURE — 82948 REAGENT STRIP/BLOOD GLUCOSE: CPT

## 2022-04-23 PROCEDURE — 6370000000 HC RX 637 (ALT 250 FOR IP): Performed by: PHYSICIAN ASSISTANT

## 2022-04-23 PROCEDURE — 36415 COLL VENOUS BLD VENIPUNCTURE: CPT

## 2022-04-23 PROCEDURE — 6370000000 HC RX 637 (ALT 250 FOR IP): Performed by: INTERNAL MEDICINE

## 2022-04-23 PROCEDURE — 2580000003 HC RX 258: Performed by: STUDENT IN AN ORGANIZED HEALTH CARE EDUCATION/TRAINING PROGRAM

## 2022-04-23 PROCEDURE — 80048 BASIC METABOLIC PNL TOTAL CA: CPT

## 2022-04-23 PROCEDURE — 85027 COMPLETE CBC AUTOMATED: CPT

## 2022-04-23 PROCEDURE — 99232 SBSQ HOSP IP/OBS MODERATE 35: CPT | Performed by: INTERNAL MEDICINE

## 2022-04-23 PROCEDURE — 99239 HOSP IP/OBS DSCHRG MGMT >30: CPT | Performed by: INTERNAL MEDICINE

## 2022-04-23 RX ORDER — LISINOPRIL 5 MG/1
10 TABLET ORAL DAILY
Qty: 1 TABLET | Refills: 3 | Status: SHIPPED | OUTPATIENT
Start: 2022-04-23 | End: 2022-05-18 | Stop reason: SDUPTHER

## 2022-04-23 RX ORDER — AMIODARONE HYDROCHLORIDE 200 MG/1
200 TABLET ORAL DAILY
Qty: 30 TABLET | Refills: 0 | Status: ON HOLD | OUTPATIENT
Start: 2022-04-23 | End: 2022-05-05 | Stop reason: HOSPADM

## 2022-04-23 RX ORDER — AZITHROMYCIN 250 MG/1
250 TABLET, FILM COATED ORAL DAILY
Status: DISCONTINUED | OUTPATIENT
Start: 2022-04-23 | End: 2022-04-23

## 2022-04-23 RX ORDER — METOPROLOL SUCCINATE 200 MG/1
200 TABLET, EXTENDED RELEASE ORAL DAILY
Qty: 30 TABLET | Refills: 5 | Status: SHIPPED | OUTPATIENT
Start: 2022-04-23 | End: 2022-06-29 | Stop reason: SDUPTHER

## 2022-04-23 RX ORDER — CLOPIDOGREL BISULFATE 75 MG/1
75 TABLET ORAL DAILY
Qty: 30 TABLET | Refills: 3 | Status: SHIPPED | OUTPATIENT
Start: 2022-04-23 | End: 2022-08-04 | Stop reason: SDUPTHER

## 2022-04-23 RX ORDER — ATORVASTATIN CALCIUM 40 MG/1
40 TABLET, FILM COATED ORAL NIGHTLY
Qty: 30 TABLET | Refills: 3 | Status: SHIPPED | OUTPATIENT
Start: 2022-04-23 | End: 2022-08-04 | Stop reason: SDUPTHER

## 2022-04-23 RX ORDER — ASPIRIN 81 MG/1
81 TABLET ORAL DAILY
Qty: 30 TABLET | Refills: 3 | Status: SHIPPED | OUTPATIENT
Start: 2022-04-23 | End: 2022-07-11 | Stop reason: ALTCHOICE

## 2022-04-23 RX ORDER — LISINOPRIL 10 MG/1
10 TABLET ORAL DAILY
Qty: 30 TABLET | Refills: 0 | Status: SHIPPED | OUTPATIENT
Start: 2022-04-23 | End: 2022-04-23 | Stop reason: SDUPTHER

## 2022-04-23 RX ADMIN — SODIUM CHLORIDE, POTASSIUM CHLORIDE, SODIUM LACTATE AND CALCIUM CHLORIDE: 600; 310; 30; 20 INJECTION, SOLUTION INTRAVENOUS at 06:01

## 2022-04-23 RX ADMIN — CLOPIDOGREL BISULFATE 75 MG: 75 TABLET ORAL at 09:45

## 2022-04-23 RX ADMIN — APIXABAN 2.5 MG: 2.5 TABLET, FILM COATED ORAL at 09:45

## 2022-04-23 RX ADMIN — ASPIRIN 81 MG: 81 TABLET, COATED ORAL at 09:45

## 2022-04-23 RX ADMIN — AMIODARONE HYDROCHLORIDE 200 MG: 200 TABLET ORAL at 09:40

## 2022-04-23 RX ADMIN — METOPROLOL TARTRATE 100 MG: 50 TABLET, FILM COATED ORAL at 09:40

## 2022-04-23 ASSESSMENT — PAIN SCALES - GENERAL: PAINLEVEL_OUTOF10: 0

## 2022-04-23 NOTE — DISCHARGE SUMMARY
Hospitalist Discharge Summary    Patient Identification:   Adarsh Mendoza   :   MRN: [id number]   Account: [de-identified]    Patient's PCP: EMRE Doan CNP  Admit Date: 2022   Discharge Date:   2022  Admitting Physician: Farrah Mcmullen DO   Discharge Physician: Purvi Osorio DO     Discharge Diagnoses: Active Hospital Problems    Diagnosis Date Noted    Coronary artery disease involving native coronary artery of native heart without angina pectoris [I25.10]      Priority: Medium    Metabolic acidosis [S25.5]     Encounter for cardioversion procedure [Z01.89]     Atrial fibrillation with RVR (HCC) [I48.91]     JUAN C (acute kidney injury) ([facility] [N17.9]     Flash pulmonary edema (HCC) [J81.0]     Decompensated heart failure ([facility] [I50.9] 2022    Nonrheumatic aortic valve stenosis [I35.0] 2017    Type 2 diabetes mellitus with stage 3a chronic kidney disease, without long-term current use of insulin ([facility] [Z34.23, N18.31] 2015        1. Acute Decompensated Diastolic HF NYHA class III (resolved). suspect 2/2 severe aortic stenosis. Now symptomatic. LVH noted on TTE  AS and G2DD per . Repeat TTE shows ZAHEER 1.0 w/ MG 20-25 EF 40-45 (now mid-range EF drop down from 60% 4 months ago) Suspect low flow/low gradient. May consider stress echo to confirm if needed on discharge. Of note he did get the COVID-vaccine booster #2 on , so may be consideration for myopericardial disease  -Metoprolol succinate 200mg daily on discharge  -Strict I and O, fluid restrict, low NA diet  -Cardiology following.   -Holding diuresis JUAN C   -CHF clinic f/u  -Post LHC + PCI 22  2. Paroxysmal Atrial Fibrillation: NSR in ED s/p MIS/DCCV 22. No previously documented episodes in Epic. CHADS2-VASc 5 HAS-BLED 3. Rate controlled on bisoprolol. Not on [address] as outpatient. Converted back to afib on .  DCCV 22  -restarted renally dosed Eliquis prior to discharge  -Metoprolol per above  -Amiodarone PO for maintenance  3. NSTEMI/Obstructive multivessel CAD: POA. Noted on [facility] 4/19/22. EKG showed Twave inversions aVL and I. tpn trended flat elevation on admission. Severe ostial stenosis LM, LAD and multiple stenotic lesions on RCA. LFLG noted on LVgram. CTS consulted however, patient refused CABG and SAVR. Underwent PCI w/ TARYN LAD LM 4/21/22    -Evaluation by heart team  -DAPT on discharge  -continued BB  -lipitor  -Staged PCI of RCA 2 weeks post discharge  4. Aortic Stenosis, Severe (LFLG) (ZAHEER 0.9, max gradient 47, mean 27, peak veloc 3.5)  -Avoid nitrates notable drop noted following NO gtt in ED  -Plan for [facility] and further eval for TAVR as his presentation may be related to symptomatic AS  5. Recent Covid Booster #2 on 4/14/22  6. NIDDM2, with hyperglycemia: HbA1c 6.8 on arrival. on metformin PTA, hold. Glucose 295 on arrival, no prior steroids. -SSI, dm diet, hypoglycemia protocol.   -Aubery Ku not covered on patient's current insurance plan  7. HTN (with accelerated HTN on arrival): on lisinopril, bisoprolol, and HCTZ pta. Favor HTN urgency 2/2 anxiety and SOB rather than hypertensive emergency. -Restart ACE 1 week s/p discharge   -metoprolol per above  8. BPH: not on any meds pta. F/up renal US  9. Stage 1 JUAN C: Cr 1.6 on arrival (baseline 1.1) Suspect CRS type I in context of acute HFrEF. Chronic HCTZ and lisinopril use. UA shows no hydronephrosis or postobstructive etiology. Subsequent increase in creatine following LHC 4/21/22. Cr stable x2 following LHC. Cleared for discharge by nephrology  -discontinued diuretics on discharge  -repeat BMP in 1 week   -Monitor I/O, avoid hypotension  10. Mild transaminitis (resolved): Suspect related to congestive hepatopathy, quite mild and will trend labs. 11. Lactic Acidosis (resolved): mild, suspect secondary to metformin in setting of JUAN C.    12. Acute Hypoxic Respiratory Failure (resolved): secondary to decompensated HFpEF in setting of severe aortic stenosis. Pulmonary edema noted on CXR on arrival. Improved s/p BiPAP and diuresis. 13. Leukocytosis / Suspected CAP (resolved): Bibasilar infiltrates may be related to prior scarring, atelectasis, pulmonary edema, or pneumonia. However he does have a elevated white count but denies any fevers. Mildly elevated procal. WBC tredning down. Does endorse rhinorrhea chronically. Received 4d Rocephin and 3d azithro, culture resp, blood cultures WNL     The patient was seen and examined on day of discharge and this discharge summary is in conjunction with any daily progress note from day of discharge. Hospital Course: Adelina Kuhn is a 80 y.o. male with PMHx of hypertension, diabetes, severe aortic stenosis, diastolic dysfunction with LVH who presents to [address] [place] [address] with shortness of breath. Patient is on BiPAP during my examination and at times difficult to understand, so daughter assists with history. Patient reports that after waking up this morning he began to develop relatively rapid progression of shortness of breath, and subsequently called his daughter and eventually EMS for transfer to ED. He reports that he has been in his usual state of health, however his daughter reports that there have been some shortness of breath developing over the last week worse than his baseline shortness of breath. He denies any chest pain or palpitations, no dizziness or syncope. Reports a mild cough but thinks this is due to his acute on chronic nasal congestion for which she has been taking nasal spray. There has been no fevers or chills or sputum production. He does report orthopnea, dyspnea on exertion, but denies any change in his chronic lower extremity edema. He has been compliant with his medications of bisoprolol, lisinopril, hydrochlorothiazide. He has known severe aortic stenosis being followed by Dr. Sangita Lindsey, which was previously noted to be asymptomatic. He is a non-smoker and denies any significant alcohol use. No recent change in diet. No recent NSAIDs or steroids. Of note, the patient did get his COVID booster #2 on 4/14 and attributes some of his symptoms to this.     ED course: Patient arrived on noninvasive ventilation, will switch to BiPAP while here due to work of breathing and chest x-ray is obtained showing pulmonary edema pattern, he was treated for suspected flash pulmonary edema as his blood pressure was greater than 728 systolic on arrival.  He was started on a nitro drip with brisk decrease in his blood pressure, this was subsequently stopped. He remained on BiPAP during my examination and states he was feeling improved. O2 saturations 99 to 100%. Tachypnea was noted, tachycardia was noted. There is no hypoxia documented. Labs are significant for an JUAN C with a creatinine of 1.6, glucose of 295, TSH of 4.4 within normal limit free T4, white blood cell count of 23, hemoglobin of 12.1, lactic acid of 2.4 that improved to 2.0, and a BNP of 2599 with a troponin of 0.015. His AST and ALT were 48 and 89 respectively. Patient was seen and evaluated by cardiology and underwent MIS/cardioversion followed by [facility] which demonstrated severe ostial disease of left main and LAD. Subsequently seen by cardiothoracic surgery for possible CABG however refused invasive surgery and elected for multivessel PCI on 4/21/22. Plan for staged PCI of RCA in 2 weeks followed by TAVR. Patient was also seen by nephrology during the perioperative period for contrast associated nephropathy. Patient was noted to have JUAN C following LHC however Cr remained stable    On the day of discharge patient was seen and evaluated at bedside and found to be in stable condition and all labs, imaging, and care plan were reviewed by the attending. Patient discharge instructions were discussed with the patient by the nursing staff.  Thank you EMRE Flannery - CNP for allowing us the opportunity to care for this patient    Exam:     Vitals:  Vitals:    04/22/22 1640 04/22/22 2030 04/22/22 2324 04/23/22 0301   BP: 139/68 135/64 124/63 129/63   Pulse: 64 69 57 65   Resp: 16 18 18 16   Temp: 98.5 °F (36.9 °C) 98.3 °F (36.8 °C) 97.6 °F (36.4 °C) 98.3 °F (36.8 °C)   TempSrc: Oral Oral Oral Oral   SpO2: 96% 98% 94% 96%   Weight:    209 lb 14.4 oz (95.2 kg)   Height:         Weight: Weight: 209 lb 14.4 oz (95.2 kg)     24 hour intake/output:    Intake/Output Summary (Last 24 hours) at 4/23/2022 5396  Last data filed at 4/23/2022 1361  Gross per 24 hour   Intake 3954.72 ml   Output 1225 ml   Net 2729.72 ml           General appearance: Elderly male in no acute distress, well-groomed  Eyes:  Pupils equal, round, and reactive to light. Conjunctivae/corneas clear. HENT: Head normal in appearance. External nares normal.  Oral mucosa moist without lesions. Hearing grossly intact. Neck: Supple, with full range of motion. Trachea midline. Unable to eval for JVD. Respiratory: Normal respiratory effort with mild RLL Rales, no wheezing or rhonchi. Cardiovascular: RRR  Minimal bilateral lower extremity edema improved from days prior  Abdomen: Soft, non-tender, non-distended with normal bowel sounds. Musculoskeletal: There is no joint swelling or tenderness. Normal tone. No abnormal movements. Skin: Warm and dry. No rashes or lesions. Neurologic:  No focal sensory/motor deficits in the upper and lower extremities. Cranial nerves:  grossly non-focal 2-12. Psychiatric: Alert and oriented, normal insight and thought content. Capillary Refill: Brisk,< 3 seconds. Peripheral Pulses: +2 palpable, equal bilaterally. Labs:  For convenience and continuity at follow-up the following most recent labs are provided:      CBC:    Lab Results   Component Value Date    WBC 11.0 04/23/2022    HGB 11.2 04/23/2022    HCT 35.4 04/23/2022     04/23/2022       Renal:    Lab Results   Component Value Date  04/23/2022    K 4.1 04/23/2022    K 3.7 04/16/2022    CL 98 04/23/2022    CO2 25 04/23/2022    BUN 28 04/23/2022    CREATININE 1.6 04/23/2022    CALCIUM 8.8 04/23/2022         Significant Diagnostic Studies    Radiology:   XR CHEST PORTABLE   Final Result   Impression:      No acute processes on current film. This document has been electronically signed by: Natalie Dumas MD on    04/21/2022 01:02 AM      US RENAL COMPLETE   Final Result    No evidence of hydronephrosis. **This report has been created using voice recognition software. It may contain minor errors which are inherent in voice recognition technology. **      Final report electronically signed by Dr. Oni Weinstein on 4/17/2022 8:55 AM      XR CHEST PORTABLE   Final Result   Impression:   No infiltrate or mass. Improvement in reticular densities vs prior. Small effusions. This document has been electronically signed by: Karthikeyan Bach MD on    04/17/2022 04:10 AM      XR CHEST PORTABLE   Final Result   Interstitial opacities throughout both lungs. These have worsened in the left midlung zone and left lung base. This can are present pulmonary edema, infiltrates are not excluded. **This report has been created using voice recognition software. It may contain minor errors which are inherent in voice recognition technology. **      Final report electronically signed by Dr. Oni Weinstein on 4/16/2022 10:24 AM             Consults:     IP CONSULT TO CARDIOLOGY  IP CONSULT TO HEART FAILURE NURSE/COORDINATOR  IP CONSULT TO DIETITIAN  IP CONSULT TO PHARMACY  IP CONSULT TO NEPHROLOGY  IP CONSULT TO SOCIAL WORK  IP CONSULT TO CARDIOTHORACIC SURGERY  IP CONSULT TO PHARMACY  IP CONSULT TO CARDIAC REHAB  IP CONSULT TO SPIRITUAL SERVICES    Disposition: Home  Condition at Discharge: Stable    Code Status:  Full Code     Patient Instructions:    Discharge lab work:  BMP 1 week  Activity: activity as tolerated  Diet: ADULT DIET; Regular; No Added Salt (3-4 gm)      Follow-up visits:   No follow-up provider specified. Discharge Medications:        Medication List      START taking these medications    amiodarone 200 MG tablet  Commonly known as: CORDARONE  Take 1 tablet by mouth daily     apixaban 2.5 MG Tabs tablet  Commonly known as: ELIQUIS  Take 1 tablet by mouth 2 times daily     aspirin 81 MG EC tablet  Take 1 tablet by mouth daily     atorvastatin 40 MG tablet  Commonly known as: LIPITOR  Take 1 tablet by mouth nightly     clopidogrel 75 MG tablet  Commonly known as: PLAVIX  Take 1 tablet by mouth daily     metoprolol succinate 200 MG extended release tablet  Commonly known as: TOPROL XL  Take 1 tablet by mouth daily        CHANGE how you take these medications    lisinopril 10 MG tablet  Commonly known as: PRINIVIL;ZESTRIL  Take 1 tablet by mouth daily Hold for 1 week following discharge  What changed:   · medication strength  · See the new instructions. CONTINUE taking these medications    metFORMIN 500 MG extended release tablet  Commonly known as: Glucophage XR  Take 2 tablets by mouth daily (with breakfast)        STOP taking these medications    bisoprolol-hydroCHLOROthiazide 10-6.25 MG per tablet  Commonly known as: Jeanette Barajas           Where to Get Your Medications      These medications were sent to [place], [address] [facility], [address] [place] [address] Street    Phone: [phone]   · amiodarone 200 MG tablet  · apixaban 2.5 MG Tabs tablet  · aspirin 81 MG EC tablet  · atorvastatin 40 MG tablet  · clopidogrel 75 MG tablet  · lisinopril 10 MG tablet  · metoprolol succinate 200 MG extended release tablet          Time Spent on discharge is more than 1.5 hours in the examination, evaluation, counseling and review of medications and discharge plan. Signed:     Thank you EMRE Reilly - STEPH for the opportunity to be involved in this patient's care.     Electronically signed by Nasra Edwards DO PGY-2 on 4/23/2022 at 8:37 AM

## 2022-04-23 NOTE — PROGRESS NOTES
[facility]   Nephrology progress note  4/23/2022, 10:56 AM      Pt Name:    Smiley Thakur  MRN:     [id number]     YOB: 1931  Admit Date:    4/16/2022  9:51 AM  Primary Care Physician:  EMRE Weber CNP   Room number  3B-26/026-A    Chief Complaint: Nephrology following for JUAN C/CKD    Subjective:  Patient seen and examined  No chest pain or shortness of breath  Feels okay  Poor historian    Objective:  24HR INTAKE/OUTPUT:    Intake/Output Summary (Last 24 hours) at 4/23/2022 1056  Last data filed at 4/23/2022 0735  Gross per 24 hour   Intake 3954.72 ml   Output 1225 ml   Net 2729.72 ml     I/O last 3 completed shifts: In: 4304.7 [P.O.:1820; I.V.:2484.7]  Out: 1525 [Urine:1525]  I/O this shift:  In: -   Out: 350 [Urine:350]  Admission weight: 217 lb (98.4 kg)  Wt Readings from Last 3 Encounters:   04/23/22 209 lb 14.4 oz (95.2 kg)   03/01/22 217 lb (98.4 kg)   12/01/21 219 lb (99.3 kg)     Body mass index is 30.12 kg/m².     Physical examination  VITALS:     Vitals:    04/22/22 2030 04/22/22 2324 04/23/22 0301 04/23/22 0922   BP: 135/64 124/63 129/63 129/60   Pulse: 69 57 65 76   Resp: 18 18 16 18   Temp: 98.3 °F (36.8 °C) 97.6 °F (36.4 °C) 98.3 °F (36.8 °C) 98 °F (36.7 °C)   TempSrc: Oral Oral Oral Oral   SpO2: 98% 94% 96% 96%   Weight:   209 lb 14.4 oz (95.2 kg)    Height:         General Appearance: alert and cooperative with exam, appears comfortable, no distress  Mouth/Throat: Oral mucosa moist  Neck: No JVD  Lungs: Air entry B/L, no rales, no use of accessory muscles  Heart:  S1, S2 heard  GI: soft, non-tender, no guarding      Lab Data  CBC:   Recent Labs     04/21/22  0326 04/22/22  0525 04/23/22  0324   WBC 11.7* 9.8 11.0*   HGB 12.3* 10.8* 11.2*   HCT 38.2* 33.6* 35.4*    146 154     BMP:  Recent Labs     04/21/22  0326 04/22/22  0525 04/23/22  0324    136 135   K 3.9 4.2 4.1    99 98   CO2 20* 22* 25   BUN 27* 29* 28*   CREATININE 1.3* 1. 6* 1.6*   GLUCOSE 154* 150* 132*   CALCIUM 9.1 8.8 8.8   MG 1.9  --   --      Hepatic: No results for input(s): LABALBU, AST, ALT, ALB, BILITOT, ALKPHOS in the last 72 hours. Meds:  Infusion:    lactated ringers Stopped (04/23/22 0911)    sodium chloride Stopped (04/23/22 0911)    sodium chloride Stopped (04/23/22 0911)    dextrose Stopped (04/23/22 0911)     Meds:    apixaban  2.5 mg Oral BID    amiodarone  200 mg Oral Daily    sodium chloride flush  5-40 mL IntraVENous 2 times per day    clopidogrel  75 mg Oral Daily    [Held by provider] furosemide  20 mg IntraVENous Daily    metoprolol tartrate  100 mg Oral BID    insulin lispro  0-6 Units SubCUTAneous TID WC    insulin lispro  0-3 Units SubCUTAneous Nightly    aspirin  325 mg Oral Once    aspirin  81 mg Oral Daily    atorvastatin  40 mg Oral Nightly    insulin glargine  3 Units SubCUTAneous Nightly     Meds prn: sodium chloride flush, sodium chloride, ondansetron, sodium chloride, acetaminophen, metoprolol, heparin (porcine), heparin (porcine), ondansetron **OR** [DISCONTINUED] ondansetron, polyethylene glycol, potassium chloride **OR** potassium alternative oral replacement **OR** potassium chloride, magnesium sulfate, glucagon (rDNA), dextrose, dextrose bolus (hypoglycemia) **OR** dextrose bolus (hypoglycemia), glucose       Impression and Plan:  1. JUAN C on CKD 3  Overall serum creatinine stable  Electrolytes are stable  If discharged then see Dr. Cuco Piña in 2 to 3 weeks with BMP  Okay to resume low-dose Lasix due to systolic dysfunction    2. CAD status post PCI  3. Metabolic acidosis resolved  4. Essential hypertension  5. Insulin-dependent diabetes mellitus        Emmett Odonnell MD  [facility]    This report has been created using voice recognition software.  It may contain minor errors which are inherent in voice recognition technology

## 2022-04-23 NOTE — PROGRESS NOTES
Reviewed discharge instructions removed IV   Heart attack teaching covered with patient and/or family or significant other:  1. Signs and symptoms of a heart attack. 2. When to call 911 and the importance of calling 911.  3. Personal risk factors and ways to lower their risk. 4.   Importance of quitting smoking if applicable. Heart Attack booklet and Acute Coronary Syndrome folder given to the patient and/or family or significant other. Reviewed:  1. Heart Attack - What's Ahead booklet. 2. How to take Nitroglycerin. 3. The importance of participating in Cardiac Rehab and hours of operation. Pamphlet given. 4. Heart Healthy Diet. 5. Reduce your risk of heart attack handout. 6. Discharge instructions for Cath/Intervention procedure site. 7. Smoking Cessation, Activate Change-QUIT program, pamphlet given. Acute Coronary Syndrome Folder given to patient which includes the following education:    1. Heart healthy Diet booklet  2. Reduce your risk of Heart attack paper  3. Cardiac Rehab phamphlet  4. How to take your Nitroglycerine paper  5. Resources for you and your family paper  6. Smoking Cessation information  7.  Cardiac Cath Discharge Instructions Groin/Radial Approach    Answered all questions at this time waiting for ride home call light left within reach

## 2022-04-23 NOTE — PLAN OF CARE
Problem: Falls - Risk of:  Goal: Will remain free from falls  Description: Will remain free from falls  4/22/2022 2250 by Elayne Chang RN  Outcome: Progressing  4/22/2022 0924 by Kizzy Garcia RN  Outcome: Progressing  Goal: Absence of physical injury  Description: Absence of physical injury  4/22/2022 2250 by Elayne Chang RN  Outcome: Progressing  4/22/2022 0924 by Kizzy Garcia RN  Outcome: Progressing     Problem: Discharge Planning  Goal: Discharge to home or other facility with appropriate resources  4/22/2022 2250 by Elayne Chang RN  Outcome: Progressing  4/22/2022 0924 by Kizzy Garcia RN  Outcome: Progressing     Problem: Pain  Goal: Verbalizes/displays adequate comfort level or baseline comfort level  4/22/2022 2250 by Elayne Chang RN  Outcome: Progressing  4/22/2022 0924 by Kizzy Garcia RN  Outcome: Progressing     Problem: Chronic Conditions and Co-morbidities  Goal: Patient's chronic conditions and co-morbidity symptoms are monitored and maintained or improved  4/22/2022 2250 by Elayne Chang RN  Outcome: Progressing  4/22/2022 0924 by Kizzy Garcia RN  Outcome: Progressing     Problem: ABCDS Injury Assessment  Goal: Absence of physical injury  4/22/2022 2250 by Elayne Chang RN  Outcome: Progressing  4/22/2022 0924 by Kizzy Garcia RN  Outcome: Progressing     Problem: Cardiovascular - Adult  Goal: Maintains optimal cardiac output and hemodynamic stability  4/22/2022 2250 by Elayne Chang RN  Outcome: Progressing  4/22/2022 0924 by Kizzy Garcia RN  Outcome: Progressing  Goal: Absence of cardiac dysrhythmias or at baseline  4/22/2022 2250 by Elayne Chang RN  Outcome: Progressing  4/22/2022 0924 by Kizzy Garcia RN  Outcome: Progressing     Problem: Infection - Adult  Goal: Absence of infection during hospitalization  4/22/2022 2250 by Elayne Cahng RN  Outcome: Progressing  4/22/2022 0924 by Kizzy Garcia RN  Outcome: Progressing     Problem: Metabolic/Fluid and Electrolytes - Adult  Goal: Electrolytes maintained within normal limits  4/22/2022 2250 by Mary Hancock RN  Outcome: Progressing  4/22/2022 0924 by Benny Gomez RN  Outcome: Progressing  Goal: Hemodynamic stability and optimal renal function maintained  4/22/2022 2250 by Mary Hancock RN  Outcome: Progressing  4/22/2022 0924 by Benny Gomez RN  Outcome: Progressing  Goal: Glucose maintained within prescribed range  4/22/2022 2250 by Mary Hancock RN  Outcome: Progressing  4/22/2022 0924 by Benny Gomez RN  Outcome: Progressing

## 2022-04-24 ENCOUNTER — HOSPITAL ENCOUNTER (EMERGENCY)
Age: 87
Discharge: HOME | End: 2022-04-24
Attending: EMERGENCY MEDICINE
Payer: MEDICARE

## 2022-04-24 VITALS
WEIGHT: 209 LBS | BODY MASS INDEX: 29.92 KG/M2 | OXYGEN SATURATION: 98 % | TEMPERATURE: 97.8 F | RESPIRATION RATE: 16 BRPM | DIASTOLIC BLOOD PRESSURE: 87 MMHG | HEART RATE: 102 BPM | SYSTOLIC BLOOD PRESSURE: 136 MMHG | HEIGHT: 70 IN

## 2022-04-24 DIAGNOSIS — I48.91 ATRIAL FIBRILLATION WITH RVR (HCC): Primary | ICD-10-CM

## 2022-04-24 LAB
ALBUMIN SERPL-MCNC: 3.7 G/DL (ref 3.5–5.1)
ALP BLD-CCNC: 70 U/L (ref 38–126)
ALT SERPL-CCNC: 46 U/L (ref 11–66)
ANION GAP SERPL CALCULATED.3IONS-SCNC: 12 MEQ/L (ref 8–16)
AST SERPL-CCNC: 30 U/L (ref 5–40)
BASOPHILS # BLD: 0.4 %
BASOPHILS ABSOLUTE: 0 THOU/MM3 (ref 0–0.1)
BILIRUB SERPL-MCNC: 0.9 MG/DL (ref 0.3–1.2)
BUN BLDV-MCNC: 26 MG/DL (ref 7–22)
CALCIUM SERPL-MCNC: 8.8 MG/DL (ref 8.5–10.5)
CHLORIDE BLD-SCNC: 101 MEQ/L (ref 98–111)
CO2: 22 MEQ/L (ref 23–33)
CREAT SERPL-MCNC: 1.3 MG/DL (ref 0.4–1.2)
EKG Q-T INTERVAL: 338 MS
EKG QRS DURATION: 132 MS
EKG QTC CALCULATION (BAZETT): 493 MS
EKG R AXIS: -58 DEGREES
EKG T AXIS: 106 DEGREES
EKG VENTRICULAR RATE: 128 BPM
EOSINOPHIL # BLD: 0.7 %
EOSINOPHILS ABSOLUTE: 0.1 THOU/MM3 (ref 0–0.4)
ERYTHROCYTE [DISTWIDTH] IN BLOOD BY AUTOMATED COUNT: 13.5 % (ref 11.5–14.5)
ERYTHROCYTE [DISTWIDTH] IN BLOOD BY AUTOMATED COUNT: 41.4 FL (ref 35–45)
GFR SERPL CREATININE-BSD FRML MDRD: 52 ML/MIN/1.73M2
GLUCOSE BLD-MCNC: 288 MG/DL (ref 70–108)
HCT VFR BLD CALC: 35.8 % (ref 42–52)
HEMOGLOBIN: 11.5 GM/DL (ref 14–18)
IMMATURE GRANS (ABS): 0.08 THOU/MM3 (ref 0–0.07)
IMMATURE GRANULOCYTES: 0.8 %
LYMPHOCYTES # BLD: 6.2 %
LYMPHOCYTES ABSOLUTE: 0.6 THOU/MM3 (ref 1–4.8)
MCH RBC QN AUTO: 27.1 PG (ref 26–33)
MCHC RBC AUTO-ENTMCNC: 32.1 GM/DL (ref 32.2–35.5)
MCV RBC AUTO: 84.4 FL (ref 80–94)
MONOCYTES # BLD: 8.1 %
MONOCYTES ABSOLUTE: 0.8 THOU/MM3 (ref 0.4–1.3)
NUCLEATED RED BLOOD CELLS: 0 /100 WBC
OSMOLALITY CALCULATION: 285.4 MOSMOL/KG (ref 275–300)
PLATELET # BLD: 141 THOU/MM3 (ref 130–400)
PMV BLD AUTO: 13 FL (ref 9.4–12.4)
POTASSIUM REFLEX MAGNESIUM: 4.2 MEQ/L (ref 3.5–5.2)
PRO-BNP: 2967 PG/ML (ref 0–1800)
RBC # BLD: 4.24 MILL/MM3 (ref 4.7–6.1)
SEG NEUTROPHILS: 83.8 %
SEGMENTED NEUTROPHILS ABSOLUTE COUNT: 8.4 THOU/MM3 (ref 1.8–7.7)
SODIUM BLD-SCNC: 135 MEQ/L (ref 135–145)
TOTAL PROTEIN: 6.8 G/DL (ref 6.1–8)
TROPONIN T: 0.52 NG/ML
TROPONIN T: 0.53 NG/ML
WBC # BLD: 10 THOU/MM3 (ref 4.8–10.8)

## 2022-04-24 PROCEDURE — 80053 COMPREHEN METABOLIC PANEL: CPT

## 2022-04-24 PROCEDURE — 96374 THER/PROPH/DIAG INJ IV PUSH: CPT

## 2022-04-24 PROCEDURE — 36415 COLL VENOUS BLD VENIPUNCTURE: CPT

## 2022-04-24 PROCEDURE — 96376 TX/PRO/DX INJ SAME DRUG ADON: CPT

## 2022-04-24 PROCEDURE — 85025 COMPLETE CBC W/AUTO DIFF WBC: CPT

## 2022-04-24 PROCEDURE — 93010 ELECTROCARDIOGRAM REPORT: CPT | Performed by: INTERNAL MEDICINE

## 2022-04-24 PROCEDURE — 93005 ELECTROCARDIOGRAM TRACING: CPT | Performed by: EMERGENCY MEDICINE

## 2022-04-24 PROCEDURE — 2500000003 HC RX 250 WO HCPCS: Performed by: EMERGENCY MEDICINE

## 2022-04-24 PROCEDURE — 84484 ASSAY OF TROPONIN QUANT: CPT

## 2022-04-24 PROCEDURE — 83880 ASSAY OF NATRIURETIC PEPTIDE: CPT

## 2022-04-24 PROCEDURE — 96375 TX/PRO/DX INJ NEW DRUG ADDON: CPT

## 2022-04-24 PROCEDURE — 99284 EMERGENCY DEPT VISIT MOD MDM: CPT

## 2022-04-24 RX ORDER — MIDAZOLAM IN NACL,ISO-OSMOT/PF 50 MG/50ML
1-10 INFUSION BOTTLE (ML) INTRAVENOUS CONTINUOUS
Status: DISCONTINUED | OUTPATIENT
Start: 2022-04-24 | End: 2022-04-24

## 2022-04-24 RX ORDER — FENTANYL CITRATE-0.9 % NACL/PF 10 MCG/ML
25-200 PLASTIC BAG, INJECTION (ML) INTRAVENOUS CONTINUOUS
Status: DISCONTINUED | OUTPATIENT
Start: 2022-04-24 | End: 2022-04-24

## 2022-04-24 RX ORDER — METOPROLOL TARTRATE 5 MG/5ML
5 INJECTION INTRAVENOUS ONCE
Status: COMPLETED | OUTPATIENT
Start: 2022-04-24 | End: 2022-04-24

## 2022-04-24 RX ADMIN — METOPROLOL TARTRATE 5 MG: 1 INJECTION, SOLUTION INTRAVENOUS at 11:34

## 2022-04-24 RX ADMIN — METOPROLOL TARTRATE 5 MG: 1 INJECTION, SOLUTION INTRAVENOUS at 13:19

## 2022-04-24 ASSESSMENT — PAIN SCALES - GENERAL: PAINLEVEL_OUTOF10: 6

## 2022-04-24 ASSESSMENT — PAIN DESCRIPTION - LOCATION: LOCATION: SHOULDER

## 2022-04-24 ASSESSMENT — PAIN - FUNCTIONAL ASSESSMENT: PAIN_FUNCTIONAL_ASSESSMENT: 0-10

## 2022-04-24 NOTE — ED NOTES
Pt resting in bed listening to music, daughter at bedside. Call light in reach.      Ashlyn Deluca RN  04/24/22 8646

## 2022-04-24 NOTE — ED PROVIDER NOTES
[facility] [address] [address] EMERGENCY DEPT    EMERGENCY MEDICINE     Pt Name: Aki Esquivel  MRN: [id number]  [place]da 1/30/1931  Date of evaluation: 4/24/2022  Provider: Av Wise MD,     CHIEF COMPLAINT       Chief Complaint   Patient presents with    Atrial Fibrillation       HISTORY OF PRESENT ILLNESS    Aki Esquivel is a pleasant 80 y.o. male who presents to the emergency department from home with complaints of A. fib. Patient states that he was admitted last week in which he was cardioverted but eventually went back into A. fib. Patient states that he woke up and he had noticed that he had palpitations and he also had bilateral feet that were swollen. Patient was discharged yesterday from the hospital.  He has taken his medications. His daughter does note that he will take his evening meds around dinnertime and then does not take his morning meds until 10 or 11 AM.  Today he woke up around 8 AM but had not eaten or taken his medication until 930. He had been planning to get brunch with his daughter but when he felt the palpitations he thought maybe he would need something to eat so that he could take his medications. He was concerned that if he did not eat prior to taking his meds that that would be a problem. Patient denies any recent fever, chills, cough, nausea, vomiting, diarrhea. Triage notes and Nursing notes were reviewed by myself. Any discrepancies are addressed above.     PAST MEDICAL HISTORY     Past Medical History:   Diagnosis Date    Diabetes mellitus ([address]     Hypertension     Type II or unspecified type diabetes mellitus without mention of complication, not stated as uncontrolled        SURGICAL HISTORY       Past Surgical History:   Procedure Laterality Date    TONSILLECTOMY AND ADENOIDECTOMY Bilateral     age 10        CURRENT MEDICATIONS       Discharge Medication List as of 4/24/2022  2:18 PM      CONTINUE these medications which have NOT CHANGED    Details   aspirin 81 MG EC tablet Take 1 tablet by mouth daily, Disp-30 tablet, R-3Normal      clopidogrel (PLAVIX) 75 MG tablet Take 1 tablet by mouth daily, Disp-30 tablet, R-3Normal      amiodarone (CORDARONE) 200 MG tablet Take 1 tablet by mouth daily, Disp-30 tablet, R-0Normal      atorvastatin (LIPITOR) 40 MG tablet Take 1 tablet by mouth nightly, Disp-30 tablet, R-3Normal      apixaban (ELIQUIS) 2.5 MG TABS tablet Take 1 tablet by mouth 2 times daily, Disp-60 tablet, R-3Normal      metoprolol succinate (TOPROL XL) 200 MG extended release tablet Take 1 tablet by mouth daily, Disp-30 tablet, R-5Normal      lisinopril (PRINIVIL;ZESTRIL) 5 MG tablet Take 2 tablets by mouth daily Hold for 1 week following discharge, Disp-1 tablet, R-3Normal      metFORMIN (GLUCOPHAGE XR) 500 MG extended release tablet Take 2 tablets by mouth daily (with breakfast), Disp-180 tablet, R-3Normal             ALLERGIES     Norvasc [amlodipine besylate] and Other    FAMILY HISTORY       Family History   Problem Relation Age of Onset    Diabetes Paternal Aunt     Diabetes Paternal Uncle     Cancer Paternal Uncle     Diabetes Maternal Grandmother     Diabetes Maternal Grandfather     Diabetes Paternal Grandmother     Cancer Paternal Grandmother     Diabetes Paternal Grandfather         SOCIAL HISTORY       Social History     Socioeconomic History    Marital status:      Spouse name: None    Number of children: None    Years of education: None    Highest education level: None   Occupational History    None   Tobacco Use    Smoking status: Never Smoker    Smokeless tobacco: Never Used   Vaping Use    Vaping Use: Never used   Substance and Sexual Activity    Alcohol use: No     Alcohol/week: 0.0 standard drinks    Drug use: No    Sexual activity: Not Currently     Partners: Female   Other Topics Concern    None   Social History Narrative    None     Social Determinants of Health     Financial Resource Strain: Low Risk     Difficulty of Paying Living Expenses: Not hard at all   Food Insecurity: No Food Insecurity    Worried About Running Out of Food in the Last Year: Never true    Ran Out of Food in the Last Year: Never true   Transportation Needs: No Transportation Needs    Lack of Transportation (Medical): No    Lack of Transportation (Non-Medical): No   Physical Activity:     Days of Exercise per Week: Not on file    Minutes of Exercise per Session: Not on file   Stress:     Feeling of Stress : Not on file   Social Connections:     Frequency of Communication with Friends and Family: Not on file    Frequency of Social Gatherings with Friends and Family: Not on file    Attends [employer] Services: Not on file    Active Member of [address] [place] [address] or Organizations: Not on file    Attends Club or Organization Meetings: Not on file    Marital Status: Not on file   Intimate Partner Violence:     Fear of Current or Ex-Partner: Not on file    Emotionally Abused: Not on file    Physically Abused: Not on file    Sexually Abused: Not on file   Housing Stability:     Unable to Pay for Housing in the Last Year: Not on file    Number of [place] in the Last Year: Not on file    Unstable Housing in the Last Year: Not on file       REVIEW OF SYSTEMS     Review of Systems   Constitutional: Negative for fatigue and fever. HENT: Negative for congestion and trouble swallowing. Eyes: Negative for redness. Respiratory: Negative for cough and shortness of breath. Cardiovascular: Positive for palpitations and leg swelling. Negative for chest pain. Gastrointestinal: Negative for abdominal pain, nausea and vomiting. Genitourinary: Negative for difficulty urinating. Musculoskeletal: Negative for back pain. Skin: Negative for rash. Allergic/Immunologic: Negative for immunocompromised state. Neurological: Negative for light-headedness and headaches. Hematological: Does not bruise/bleed easily.        Except as noted above the remainder of the review of systems was reviewed and is. PHYSICAL EXAM    (up to 7 for level 4, 8 or more for level 5)     ED Triage Vitals [22 1110]   BP Temp Temp Source Pulse Resp SpO2 Height Weight   (!) 118/95 97.8 °F (36.6 °C) Oral 132 17 98 % 5' 10\" (1.778 m) 209 lb (94.8 kg)       Physical Exam  Vitals and nursing note reviewed. Constitutional:       General: He is not in acute distress. Appearance: He is normal weight. He is not ill-appearing. HENT:      Head: Normocephalic and atraumatic. Mouth/Throat:      Mouth: Mucous membranes are moist.      Pharynx: Oropharynx is clear. Eyes:      Extraocular Movements: Extraocular movements intact. Pupils: Pupils are equal, round, and reactive to light. Cardiovascular:      Rate and Rhythm: Tachycardia present. Rhythm irregular. Pulses:           Dorsalis pedis pulses are 2+ on the right side and 2+ on the left side. Posterior tibial pulses are 2+ on the right side and 2+ on the left side. Heart sounds: No murmur heard. Pulmonary:      Effort: Pulmonary effort is normal. No respiratory distress. Breath sounds: Normal breath sounds. No decreased breath sounds. Abdominal:      General: Bowel sounds are normal.      Palpations: Abdomen is soft. Tenderness: There is no abdominal tenderness. There is no guarding or rebound. Musculoskeletal:      Cervical back: Neck supple. Right lower le+ Pitting Edema present. Left lower le+ Pitting Edema present. Skin:     General: Skin is warm and dry. Capillary Refill: Capillary refill takes less than 2 seconds. Neurological:      General: No focal deficit present. Mental Status: He is alert.          DIAGNOSTIC RESULTS     EKG:(none if blank)  All EKG's are interpreted by [facility] Phys[facility] who either signs or Co-signs this chart in the absence of a cardiologist.        RADIOLOGY: (none if blank)   Interpretation per the Radiologistbelow, if available at the time of this note:    No orders to display       LABS:  Labs Reviewed   CBC WITH AUTO DIFFERENTIAL - Abnormal; Notable for the following components:       Result Value    RBC 4.24 (*)     Hemoglobin 11.5 (*)     Hematocrit 35.8 (*)     MCHC 32.1 (*)     MPV 13.0 (*)     Segs Absolute 8.4 (*)     Lymphocytes Absolute 0.6 (*)     Immature Grans (Abs) 0.08 (*)     All other components within normal limits   COMPREHENSIVE METABOLIC PANEL W/ REFLEX TO MG FOR LOW K - Abnormal; Notable for the following components:    Glucose 288 (*)     CREATININE 1.3 (*)     BUN 26 (*)     CO2 22 (*)     All other components within normal limits   TROPONIN - Abnormal; Notable for the following components:    Troponin T 0.530 (*)     All other components within normal limits   BRAIN NATRIURETIC PEPTIDE - Abnormal; Notable for the following components:    Pro-BNP 2967.0 (*)     All other components within normal limits   GLOMERULAR FILTRATION RATE, ESTIMATED - Abnormal; Notable for the following components:    Est, Glom Filt Rate 52 (*)     All other components within normal limits   TROPONIN - Abnormal; Notable for the following components:    Troponin T 0.521 (*)     All other components within normal limits   ANION GAP   OSMOLALITY       All other labs were within normal range or not returned as of this dictation. Please note, any cultures that may have been sent were not resulted at the time of this patient visit. EMERGENCY DEPARTMENT COURSE andMedical Decision Making:     MDM  Number of Diagnoses or Management Options  Atrial fibrillation with RVR [facility])  Diagnosis management comments: 80-year-old male presents emergency room for palpitations. Patient was found to be in A. fib with RVR upon arrival.  Differential includes electrolyte abnormality, ischemia, infection, medication noncompliance, dehydration. Will give a dose of Lopressor here.   We will plan to give some mild hydration given his CHF history. Will evaluate with CBC, CMP, troponin, BNP, chest x-ray. /  ED Course as of 04/28/22 1455   Sun Apr 24, 2022   1235 Patient's troponin is mildly higher than his baseline. Heart rate improved with 1 dose of Lopressor. He still has intermittent periods where he is jumping to 110-115. We will give him another dose of Lopressor here and recheck his troponin. If it is decreasing the plan will be to discharge him home. [DD]   1416 Unchanged troponin. Heart rate is improved. Will discharge home. [DD]      ED Course User Index  [DD] Dc Ferrara MD         The patient was evaluated during the global COVID-19 pandemic, and that diagnosis was considered upon their initial presentation. Their evaluation, treatment and testing was consistent with current guidelines for patients who present with complaints or symptoms that may be related to COVID-19. Strict returnprecautions and follow up instructions were discussed with the patient with which the patient agrees        ED Medications administered this visit:    Medications   metoprolol (LOPRESSOR) injection 5 mg (5 mg IntraVENous Given 4/24/22 1134)   metoprolol (LOPRESSOR) injection 5 mg (5 mg IntraVENous Given 4/24/22 1319)         Procedures: (None if blank)       CLINICAL       1.  Atrial fibrillation with RVR [facility])          DISPOSITION/PLAN   DISPOSITION Decision To Discharge 04/24/2022 02:16:41 PM      PATIENT REFERRED TO:  EMRE Kaur - CNP  [address] [facility]  [phone]    Schedule an appointment as soon as possible for a visit   If symptoms worsen      DISCHARGE MEDICATIONS:  Discharge Medication List as of 4/24/2022  2:18 PM                 (Please note that portions of this note were completed with a voice recognition program.  Efforts were made to edit the dictations but occasionallywords are mis-transcribed.)      Electronically signed by Palomo Vale MD on 4/24/22 at 1:06 PM EDT    Attending Physician, Emergency Department       Raj Martinez MD  04/28/22 5234

## 2022-04-24 NOTE — ED TRIAGE NOTES
Presents to ER with complaints of Afib. Pt states he was admitted last week in which he was cardioverted but eventually went back in Afib. States he had 2 catheterizations with one stent placed. States he was discharged yesterday and this morning he noticed bilateral feet were swollen and felt like he was back in Afib. Upon arrival -140 A fib RVR. EKG completed.

## 2022-04-24 NOTE — ED NOTES
Pt resting in bed, daughter at bedside. Respirations unlabored. Will continue to monitor.      Ashlyn Deluca RN  04/24/22 7184

## 2022-04-25 ENCOUNTER — CARE COORDINATION (OUTPATIENT)
Dept: CASE MANAGEMENT | Age: 87
End: 2022-04-25

## 2022-04-25 ENCOUNTER — PATIENT MESSAGE (OUTPATIENT)
Dept: FAMILY MEDICINE CLINIC | Age: 87
End: 2022-04-25

## 2022-04-25 ENCOUNTER — TELEMEDICINE (OUTPATIENT)
Dept: FAMILY MEDICINE CLINIC | Age: 87
End: 2022-04-25
Payer: MEDICARE

## 2022-04-25 DIAGNOSIS — N18.31 TYPE 2 DIABETES MELLITUS WITH STAGE 3A CHRONIC KIDNEY DISEASE, WITHOUT LONG-TERM CURRENT USE OF INSULIN (HCC): ICD-10-CM

## 2022-04-25 DIAGNOSIS — R53.81 PHYSICAL DECONDITIONING: ICD-10-CM

## 2022-04-25 DIAGNOSIS — I35.0 NONRHEUMATIC AORTIC VALVE STENOSIS: ICD-10-CM

## 2022-04-25 DIAGNOSIS — I10 PRIMARY HYPERTENSION: ICD-10-CM

## 2022-04-25 DIAGNOSIS — I48.0 PAROXYSMAL ATRIAL FIBRILLATION (HCC): ICD-10-CM

## 2022-04-25 DIAGNOSIS — I50.9 ACUTE CONGESTIVE HEART FAILURE, UNSPECIFIED HEART FAILURE TYPE (HCC): ICD-10-CM

## 2022-04-25 DIAGNOSIS — E11.22 TYPE 2 DIABETES MELLITUS WITH STAGE 3A CHRONIC KIDNEY DISEASE, WITHOUT LONG-TERM CURRENT USE OF INSULIN (HCC): ICD-10-CM

## 2022-04-25 DIAGNOSIS — I48.91 ATRIAL FIBRILLATION WITH RVR (HCC): ICD-10-CM

## 2022-04-25 DIAGNOSIS — I48.91 ATRIAL FIBRILLATION WITH RVR (HCC): Primary | ICD-10-CM

## 2022-04-25 DIAGNOSIS — I25.10 CORONARY ARTERY DISEASE INVOLVING NATIVE CORONARY ARTERY OF NATIVE HEART WITHOUT ANGINA PECTORIS: Primary | ICD-10-CM

## 2022-04-25 DIAGNOSIS — Z09 HOSPITAL DISCHARGE FOLLOW-UP: ICD-10-CM

## 2022-04-25 DIAGNOSIS — I50.9 DECOMPENSATED HEART FAILURE (HCC): ICD-10-CM

## 2022-04-25 PROCEDURE — 1111F DSCHRG MED/CURRENT MED MERGE: CPT | Performed by: NURSE PRACTITIONER

## 2022-04-25 PROCEDURE — 99496 TRANSJ CARE MGMT HIGH F2F 7D: CPT | Performed by: NURSE PRACTITIONER

## 2022-04-25 RX ORDER — FUROSEMIDE 40 MG/1
40 TABLET ORAL DAILY
Qty: 3 TABLET | Refills: 0 | Status: SHIPPED | OUTPATIENT
Start: 2022-04-25 | End: 2022-05-12

## 2022-04-25 RX ORDER — POTASSIUM CHLORIDE 20 MEQ/1
20 TABLET, EXTENDED RELEASE ORAL DAILY
Qty: 3 TABLET | Refills: 0 | Status: SHIPPED | OUTPATIENT
Start: 2022-04-25 | End: 2022-05-12

## 2022-04-25 NOTE — TELEPHONE ENCOUNTER
From: Ewdin Jorgensen  To: Checo Estrada  Sent: 4/25/2022 12:58 PM EDT  Subject: Home health care    Mary Jane Hawkins,  I forgot to ask you if you could arrange home health care for me? At the hospital they told me to check with you.   ThanksBulmaro

## 2022-04-25 NOTE — CARE COORDINATION
[facility] Transitions Initial Follow Up Call    Call within 2 business days of discharge: Yes    Patient: Jovan Singh Patient :    MRN: [id number]  Reason for Admission: Atriel Fibrillation with CV / PNA / CHF   Discharge Date: 22 RARS: Readmission Risk Score: 14.9 ( )      Last Discharge [facility]       Complaint Diagnosis Description Type Department Provider    22 Atrial Fibrillation Atrial fibrillation with RVR [facility]) ED (DISCHARGE) ABRAHAN Burciaga MD        Transitions of Care Initial Call:  Explained the role of Care Transition Nurse and the Transition program, patient is agreeable to follow up calls Post discharge from the [employer] patient's daughter 1st - left message. Called and spoke with patient. Patient is alert and oriented to self, place, and time. Denies episodes of rapid heart rate or palpitations, dizziness, fatigue, SOB or chest pain at this time. Patient denies weight gain (started weighing this morning), SOB or wheezing, coughing, abdominal edema, or chest pain. Ankle swollen but no worse then in hospital. NO oxygen used or Pulse Oximetry. Reviewed Fluid Restriction and low NA diet with patient. Declines Dietician Referral.   Instructed patient on the importance of weighing daily, in the morning after urinating, and that if the patient has weight gain of 3# over night or 5# weight gain in a week to call their physician immediately and report. Also instructed patient to drink no more than Six 8oz glasses of fluid per day will clarify how much fluid he is to drink at [address] [facility] on 2022. Instructed to maintain Low NA diet. Will also get clarification on this tomorrow at CHF clinic. Has LVH on 22 and patient states everything is ok with that. Does NOT check blood sugars daily. Reviewed medications with Patient. Patient is NOT taking any NSAIDS at this time.    Confirmed Patient obtained and is taking medications as identifiers. Provided introduction to self, and explanation of the CTN role. CTN reviewed discharge instructions, medical action plan and red flags with patient who verbalized understanding. Patient given an opportunity to ask questions and does not have any further questions or concerns at this time. Were discharge instructions available to patient? Yes. Reviewed appropriate site of care based on symptoms and resources available to patient including: Specialist  When to call [address] The patient agrees to contact the PCP office for questions related to their healthcare. Medication reconciliation was performed with patient, who verbalizes understanding of administration of home medications. Advised obtaining a 90-day supply of all daily and as-needed medications. Reviewed and educated patient on any new and changed medications related to discharge diagnosis     Was patient discharged with a pulse oximeter? No If Yes - Discussed and confirmed pulse oximeter discharge instructions and when to notify provider or seek emergency care. LPN CC provided contact information. Plan for follow-up call in 3-5 days based on severity of symptoms and risk factors.          Non-face-to-face services provided:  Obtained and reviewed discharge summary and/or continuity of care documents    Care Transitions 24 Hour Call    Schedule Follow Up Appointment with PCP: Og Humphreys you have a copy of your discharge instructions?: Yes  Do you have all of your prescriptions and are they filled?: Yes  Have you been contacted by a [facility] Pharmacist?: No  Have you scheduled your follow up appointment?: Yes  How are you going to get to your appointment?: Other  Do you feel like you have everything you need to keep you well at home?: Yes  Care Transitions Interventions    Registered Dietician: Declined           Follow Up  Future Appointments   Date Time Provider Satish Latif   4/26/2022 10:00 AM

## 2022-04-25 NOTE — PROGRESS NOTES
Post-Discharge Transitional Care Follow Up      Spencer Flores   YOB: 1931    Date of Office Visit:  4/25/2022  Date of Hospital Admission: 4/16/22  Date of Hospital Discharge: 4/23/22  Readmission Risk Score (high >=14%.  Medium >=10%):Readmission Risk Score: 14.9 ( )      Care management risk score Rising risk (score 2-5) and Complex Care (Scores >=6): 3     Non face to face  following discharge, date last encounter closed (first attempt may have been earlier): 4/25/2022 11:37 AM     Call initiated 2 business days of discharge: Yes     Coronary artery disease involving native coronary artery of native heart without angina pectoris  -     [facility] DISCHARGE MEDS RECONCILED W/ CURRENT OUTPATIENT MED LIST  Monitor for cp and go to ER if occurs  ER for sudden SOB  Follow up with cardio in couple weeks for cath and stent placement  Continue with plavix  Cont lipitor  Cont toprol  Primary hypertension  htn stable currently  Continue with toprol  Can restart ace in future if kidney function improved  Type 2 diabetes mellitus with stage 3a chronic kidney disease, without long-term current use of insulin (HCC)         Stable A1C          Did hold metformin due to contrast, has now restarted          Watch diet  Acute congestive heart failure, unspecified heart failure type ([address]          Low sodium diet          Lasix and potassium as prescribed since increase sob and 2 lb weight gain          Follow up with chf clinic tomorrow as planned  Paroxysmal atrial fibrillation ([address]          Cont with amiodarone and toprol          Continue with xarelto          Monitor HR          HR stable currently          Follow up with cardio as planned  Nonrheumatic aortic valve stenosis          Consider TAVR procedure, discuss with cardio  Physical deconditioning          Would benefit from home health           Home health ordered, PT, OT and nursing  Hospital discharge follow-up  -     [facility] DISCHARGE MEDS RECONCILED W/ CURRENT OUTPATIENT MED LIST      Medical Decision Making: high complexity  Return in 3 months (on 7/25/2022). Subjective:   HPI    This is a 77-year-old male who I am seeing today for hospital follow-up. He was admitted to the hospital on 4/16/2022 for sudden onset of shortness of breath. EMS was called and the patient was found to be hypoxic with a pulse ox in the mid 70s. They did start him on a CPAP for transport. Once to the emergency department he was found to have A. fib with RVR and flash pulmonary edema. This was treated and the patient was admitted to the hospital.  While in the hospital he did have a cardiac catheterization which a stent was placed in the LAD. He was cardioverted at that time. He did flip back to A. fib. He is on amiodarone currently. He is seeing the CHF clinic tomorrow. He does complain of some increased shortness of breath again. He does have problems laying down. He describes a 2 pound weight gain overnight. He denies any chest pain at this time. He does state he is having some problems moving around and getting around. He is having some also some problems getting his ADLs done. He is requesting to have home health. He is having another cardiac cath in a couple weeks to have a stent placed in his right side of his heart. Is homebound currently and would benefit form home health. Inpatient course: Discharge summary reviewed- see chart.     Interval history/Current status: stable    Patient Active Problem List   Diagnosis    Type 2 diabetes mellitus with stage 3a chronic kidney disease, without long-term current use of insulin ([address]    BPH (benign prostatic hyperplasia)    Essential hypertension    Nonrheumatic aortic valve stenosis    Left atrial enlargement    Decompensated heart failure ([address]    JUAN C (acute kidney injury) ([address]    Flash pulmonary edema ([address]    Encounter for cardioversion procedure    Atrial fibrillation with RVR ([address]    Metabolic acidosis    Coronary artery disease involving native coronary artery of native heart without angina pectoris    Type 2 diabetes mellitus with chronic kidney disease       Medications listed as ordered at the time of discharge from hospital     Medication List          Accurate as of April 25, 2022 11:59 PM. If you have any questions, ask your nurse or doctor.             START taking these medications    furosemide 40 MG tablet  Commonly known as: Lasix  Take 1 tablet by mouth daily  Started by: EMRE Barboza CNP     potassium chloride 20 MEQ extended release tablet  Commonly known as: KLOR-CON M  Take 1 tablet by mouth daily  Started by: EMRE Barboza CNP        CONTINUE taking these medications    amiodarone 200 MG tablet  Commonly known as: CORDARONE  Take 1 tablet by mouth daily     apixaban 2.5 MG Tabs tablet  Commonly known as: ELIQUIS  Take 1 tablet by mouth 2 times daily     aspirin 81 MG EC tablet  Take 1 tablet by mouth daily     atorvastatin 40 MG tablet  Commonly known as: LIPITOR  Take 1 tablet by mouth nightly     clopidogrel 75 MG tablet  Commonly known as: PLAVIX  Take 1 tablet by mouth daily     lisinopril 5 MG tablet  Commonly known as: PRINIVIL;ZESTRIL  Take 2 tablets by mouth daily Hold for 1 week following discharge     metFORMIN 500 MG extended release tablet  Commonly known as: Glucophage XR  Take 2 tablets by mouth daily (with breakfast)     metoprolol succinate 200 MG extended release tablet  Commonly known as: TOPROL XL  Take 1 tablet by mouth daily           Where to Get Your Medications      These medications were sent to [facility] [phone]  [phone]    Phone: [phone]   · furosemide 40 MG tablet  · potassium chloride 20 MEQ extended release tablet          Medications marked \"taking\" at this time  Outpatient Medications Marked as Taking for the 4/25/22 encounter (Telemedicine) with Chelo EMRE Beckett CNP   Medication Sig Dispense Refill    furosemide (LASIX) 40 MG tablet Take 1 tablet by mouth daily 3 tablet 0    potassium chloride (KLOR-CON M) 20 MEQ extended release tablet Take 1 tablet by mouth daily 3 tablet 0        Medications patient taking as of now reconciled against medications ordered at time of hospital discharge: Yes    Review of Systems   Constitutional: Negative for activity change, appetite change, chills, diaphoresis, fatigue, fever and unexpected weight change. HENT: Negative for congestion, ear pain, postnasal drip, sinus pressure and sore throat. Eyes: Negative for visual disturbance. Respiratory: Negative for cough, chest tightness, shortness of breath, wheezing and stridor. Cardiovascular: Negative for chest pain, palpitations and leg swelling. Gastrointestinal: Negative for abdominal distention, abdominal pain, constipation, diarrhea, nausea and vomiting. Endocrine: Negative for polydipsia, polyphagia and polyuria. Genitourinary: Negative for decreased urine volume, difficulty urinating, dysuria, flank pain, frequency, hematuria and urgency. Musculoskeletal: Negative for arthralgias, back pain, gait problem, joint swelling, myalgias and neck pain. Skin: Negative for color change, pallor and rash. Neurological: Negative for dizziness, syncope, weakness, light-headedness, numbness and headaches. Hematological: Negative for adenopathy. Psychiatric/Behavioral: Negative for behavioral problems, self-injury and sleep disturbance. The patient is not nervous/anxious. Objective: There were no vitals taken for this visit. Physical Exam  Vitals reviewed. Constitutional:       General: He is not in acute distress. Appearance: Normal appearance. He is well-developed. HENT:      Head: Normocephalic.       Right Ear: External ear normal.      Left Ear: External ear normal.      Nose: Nose normal. Right Sinus: No maxillary sinus tenderness. Left Sinus: No maxillary sinus tenderness. Mouth/Throat:      Mouth: Mucous membranes are moist.      Pharynx: Oropharynx is clear. Uvula midline. No oropharyngeal exudate or posterior oropharyngeal erythema. Neck:      Trachea: Trachea normal.   Cardiovascular:      Rate and Rhythm: Normal rate. Rhythm irregular. Heart sounds: Murmur heard. Pulmonary:      Effort: Pulmonary effort is normal. No respiratory distress. Breath sounds: Rales present. No decreased breath sounds, wheezing or rhonchi. Chest:      Chest wall: No tenderness. Abdominal:      General: There is no distension. Palpations: Abdomen is soft. There is no mass. Tenderness: There is no abdominal tenderness. Musculoskeletal:         General: No tenderness or deformity. Normal range of motion. Cervical back: Normal range of motion and neck supple. Lymphadenopathy:      Cervical: No cervical adenopathy. Skin:     General: Skin is warm and dry. Neurological:      Mental Status: He is alert and oriented to person, place, and time. Motor: No abnormal muscle tone. Coordination: Coordination normal.      Gait: Gait normal.   Psychiatric:         Mood and Affect: Mood normal.         Behavior: Behavior normal.         Thought Content: Thought content normal.         Judgment: Judgment normal.         An electronic signature was used to authenticate this note.   --Abby Robison, APRN - CNP

## 2022-04-26 ENCOUNTER — TELEPHONE (OUTPATIENT)
Dept: FAMILY MEDICINE CLINIC | Age: 87
End: 2022-04-26

## 2022-04-26 ENCOUNTER — OFFICE VISIT (OUTPATIENT)
Dept: CARDIOLOGY CLINIC | Age: 87
End: 2022-04-26
Payer: MEDICARE

## 2022-04-26 VITALS
WEIGHT: 213 LBS | DIASTOLIC BLOOD PRESSURE: 80 MMHG | BODY MASS INDEX: 30.49 KG/M2 | OXYGEN SATURATION: 97 % | HEART RATE: 71 BPM | SYSTOLIC BLOOD PRESSURE: 130 MMHG | HEIGHT: 70 IN

## 2022-04-26 DIAGNOSIS — I35.0 NONRHEUMATIC AORTIC VALVE STENOSIS: ICD-10-CM

## 2022-04-26 DIAGNOSIS — I50.22 CHF NYHA CLASS II, CHRONIC, SYSTOLIC (HCC): Primary | ICD-10-CM

## 2022-04-26 DIAGNOSIS — I25.10 CORONARY ARTERY DISEASE INVOLVING NATIVE HEART WITHOUT ANGINA PECTORIS, UNSPECIFIED VESSEL OR LESION TYPE: ICD-10-CM

## 2022-04-26 DIAGNOSIS — I48.91 ATRIAL FIBRILLATION, UNSPECIFIED TYPE (HCC): ICD-10-CM

## 2022-04-26 PROCEDURE — 1036F TOBACCO NON-USER: CPT | Performed by: NURSE PRACTITIONER

## 2022-04-26 PROCEDURE — 4040F PNEUMOC VAC/ADMIN/RCVD: CPT | Performed by: NURSE PRACTITIONER

## 2022-04-26 PROCEDURE — 99215 OFFICE O/P EST HI 40 MIN: CPT | Performed by: NURSE PRACTITIONER

## 2022-04-26 PROCEDURE — 1123F ACP DISCUSS/DSCN MKR DOCD: CPT | Performed by: NURSE PRACTITIONER

## 2022-04-26 PROCEDURE — 1111F DSCHRG MED/CURRENT MED MERGE: CPT | Performed by: NURSE PRACTITIONER

## 2022-04-26 PROCEDURE — G8427 DOCREV CUR MEDS BY ELIG CLIN: HCPCS | Performed by: NURSE PRACTITIONER

## 2022-04-26 PROCEDURE — G8417 CALC BMI ABV UP PARAM F/U: HCPCS | Performed by: NURSE PRACTITIONER

## 2022-04-26 RX ORDER — FUROSEMIDE 20 MG/1
20 TABLET ORAL DAILY PRN
Qty: 30 TABLET | Refills: 1 | Status: SHIPPED | OUTPATIENT
Start: 2022-04-26 | End: 2022-08-03 | Stop reason: SDUPTHER

## 2022-04-26 ASSESSMENT — ENCOUNTER SYMPTOMS
ABDOMINAL DISTENTION: 0
COUGH: 0
SHORTNESS OF BREATH: 1

## 2022-04-26 NOTE — PROGRESS NOTES
Heart Failure Clinic       Visit Date: 4/26/2022  Cardiologist:  Dr. Gladis Hoyos  Primary Care Physician: Dr. Alley Graham, APRN - CNP    Brittney Turk is a 80 y.o. male who presents today for:  Chief Complaint   Patient presents with    Congestive Heart Failure     New Patient        HPI:   Brittney Turk is a 80 y.o. male who presents to the office for a NEW patient visit in the heart failure clinic. Accompanied by dtr, Hester Porch    TYPE HF: HFrEF (40-45% 4/2022 - drop from 60% 12/2021), Severe AS (ZAHEER 1.0, mean gradient 27)  Cause: Valvular  Device: no  HX: HTN, DM, Afib (Eliquis) s/p DCCV, CAD s/p PCI LAD (4/2022)     Hospitalization:  April 2022 x 1 wk - SOB, Bipap. CXR +pulmonary edema, BP 200s. BNP 2599. WBCs 23. LA 2.4  Of note Covid booster 4/14. MIS/DCCV  LHC - CAD LM and LAD - [facility] saw, pt refused OHS. Multivessel PCI 4/21/22. Staged RCA in 2 weeks then plan to proceed w/ TAVR. Diuretics stopped d/t JUAN C post cath    ED 4/24 - back in Afib -140s - IV Lopressor given  VV PCP yesterday - started Lasix 40/day x 3 days d/t orhtopnea, wt gain 2#, mild ankle swelling  Today: feeling better after one dose lasix - was able to sleep in bed last night. Wheelchair to OV but up walked to BR several times, no SOB observed. Lives home alone, able shower, complete ADLs. Little fatigued/weak but getting stronger since discharge. No fluid on exam.   HR stable today - 60s at home.     Patient has:  Chest Pain: no  SOB: improved  Orthopnea/PND: improved  KIMMY: no  Edema: no  Fatigue: some - improving  Abdominal bloating: no  Cough: no  Appetite: good  Home weight: stable  Home blood pressure: stable    Past Medical History:   Diagnosis Date    Diabetes mellitus [facility]     Hypertension     Type II or unspecified type diabetes mellitus without mention of complication, not stated as uncontrolled      Past Surgical History:   Procedure Laterality Date    TONSILLECTOMY AND ADENOIDECTOMY Bilateral     age 10      Family History   Problem Relation Age of Onset    Diabetes Paternal Aunt     Diabetes Paternal Uncle     Cancer Paternal Uncle     Diabetes Maternal Grandmother     Diabetes Maternal Grandfather     Diabetes Paternal Grandmother     Cancer Paternal Grandmother     Diabetes Paternal Grandfather      Social History     Tobacco Use    Smoking status: Never Smoker    Smokeless tobacco: Never Used   Substance Use Topics    Alcohol use: No     Alcohol/week: 0.0 standard drinks     Current Outpatient Medications   Medication Sig Dispense Refill    furosemide (LASIX) 20 MG tablet Take 1 tablet by mouth daily as needed (for wt gain or swelling or SOB) 30 tablet 1    furosemide (LASIX) 40 MG tablet Take 1 tablet by mouth daily 3 tablet 0    potassium chloride (KLOR-CON M) 20 MEQ extended release tablet Take 1 tablet by mouth daily 3 tablet 0    aspirin 81 MG EC tablet Take 1 tablet by mouth daily 30 tablet 3    clopidogrel (PLAVIX) 75 MG tablet Take 1 tablet by mouth daily 30 tablet 3    amiodarone (CORDARONE) 200 MG tablet Take 1 tablet by mouth daily 30 tablet 0    atorvastatin (LIPITOR) 40 MG tablet Take 1 tablet by mouth nightly 30 tablet 3    apixaban (ELIQUIS) 2.5 MG TABS tablet Take 1 tablet by mouth 2 times daily 60 tablet 3    metoprolol succinate (TOPROL XL) 200 MG extended release tablet Take 1 tablet by mouth daily 30 tablet 5    metFORMIN (GLUCOPHAGE XR) 500 MG extended release tablet Take 2 tablets by mouth daily (with breakfast) 180 tablet 3    lisinopril (PRINIVIL;ZESTRIL) 5 MG tablet Take 2 tablets by mouth daily Hold for 1 week following discharge (Patient not taking: Reported on 4/26/2022) 1 tablet 3     No current facility-administered medications for this visit.      Allergies   Allergen Reactions    Norvasc [Amlodipine Besylate]     Other Rash     States allergic to several BP meds (unsure of name)       SUBJECTIVE:   Review of Systems   Constitutional: Positive for fatigue. Negative for activity change and appetite change. Respiratory: Positive for shortness of breath (improving). Negative for cough. Cardiovascular: Negative for chest pain, palpitations and leg swelling. Gastrointestinal: Negative for abdominal distention. Neurological: Negative for weakness, light-headedness and headaches. Hematological: Negative for adenopathy. Psychiatric/Behavioral: Negative for sleep disturbance. OBJECTIVE:   Today's Vitals:  /80   Pulse 71   Ht 5' 10\" (1.778 m)   Wt 213 lb (96.6 kg)   SpO2 97%   BMI 30.56 kg/m²     Physical Exam  Vitals reviewed. Constitutional:       General: He is not in acute distress. Appearance: Normal appearance. He is well-developed. He is not diaphoretic. HENT:      Head: Normocephalic and atraumatic. Eyes:      Conjunctiva/sclera: Conjunctivae normal.   Neck:      Comments: No JVD  Cardiovascular:      Rate and Rhythm: Normal rate and regular rhythm. Heart sounds: Murmur heard. Pulmonary:      Effort: Pulmonary effort is normal. No respiratory distress. Breath sounds: Normal breath sounds. No wheezing or rales. Abdominal:      General: Bowel sounds are normal. There is no distension. Palpations: Abdomen is soft. Tenderness: There is no abdominal tenderness. Musculoskeletal:         General: Normal range of motion. Cervical back: Normal range of motion and neck supple. Right lower leg: No edema. Left lower leg: No edema. Skin:     General: Skin is warm and dry. Capillary Refill: Capillary refill takes less than 2 seconds. Neurological:      Mental Status: He is alert and oriented to person, place, and time.       Coordination: Coordination normal.   Psychiatric:         Behavior: Behavior normal.       Wt Readings from Last 3 Encounters:   04/26/22 213 lb (96.6 kg)   04/24/22 209 lb (94.8 kg)   04/23/22 209 lb 14.4 oz (95.2 kg)     BP Readings from Last 3 Encounters:   04/26/22 130/80   04/24/22 136/87   04/23/22 129/60     Pulse Readings from Last 3 Encounters:   04/26/22 71   04/24/22 102   04/23/22 76     Body mass index is 30.56 kg/m². MIS      Summary   No intra cardiac mass or thrombus. No embolic source. No Intracardiac shunt   Left ventricle size is normal.   Normal left ventricle wall thickness. There was moderate global hypokinesis of the left ventricle. Systolic function was moderately reduced. Ejection fraction is visually estimated in the range of 40% to 45%. Left atrium moderately dilated   There is moderate-to-severe aortic stenosis with valve area of 1 to 1.1 sq   cm by Planimetry      Signature      ----------------------------------------------------------------   Electronically signed by Kavya Conway MD (Interpreting   physician) on 04/18/2022 at 07:27 PM   ----------------------------------------------------------------      ECHO:   Summary   Technically difficult examination. Left ventricular size is normal and systolic function is moderately   reduced. Ejection fraction was estimated at 40-45%. LV wall thickness is   within normal limits. Moderately dilated left atrium. Thickened and calcified aortic valve. Leaflets exhibited severely reduced cuspal separation of the aortic valve. Trivial aortic regurgitation is noted. There is moderate-to-severe aortic   stenosis with valve area of 1.0 cm2. The maximum aortic valve gradient is   32 mmHg, the mean gradient is 21 mmHg. Consider DSE or MIS for further   evaluation. Signature      ----------------------------------------------------------------   Electronically signed by Jean Moreno MD (Interpreting   physician) on 04/17/2022 at 09:40 AM    [facility] 4/19/22  FINDINGS:  HEMODYNAMICS AND RIGHT HEART CATHETERIZATION:  Pulmonary arterial oxygen  saturation 66%. Pulmonary arterial pressure 53/29. Mean pulmonary  arterial pressure 39 mmHg.   Pulmonary capillary wedge pressure 35 mmHg.   Right ventricular pressure 57/14. Right atrial pressure 90 mmHg. Left  ventricular end-diastolic pressure was 29 mmHg. Upon pullback across  the aortic valve, the mean pressure gradient was measured at 24 mmHg.     LEFT VENTRICULOGRAM:  Mild global hypokinesis. Ejection fraction  estimated at 45% to 50%.       CATH 4/21/22   IMPRESSION:  1. Successful PCI and stenting of left anterior descending artery. 2.  Successful PCI and stenting of the left main coronary artery. 3.  Status post successful direct current cardioversion.     RECOMMENDATIONS:  Bedrest for four hours. Monitor on telemetry,  optimize medical therapy for CAD. Aspirin 81 mg p.o. daily, Plavix 75  mg p.o. daily. The patient needs to be on oral anticoagulation for  history of atrial fibrillation. May start Eliquis 2.5 mg p.o. b.i.d.  tomorrow in the morning. Start amiodarone 200 mg p.o. daily. Stop  amiodarone drip tonight at 08:00 p.m. May resume heparin drip without  initial bolus five hours post the procedure if no bleeding complications  occur. High intensity statin therapy. Staged PCI of RCA in two weeks  post discharge.   Cardiac rehab referral.  Referral to congestive heart  failure clinic.  Ai Barnes MD     D: 04/21/2022 11:31:54         Results reviewed:  BNP: No results found for: BNP  CBC:   Lab Results   Component Value Date    WBC 10.0 04/24/2022    RBC 4.24 04/24/2022    HGB 11.5 04/24/2022    HCT 35.8 04/24/2022     04/24/2022     CMP:    Lab Results   Component Value Date     04/24/2022    K 4.2 04/24/2022     04/24/2022    CO2 22 04/24/2022    BUN 26 04/24/2022    CREATININE 1.3 04/24/2022    LABGLOM 52 04/24/2022    GLUCOSE 288 04/24/2022    CALCIUM 8.8 04/24/2022     Hepatic Function Panel:    Lab Results   Component Value Date    ALKPHOS 70 04/24/2022    ALT 46 04/24/2022    AST 30 04/24/2022    PROT 6.8 04/24/2022    BILITOT 0.9 04/24/2022    BILIDIR 0.3 04/17/2022    LABALBU 3.7 04/24/2022     Magnesium:    Lab Results   Component Value Date    MG 1.9 04/21/2022     PT/INR:    Lab Results   Component Value Date    INR 1.20 04/19/2022     Lipids:    Lab Results   Component Value Date    TRIG 100 04/19/2022    HDL 39 04/19/2022    [address] 04/19/2022       ASSESSMENT AND PLAN:      Diagnosis Orders   1. CHF NYHA class II, chronic, systolic (HCC)  Basic Metabolic Panel    Brain Natriuretic Peptide   2. Nonrheumatic aortic valve stenosis     3. Atrial fibrillation, unspecified type ([facility]       Continue:  GDMT:   ACE/ARB/ARNI - Lisinopril 10/day - on HOLD d/t JUAN C - on Ziac in past   BB - Toprol 200/day   SGLT2 -  no  AA - no  Diuretic - Lasix 40/day, Kcl 20/day x 3 days   Vasodilator - no  Other - Plavix, Eliquis, Amio    HFrEF (40-45% 4/2022, down from 60% 12/2021)  CAD s/p PCI LAD - staged RCA next week  Mod-severe AS - [place] following, pt previously didn't want intervention - wants rediscuss TAVR since recent exacerbation. Lawrence Snyder notified to move up f/u appt  Afib - rate controlled     CHF Stable today, appears Euvolemic - improved orthopnea, ankle swelling after 1 dose Lasix  Continue last Lasix 40mg dose tomorrow then ADD Lasix 20 PRN - educated on use. Will keep conservative in prep of cath next week - creat baseline 1.0-1.1 - recent bump during admission to 1.7 - 1.3 on discharge. Repeat blood work on Friday - BMP, BNP  BP/HR stable   Discussed diet/fluid adherence   New HF Pamphlet given and ZONE sheet reviewed, patient voices understanding. Questions answered. Start weighing daily  F/U w/ Cardiology  F/U in clinic in 2 weeks to continue to optimize meds and check kidneys post cath    Tolerating above noted HF meds, no ill side effects noted. Will continue to monitor kidney function and electrolytes. Will optimize as tolerated. Pt is compliant w/ medications.     Total visit time of 50 minutes has been spent with patient on education of symptoms, management, medication, and plan of care; as well as review of chart: labs, ECHO, radiology reports, etc.   I personally spent more then 50% of the appt time face to face with the patient. · Daily weights  · Fluid restriction of 2 Liters per day  · Limit sodium in diet to around 1972-7021 mg/day  · Monitor BP  · Activity as tolerated     Patient was instructed to call the [address] for any changes in symptoms as noted in AVS.      No follow-ups on file. or sooner if needed     Patient given educational materials - see patient instructions. We discussed the importance of weighing oneself and recording daily. We also discussed the importance of a low sodium diet, higher sodium foods to avoid and better low sodium food options. Patient verbalizes understanding of plan of care using teach back method, and is agreeable to the treatment plan.        Electronically signed by EMRE Arreola CNP on 4/26/2022 at 7:24 PM

## 2022-04-27 PROBLEM — E11.22 TYPE 2 DIABETES MELLITUS WITH CHRONIC KIDNEY DISEASE (HCC): Status: ACTIVE | Noted: 2022-01-01

## 2022-04-27 ASSESSMENT — ENCOUNTER SYMPTOMS
COLOR CHANGE: 0
DIARRHEA: 0
NAUSEA: 0
ABDOMINAL PAIN: 0
CONSTIPATION: 0
CHEST TIGHTNESS: 0
BACK PAIN: 0
WHEEZING: 0
COUGH: 0
ABDOMINAL DISTENTION: 0
SINUS PRESSURE: 0
VOMITING: 0
SHORTNESS OF BREATH: 0
SORE THROAT: 0
STRIDOR: 0

## 2022-04-28 ASSESSMENT — ENCOUNTER SYMPTOMS
EYE REDNESS: 0
TROUBLE SWALLOWING: 0
NAUSEA: 0
BACK PAIN: 0
COUGH: 0
SHORTNESS OF BREATH: 0
VOMITING: 0
ABDOMINAL PAIN: 0

## 2022-04-28 NOTE — PROGRESS NOTES
[place] on 2nd floor [facility]  NPO after midnight  Bring drivers license and insurance information  Wear comfortable clean clothes  Shower morning of and night before with liquid antibacterial soap  Remove jewelry   May have to stay overnight if have PTCA/stent - bring small overnight bag  Bring medications in original bottles - may take am meds with small amount of water. Do not take any diabetic meds day of procedure. Made aware of visitors limit to 2 at a time  Follow all instructions given by your physician  Please notify doctor office if you need to cancel or reschedule your procedure   needed at discharge  Bring and wear your mask.   Leave valuables at home

## 2022-04-29 ENCOUNTER — HOSPITAL ENCOUNTER (OUTPATIENT)
Age: 87
Discharge: HOME | End: 2022-04-29
Payer: MEDICARE

## 2022-04-29 ENCOUNTER — TELEPHONE (OUTPATIENT)
Dept: CARDIOLOGY CLINIC | Age: 87
End: 2022-04-29

## 2022-04-29 ENCOUNTER — CARE COORDINATION (OUTPATIENT)
Dept: CASE MANAGEMENT | Age: 87
End: 2022-04-29

## 2022-04-29 DIAGNOSIS — I48.91 ATRIAL FIBRILLATION, UNSPECIFIED TYPE (HCC): Primary | ICD-10-CM

## 2022-04-29 DIAGNOSIS — I50.22 CHF NYHA CLASS II, CHRONIC, SYSTOLIC (HCC): ICD-10-CM

## 2022-04-29 LAB
ANION GAP SERPL CALCULATED.3IONS-SCNC: 15 MEQ/L (ref 8–16)
BUN BLDV-MCNC: 22 MG/DL (ref 7–22)
CALCIUM SERPL-MCNC: 9.1 MG/DL (ref 8.5–10.5)
CHLORIDE BLD-SCNC: 102 MEQ/L (ref 98–111)
CO2: 21 MEQ/L (ref 23–33)
CREAT SERPL-MCNC: 1.2 MG/DL (ref 0.4–1.2)
GFR SERPL CREATININE-BSD FRML MDRD: 57 ML/MIN/1.73M2
GLUCOSE BLD-MCNC: 169 MG/DL (ref 70–108)
POTASSIUM SERPL-SCNC: 4.9 MEQ/L (ref 3.5–5.2)
PRO-BNP: 3231 PG/ML (ref 0–1800)
SODIUM BLD-SCNC: 138 MEQ/L (ref 135–145)

## 2022-04-29 PROCEDURE — 83880 ASSAY OF NATRIURETIC PEPTIDE: CPT

## 2022-04-29 PROCEDURE — 80048 BASIC METABOLIC PNL TOTAL CA: CPT

## 2022-04-29 PROCEDURE — 36415 COLL VENOUS BLD VENIPUNCTURE: CPT

## 2022-04-29 NOTE — TELEPHONE ENCOUNTER
Patient and daughter called in. Patient HR has been 60s-70s last 4-5 days. Today it has been running above 100s. Is taking medications as prescribed and is not symptomatic. Patient just wanted to let [place] know.

## 2022-04-29 NOTE — CARE COORDINATION
[facility] Transitions Follow Up Call    2022    Patient: Sukumar Márquez  Patient : [id number]   MRN: [id number]  Reason for Admission: Atrial Fibrillation with CV / PNA / CHF   Discharge Date: 22 RARS: Readmission Risk Score: 14.9 ( )    Attempted to contact patient for Transition Subsequent call. Left HIPAA Compliant message on Voice Mail to call CTN (number given) with any questions and an update on patient's condition since discharge. Will continue to follow. Dharmesh Carrillo LPN    [phone]  [facility] / Shahid Hinojosa 50 Transitions Subsequent and Final Call    Subsequent and Final Calls  Care Transitions Interventions  Other Interventions:            Follow Up  Future Appointments   Date Time Provider Satish Latif   2022  5:00 AM STR CVI ROOM 2E04 STR[facility]E [place] [facility] [facility]   2022  7:00 AM STR CARDIAC CATHETERIZATION RM2 STR[facility] CATH [facility] [place] [facility]   2022  1:00 PM Brenda Mccallum APRN - CNP N SRPX CHF [facility] [place]   2022  2:00 PM Katerin Ramirez MD N SRPX Heart [facility] - [place]   2022  1:00 PM EMRE Chew - CNP Fam Med CG [facility] - [place]   2022  2:30 PM STR ECHO RM3 STR[facility] ECHO [place] [facility]       Dharmesh Carrillo LPN

## 2022-04-29 NOTE — TELEPHONE ENCOUNTER
Notified daughter to continue to monitor.  Will get back if considering Digoxin by Dr Doretha Chávez

## 2022-04-29 NOTE — TELEPHONE ENCOUNTER
Ok, continue to monitor. Scheduled for PCI RCA next week. Already on Toprol 200/day and Amio. Consider Dig?

## 2022-05-02 ENCOUNTER — TELEPHONE (OUTPATIENT)
Dept: CARDIOLOGY CLINIC | Age: 87
End: 2022-05-02

## 2022-05-02 NOTE — TELEPHONE ENCOUNTER
----- Message from EMRE Blood CNP sent at 5/2/2022  7:47 AM EDT -----  Labs from Friday are stable. Continue meds same. Will review pressures from heart cath and adjust diuretics from there.

## 2022-05-02 NOTE — TELEPHONE ENCOUNTER
Clarified w/ Yann Self - 2 week Ziopatch ordered  Please let him know - ?  Put on after cath this week

## 2022-05-03 ENCOUNTER — HOSPITAL ENCOUNTER (OUTPATIENT)
Age: 87
Discharge: HOME | End: 2022-05-05
Attending: INTERNAL MEDICINE | Admitting: INTERNAL MEDICINE
Payer: MEDICARE

## 2022-05-03 ENCOUNTER — CARE COORDINATION (OUTPATIENT)
Dept: CASE MANAGEMENT | Age: 87
End: 2022-05-03

## 2022-05-03 DIAGNOSIS — I48.91 ATRIAL FIBRILLATION WITH RVR (HCC): Primary | ICD-10-CM

## 2022-05-03 PROBLEM — N18.2 CHRONIC KIDNEY DISEASE (CKD) STAGE G2/A1, MILDLY DECREASED GLOMERULAR FILTRATION RATE (GFR) BETWEEN 60-89 ML/MIN/1.73 SQUARE METER AND ALBUMINURIA CREATININE RATIO LESS THAN 30 MG/G: Status: ACTIVE | Noted: 2022-01-01

## 2022-05-03 LAB
ABO: NORMAL
ANION GAP SERPL CALCULATED.3IONS-SCNC: 11 MEQ/L (ref 8–16)
ANTIBODY SCREEN: NORMAL
APTT: 32.7 SECONDS (ref 22–38)
BUN BLDV-MCNC: 34 MG/DL (ref 7–22)
CALCIUM SERPL-MCNC: 9 MG/DL (ref 8.5–10.5)
CHLORIDE BLD-SCNC: 100 MEQ/L (ref 98–111)
CHOLESTEROL, TOTAL: 87 MG/DL (ref 100–199)
CO2: 23 MEQ/L (ref 23–33)
CREAT SERPL-MCNC: 1.6 MG/DL (ref 0.4–1.2)
EKG Q-T INTERVAL: 388 MS
EKG QRS DURATION: 130 MS
EKG QTC CALCULATION (BAZETT): 472 MS
EKG R AXIS: -52 DEGREES
EKG T AXIS: 93 DEGREES
EKG VENTRICULAR RATE: 89 BPM
ERYTHROCYTE [DISTWIDTH] IN BLOOD BY AUTOMATED COUNT: 13.4 % (ref 11.5–14.5)
ERYTHROCYTE [DISTWIDTH] IN BLOOD BY AUTOMATED COUNT: 42.3 FL (ref 35–45)
GFR SERPL CREATININE-BSD FRML MDRD: 41 ML/MIN/1.73M2
GLUCOSE BLD-MCNC: 233 MG/DL (ref 70–108)
GLUCOSE BLD-MCNC: 239 MG/DL (ref 70–108)
HCT VFR BLD CALC: 37.4 % (ref 42–52)
HDLC SERPL-MCNC: 38 MG/DL
HEMOGLOBIN: 11.8 GM/DL (ref 14–18)
INR BLD: 1.1 (ref 0.85–1.13)
LDL CHOLESTEROL CALCULATED: 33 MG/DL
MCH RBC QN AUTO: 27.2 PG (ref 26–33)
MCHC RBC AUTO-ENTMCNC: 31.6 GM/DL (ref 32.2–35.5)
MCV RBC AUTO: 86.2 FL (ref 80–94)
PLATELET # BLD: 174 THOU/MM3 (ref 130–400)
PMV BLD AUTO: 12.7 FL (ref 9.4–12.4)
POTASSIUM SERPL-SCNC: 5.2 MEQ/L (ref 3.5–5.2)
RBC # BLD: 4.34 MILL/MM3 (ref 4.7–6.1)
RH FACTOR: NORMAL
SODIUM BLD-SCNC: 134 MEQ/L (ref 135–145)
TRIGL SERPL-MCNC: 78 MG/DL (ref 0–199)
WBC # BLD: 9.6 THOU/MM3 (ref 4.8–10.8)

## 2022-05-03 PROCEDURE — 80048 BASIC METABOLIC PNL TOTAL CA: CPT

## 2022-05-03 PROCEDURE — 82948 REAGENT STRIP/BLOOD GLUCOSE: CPT

## 2022-05-03 PROCEDURE — 85610 PROTHROMBIN TIME: CPT

## 2022-05-03 PROCEDURE — 93010 ELECTROCARDIOGRAM REPORT: CPT | Performed by: INTERNAL MEDICINE

## 2022-05-03 PROCEDURE — 83036 HEMOGLOBIN GLYCOSYLATED A1C: CPT

## 2022-05-03 PROCEDURE — 2580000003 HC RX 258: Performed by: PHYSICIAN ASSISTANT

## 2022-05-03 PROCEDURE — 85027 COMPLETE CBC AUTOMATED: CPT

## 2022-05-03 PROCEDURE — 6370000000 HC RX 637 (ALT 250 FOR IP): Performed by: PHYSICIAN ASSISTANT

## 2022-05-03 PROCEDURE — 80061 LIPID PANEL: CPT

## 2022-05-03 PROCEDURE — 86901 BLOOD TYPING SEROLOGIC RH(D): CPT

## 2022-05-03 PROCEDURE — 86900 BLOOD TYPING SEROLOGIC ABO: CPT

## 2022-05-03 PROCEDURE — 86850 RBC ANTIBODY SCREEN: CPT

## 2022-05-03 PROCEDURE — 85730 THROMBOPLASTIN TIME PARTIAL: CPT

## 2022-05-03 PROCEDURE — 36415 COLL VENOUS BLD VENIPUNCTURE: CPT

## 2022-05-03 PROCEDURE — 93005 ELECTROCARDIOGRAM TRACING: CPT | Performed by: PHYSICIAN ASSISTANT

## 2022-05-03 RX ORDER — SODIUM CHLORIDE 9 MG/ML
INJECTION, SOLUTION INTRAVENOUS PRN
Status: DISCONTINUED | OUTPATIENT
Start: 2022-05-03 | End: 2022-05-04

## 2022-05-03 RX ORDER — SODIUM CHLORIDE 0.9 % (FLUSH) 0.9 %
5-40 SYRINGE (ML) INJECTION EVERY 12 HOURS SCHEDULED
Status: DISCONTINUED | OUTPATIENT
Start: 2022-05-03 | End: 2022-05-05 | Stop reason: HOSPADM

## 2022-05-03 RX ORDER — INSULIN LISPRO 100 [IU]/ML
0-3 INJECTION, SOLUTION INTRAVENOUS; SUBCUTANEOUS NIGHTLY
Status: DISCONTINUED | OUTPATIENT
Start: 2022-05-03 | End: 2022-05-05 | Stop reason: HOSPADM

## 2022-05-03 RX ORDER — INSULIN LISPRO 100 [IU]/ML
0-6 INJECTION, SOLUTION INTRAVENOUS; SUBCUTANEOUS
Status: DISCONTINUED | OUTPATIENT
Start: 2022-05-04 | End: 2022-05-04 | Stop reason: SDUPTHER

## 2022-05-03 RX ORDER — NITROGLYCERIN 0.4 MG/1
0.4 TABLET SUBLINGUAL EVERY 5 MIN PRN
Status: DISCONTINUED | OUTPATIENT
Start: 2022-05-03 | End: 2022-05-05 | Stop reason: HOSPADM

## 2022-05-03 RX ORDER — SODIUM CHLORIDE 9 MG/ML
INJECTION, SOLUTION INTRAVENOUS CONTINUOUS
Status: DISCONTINUED | OUTPATIENT
Start: 2022-05-03 | End: 2022-05-04

## 2022-05-03 RX ORDER — SODIUM CHLORIDE 0.9 % (FLUSH) 0.9 %
5-40 SYRINGE (ML) INJECTION PRN
Status: DISCONTINUED | OUTPATIENT
Start: 2022-05-03 | End: 2022-05-05 | Stop reason: HOSPADM

## 2022-05-03 RX ADMIN — INSULIN LISPRO 1 UNITS: 100 INJECTION, SOLUTION INTRAVENOUS; SUBCUTANEOUS at 22:53

## 2022-05-03 RX ADMIN — SODIUM CHLORIDE, PRESERVATIVE FREE 10 ML: 5 INJECTION INTRAVENOUS at 21:15

## 2022-05-03 RX ADMIN — SODIUM CHLORIDE: 9 INJECTION, SOLUTION INTRAVENOUS at 21:18

## 2022-05-03 ASSESSMENT — PAIN SCALES - GENERAL: PAINLEVEL_OUTOF10: 0

## 2022-05-03 NOTE — CARE COORDINATION
[facility] Transitions Follow Up Call    5/3/2022    Patient: Arabella Lewis  Patient : [id number]   MRN: [id number]  Reason for Admission: Atrial Fibrillation with CV / PNA / CHF   Discharge Date: 22 RARS: Readmission Risk Score: 14.9 ( )    Attempted to contact patient for Transition Subsequent call. Left HIPAA Compliant message on Voice Mail to call CTN (number given) with any questions and an update on patient's condition since discharge. Left HIPAA Compliant message on voice mail for Patient that this is the Final Transition Call and to call their Specialist/PCP for any problems or issues that may occur. Dwight Casanova LPN    [phone]  [facility] / [address] [place] [address] Transitions Subsequent and Final Call    Subsequent and Final Calls  Care Transitions Interventions  Other Interventions:            Follow Up  Future Appointments   Date Time Provider Satish Latif   2022  7:00 AM STR CARDIAC CATHETERIZATION RM2 STRZ CATH LB [place] [facility]   2022  1:00 PM EMRE Roe - CNP N SRPX CHF [facility] - [place]   2022  2:00 PM Claude Salinas, MD N SRPX Heart [facility] - [place]   2022  1:00 PM EMRE Clark CNP Fam Med CG [facility] - [place]   2022  2:30 PM STR ECHO RM3 STRZ ECHO [place]       Dwight Casanova LPN

## 2022-05-04 ENCOUNTER — HOSPITAL ENCOUNTER (OUTPATIENT)
Dept: INPATIENT UNIT | Age: 87
Discharge: HOME | End: 2022-05-04

## 2022-05-04 LAB
ACTIVATED CLOTTING TIME: 243 SECONDS (ref 1–150)
AVERAGE GLUCOSE: 147 MG/DL (ref 70–126)
EKG ATRIAL RATE: 288 BPM
EKG P AXIS: 85 DEGREES
EKG Q-T INTERVAL: 434 MS
EKG QRS DURATION: 122 MS
EKG QTC CALCULATION (BAZETT): 461 MS
EKG R AXIS: -56 DEGREES
EKG T AXIS: 80 DEGREES
EKG VENTRICULAR RATE: 68 BPM
GLUCOSE BLD-MCNC: 130 MG/DL (ref 70–108)
GLUCOSE BLD-MCNC: 138 MG/DL (ref 70–108)
GLUCOSE BLD-MCNC: 162 MG/DL (ref 70–108)
GLUCOSE BLD-MCNC: 173 MG/DL (ref 70–108)
HBA1C MFR BLD: 6.9 % (ref 4.4–6.4)

## 2022-05-04 PROCEDURE — 6370000000 HC RX 637 (ALT 250 FOR IP): Performed by: INTERNAL MEDICINE

## 2022-05-04 PROCEDURE — 2500000003 HC RX 250 WO HCPCS

## 2022-05-04 PROCEDURE — 93458 L HRT ARTERY/VENTRICLE ANGIO: CPT

## 2022-05-04 PROCEDURE — C1894 INTRO/SHEATH, NON-LASER: HCPCS

## 2022-05-04 PROCEDURE — 85347 COAGULATION TIME ACTIVATED: CPT

## 2022-05-04 PROCEDURE — 93005 ELECTROCARDIOGRAM TRACING: CPT | Performed by: INTERNAL MEDICINE

## 2022-05-04 PROCEDURE — 93010 ELECTROCARDIOGRAM REPORT: CPT | Performed by: INTERNAL MEDICINE

## 2022-05-04 PROCEDURE — C1887 CATHETER, GUIDING: HCPCS

## 2022-05-04 PROCEDURE — C1725 CATH, TRANSLUMIN NON-LASER: HCPCS

## 2022-05-04 PROCEDURE — 6370000000 HC RX 637 (ALT 250 FOR IP): Performed by: PHYSICIAN ASSISTANT

## 2022-05-04 PROCEDURE — 6360000004 HC RX CONTRAST MEDICATION: Performed by: INTERNAL MEDICINE

## 2022-05-04 PROCEDURE — C1769 GUIDE WIRE: HCPCS

## 2022-05-04 PROCEDURE — C9600 PERC DRUG-EL COR STENT SING: HCPCS

## 2022-05-04 PROCEDURE — C1874 STENT, COATED/COV W/DEL SYS: HCPCS

## 2022-05-04 PROCEDURE — 92928 PRQ TCAT PLMT NTRAC ST 1 LES: CPT | Performed by: INTERNAL MEDICINE

## 2022-05-04 PROCEDURE — 82948 REAGENT STRIP/BLOOD GLUCOSE: CPT

## 2022-05-04 PROCEDURE — 6360000002 HC RX W HCPCS

## 2022-05-04 PROCEDURE — 2580000003 HC RX 258: Performed by: PHYSICIAN ASSISTANT

## 2022-05-04 RX ORDER — POTASSIUM CHLORIDE 20 MEQ/1
20 TABLET, EXTENDED RELEASE ORAL DAILY
Status: DISCONTINUED | OUTPATIENT
Start: 2022-05-04 | End: 2022-05-04

## 2022-05-04 RX ORDER — FUROSEMIDE 20 MG/1
20 TABLET ORAL
Status: DISCONTINUED | OUTPATIENT
Start: 2022-05-06 | End: 2022-05-05 | Stop reason: HOSPADM

## 2022-05-04 RX ORDER — ACETAMINOPHEN 325 MG/1
650 TABLET ORAL EVERY 4 HOURS PRN
Status: DISCONTINUED | OUTPATIENT
Start: 2022-05-04 | End: 2022-05-05 | Stop reason: HOSPADM

## 2022-05-04 RX ORDER — SODIUM CHLORIDE 0.9 % (FLUSH) 0.9 %
5-40 SYRINGE (ML) INJECTION PRN
Status: DISCONTINUED | OUTPATIENT
Start: 2022-05-04 | End: 2022-05-05 | Stop reason: HOSPADM

## 2022-05-04 RX ORDER — SODIUM CHLORIDE 9 MG/ML
INJECTION, SOLUTION INTRAVENOUS CONTINUOUS
Status: DISCONTINUED | OUTPATIENT
Start: 2022-05-04 | End: 2022-05-05 | Stop reason: HOSPADM

## 2022-05-04 RX ORDER — SODIUM CHLORIDE 0.9 % (FLUSH) 0.9 %
5-40 SYRINGE (ML) INJECTION EVERY 12 HOURS SCHEDULED
Status: DISCONTINUED | OUTPATIENT
Start: 2022-05-04 | End: 2022-05-05 | Stop reason: HOSPADM

## 2022-05-04 RX ORDER — INSULIN LISPRO 100 [IU]/ML
0-6 INJECTION, SOLUTION INTRAVENOUS; SUBCUTANEOUS
Status: DISCONTINUED | OUTPATIENT
Start: 2022-05-04 | End: 2022-05-05 | Stop reason: HOSPADM

## 2022-05-04 RX ORDER — ASPIRIN 81 MG/1
81 TABLET ORAL DAILY
Status: DISCONTINUED | OUTPATIENT
Start: 2022-05-04 | End: 2022-05-05 | Stop reason: HOSPADM

## 2022-05-04 RX ORDER — DEXTROSE MONOHYDRATE 50 MG/ML
100 INJECTION, SOLUTION INTRAVENOUS PRN
Status: DISCONTINUED | OUTPATIENT
Start: 2022-05-04 | End: 2022-05-05 | Stop reason: HOSPADM

## 2022-05-04 RX ORDER — LISINOPRIL 10 MG/1
10 TABLET ORAL DAILY
Status: DISCONTINUED | OUTPATIENT
Start: 2022-05-04 | End: 2022-05-04

## 2022-05-04 RX ORDER — METOPROLOL SUCCINATE 100 MG/1
200 TABLET, EXTENDED RELEASE ORAL DAILY
Status: DISCONTINUED | OUTPATIENT
Start: 2022-05-04 | End: 2022-05-05 | Stop reason: HOSPADM

## 2022-05-04 RX ORDER — NICOTINE POLACRILEX 4 MG
15 LOZENGE BUCCAL PRN
Status: DISCONTINUED | OUTPATIENT
Start: 2022-05-04 | End: 2022-05-04 | Stop reason: RX

## 2022-05-04 RX ORDER — DEXTROSE MONOHYDRATE 25 G/50ML
12.5 INJECTION, SOLUTION INTRAVENOUS PRN
Status: DISCONTINUED | OUTPATIENT
Start: 2022-05-04 | End: 2022-05-04 | Stop reason: RX

## 2022-05-04 RX ORDER — ATORVASTATIN CALCIUM 40 MG/1
40 TABLET, FILM COATED ORAL NIGHTLY
Status: DISCONTINUED | OUTPATIENT
Start: 2022-05-04 | End: 2022-05-05 | Stop reason: HOSPADM

## 2022-05-04 RX ORDER — FUROSEMIDE 40 MG/1
40 TABLET ORAL DAILY
Status: DISCONTINUED | OUTPATIENT
Start: 2022-05-04 | End: 2022-05-04

## 2022-05-04 RX ORDER — CLOPIDOGREL BISULFATE 75 MG/1
75 TABLET ORAL DAILY
Status: DISCONTINUED | OUTPATIENT
Start: 2022-05-04 | End: 2022-05-05 | Stop reason: HOSPADM

## 2022-05-04 RX ORDER — SODIUM CHLORIDE 9 MG/ML
INJECTION, SOLUTION INTRAVENOUS PRN
Status: DISCONTINUED | OUTPATIENT
Start: 2022-05-04 | End: 2022-05-05 | Stop reason: HOSPADM

## 2022-05-04 RX ORDER — AMIODARONE HYDROCHLORIDE 200 MG/1
200 TABLET ORAL DAILY
Status: DISCONTINUED | OUTPATIENT
Start: 2022-05-04 | End: 2022-05-05

## 2022-05-04 RX ORDER — AMIODARONE HYDROCHLORIDE 200 MG/1
200 TABLET ORAL ONCE
Status: COMPLETED | OUTPATIENT
Start: 2022-05-04 | End: 2022-05-04

## 2022-05-04 RX ORDER — ASPIRIN 325 MG
325 TABLET ORAL ONCE
Status: COMPLETED | OUTPATIENT
Start: 2022-05-04 | End: 2022-05-04

## 2022-05-04 RX ADMIN — INSULIN LISPRO 1 UNITS: 100 INJECTION, SOLUTION INTRAVENOUS; SUBCUTANEOUS at 20:56

## 2022-05-04 RX ADMIN — ASPIRIN 81 MG: 81 TABLET ORAL at 10:06

## 2022-05-04 RX ADMIN — ASPIRIN 325 MG: 325 TABLET ORAL at 06:31

## 2022-05-04 RX ADMIN — AMIODARONE HYDROCHLORIDE 200 MG: 200 TABLET ORAL at 10:06

## 2022-05-04 RX ADMIN — INSULIN LISPRO 1 UNITS: 100 INJECTION, SOLUTION INTRAVENOUS; SUBCUTANEOUS at 17:49

## 2022-05-04 RX ADMIN — CLOPIDOGREL BISULFATE 75 MG: 75 TABLET, FILM COATED ORAL at 10:07

## 2022-05-04 RX ADMIN — ATORVASTATIN CALCIUM 40 MG: 40 TABLET, FILM COATED ORAL at 20:22

## 2022-05-04 RX ADMIN — AMIODARONE HYDROCHLORIDE 200 MG: 200 TABLET ORAL at 11:15

## 2022-05-04 RX ADMIN — IOPAMIDOL 25 ML: 755 INJECTION, SOLUTION INTRAVENOUS at 08:00

## 2022-05-04 RX ADMIN — LISINOPRIL 10 MG: 10 TABLET ORAL at 10:09

## 2022-05-04 RX ADMIN — FUROSEMIDE 40 MG: 40 TABLET ORAL at 10:08

## 2022-05-04 RX ADMIN — SODIUM CHLORIDE: 9 INJECTION, SOLUTION INTRAVENOUS at 08:29

## 2022-05-04 RX ADMIN — POTASSIUM CHLORIDE 20 MEQ: 1500 TABLET, EXTENDED RELEASE ORAL at 10:09

## 2022-05-04 RX ADMIN — METOPROLOL SUCCINATE 200 MG: 100 TABLET, EXTENDED RELEASE ORAL at 10:09

## 2022-05-04 ASSESSMENT — PAIN SCALES - GENERAL
PAINLEVEL_OUTOF10: 0
PAINLEVEL_OUTOF10: 0

## 2022-05-04 NOTE — H&P
[facility]  Sedation/Analgesia History & Physical    Pt Name: Aki Esquivel  Account number: [de-identified]  MRN: [id number]  YOB: 1931  Provider Performing Procedure: Fransico Ridley MD MD  Referring Provider: Fransico Ridley MD   Primary Care Physician: Cruz Tapia, EMRE - CNP  Date: 5/4/2022    PRE-PROCEDURE    Code Status: FULL CODE  Brief History/Pre-Procedure Diagnosis:   CAD  Ischemic cardiomyopathy  Recent NSTEMI  Residual severe RCA stenosis   Aortic stenosis   CANTRELL, Angina    Consent: : I have discussed with the patient risks, benefits, and alternatives (and relevant risks, benefits, and side effects related to alternatives or not receiving care), and likelihood of the success. The patient and/or representative appear to understand and agree to proceed. The discussion encompasses risks, benefits, and side effects related to the alternatives and the risks related to not receiving the proposed care, treatment, and services. The indication, risks and benefits of the procedure and possible therapeutic consequences and alternatives were discussed with the patient. The patient was given the opportunity to ask questions and to have them answered to his/her satisfaction. Risks of the procedure include but are not limited to the following: Bleeding, hematoma including retroperitoneal hemmorhage, infection, pain and discomfort, injury to the aorta and other blood vessels, rhythm disturbance, low blood pressure, myocardial infarction, stroke, kidney damage/failure, myocardial perforation, allergic reactions to sedatives/contrast material, loss of pulse/vascular compromise requiring surgery, aneurysm/pseudoaneurysm formation, possible loss of a limb/hand/leg, needing blood transfusion, requiring emergent open heart surgery or emergent coronary intervention, the need for intubation/respiratory support, the requirement for defibrillation/cardioversion, and death.  Alternatives to and omission of the suggested procedure were discussed. The patient had no further questions and wished to proceed; the consent form was signed. MEDICAL HISTORY  []ASHD/ANGINA/MI/CHF   []Hypertension  []Diabetes  []Hyperlipidemia  []Smoking  []Family Hx of ASHD  []Additional information:       has a past medical history of Atrial fibrillation ([facility] [address] CHF (congestive heart failure) ([facility] [address] [facility] Hypertension, and Type II or unspecified type diabetes mellitus without mention of complication, not stated as uncontrolled. SURGICAL HISTORY   has a past surgical history that includes Tonsillectomy and adenoidectomy (Bilateral).   Additional information:       ALLERGIES   Allergies as of 05/03/2022 - Fully Reviewed 05/03/2022   Allergen Reaction Noted    Norvasc [amlodipine besylate]  08/12/2015    Other Rash 06/19/2015     Additional information:       MEDICATIONS   Aspirin  [x] 81 mg  [] 325 mg  [] None  Antiplatelet drug therapy use last 5 days  []No [x]Yes  Coumadin Use Last 5 Days [x]No []Yes  Other anticoagulant use last 5 days  []No [x]Yes    Current Facility-Administered Medications:     0.9 % sodium chloride infusion, , IntraVENous, Continuous, Unknown Prima, PA-C, Last Rate: 75 mL/hr at 05/03/22 2118, New Bag at 05/03/22 2118    0.9 % sodium chloride infusion, , IntraVENous, PRN, Unknown Prima, PA-C    nitroGLYCERIN (NITROSTAT) SL tablet 0.4 mg, 0.4 mg, SubLINGual, Q5 Min PRN, Unknown Prima, PA-C    sodium chloride flush 0.9 % injection 5-40 mL, 5-40 mL, IntraVENous, 2 times per day, Unknown Prima, PA-C, 10 mL at 05/03/22 2115    sodium chloride flush 0.9 % injection 5-40 mL, 5-40 mL, IntraVENous, PRN, Unknown Prima, PA-C    insulin lispro (HUMALOG) injection vial 0-6 Units, 0-6 Units, SubCUTAneous, TID WC, Es Ruvalcaba PA-C    insulin lispro (HUMALOG) injection vial 0-3 Units, 0-3 Units, SubCUTAneous, Nightly, Es Ruvalcaba PA-C, 1 Units at 05/03/22 9189  Prior to Admission medications    Medication Sig Start Date End Date Taking? Authorizing Provider   furosemide (LASIX) 20 MG tablet Take 1 tablet by mouth daily as needed (for wt gain or swelling or SOB) 4/26/22   EMRE Potter CNP   furosemide (LASIX) 40 MG tablet Take 1 tablet by mouth daily 4/25/22   EMRE Swift CNP   potassium chloride (KLOR-CON M) 20 MEQ extended release tablet Take 1 tablet by mouth daily 4/25/22   EMRE Swift CNP   aspirin 81 MG EC tablet Take 1 tablet by mouth daily 4/23/22   Valdemar Gao, DO   clopidogrel (PLAVIX) 75 MG tablet Take 1 tablet by mouth daily 4/23/22 Dejamel Fuse, DO   amiodarone (CORDARONE) 200 MG tablet Take 1 tablet by mouth daily 4/23/22   Kentrell Lim, DO   atorvastatin (LIPITOR) 40 MG tablet Take 1 tablet by mouth nightly 4/23/22   Valdemar Gao, DO   apixaban (ELIQUIS) 2.5 MG TABS tablet Take 1 tablet by mouth 2 times daily 4/23/22   Kentrell Lim, DO   metoprolol succinate (TOPROL XL) 200 MG extended release tablet Take 1 tablet by mouth daily 4/23/22   Kentrell Lim, DO   lisinopril (PRINIVIL;ZESTRIL) 5 MG tablet Take 2 tablets by mouth daily Hold for 1 week following discharge 4/23/22   Kentrell Lim,    metFORMIN (GLUCOPHAGE XR) 500 MG extended release tablet Take 2 tablets by mouth daily (with breakfast) 5/26/21 4/26/22  EMRE Swift CNP     Additional information:       VITAL SIGNS   Vitals:    05/04/22 0305   BP: (!) 147/85   Pulse: 85   Resp: 16   Temp: 98.6 °F (37 °C)   SpO2: 97%       PHYSICAL:   General: No acute distress  HEENT:  Unremarkable for age  Neck: without increased JVD, carotid pulses 2+ bilaterally without bruits  Heart: RRR, S1 & S2 WNL. ES murmur   Lungs: Clear to auscultation    Abdomen: BS present, without HSM, masses, or tenderness    Extremities: without C,C,E.    Mental Status: Alert & Oriented        PLANNED PROCEDURE   [x]Cath  [x]PCI []Pacemaker/AICD  []MIS             []Cardioversion []Peripheral angiography/PTA  []Other:      SEDATION  Planned agent:[x]Midazolam []Meperidine []Sublimaze []Morphine  []Diazepam  []Other:     ASA Classification:  []1 []2 [x]3 []4 []5   Class 1: A normal healthy patient  Class 2: Pt with mild to moderate systemic disease  Class 3: Severe systemic disease or disturbance  Class 4: Severe systemic disorders that are already life threatening. Class 5: Moribund pt with little chances of survival, for more than 24 hours. Mallampati I Airway Classification:   []1 []2 [x]3 []4     [x]Pre-procedure diagnostic studies complete and results available. Comment:    [x]Previous sedation/anesthesia experiences assessed. Comment:    [x]The patient is an appropriate candidate to undergo the planned procedure sedation and anesthesia. (Refer to nursing sedation/analgesia documentation record)  [x]Formulation and discussion of sedation/procedure plan, risks, and expectations with patient and/or responsible adult completed. [x]Patient examined immediately prior to the procedure.  (Refer to nursing sedation/analgesia documentation record)      Lillian [place] MD, Farhat Griffin    Electronically signed 5/4/2022 at 7:35 AM

## 2022-05-04 NOTE — FLOWSHEET NOTE
Pt was anointed     05/04/22 1523   Encounter Summary   Encounter Overview/Reason  Initial Encounter   Service Provided For: Patient   Referral/Consult From: [address] Family members   Last Encounter  05/04/22  (Anointed)   Complexity of Encounter Low   Begin Time 1131   End Time  1139   Total Time Calculated 8 min   Encounter    Type Initial Screen/Assessment   Spiritual/Emotional needs   Type Spiritual Support   Rituals, Rites and Sacraments   Type Anointing   Assessment/Intervention/Outcome   Assessment Calm; Hopeful

## 2022-05-04 NOTE — PLAN OF CARE
Problem: Discharge Planning  Goal: Discharge to home or other facility with appropriate resources  5/4/2022 1035 by Loreto Silva RN  Outcome: Progressing  Flowsheets (Taken 5/3/2022 2034 by Haydee Hester, RN)  Discharge to home or other facility with appropriate resources:   Identify discharge learning needs (meds, wound care, etc)   Refer to discharge planning if patient needs post-hospital services based on physician order or complex needs related to functional status, cognitive ability or social support system   Identify barriers to discharge with patient and caregiver  Note: Discharge to home tomorrow. Cath stent placement today. Problem: ABCDS Injury Assessment  Goal: Absence of physical injury  5/4/2022 1035 by Loreto Silva RN  Outcome: Progressing  Flowsheets (Taken 5/4/2022 1034)  Absence of Physical Injury: Implement safety measures based on patient assessment  Note: Bed alarm is on. Call light is with in reach. Problem: Safety - Adult  Goal: Free from fall injury  5/4/2022 1035 by Loreto Silva RN  Outcome: Progressing  Flowsheets (Taken 5/4/2022 1034)  Free From Fall Injury: Based on caregiver fall risk screen, instruct family/caregiver to ask for assistance with transferring infant if caregiver noted to have fall risk factors  Note: 1 assist/ call light is with in reach.       Problem: Chronic Conditions and Co-morbidities  Goal: Patient's chronic conditions and co-morbidity symptoms are monitored and maintained or improved  Outcome: Progressing  Flowsheets (Taken 5/3/2022 2034 by Haydee Hester, RN)  Care Plan - Patient's Chronic Conditions and Co-Morbidity Symptoms are Monitored and Maintained or Improved:   Monitor and assess patient's chronic conditions and comorbid symptoms for stability, deterioration, or improvement   Collaborate with multidisciplinary team to address chronic and comorbid conditions and prevent exacerbation or deterioration   Update acute care plan with appropriate goals if chronic or comorbid symptoms are exacerbated and prevent overall improvement and discharge  Note: Stent placed today . Care plan reviewed with patient. Patient  verbalize understanding of the plan of care and contribute to goal setting.

## 2022-05-04 NOTE — PROGRESS NOTES
Inpatient Cardiac Rehabilitation Consult    Received consult for Phase II Cardiac Rehabilitation. Patient needs cardiac rehab due to staged PCI on 5-4-22.   Spoke with patient on 4-21-22 regarding ICR, we will be following up with patient at home to schedule

## 2022-05-04 NOTE — PLAN OF CARE
Problem: Discharge Planning  Goal: Discharge to home or other facility with appropriate resources  Outcome: Progressing  Flowsheets (Taken 5/3/2022 2034)  Discharge to home or other facility with appropriate resources:   Identify discharge learning needs (meds, wound care, etc)   Refer to discharge planning if patient needs post-hospital services based on physician order or complex needs related to functional status, cognitive ability or social support system   Identify barriers to discharge with patient and caregiver     Problem: ABCDS Injury Assessment  Goal: Absence of physical injury  Outcome: Progressing     Problem: Safety - Adult  Goal: Free from fall injury  Outcome: Progressing

## 2022-05-04 NOTE — PROCEDURES
[address] [place] [address]                            CARDIAC CATHETERIZATION    PATIENT NAME: Andrea Villalba               :        1931  MED REC NO:   [id number]                           ROOM:       [id number]  ACCOUNT NO:   [de-identified]                           ADMIT DATE: 2022  PROVIDER:     Lillian [place] MD    DATE OF PROCEDURE:  2022    INDICATIONS FOR PROCEDURE:  This is a 41-year-old male patient with  recent hospitalization for acute congestive heart failure, new-onset  ischemic cardiomyopathy, coronary artery disease, non-ST-elevation  myocardial infarction with aortic stenosis. He has dyspnea on exertion  and angina and angina equivalent symptoms. He has known residual severe  RCA stenosis. PROCEDURES PERFORMED:  1. Left cardiac catheterization with selective coronary angiogram of  the right coronary artery. 2.  Measurement of the left ventricular end-diastolic pressure and the  transaortic valve gradient. 3.  Successful PCI and stenting of the right coronary artery. FINDINGS:  CORONARY ANGIOGRAM:  1. Left main coronary artery not injected. 2.  Right coronary artery:  Proximal and mid RCA with luminal  irregularities. The RCA is a dominant vessel. Distal RCA has an 80-90%  stenosis. RPL and RPDA are patent with mild diffuse disease. HEMODYNAMICS:  Left ventricular end-diastolic pressure 16 mmHg. Upon  the pullback across the aortic valve, the peak gradient was 29 mmHg. DESCRIPTION OF PROCEDURE/INTERVENTION DETAILS:  After informed consent,  the patient was brought to the cardiac catheterization room. He was  prepped and draped in a sterile fashion. 2% lidocaine was injected in  the skin and subcutaneous tissue overlying the right radial artery. Using modified Seldinger technique, access was obtained in the right  radial artery. 5/6 Slender sheath was inserted. Standard  antithrombotic/antispasmodic medications were given. I used 6-Al[place]ian  JR-4 guide catheter to first across the aortic valve and measure the  LVDP, then the guide catheter was used to cannulate the right coronary  artery. Runthrough wire was used to cross the lesion. Heparin was  given. ACT was confirmed to be above 250. I elected to proceed with  direct stenting using Resolute Belden 3.0 x 18 mm drug-eluting stent. Stent was postdilated using a 3.25 x 12 mm noncompliant balloon. Wire  was removed. Final angiogram revealed well-expanded stent, 0% residual  stenosis. No complications including no dissection, distal embolization  or perforation. MEDICATIONS:  See MAR. COMPLICATIONS:  None. ESTIMATED BLOOD LOSS:  Less than 50 mL. ACCESS:  Right radial artery access. Vasc Band was applied. Hemostasis  was achieved. IMPRESSION:  1. Successful PCI and stenting of the distal right coronary artery. 2.  LVEDP 16 mmHg. The patient has chronic kidney disease and known  history of congestive heart failure. 3.  History of aortic stenosis. Peak gradient across the aortic valve  29 mmHg. RECOMMENDATIONS/PLAN OF CARE:  The patient will be admitted to the  hospital for observation overnight, monitored on telemetry. Resume  Eliquis in the morning if no bleeding complications occur. The patient  is currently on aspirin and Plavix. May stop aspirin one week post PCI. Continue on Plavix 75 mg p.o. daily. High-intensity statin therapy. Aggressive risk factor modification.   Outpatient followup in office with  primary cardiologist.        Huan Willis MD    D: 05/04/2022 8:26:02       T: 05/04/2022 8:29:02     [id number]  Job#: [id number]     Doc#: [id number]    CC:

## 2022-05-04 NOTE — PROGRESS NOTES
Spoke to cath lab nurse. Patient was given , Plavix 75 mg and amiodarone 200 mg. Patient had chlorhexidine bath and urinated. He has a #20 g IV in his LFA with 0.9% NS running at 75mL/hour. He is A& O x4. Patient had questions regarding the procedure and wanted to speak with the physician prior to signing consent. There was a copy placed in the chart.

## 2022-05-04 NOTE — PROCEDURES
12 lead EKG completed. Results handed to [facility].  [facility] F/U #2:    Patient did not answer on second outreach attempt. CC will close case. This note will not be viewable in [address]

## 2022-05-04 NOTE — BRIEF OP NOTE
[facility]  Sedation/Analgesia Post Sedation Record    Pt Name: Oneyda Ingram  Account number: [de-identified]  MRN: [id number]  YOB: 1931  Procedure Performed By: Trang Serra MD MD   Primary Care Physician: EMRE Casillas - CNP  Date: 5/4/2022    POST-PROCEDURE    Physicians/Assistants: Trang Serra MD MD     Procedure Performed:Cath/ PCI      Sedation/Anesthesia: Versed/ Fentanyl and 2% xylocaine local anesthesia. Estimated Blood Loss: < 50 ml. Specimens Removed: None         Disposition of Specimen: N/A        Complications: No Immediate Complications. Post-procedure Diagnosis/Findings:        Successful PCI/TARYN of RCA   LVEDP 16 mmHg , PG across the aortic valve was 29 mmHg    Recommendations:  Bed rest for 2 hours post procedure   Admit overnight for observation post PCI   Monitor on Telemetry   Optimize medical therapy for Coronary Artery Disease  Aggressive risk factor modification   Access site care  Antiplatelet therapy: ASA 81 mg PO daily and Plavix 75 mg po daily  Stop ASA one week post PCI  Resume Eliquis in AM   High intensity statin therapy  Has CKD  K 5.2  Stop KCL  Hold lisinopril   ~ 25 cc of contrast was used for the procedure   IVF: NS at 20 cc/h  Change lasix to 20 mg po Q 48 hours  AM Labs: BMP, CBC  BMP one week post discharge   Follow up with CHF clinic   Outpatient follow up in office in 2 weeks        Above findings and plan of care were discussed with patient and his daughter, questions were answered, agreeable with plan.        Trang Serra MD, Duane Delacruz   Electronically signed 5/4/2022 at 8:26 AM  Interventional Cardiology

## 2022-05-05 VITALS
HEIGHT: 70 IN | BODY MASS INDEX: 30.56 KG/M2 | HEART RATE: 93 BPM | SYSTOLIC BLOOD PRESSURE: 148 MMHG | TEMPERATURE: 98.7 F | OXYGEN SATURATION: 100 % | RESPIRATION RATE: 18 BRPM | DIASTOLIC BLOOD PRESSURE: 93 MMHG

## 2022-05-05 LAB
ANION GAP SERPL CALCULATED.3IONS-SCNC: 10 MEQ/L (ref 8–16)
BUN BLDV-MCNC: 25 MG/DL (ref 7–22)
CALCIUM SERPL-MCNC: 9.2 MG/DL (ref 8.5–10.5)
CHLORIDE BLD-SCNC: 104 MEQ/L (ref 98–111)
CO2: 23 MEQ/L (ref 23–33)
CREAT SERPL-MCNC: 1.1 MG/DL (ref 0.4–1.2)
ERYTHROCYTE [DISTWIDTH] IN BLOOD BY AUTOMATED COUNT: 13.6 % (ref 11.5–14.5)
ERYTHROCYTE [DISTWIDTH] IN BLOOD BY AUTOMATED COUNT: 42.1 FL (ref 35–45)
GFR SERPL CREATININE-BSD FRML MDRD: 63 ML/MIN/1.73M2
GLUCOSE BLD-MCNC: 132 MG/DL (ref 70–108)
GLUCOSE BLD-MCNC: 147 MG/DL (ref 70–108)
GLUCOSE BLD-MCNC: 151 MG/DL (ref 70–108)
HCT VFR BLD CALC: 38.9 % (ref 42–52)
HEMOGLOBIN: 12.2 GM/DL (ref 14–18)
MCH RBC QN AUTO: 26.6 PG (ref 26–33)
MCHC RBC AUTO-ENTMCNC: 31.4 GM/DL (ref 32.2–35.5)
MCV RBC AUTO: 84.7 FL (ref 80–94)
PLATELET # BLD: 180 THOU/MM3 (ref 130–400)
PMV BLD AUTO: 13 FL (ref 9.4–12.4)
POTASSIUM SERPL-SCNC: 4.8 MEQ/L (ref 3.5–5.2)
RBC # BLD: 4.59 MILL/MM3 (ref 4.7–6.1)
SODIUM BLD-SCNC: 137 MEQ/L (ref 135–145)
WBC # BLD: 10.3 THOU/MM3 (ref 4.8–10.8)

## 2022-05-05 PROCEDURE — 85027 COMPLETE CBC AUTOMATED: CPT

## 2022-05-05 PROCEDURE — 6370000000 HC RX 637 (ALT 250 FOR IP): Performed by: INTERNAL MEDICINE

## 2022-05-05 PROCEDURE — 36415 COLL VENOUS BLD VENIPUNCTURE: CPT

## 2022-05-05 PROCEDURE — 2580000003 HC RX 258: Performed by: INTERNAL MEDICINE

## 2022-05-05 PROCEDURE — 80048 BASIC METABOLIC PNL TOTAL CA: CPT

## 2022-05-05 PROCEDURE — 6360000002 HC RX W HCPCS: Performed by: PHYSICIAN ASSISTANT

## 2022-05-05 PROCEDURE — 93246 EXT ECG>7D<15D RECORDING: CPT

## 2022-05-05 PROCEDURE — 82948 REAGENT STRIP/BLOOD GLUCOSE: CPT

## 2022-05-05 RX ORDER — DIGOXIN 125 MCG
125 TABLET ORAL
Status: DISCONTINUED | OUTPATIENT
Start: 2022-05-07 | End: 2022-05-05 | Stop reason: HOSPADM

## 2022-05-05 RX ORDER — AMIODARONE HYDROCHLORIDE 200 MG/1
200 TABLET ORAL 2 TIMES DAILY
Qty: 60 TABLET | Refills: 0 | Status: SHIPPED | OUTPATIENT
Start: 2022-05-05 | End: 2022-06-01 | Stop reason: SDUPTHER

## 2022-05-05 RX ORDER — DIGOXIN 125 MCG
125 TABLET ORAL
Qty: 30 TABLET | Refills: 3 | Status: SHIPPED | OUTPATIENT
Start: 2022-05-07 | End: 2022-06-28 | Stop reason: ALTCHOICE

## 2022-05-05 RX ORDER — AMIODARONE HYDROCHLORIDE 200 MG/1
200 TABLET ORAL 2 TIMES DAILY
Status: DISCONTINUED | OUTPATIENT
Start: 2022-05-05 | End: 2022-05-05 | Stop reason: HOSPADM

## 2022-05-05 RX ORDER — DIGOXIN 0.25 MG/ML
500 INJECTION INTRAMUSCULAR; INTRAVENOUS ONCE
Status: COMPLETED | OUTPATIENT
Start: 2022-05-05 | End: 2022-05-05

## 2022-05-05 RX ORDER — NITROGLYCERIN 0.4 MG/1
0.4 TABLET SUBLINGUAL EVERY 5 MIN PRN
Qty: 25 TABLET | Refills: 3 | Status: SHIPPED | OUTPATIENT
Start: 2022-05-05

## 2022-05-05 RX ADMIN — CLOPIDOGREL BISULFATE 75 MG: 75 TABLET, FILM COATED ORAL at 09:04

## 2022-05-05 RX ADMIN — SODIUM CHLORIDE, PRESERVATIVE FREE 10 ML: 5 INJECTION INTRAVENOUS at 09:04

## 2022-05-05 RX ADMIN — ASPIRIN 81 MG: 81 TABLET ORAL at 09:04

## 2022-05-05 RX ADMIN — DIGOXIN 500 MCG: 0.25 INJECTION INTRAMUSCULAR; INTRAVENOUS at 09:58

## 2022-05-05 RX ADMIN — AMIODARONE HYDROCHLORIDE 200 MG: 200 TABLET ORAL at 09:04

## 2022-05-05 RX ADMIN — METOPROLOL SUCCINATE 200 MG: 100 TABLET, EXTENDED RELEASE ORAL at 09:04

## 2022-05-05 ASSESSMENT — PAIN SCALES - GENERAL: PAINLEVEL_OUTOF10: 0

## 2022-05-05 NOTE — FLOWSHEET NOTE
Discharge teaching and instructions for diagnosis/procedure of Post cath completed with patient using teachback method. AVS reviewed. Printed prescriptions given to patient. Patient voiced understanding regarding prescriptions, follow up appointments, and care of self at home. Discharged in a wheelchair to  independent living per family.

## 2022-05-06 ENCOUNTER — HOSPITAL ENCOUNTER (EMERGENCY)
Age: 87
Discharge: HOME | End: 2022-05-06
Attending: FAMILY MEDICINE
Payer: MEDICARE

## 2022-05-06 VITALS
WEIGHT: 203 LBS | OXYGEN SATURATION: 98 % | TEMPERATURE: 97.9 F | SYSTOLIC BLOOD PRESSURE: 177 MMHG | RESPIRATION RATE: 16 BRPM | BODY MASS INDEX: 29.06 KG/M2 | HEART RATE: 110 BPM | DIASTOLIC BLOOD PRESSURE: 84 MMHG | HEIGHT: 70 IN

## 2022-05-06 DIAGNOSIS — R09.81 CHRONIC NASAL CONGESTION: Primary | ICD-10-CM

## 2022-05-06 PROCEDURE — 99283 EMERGENCY DEPT VISIT LOW MDM: CPT

## 2022-05-06 PROCEDURE — 6370000000 HC RX 637 (ALT 250 FOR IP): Performed by: FAMILY MEDICINE

## 2022-05-06 RX ORDER — FLUTICASONE PROPIONATE 50 MCG
1 SPRAY, SUSPENSION (ML) NASAL DAILY
Qty: 16 G | Refills: 0 | Status: SHIPPED | OUTPATIENT
Start: 2022-05-06 | End: 2022-09-13

## 2022-05-06 RX ADMIN — PHENYLEPHRINE HYDROCHLORIDE 1 SPRAY: 1 SPRAY NASAL at 00:25

## 2022-05-06 ASSESSMENT — ENCOUNTER SYMPTOMS
VOMITING: 0
EYE DISCHARGE: 0
EYE REDNESS: 0
SINUS PRESSURE: 0
SORE THROAT: 0
COUGH: 0
SINUS PAIN: 0
SHORTNESS OF BREATH: 0
NAUSEA: 0

## 2022-05-06 ASSESSMENT — PAIN - FUNCTIONAL ASSESSMENT: PAIN_FUNCTIONAL_ASSESSMENT: NONE - DENIES PAIN

## 2022-05-06 NOTE — ED NOTES
Pt presents to the front window with complaints of swollen membranes. States that he feels like his nose is swollen shut. He has been intermittently having this issue for years but tonight it is really bothering him. His daughter is at bedside with him.       Marquis Pizarro RN  05/06/22 5899

## 2022-05-06 NOTE — ED PROVIDER NOTES
[facility]  eMERGENCY dEPARTMENT eNCOUnter          CHIEF COMPLAINT       Chief Complaint   Patient presents with    Sinusitis       Nurses Notes reviewed and I agree except as noted in the HPI. HISTORY OF PRESENT ILLNESS    Brittney Turk is a 80 y.o. male who presents with nose congestion. Patient notes long time condition however worse in recent days. Denies fever,chills. Denies facial pain. REVIEW OF SYSTEMS     Review of Systems   Constitutional: Negative for chills and fever. HENT: Positive for congestion. Negative for sinus pressure, sinus pain and sore throat. Eyes: Negative for discharge and redness. Respiratory: Negative for cough and shortness of breath. Gastrointestinal: Negative for nausea and vomiting. All other systems reviewed and are negative. PAST MEDICAL HISTORY    has a past medical history of Atrial fibrillation ([facility] [address] CHF (congestive heart failure) ([facility] [address] Diabetes mellitus ([facility] [address] Hypertension, and Type II or unspecified type diabetes mellitus without mention of complication, not stated as uncontrolled. SURGICAL HISTORY      has a past surgical history that includes Tonsillectomy and adenoidectomy (Bilateral). CURRENT MEDICATIONS       Discharge Medication List as of 5/6/2022 12:43 AM      CONTINUE these medications which have NOT CHANGED    Details   nitroGLYCERIN (NITROSTAT) 0.4 MG SL tablet Place 1 tablet under the tongue every 5 minutes as needed for Chest pain up to max of 3 total doses.  If no relief after 1 dose, call 911., Disp-25 tablet, R-3Normal      amiodarone (CORDARONE) 200 MG tablet Take 1 tablet by mouth 2 times daily, Disp-60 tablet, R-0Normal      digoxin (LANOXIN) 125 MCG tablet Take 1 tablet by mouth every 48 hours, Disp-30 tablet, R-3Normal      !! furosemide (LASIX) 20 MG tablet Take 1 tablet by mouth daily as needed (for wt gain or swelling or SOB), Disp-30 tablet, R-1Normal      !! furosemide (LASIX) 40 MG tablet Take 1 tablet by mouth daily, Disp-3 tablet, R-0Normal      potassium chloride (KLOR-CON M) 20 MEQ extended release tablet Take 1 tablet by mouth daily, Disp-3 tablet, R-0Normal      aspirin 81 MG EC tablet Take 1 tablet by mouth daily, Disp-30 tablet, R-3Normal      clopidogrel (PLAVIX) 75 MG tablet Take 1 tablet by mouth daily, Disp-30 tablet, R-3Normal      atorvastatin (LIPITOR) 40 MG tablet Take 1 tablet by mouth nightly, Disp-30 tablet, R-3Normal      apixaban (ELIQUIS) 2.5 MG TABS tablet Take 1 tablet by mouth 2 times daily, Disp-60 tablet, R-3Normal      metoprolol succinate (TOPROL XL) 200 MG extended release tablet Take 1 tablet by mouth daily, Disp-30 tablet, R-5Normal      lisinopril (PRINIVIL;ZESTRIL) 5 MG tablet Take 2 tablets by mouth daily Hold for 1 week following discharge, Disp-1 tablet, R-3Normal      metFORMIN (GLUCOPHAGE XR) 500 MG extended release tablet Take 2 tablets by mouth daily (with breakfast), Disp-180 tablet, R-3Normal       !! - Potential duplicate medications found. Please discuss with provider. ALLERGIES     is allergic to norvasc [amlodipine besylate] and other. FAMILY HISTORY     He indicated that the status of his maternal grandmother is unknown. He indicated that the status of his maternal grandfather is unknown. He indicated that the status of his paternal grandmother is unknown. He indicated that the status of his paternal grandfather is unknown. He indicated that the status of his paternal aunt is unknown. He indicated that the status of his paternal uncle is unknown.   family history includes Cancer in his paternal grandmother and paternal uncle; Diabetes in his maternal grandfather, maternal grandmother, paternal aunt, paternal grandfather, paternal grandmother, and paternal uncle. SOCIAL HISTORY      reports that he has never smoked. He has never used smokeless tobacco. He reports that he does not drink alcohol and does not use drugs.     PHYSICAL EXAM INITIAL VITALS:  height is 5' 10\" (1.778 m) and weight is 203 lb (92.1 kg). His temperature is 97.9 °F (36.6 °C). His blood pressure is 177/84 (abnormal) and his pulse is 110. His respiration is 16 and oxygen saturation is 98%. Physical Exam  Vitals and nursing note reviewed. HENT:      Nose: Congestion present. Comments: Mild mucosa swelling. Mouth/Throat:      Pharynx: No oropharyngeal exudate or posterior oropharyngeal erythema. Eyes:      Extraocular Movements: Extraocular movements intact. Conjunctiva/sclera: Conjunctivae normal.      Pupils: Pupils are equal, round, and reactive to light. Pulmonary:      Effort: Pulmonary effort is normal.      Breath sounds: Normal breath sounds. Musculoskeletal:      Cervical back: Normal range of motion and neck supple. Lymphadenopathy:      Cervical: No cervical adenopathy. Neurological:      Mental Status: He is alert. DIFFERENTIAL DIAGNOSIS:   Chronic allergic rhinitis, chronic rhino sinusitis,    DIAGNOSTIC RESULTS     EKG: All EKG's are interpreted by the Emergency Department Physician who either signs or Co-signs this chart in the absence of a cardiologist.          LABS:   Labs Reviewed - No data to display    EMERGENCY DEPARTMENT COURSE:   Vitals:    Vitals:    05/06/22 0006   BP: (!) 177/84   Pulse: 110   Resp: 16   Temp: 97.9 °F (36.6 °C)   SpO2: 98%   Weight: 203 lb (92.1 kg)   Height: 5' 10\" (1.778 m)     Nares bilaterally are clear. Patient notes congestion inability to breath thru nostrils. Provided application one time of rolando synephrine to nose. Patient breathing easier. Discouraged any continuous use of rolando synephrine. Will provide Flonase nasal spray for efforts suggested by chronicity of complaint. If symptoms persist ENT may need to be involved. Care instructions provided. PROCEDURES:  None    FINAL IMPRESSION      1. Chronic nasal congestion          DISPOSITION/PLAN   Home. Care instructions provided. Follow up with PCP or ED as needed.      PATIENT REFERRED TO:  Checo Estrada, EMRE - CNP  [address] [place]  [phone]    Schedule an appointment as soon as possible for a visit in 3 days        DISCHARGE MEDICATIONS:  Discharge Medication List as of 5/6/2022 12:43 AM      START taking these medications    Details   fluticasone (FLONASE) 50 MCG/ACT nasal spray 1 spray by Each Nostril route daily, Disp-16 g, R-0Normal             (Please note that portions of this note were completed with a voice recognition program.  Efforts were made to edit the dictations but occasionally words are mis-transcribed.)    MD Arron Jacobo MD  05/06/22 [id number]

## 2022-05-09 ENCOUNTER — TELEPHONE (OUTPATIENT)
Dept: FAMILY MEDICINE CLINIC | Age: 87
End: 2022-05-09

## 2022-05-09 ENCOUNTER — HOSPITAL ENCOUNTER (OUTPATIENT)
Age: 87
Discharge: HOME | End: 2022-05-09
Payer: MEDICARE

## 2022-05-09 PROCEDURE — 80162 ASSAY OF DIGOXIN TOTAL: CPT

## 2022-05-09 PROCEDURE — 36415 COLL VENOUS BLD VENIPUNCTURE: CPT

## 2022-05-10 LAB — DIGOXIN LEVEL: 1 NG/ML (ref 0.5–2)

## 2022-05-11 ENCOUNTER — OFFICE VISIT (OUTPATIENT)
Dept: FAMILY MEDICINE CLINIC | Age: 87
End: 2022-05-11
Payer: MEDICARE

## 2022-05-11 VITALS
RESPIRATION RATE: 20 BRPM | HEART RATE: 76 BPM | HEIGHT: 70 IN | OXYGEN SATURATION: 96 % | TEMPERATURE: 98.4 F | BODY MASS INDEX: 29.92 KG/M2 | WEIGHT: 209 LBS | DIASTOLIC BLOOD PRESSURE: 76 MMHG | SYSTOLIC BLOOD PRESSURE: 132 MMHG

## 2022-05-11 DIAGNOSIS — Z09 HOSPITAL DISCHARGE FOLLOW-UP: ICD-10-CM

## 2022-05-11 DIAGNOSIS — I50.9 ACUTE CONGESTIVE HEART FAILURE, UNSPECIFIED HEART FAILURE TYPE (HCC): ICD-10-CM

## 2022-05-11 DIAGNOSIS — I10 PRIMARY HYPERTENSION: ICD-10-CM

## 2022-05-11 DIAGNOSIS — I48.0 PAROXYSMAL ATRIAL FIBRILLATION (HCC): ICD-10-CM

## 2022-05-11 DIAGNOSIS — N18.31 TYPE 2 DIABETES MELLITUS WITH STAGE 3A CHRONIC KIDNEY DISEASE, WITHOUT LONG-TERM CURRENT USE OF INSULIN (HCC): ICD-10-CM

## 2022-05-11 DIAGNOSIS — I25.10 CORONARY ARTERY DISEASE INVOLVING NATIVE CORONARY ARTERY OF NATIVE HEART WITHOUT ANGINA PECTORIS: Primary | ICD-10-CM

## 2022-05-11 DIAGNOSIS — E11.22 TYPE 2 DIABETES MELLITUS WITH STAGE 3A CHRONIC KIDNEY DISEASE, WITHOUT LONG-TERM CURRENT USE OF INSULIN (HCC): ICD-10-CM

## 2022-05-11 PROCEDURE — 99214 OFFICE O/P EST MOD 30 MIN: CPT | Performed by: NURSE PRACTITIONER

## 2022-05-11 PROCEDURE — 1111F DSCHRG MED/CURRENT MED MERGE: CPT | Performed by: NURSE PRACTITIONER

## 2022-05-11 RX ORDER — BUSPIRONE HYDROCHLORIDE 5 MG/1
5 TABLET ORAL 2 TIMES DAILY
Qty: 60 TABLET | Refills: 3 | Status: SHIPPED | OUTPATIENT
Start: 2022-05-11 | End: 2022-06-10

## 2022-05-11 NOTE — PROGRESS NOTES
Post-Discharge Transitional Care Follow Up      Oneyda Ingram   YOB: 1931    Date of Office Visit:  5/11/2022  Date of Hospital Admission: 5/4/22  Date of Hospital Discharge: 5/5/22  Readmission Risk Score (high >=14%. Medium >=10%):Readmission Risk Score: 14.9 ( )      Care management risk score Rising risk (score 2-5) and Complex Care (Scores >=6): 7     Non face to face  following discharge, date last encounter closed (first attempt may have been earlier): *No documented post hospital discharge outreach found in the last 14 days     Call initiated 2 business days of discharge: *No response recorded in the last 14 days     Coronary artery disease involving native coronary artery of native heart without angina pectoris  -     SC DISCHARGE MEDS RECONCILED W/ CURRENT OUTPATIENT MED LIST  Follow up with cardio  Stable at this point  Primary hypertension         Continue toprol          Monitor kidney function  Type 2 diabetes mellitus with stage 3a chronic kidney disease, without long-term current use of insulin (HCC)          Controlled on current meds  Paroxysmal atrial fibrillation (HCC)            Rate stable             Cont eliquis, amiodarone, digoxin  Hospital discharge follow-up  -     SC DISCHARGE MEDS RECONCILED W/ CURRENT OUTPATIENT MED LIST  Acute congestive heart failure, unspecified heart failure type ([address]         Take lasix for any 2-3 lb weight gain          Watch sodium intake    Medical Decision Making: moderate complexity  No follow-ups on file. Subjective:   HPI    This is a 80year old gentleman who I am seeing today for a follow up. He was admitted to [facility] for cath and management of cad. He did have cath several back on the other side of the heart. Back now for another cath/stent to be placed on the other side of the heart. He was having some kidney issues. His did go into abnormal rhythm and started on digoxin. Also on amiodarone.     He was placed on blood thinners as well. Today states he is doing fairly well. Little fatigued. Still needs to have his aortic valve repaired due to severe aortic stenosis. Seen Dr. Jimmy Strickland for this in the past.   Pt was thinking about if wanted to get it done. Today denies any chest pain or sob. Doing good overall. Inpatient course: Discharge summary reviewed- see chart. Interval history/Current status: stable    Patient Active Problem List   Diagnosis    Type 2 diabetes mellitus with stage 3a chronic kidney disease, without long-term current use of insulin (HCC)    BPH (benign prostatic hyperplasia)    Essential hypertension    Nonrheumatic aortic valve stenosis    Left atrial enlargement    Decompensated heart failure ([address]    JUAN C (acute kidney injury) ([address]    Flash pulmonary edema ([address]    Encounter for cardioversion procedure    Atrial fibrillation with RVR ([address]    Metabolic acidosis    Coronary artery disease involving native coronary artery of native heart without angina pectoris    Type 2 diabetes mellitus with chronic kidney disease    Chronic kidney disease (CKD) stage G2/A1, mildly decreased glomerular filtration rate (GFR) between 60-89 mL/min/1.73 square meter and albuminuria creatinine ratio less than 30 mg/g    Anginal equivalent ([address]    CANTRELL (dyspnea on exertion)    Ischemic cardiomyopathy    CAD in native artery    NSTEMI (non-ST elevated myocardial infarction) ([address]       Medications listed as ordered at the time of discharge from hospital     Medication List          Accurate as of May 11, 2022 11:59 PM. If you have any questions, ask your nurse or doctor.             START taking these medications    busPIRone 5 MG tablet  Commonly known as: BUSPAR  Take 1 tablet by mouth 2 times daily  Started by: EMRE Ortega - CNP        CONTINUE taking these medications    amiodarone 200 MG tablet  Commonly known as: CORDARONE  Take 1 tablet by mouth 2 times daily     apixaban 2.5 MG Tabs tablet  Commonly known as: ELIQUIS  Take 1 tablet by mouth 2 times daily     aspirin 81 MG EC tablet  Take 1 tablet by mouth daily     atorvastatin 40 MG tablet  Commonly known as: LIPITOR  Take 1 tablet by mouth nightly     clopidogrel 75 MG tablet  Commonly known as: PLAVIX  Take 1 tablet by mouth daily     digoxin 125 MCG tablet  Commonly known as: LANOXIN  Take 1 tablet by mouth every 48 hours     fluticasone 50 MCG/ACT nasal spray  Commonly known as: Flonase  1 spray by Each Nostril route daily     furosemide 20 MG tablet  Commonly known as: LASIX  Take 1 tablet by mouth daily as needed (for wt gain or swelling or SOB)     lisinopril 5 MG tablet  Commonly known as: PRINIVIL;ZESTRIL  Take 2 tablets by mouth daily Hold for 1 week following discharge     metFORMIN 500 MG extended release tablet  Commonly known as: Glucophage XR  Take 2 tablets by mouth daily (with breakfast)     metoprolol succinate 200 MG extended release tablet  Commonly known as: TOPROL XL  Take 1 tablet by mouth daily     nitroGLYCERIN 0.4 MG SL tablet  Commonly known as: Nitrostat  Place 1 tablet under the tongue every 5 minutes as needed for Chest pain up to max of 3 total doses. If no relief after 1 dose, call 911.            Where to Get Your Medications      You can get these medications from any pharmacy    Bring a paper prescription for each of these medications  · busPIRone 5 MG tablet          Medications marked \"taking\" at this time  Outpatient Medications Marked as Taking for the 5/11/22 encounter (Office Visit) with EMRE Lindsay CNP   Medication Sig Dispense Refill    busPIRone (BUSPAR) 5 MG tablet Take 1 tablet by mouth 2 times daily (Patient not taking: Reported on 5/12/2022) 60 tablet 3    fluticasone (FLONASE) 50 MCG/ACT nasal spray 1 spray by Each Nostril route daily 16 g 0    nitroGLYCERIN (NITROSTAT) 0.4 MG SL tablet Place 1 tablet under the tongue every 5 minutes as needed for Chest pain up to max of 3 total doses. If no relief after 1 dose, call 911. 25 tablet 3    amiodarone (CORDARONE) 200 MG tablet Take 1 tablet by mouth 2 times daily 60 tablet 0    digoxin (LANOXIN) 125 MCG tablet Take 1 tablet by mouth every 48 hours 30 tablet 3    furosemide (LASIX) 20 MG tablet Take 1 tablet by mouth daily as needed (for wt gain or swelling or SOB) 30 tablet 1    [DISCONTINUED] furosemide (LASIX) 40 MG tablet Take 1 tablet by mouth daily 3 tablet 0    [DISCONTINUED] potassium chloride (KLOR-CON M) 20 MEQ extended release tablet Take 1 tablet by mouth daily 3 tablet 0    aspirin 81 MG EC tablet Take 1 tablet by mouth daily 30 tablet 3    clopidogrel (PLAVIX) 75 MG tablet Take 1 tablet by mouth daily 30 tablet 3    atorvastatin (LIPITOR) 40 MG tablet Take 1 tablet by mouth nightly 30 tablet 3    apixaban (ELIQUIS) 2.5 MG TABS tablet Take 1 tablet by mouth 2 times daily 60 tablet 3    metoprolol succinate (TOPROL XL) 200 MG extended release tablet Take 1 tablet by mouth daily 30 tablet 5    lisinopril (PRINIVIL;ZESTRIL) 5 MG tablet Take 2 tablets by mouth daily Hold for 1 week following discharge 1 tablet 3        Medications patient taking as of now reconciled against medications ordered at time of hospital discharge: Yes    Review of Systems   Constitutional: Negative for activity change, appetite change, chills, diaphoresis, fatigue, fever and unexpected weight change. HENT: Negative for congestion, ear pain, postnasal drip, sinus pressure and sore throat. Eyes: Negative for visual disturbance. Respiratory: Negative for cough, chest tightness, shortness of breath, wheezing and stridor. Cardiovascular: Negative for chest pain, palpitations and leg swelling. Gastrointestinal: Negative for abdominal distention, abdominal pain, constipation, diarrhea, nausea and vomiting. Endocrine: Negative for polydipsia, polyphagia and polyuria.    Genitourinary: Negative for decreased urine volume, difficulty urinating, dysuria, flank pain, frequency, hematuria and urgency. Musculoskeletal: Negative for arthralgias, back pain, gait problem, joint swelling, myalgias and neck pain. Skin: Negative for color change, pallor and rash. Neurological: Negative for dizziness, syncope, weakness, light-headedness, numbness and headaches. Hematological: Negative for adenopathy. Psychiatric/Behavioral: Negative for behavioral problems, self-injury and sleep disturbance. The patient is not nervous/anxious. Objective:    /76 (Site: Left Upper Arm, Position: Sitting, Cuff Size: Medium Adult)   Pulse 76   Temp 98.4 °F (36.9 °C) (Temporal)   Resp 20   Ht 5' 10\" (1.778 m)   Wt 209 lb (94.8 kg)   SpO2 96%   BMI 29.99 kg/m²   Physical Exam  Vitals reviewed. Constitutional:       General: He is not in acute distress. Appearance: Normal appearance. He is well-developed. HENT:      Head: Normocephalic. Right Ear: External ear normal.      Left Ear: External ear normal.      Nose: Nose normal.      Right Sinus: No maxillary sinus tenderness. Left Sinus: No maxillary sinus tenderness. Mouth/Throat:      Pharynx: Uvula midline. Neck:      Trachea: Trachea normal.   Cardiovascular:      Rate and Rhythm: Normal rate. Heart sounds: Murmur heard. Pulmonary:      Effort: Pulmonary effort is normal. No respiratory distress. Breath sounds: Normal breath sounds. No decreased breath sounds, wheezing, rhonchi or rales. Chest:      Chest wall: No tenderness. Abdominal:      General: There is no distension. Palpations: Abdomen is soft. There is no mass. Tenderness: There is no abdominal tenderness. Musculoskeletal:         General: No tenderness or deformity. Normal range of motion. Cervical back: Normal range of motion and neck supple. Lymphadenopathy:      Cervical: No cervical adenopathy. Skin:     General: Skin is warm and dry.    Neurological:      Mental Status: He is alert and oriented to person, place, and time. Motor: No abnormal muscle tone. Coordination: Coordination normal.      Gait: Gait normal.   Psychiatric:         Mood and Affect: Mood normal.         Behavior: Behavior normal.         Thought Content: Thought content normal.         Judgment: Judgment normal.         An electronic signature was used to authenticate this note.   --EMRE Salmeron - CNP

## 2022-05-12 ENCOUNTER — OFFICE VISIT (OUTPATIENT)
Dept: CARDIOLOGY CLINIC | Age: 87
End: 2022-05-12
Payer: MEDICARE

## 2022-05-12 VITALS
HEART RATE: 96 BPM | WEIGHT: 205.6 LBS | HEIGHT: 70 IN | BODY MASS INDEX: 29.43 KG/M2 | OXYGEN SATURATION: 97 % | SYSTOLIC BLOOD PRESSURE: 138 MMHG | DIASTOLIC BLOOD PRESSURE: 68 MMHG

## 2022-05-12 DIAGNOSIS — I50.22 CHF NYHA CLASS II, CHRONIC, SYSTOLIC (HCC): Primary | ICD-10-CM

## 2022-05-12 DIAGNOSIS — I48.91 ATRIAL FIBRILLATION, UNSPECIFIED TYPE (HCC): ICD-10-CM

## 2022-05-12 DIAGNOSIS — I25.10 CORONARY ARTERY DISEASE INVOLVING NATIVE HEART WITHOUT ANGINA PECTORIS, UNSPECIFIED VESSEL OR LESION TYPE: ICD-10-CM

## 2022-05-12 DIAGNOSIS — I35.0 NONRHEUMATIC AORTIC VALVE STENOSIS: ICD-10-CM

## 2022-05-12 PROCEDURE — G8427 DOCREV CUR MEDS BY ELIG CLIN: HCPCS | Performed by: NURSE PRACTITIONER

## 2022-05-12 PROCEDURE — 1036F TOBACCO NON-USER: CPT | Performed by: NURSE PRACTITIONER

## 2022-05-12 PROCEDURE — 4040F PNEUMOC VAC/ADMIN/RCVD: CPT | Performed by: NURSE PRACTITIONER

## 2022-05-12 PROCEDURE — 1123F ACP DISCUSS/DSCN MKR DOCD: CPT | Performed by: NURSE PRACTITIONER

## 2022-05-12 PROCEDURE — 1111F DSCHRG MED/CURRENT MED MERGE: CPT | Performed by: NURSE PRACTITIONER

## 2022-05-12 PROCEDURE — 99214 OFFICE O/P EST MOD 30 MIN: CPT | Performed by: NURSE PRACTITIONER

## 2022-05-12 PROCEDURE — G8417 CALC BMI ABV UP PARAM F/U: HCPCS | Performed by: NURSE PRACTITIONER

## 2022-05-12 ASSESSMENT — ENCOUNTER SYMPTOMS
COUGH: 0
ABDOMINAL DISTENTION: 0
SHORTNESS OF BREATH: 0

## 2022-05-12 NOTE — PATIENT INSTRUCTIONS
You may receive a survey regarding the care you received during your visit. Your input is valuable to us. We encourage you to complete and return your survey. We hope you will choose us in the future for your healthcare needs.     Continue:  · Continue current medications  · Daily weights and record  · Fluid restriction of 2 Liters per day  · Limit sodium in diet to around 3792-2605 mg/day  · Monitor BP  · Activity as tolerated     Call the [facility] for any of the following symptoms: [phone]   Weight gain of 2-3 pounds in 1 day or 5 pounds in 1 week   Increased shortness of breath   Shortness of breath while laying down   Cough   Chest pain   Swelling in feet, ankles or legs   Tenderness or bloating in the abdomen   Fatigue    Decreased appetite or nausea    Confusion   

## 2022-05-12 NOTE — PROGRESS NOTES
Heart Failure Clinic       Visit Date: 5/12/2022  Cardiologist:  Dr. Yann Castillo  Primary Care Physician: Dr. Angel Luis Sommers, APRN - CNP    Meaghan Martinez is a 80 y.o. male who presents today for:  Chief Complaint   Patient presents with    Congestive Heart Failure       HPI:   Meaghan Martinez is a 80 y.o. male who presents to the office for a follow up patient visit in the heart failure clinic. Accompanied by dtr,     TYPE HF: HFrEF (40-45% 4/2022 - drop from 60% 12/2021), Severe AS (ZAHEER 1.0-1.1, mean gradient 27)  Cause: Valvular/ICM  Device: no  HX: HTN, DM, Afib (Eliquis) s/p DCCV, CAD s/p PCI LAD (4/2022)      Hospitalization:    April 2022 x 1 wk - SOB, Bipap. CXR +pulmonary edema, BP 200s. BNP 2599. WBCs 23. LA 2.4  Pt notes Covid booster 4/14. MIS/DCCV  C - CAD LM and LAD - [facility] saw, pt refused [facility]. Multivessel PCI 4/21/22. Staged RCA in 2 weeks. Diuretics stopped d/t JUAN C post cath  ED 4/24 - back in Afib -140s - IV Lopressor given  VV PCP 4/25 - started Lasix 40/day x 3 days d/t orthopnea, wt gain 2#, mild ankle swelling  OV CHF 4/26 - PCP started Lasix - feeling better  5/3-5/5 - C -PCI RCA - creat 1.6.   10 day Holter applied - ISSUE w/ rhythm? - started on Digoxin and increased Amiodarone     Today: HR goes \"all over\" but does not feel it. Notes some fatigue generally in afternoon, usually few hours after pills   No low BPs - checks several times day - range 130-150s  HR ranges 70 -110s when dtr checks  Dtr questions some anxiety - PCP recently started Buspar  No fluid on exam - wt down 8#/2 weeks.    Using PRN Lasix very rare     Patient has:  Chest Pain: no  SOB: some CANTRELL  Orthopnea/PND: no  KIMMY: no  Edema: no  Fatigue: yes   Abdominal bloating: no  Cough: no  Appetite: good  Home weight: stable - down 8# in 2 weeks  Home blood pressure: stable    Past Medical History:   Diagnosis Date    Atrial fibrillation (HCC)     [facility]     [facility]     Hypertension     Type II or unspecified type diabetes mellitus without mention of complication, not stated as uncontrolled      Past Surgical History:   Procedure Laterality Date    TONSILLECTOMY AND ADENOIDECTOMY Bilateral     age 10      Family History   Problem Relation Age of Onset    Diabetes Paternal Aunt     Diabetes Paternal Uncle     Cancer Paternal Uncle     Diabetes Maternal Grandmother     Diabetes Maternal Grandfather     Diabetes Paternal Grandmother     Cancer Paternal Grandmother     Diabetes Paternal Grandfather      Social History     Tobacco Use    Smoking status: Never Smoker    Smokeless tobacco: Never Used   Substance Use Topics    Alcohol use: No     Alcohol/week: 0.0 standard drinks     Current Outpatient Medications   Medication Sig Dispense Refill    fluticasone (FLONASE) 50 MCG/ACT nasal spray 1 spray by Each Nostril route daily 16 g 0    nitroGLYCERIN (NITROSTAT) 0.4 MG SL tablet Place 1 tablet under the tongue every 5 minutes as needed for Chest pain up to max of 3 total doses.  If no relief after 1 dose, call 911. 25 tablet 3    amiodarone (CORDARONE) 200 MG tablet Take 1 tablet by mouth 2 times daily 60 tablet 0    digoxin (LANOXIN) 125 MCG tablet Take 1 tablet by mouth every 48 hours 30 tablet 3    furosemide (LASIX) 20 MG tablet Take 1 tablet by mouth daily as needed (for wt gain or swelling or SOB) 30 tablet 1    aspirin 81 MG EC tablet Take 1 tablet by mouth daily 30 tablet 3    clopidogrel (PLAVIX) 75 MG tablet Take 1 tablet by mouth daily 30 tablet 3    atorvastatin (LIPITOR) 40 MG tablet Take 1 tablet by mouth nightly 30 tablet 3    apixaban (ELIQUIS) 2.5 MG TABS tablet Take 1 tablet by mouth 2 times daily 60 tablet 3    metoprolol succinate (TOPROL XL) 200 MG extended release tablet Take 1 tablet by mouth daily 30 tablet 5    lisinopril (PRINIVIL;ZESTRIL) 5 MG tablet Take 2 tablets by mouth daily Hold for 1 week following discharge 1 tablet 3    metFORMIN (GLUCOPHAGE XR) 500 MG extended release tablet Take 2 tablets by mouth daily (with breakfast) 180 tablet 3    busPIRone (BUSPAR) 5 MG tablet Take 1 tablet by mouth 2 times daily (Patient not taking: Reported on 5/12/2022) 60 tablet 3     No current facility-administered medications for this visit. Allergies   Allergen Reactions    Norvasc [Amlodipine Besylate]     Other Rash     States allergic to several BP meds (unsure of name)       SUBJECTIVE:   Review of Systems   Constitutional: Positive for fatigue. Negative for activity change and appetite change. Respiratory: Negative for cough and shortness of breath. Cardiovascular: Negative for chest pain, palpitations and leg swelling. Gastrointestinal: Negative for abdominal distention. Neurological: Negative for weakness, light-headedness and headaches. Hematological: Negative for adenopathy. Psychiatric/Behavioral: Negative for sleep disturbance. OBJECTIVE:   Today's Vitals:  /68   Pulse 96   Ht 5' 10\" (1.778 m)   Wt 205 lb 9.6 oz (93.3 kg)   SpO2 97%   BMI 29.50 kg/m²     Physical Exam  Vitals reviewed. Constitutional:       General: He is not in acute distress. Appearance: Normal appearance. He is well-developed. He is not diaphoretic. HENT:      Head: Normocephalic and atraumatic. Eyes:      Conjunctiva/sclera: Conjunctivae normal.   Neck:      Comments: No JVD  Cardiovascular:      Rate and Rhythm: Normal rate. Rhythm irregular. Heart sounds: Normal heart sounds. No murmur heard. Pulmonary:      Effort: Pulmonary effort is normal. No respiratory distress. Breath sounds: Normal breath sounds. No wheezing or rales. Abdominal:      General: Bowel sounds are normal. There is no distension. Palpations: Abdomen is soft. Tenderness: There is no abdominal tenderness. Musculoskeletal:         General: Normal range of motion.       Cervical back: Normal range of motion and neck supple. Right lower leg: No edema. Left lower leg: No edema. Skin:     General: Skin is warm and dry. Capillary Refill: Capillary refill takes less than 2 seconds. Neurological:      Mental Status: He is alert and oriented to person, place, and time. Coordination: Coordination normal.   Psychiatric:         Behavior: Behavior normal.       Wt Readings from Last 3 Encounters:   05/12/22 205 lb 9.6 oz (93.3 kg)   05/11/22 209 lb (94.8 kg)   05/06/22 203 lb (92.1 kg)     BP Readings from Last 3 Encounters:   05/12/22 138/68   05/11/22 132/76   05/06/22 (!) 177/84     Pulse Readings from Last 3 Encounters:   05/12/22 96   05/11/22 76   05/06/22 110     Body mass index is 29.5 kg/m². ECHO:   MIS      Summary   No intra cardiac mass or thrombus. No embolic source.   No Intracardiac shunt   Left ventricle size is normal.   Normal left ventricle wall thickness.   There was moderate global hypokinesis of the left ventricle.   Systolic function was moderately reduced.   Ejection fraction is visually estimated in the range of 40% to 45%.   Left atrium moderately dilated   There is moderate-to-severe aortic stenosis with valve area of 1 to 1.1 sq   cm by Planimetry      Signature      ----------------------------------------------------------------   Electronically signed by Gail Ballesteros MD (Interpreting   physician) on 04/18/2022 at 07:27 PM   ----------------------------------------------------------------        ECHO:   Summary   Technically difficult examination.   Left ventricular size is normal and systolic function is moderately   reduced. Ejection fraction was estimated at 40-45%. LV wall thickness is   within normal limits.   Moderately dilated left atrium.   Thickened and calcified aortic valve.   Leaflets exhibited severely reduced cuspal separation of the aortic valve.   Trivial aortic regurgitation is noted.  There is moderate-to-severe aortic   stenosis with valve area of 1.0 cm2. The maximum aortic valve gradient is   32 mmHg, the mean gradient is 21 mmHg. Consider DSE or MIS for further   evaluation.      Signature      ----------------------------------------------------------------   Electronically signed by Aaron Calvillo MD (Interpreting   [email or web address]) on 04/17/2022 at 09:40 AM     CATH 5/3/22  IMPRESSION:  1. Successful PCI and stenting of the distal right coronary artery. 2.  LVEDP 16 mmHg. The patient has chronic kidney disease and known  history of congestive heart failure. 3.  History of aortic stenosis. Peak gradient across the aortic valve  29 mmHg.     RECOMMENDATIONS/PLAN OF CARE:  The patient will be admitted to the  hospital for observation overnight, monitored on telemetry. Resume  Eliquis in the morning if no bleeding complications occur. The patient  is currently on aspirin and Plavix. May stop aspirin one week post PCI. Continue on Plavix 75 mg p.o. daily. High-intensity statin therapy. Aggressive risk factor modification. Outpatient followup in office with  primary cardiologist.  Brissa Rdemond MD     D: 05/04/2022 8:26:02      [facility] 4/19/22  FINDINGS:  HEMODYNAMICS AND RIGHT HEART CATHETERIZATION:  Pulmonary arterial oxygen  saturation 66%.  Pulmonary arterial pressure 53/29.  Mean pulmonary  arterial pressure 39 mmHg.  Pulmonary capillary wedge pressure 35 mmHg. Right ventricular pressure 57/14.  Right atrial pressure 90 mmHg.  Left  ventricular end-diastolic pressure was 29 mmHg.  Upon pullback across  the aortic valve, the mean pressure gradient was measured at 24 mmHg.     LEFT VENTRICULOGRAM:  Mild global hypokinesis.  Ejection fraction  estimated at 45% to 50%.       CATH 4/21/22   IMPRESSION:  1.  Successful PCI and stenting of left anterior descending artery. 2.  Successful PCI and stenting of the left main coronary artery. 3.  Status post successful direct current cardioversion.     RECOMMENDATIONS:  Bedrest for four hours.  Monitor on telemetry,  optimize medical therapy for CAD.  Aspirin 81 mg p.o. daily, Plavix 75  mg p.o. daily.  The patient needs to be on oral anticoagulation for  history of atrial fibrillation.  May start Eliquis 2.5 mg p.o. b.i.d.  tomorrow in the morning.  Start amiodarone 200 mg p.o. daily.  Stop  amiodarone drip tonight at 08:00 p.m.  May resume heparin drip without  initial bolus five hours post the procedure if no bleeding complications  occur.  High intensity statin therapy.  Staged PCI of RCA in two weeks  post discharge.  Cardiac rehab referral.  Referral to congestive heart  failure clinic.  Lexy Ames MD     D: 04/21/2022 11:31:54            Results reviewed:  BNP: No results found for: BNP  CBC:   Lab Results   Component Value Date    WBC 10.3 05/05/2022    RBC 4.59 05/05/2022    HGB 12.2 05/05/2022    HCT 38.9 05/05/2022     05/05/2022     CMP:    Lab Results   Component Value Date     05/05/2022    K 4.8 05/05/2022    K 4.2 04/24/2022     05/05/2022    CO2 23 05/05/2022    BUN 25 05/05/2022    CREATININE 1.1 05/05/2022    LABGLOM 63 05/05/2022    GLUCOSE 147 05/05/2022    CALCIUM 9.2 05/05/2022     Hepatic Function Panel:    Lab Results   Component Value Date    ALKPHOS 70 04/24/2022    ALT 46 04/24/2022    AST 30 04/24/2022    PROT 6.8 04/24/2022    BILITOT 0.9 04/24/2022    BILIDIR 0.3 04/17/2022    LABALBU 3.7 04/24/2022     Magnesium:    Lab Results   Component Value Date    MG 1.9 04/21/2022     PT/INR:    Lab Results   Component Value Date    INR 1.10 05/03/2022     Lipids:    Lab Results   Component Value Date    TRIG 78 05/03/2022    HDL 38 05/03/2022    LDLCALC 33 05/03/2022       ASSESSMENT AND PLAN:      Diagnosis Orders   1. Nonrheumatic aortic valve stenosis     2.  CHF NYHA class II, chronic, systolic (HCC)  Basic Metabolic Panel    Digoxin Level    Brain Natriuretic Peptide     Continue:  GDMT:              ACE/ARB/ARNI - Lisinopril 10/day (resumed recently, was on hold d/t JUAN C) - consider changing Entresto              BB - Toprol 200/day              SGLT2 -  no  AA - no - K+ runs high  Diuretic - Lasix 20 QOD  Vasodilator - no  Other - Plavix, Eliquis, Amio 200 BID, Digoxin (added recently)     HFrEF (40-45% 4/2022, down from 60% 12/2021)  ICM/Valvular  CAD s/p PCI LAD (4/2022) - s/p RCA PCI (5/3/22)  Mod-severe AS - Chay Woodard following, pt previously didn't want intervention - recent MIS showing ZAHEER 1.0-1.1 - pt not meet criteria  - has repeat ECHO ordered for July - will leave for now   Afib - rate controlled      Stable, appears Euvolemic  C/o fatigue, more afternoon - ?etiology - likely multifactorial: age, valve, Afib, CAD = pt questions Digoxin r/t  Labs reviewed from 5/5/22 - stable  Repeat blood work in 2 weeks - BMP, Dig level  BP/HR stable   No med changes today   Continue diet/fluid adherence  Continue daily wts. F/U w/ Cardiology/[place] end of May  F/U in clinic in 2 months    Tolerating above noted HF meds, no ill side effects noted. Will continue to monitor kidney function and electrolytes. Will optimize as tolerated. Pt is compliant w/ medications. Total visit time of 30 minutes has been spent with patient on education of symptoms, management, medication, and plan of care; as well as review of chart: labs, ECHO, radiology reports, etc.   I personally spent more then 50% of the appt time face to face with the patient. · Daily weights  · Fluid restriction of 2 Liters per day  · Limit sodium in diet to around 4336-3155 mg/day  · Monitor BP  · Activity as tolerated     Patient was instructed to call the [address] for any changes in symptoms as noted in AVS.      Return in about 2 months (around 7/12/2022). or sooner if needed     Patient given educational materials - see patient instructions. We discussed the importance of weighing oneself and recording daily.  We also discussed the importance of a low sodium diet, higher sodium foods to avoid and better low sodium food options. Patient verbalizes understanding of plan of care using teach back method, and is agreeable to the treatment plan.        Electronically signed by EMRE Bentley CNP on 5/12/2022 at 3:23 PM

## 2022-05-16 ENCOUNTER — TELEPHONE (OUTPATIENT)
Dept: CARDIOLOGY CLINIC | Age: 87
End: 2022-05-16

## 2022-05-16 DIAGNOSIS — I10 ESSENTIAL HYPERTENSION: ICD-10-CM

## 2022-05-16 NOTE — TELEPHONE ENCOUNTER
Patient daughter, Marjan Bryant on [employer], called with c/o increased BP. Pt BP had been running 130s-140s, but here lately at times SBP 160s. Was on Lisinopril 40mg/day but was decreased to 10mg/day d/t JUAN C. VO from [place], CNP:  Get blood work today or tomorrow so we can check kidney function and further advise on increasing medications or adding new    Daughter, Marjan Bryant, notified.

## 2022-05-17 ENCOUNTER — HOSPITAL ENCOUNTER (OUTPATIENT)
Age: 87
Discharge: HOME | End: 2022-05-17
Payer: MEDICARE

## 2022-05-17 DIAGNOSIS — I50.22 CHF NYHA CLASS II, CHRONIC, SYSTOLIC (HCC): ICD-10-CM

## 2022-05-17 PROCEDURE — 80048 BASIC METABOLIC PNL TOTAL CA: CPT

## 2022-05-17 PROCEDURE — 83880 ASSAY OF NATRIURETIC PEPTIDE: CPT

## 2022-05-17 PROCEDURE — 80162 ASSAY OF DIGOXIN TOTAL: CPT

## 2022-05-17 PROCEDURE — 36415 COLL VENOUS BLD VENIPUNCTURE: CPT

## 2022-05-17 ASSESSMENT — ENCOUNTER SYMPTOMS
SINUS PRESSURE: 0
COLOR CHANGE: 0
CONSTIPATION: 0
DIARRHEA: 0
WHEEZING: 0
BACK PAIN: 0
SHORTNESS OF BREATH: 0
COUGH: 0
VOMITING: 0
ABDOMINAL PAIN: 0
NAUSEA: 0
STRIDOR: 0
ABDOMINAL DISTENTION: 0
SORE THROAT: 0
CHEST TIGHTNESS: 0

## 2022-05-18 LAB
ANION GAP SERPL CALCULATED.3IONS-SCNC: 12 MEQ/L (ref 8–16)
BUN BLDV-MCNC: 24 MG/DL (ref 7–22)
CALCIUM SERPL-MCNC: 8.8 MG/DL (ref 8.5–10.5)
CHLORIDE BLD-SCNC: 102 MEQ/L (ref 98–111)
CO2: 23 MEQ/L (ref 23–33)
CREAT SERPL-MCNC: 1.2 MG/DL (ref 0.4–1.2)
DIGOXIN LEVEL: 0.7 NG/ML (ref 0.5–2)
GFR SERPL CREATININE-BSD FRML MDRD: 57 ML/MIN/1.73M2
GLUCOSE BLD-MCNC: 177 MG/DL (ref 70–108)
POTASSIUM SERPL-SCNC: 4.6 MEQ/L (ref 3.5–5.2)
PRO-BNP: 2321 PG/ML (ref 0–1800)
SODIUM BLD-SCNC: 137 MEQ/L (ref 135–145)

## 2022-05-18 RX ORDER — LISINOPRIL 20 MG/1
20 TABLET ORAL DAILY
Qty: 30 TABLET | Refills: 5 | Status: SHIPPED | OUTPATIENT
Start: 2022-05-18 | End: 2022-09-01 | Stop reason: SDUPTHER

## 2022-05-21 NOTE — PROCEDURES
[address] [place] [address]                                 EVENT MONITOR    PATIENT NAME: Sudheer Nunez               :        1931  MED REC NO:   [id number]                           ROOM:       [id number]  ACCOUNT NO:   [de-identified]                           ADMIT DATE: 2022  PROVIDER:     Olga Lidia Seay MD    TEST TYPE:  Two-week event monitor. MONITORING PERIOD:  2022 to 2022. INDICATION: Atrial fibrillation. FINDINGS:  Baseline rhythm was atrial fibrillation or intermittent  flutter. Average heart rate is 76 beats per minute. Maximum heart rate  is 136 beats per minute. Less than 1% of the time, the patient had  PVCs. Less than 1% of the time, the patient had PACs. Longest pause  was 3.6 seconds on 2022 at 03:33 a.m. No high-grade AV block, VT  or VF were seen. No SVT was seen. SUMMARY:  Predominantly permanent rate controlled atrial fibrillation;  3.6-second pause that was asymptomatic.         Dagoberto Upton MD    D: 2022 14:17:58       T: 2022 20:00:12     [email or web address]  Job#: [id number]     Doc#: [id number]    CC:

## 2022-05-23 ENCOUNTER — OFFICE VISIT (OUTPATIENT)
Dept: CARDIOLOGY CLINIC | Age: 87
End: 2022-05-23
Payer: MEDICARE

## 2022-05-23 VITALS
SYSTOLIC BLOOD PRESSURE: 136 MMHG | HEART RATE: 62 BPM | DIASTOLIC BLOOD PRESSURE: 76 MMHG | HEIGHT: 70 IN | BODY MASS INDEX: 30.06 KG/M2 | WEIGHT: 210 LBS

## 2022-05-23 DIAGNOSIS — I50.22 CHRONIC SYSTOLIC (CONGESTIVE) HEART FAILURE (HCC): ICD-10-CM

## 2022-05-23 DIAGNOSIS — I25.10 CORONARY ARTERY DISEASE INVOLVING NATIVE HEART WITHOUT ANGINA PECTORIS, UNSPECIFIED VESSEL OR LESION TYPE: ICD-10-CM

## 2022-05-23 DIAGNOSIS — I48.91 ATRIAL FIBRILLATION, UNSPECIFIED TYPE (HCC): Primary | ICD-10-CM

## 2022-05-23 DIAGNOSIS — I35.0 NONRHEUMATIC AORTIC VALVE STENOSIS: ICD-10-CM

## 2022-05-23 DIAGNOSIS — I50.22 CHF NYHA CLASS II, CHRONIC, SYSTOLIC (HCC): ICD-10-CM

## 2022-05-23 PROCEDURE — 1123F ACP DISCUSS/DSCN MKR DOCD: CPT | Performed by: INTERNAL MEDICINE

## 2022-05-23 PROCEDURE — 99213 OFFICE O/P EST LOW 20 MIN: CPT | Performed by: INTERNAL MEDICINE

## 2022-05-23 PROCEDURE — G8417 CALC BMI ABV UP PARAM F/U: HCPCS | Performed by: INTERNAL MEDICINE

## 2022-05-23 PROCEDURE — 1111F DSCHRG MED/CURRENT MED MERGE: CPT | Performed by: INTERNAL MEDICINE

## 2022-05-23 PROCEDURE — 1036F TOBACCO NON-USER: CPT | Performed by: INTERNAL MEDICINE

## 2022-05-23 PROCEDURE — G8427 DOCREV CUR MEDS BY ELIG CLIN: HCPCS | Performed by: INTERNAL MEDICINE

## 2022-05-23 NOTE — PROGRESS NOTES
[address] [place] [address]  Dept: [phone]  Dept Fax: [phone]  Loc: [phone]    Visit Date: 7/59/4435    Mr. Arron Elder is a 80 y.o. male  who presented for:  Chief Complaint   Patient presents with    Follow-Up from Hospital       HPI:   HPI   81 yo hx of HTN, HLD, DM II who presents to discuss the AS - ZAHEER 1-1.1 cm2, EF 40-45%. Had PCI of the LAD-LM - 4/2022. Had PCI of the RAC -  5/2022. Returns to discuss. He has improved but at times mild CANTRELL. Associated with anxiety. Taking all meds. He has permanent Afib--on Eliquis. No bleeding. Taking triple therapy. BP stable. No chest pain or angina. Current Outpatient Medications:     lisinopril (PRINIVIL;ZESTRIL) 20 MG tablet, Take 1 tablet by mouth daily, Disp: 30 tablet, Rfl: 5    busPIRone (BUSPAR) 5 MG tablet, Take 1 tablet by mouth 2 times daily, Disp: 60 tablet, Rfl: 3    fluticasone (FLONASE) 50 MCG/ACT nasal spray, 1 spray by Each Nostril route daily, Disp: 16 g, Rfl: 0    nitroGLYCERIN (NITROSTAT) 0.4 MG SL tablet, Place 1 tablet under the tongue every 5 minutes as needed for Chest pain up to max of 3 total doses.  If no relief after 1 dose, call 911., Disp: 25 tablet, Rfl: 3    amiodarone (CORDARONE) 200 MG tablet, Take 1 tablet by mouth 2 times daily, Disp: 60 tablet, Rfl: 0    digoxin (LANOXIN) 125 MCG tablet, Take 1 tablet by mouth every 48 hours, Disp: 30 tablet, Rfl: 3    furosemide (LASIX) 20 MG tablet, Take 1 tablet by mouth daily as needed (for wt gain or swelling or SOB), Disp: 30 tablet, Rfl: 1    aspirin 81 MG EC tablet, Take 1 tablet by mouth daily, Disp: 30 tablet, Rfl: 3    clopidogrel (PLAVIX) 75 MG tablet, Take 1 tablet by mouth daily, Disp: 30 tablet, Rfl: 3    atorvastatin (LIPITOR) 40 MG tablet, Take 1 tablet by mouth nightly, Disp: 30 tablet, Rfl: 3    apixaban (ELIQUIS) 2.5 MG TABS tablet, Take 1 tablet by mouth 2 times daily, Disp: 60 tablet, Rfl: 3    metoprolol succinate (TOPROL XL) 200 MG extended release tablet, Take 1 tablet by mouth daily, Disp: 30 tablet, Rfl: 5    metFORMIN (GLUCOPHAGE XR) 500 MG extended release tablet, Take 2 tablets by mouth daily (with breakfast), Disp: 180 tablet, Rfl: 3    Past Medical History  Reagan Samuels  has a past medical history of Atrial fibrillation ([facility] [address] [facility] [facility] Diabetes mellitus ([facility] Hypertension, and Type II or unspecified type diabetes mellitus without mention of complication, not stated as uncontrolled. Social History  Reagan Samuels  reports that he has never smoked. He has never used smokeless tobacco. He reports that he does not drink alcohol and does not use drugs. Family History  Reagan Samuels family history includes Cancer in his paternal grandmother and paternal uncle; Diabetes in his maternal grandfather, maternal grandmother, paternal aunt, paternal grandfather, paternal grandmother, and paternal uncle. There is no family history of bicuspid aortic valve, aneurysms, heart transplant, pacemakers, defibrillators, or sudden cardiac death. Past Surgical History   Past Surgical History:   Procedure Laterality Date    TONSILLECTOMY AND ADENOIDECTOMY Bilateral     age 10        Review of Systems   Constitutional: Negative for chills and fever  HENT: Negative for congestion, sinus pressure, sneezing and sore throat. Eyes: Negative for pain, discharge, redness and itching. Respiratory: Negative for apnea, cough  Gastrointestinal: Negative for blood in stool, constipation, diarrhea   Endocrine: Negative for cold intolerance, heat intolerance, polydipsia. Genitourinary: Negative for dysuria, enuresis, flank pain and hematuria. Musculoskeletal: Negative for arthralgias, joint swelling and neck pain. Neurological: Negative for numbness and headaches.    Psychiatric/Behavioral: Negative for agitation, confusion, decreased concentration and dysphoric mood. Objective:     /76   Pulse 62   Ht 5' 10\" (1.778 m)   Wt 210 lb (95.3 kg)   BMI 30.13 kg/m²     Wt Readings from Last 3 Encounters:   05/23/22 210 lb (95.3 kg)   05/12/22 205 lb 9.6 oz (93.3 kg)   05/11/22 209 lb (94.8 kg)     BP Readings from Last 3 Encounters:   05/23/22 136/76   05/12/22 138/68   05/11/22 132/76       Nursing note and vitals reviewed. Physical Exam   Constitutional: Oriented to person, place, and time. Appears well-developed and well-nourished. HENT:   Head: Normocephalic and atraumatic. Eyes: EOM are normal. Pupils are equal, round, and reactive to light. Neck: Normal range of motion. Neck supple. No JVD present. Cardiovascular: Normal rate, regular rhythm, normal heart sounds and intact distal pulses. 2/6 CHIOMA  Pulmonary/Chest: Effort normal and breath sounds normal. No respiratory distress. No wheezes. No rales. Abdominal: Soft. Bowel sounds are normal. No distension. There is no tenderness. Musculoskeletal: Normal range of motion. No edema. Neurological: Alert and oriented to person, place, and time. No cranial nerve deficit. Coordination normal.   Skin: Skin is warm and dry. Psychiatric: Normal mood and affect.        No results found for: CKTOTAL, CKMB, CKMBINDEX    Lab Results   Component Value Date    WBC 10.3 05/05/2022    RBC 4.59 05/05/2022    HGB 12.2 05/05/2022    HCT 38.9 05/05/2022    MCV 84.7 05/05/2022    MCH 26.6 05/05/2022    MCHC 31.4 05/05/2022    RDW 13.3 05/15/2018     05/05/2022    MPV 13.0 05/05/2022       Lab Results   Component Value Date     05/17/2022    K 4.6 05/17/2022    K 4.2 04/24/2022     05/17/2022    CO2 23 05/17/2022    BUN 24 05/17/2022    LABALBU 3.7 04/24/2022    CREATININE 1.2 05/17/2022    CALCIUM 8.8 05/17/2022    LABGLOM 57 05/17/2022    GLUCOSE 177 05/17/2022       Lab Results   Component Value Date    ALKPHOS 70 04/24/2022    ALT 46 04/24/2022    AST 30 04/24/2022 PROT 6.8 04/24/2022    BILITOT 0.9 04/24/2022    BILIDIR 0.3 04/17/2022    LABALBU 3.7 04/24/2022       Lab Results   Component Value Date    MG 1.9 04/21/2022       Lab Results   Component Value Date    INR 1.10 05/03/2022    INR 1.20 (H) 04/19/2022    INR 1.18 (H) 04/17/2022         Lab Results   Component Value Date    LABA1C 6.9 05/03/2022       Lab Results   Component Value Date    TRIG 78 05/03/2022    HDL 38 05/03/2022    LDLCALC 33 05/03/2022       Lab Results   Component Value Date    TSH 2.790 04/16/2022         Testing Reviewed:      I have individually reviewed the cardiac test below:    ECHO: Results for orders placed during the hospital encounter of 12/09/21    ECHO Complete 2D W Doppler W Color    Narrative  Transthoracic Echocardiography Report (TTE)    Demographics    Patient Name   [facility]         Gender              Male  Keren Robison    MR #           [id number]         Race                    Ethnicity    Account #      [de-identified]         Room Number    Accession      [id number]         Date of Study       12/09/2021  Number    Date of Birth  01/30/1931        Referring Physician Salud Zuniga CNP    Age            80 year(s)        Sonographer         Rossy Chavarria RDCS,  RVT    Interpreting        Echo reader of the  Physician           steven Seymour MD    Procedure    Type of Study    TTE procedure:ECHOCARDIOGRAM COMPLETE 2D W DOPPLER W COLOR. Procedure Date  Date: 12/09/2021 Start: 01:07 PM    Study Location: Echo Lab  Technical Quality: Adequate visualization    Indications: Aortic stenosis. Additional Medical History:Diabetes, Hypertension, Aortic stenosis. Patient Status: Routine    Height: 70 inches Weight: 219 pounds BSA: 2.17 m^2 BMI: 31.42 kg/m^2    BP: 138/72 mmHg    Conclusions    Summary  Normal left ventricle size and systolic function. Ejection fraction was  estimated at 60 %.  There were no regional left ventricular wall motion  abnormalities and wall thickness was within normal limits. Features were consistent with a pseudonormal left ventricular filling  pattern, with concomitant abnormal relaxation and increased filling  pressure (grade 2 diastolic dysfunction). The left atrium is Moderately dilated. There is moderate-to-severe aortic stenosis with valve area of 0.9 sq cm. The maximum aortic valve gradient is 47 mmHg, the mean gradient is 27  mmHg, and the peak velocity is 3.5 m/s. Signature    ----------------------------------------------------------------  Electronically signed by Bell Moody MD (Interpreting  physician) on 12/09/2021 at 06:41 PM  ----------------------------------------------------------------    Findings    Mitral Valve  The mitral valve structure was normal with normal leaflet separation. DOPPLER: The transmitral velocity was within the normal range with no  evidence for mitral stenosis. Mild mitral regurgitation is present. Aortic Valve  Aortic valve leaflets are Moderately calcified. Leaflets exhibited  moderately increased thickness and severely reduced cuspal separation of  the aortic valve. Trivial aortic regurgitation is noted. There is  moderate-to-severe aortic stenosis with valve area of 0.9 sq cm. The  maximum aortic valve gradient is 47 mmHg, the mean gradient is 27 mmHg,  and the peak velocity is 3.5 m/s. Tricuspid Valve  The tricuspid valve structure was normal with normal leaflet separation. DOPPLER: There was no evidence of tricuspid stenosis. Mild tricuspid  regurgitation visualized. Right ventricular systolic pressure measures 55  mmhg. Pulmonic Valve  The pulmonic valve leaflets exhibited normal thickness, no calcification,  and normal cuspal separation. DOPPLER: The transpulmonic velocity was  within the normal range with no evidence for regurgitation. Left Atrium  The left atrium is Moderately dilated. Left Ventricle  Normal left ventricle size and systolic function.  Ejection fraction was  estimated at 60 %. There were no regional left ventricular wall motion  abnormalities and wall thickness was within normal limits. Features were consistent with a pseudonormal left ventricular filling  pattern, with concomitant abnormal relaxation and increased filling  pressure (grade 2 diastolic dysfunction). Right Atrium  Right atrial size was normal.    Right Ventricle  The right ventricular size was normal with normal systolic function and  wall thickness. Pericardial Effusion  The pericardium was normal in appearance with no evidence of a pericardial  effusion. Pleural Effusion  No evidence of pleural effusion. Aorta / Great Vessels  -Aortic root dimension within normal limits.  -The Pulmonary artery is within normal limits. -IVC size is within normal limits with normal respiratory phasic changes.     M-Mode/2D Measurements & Calculations    LV Diastolic    LV Systolic Dimension:    AV Cusp Separation: 1.4 cmLA  Dimension: 5.6  3.9 cm                    Dimension: 4.3 cmAO Root  cm              LV Volume Diastolic: 062  Dimension: 3 cmLA Area: 26.6  LV FS:30.4 %    ml                        cm^2  LV PW           LV Volume Systolic: 36.7  Diastolic: 1 cm ml  Septum          LV EDV/LV EDV Index: 014  Diastolic: 0.9  NB/25 G^6DV ESV/LV ESV    RV Diastolic Dimension: 3.1 cm  cm              Index: 65.9 ml/30 m^2  EF Calculated: 57.2 %     LA/Aorta: 1.43    LA volume/Index: 85.8 ml /40m^2    LVOT: 2 cm    Doppler Measurements & Calculations    MV Peak E-Wave: 116 cm/s  AV Peak Velocity: 340  LVOT Peak Velocity: 100  MV Peak A-Wave: 49 cm/s   cm/s                   cm/s  MV E/A Ratio: 2.37        AV Peak Gradient:      LVOT Mean Velocity: 62.7  MV Peak Gradient: 5.38    46.24 mmHg             cm/s  mmHg                      AV Mean Velocity: 218  LVOT Peak Gradient: 4  MV Mean Gradient: 2 mmHg  cm/s                   mmHgLVOT Mean Gradient: 2  MV Mean Velocity: 64.6    AV Mean Gradient: 26 mmHg  cm/s                      mmHg  MV Deceleration Time: 176 AV VTI: 87.3 cm        TV Peak E-Wave: 45.8 cm/s  msec                      AV Area                TV Peak A-Wave: 42 cm/s  MV P1/2t: 146 msec        (Continuity):0.85 cm^2  MVA by PHT:1.51 cm^2                             TV Peak Gradient: 0.84  MV Area (continuity):     LVOT VTI: 23.5 cm      mmHg  2.18 cm^2                 AV P1/2t: 767 msec     TR Velocity:354 cm/s  MV Area (PISA): 0.06 cm^2 IVRT: 45 msec          TR Gradient:50.13 mmHg  MV E' Septal Velocity:                           PV Peak Velocity: 51 cm/s  5.3 cm/s                                         PV Peak Gradient: 1.04  MV A' Septal Velocity:    AV DVI (VTI): 0.27AV   mmHg  5.1 cm/s                  DVI (Vmax):0.29  MV E' Lateral Velocity:  9.7 cm/s  MV A' Lateral Velocity:  5.8 cm/s  E/E' septal: 21.89  E/E' lateral: 11.96  MR Velocity: 626 cm/s  MV MARS PISA: 0.06 cm^2  MR VTI: 214 cm  Alias Velocity: 38.5  cm/sPISA Radius: 0.4 cm  MV ERO Volumetric: 0.06  cm^2  PISA area: 1 cm^2MR flow  rate: 38.5 ml/sMR  volume:12.84 ml    [email or web address] [id number]       Assessment/Plan   Moderate AS - ZAHEER 1.1 cm2  Permanent Afib on Eliquis  Hx of LAD-LM PCI, 4/2022  Hx of RCA PCI , 5/2022  ICM, EF 40-45%, NYHA II  Cr 1.2  HTN  DM II  He is stable, on OMT, triple therapy, will need to stop ASA in 1 month post recent procedure. He is on Amiodarone, Lisinopril, Digoxin, Lasix, Metoprolol. Will consider adding Aldactone and Farxiga. Following with CHF clinic. If Eliquis is expensive, will need to consider Coumadin. 890 day TTE after the last PCI. Discussed diet/exercise/BP/weight loss/health lifestyle choices/lipids; the patient understands the goals and will try to comply.     Disposition:  1 year         Electronically signed by Scotty Bae MD   5/23/2022 at 3:03 PM EDT

## 2022-06-01 ENCOUNTER — OFFICE VISIT (OUTPATIENT)
Dept: FAMILY MEDICINE CLINIC | Age: 87
End: 2022-06-01
Payer: MEDICARE

## 2022-06-01 VITALS
BODY MASS INDEX: 29.2 KG/M2 | HEART RATE: 64 BPM | SYSTOLIC BLOOD PRESSURE: 126 MMHG | DIASTOLIC BLOOD PRESSURE: 72 MMHG | HEIGHT: 70 IN | WEIGHT: 204 LBS | RESPIRATION RATE: 18 BRPM | OXYGEN SATURATION: 99 %

## 2022-06-01 DIAGNOSIS — I50.22 CHRONIC SYSTOLIC (CONGESTIVE) HEART FAILURE (HCC): ICD-10-CM

## 2022-06-01 DIAGNOSIS — E11.22 TYPE 2 DIABETES MELLITUS WITH STAGE 3A CHRONIC KIDNEY DISEASE, WITHOUT LONG-TERM CURRENT USE OF INSULIN (HCC): Primary | ICD-10-CM

## 2022-06-01 DIAGNOSIS — I48.0 PAROXYSMAL ATRIAL FIBRILLATION (HCC): ICD-10-CM

## 2022-06-01 DIAGNOSIS — I10 PRIMARY HYPERTENSION: ICD-10-CM

## 2022-06-01 DIAGNOSIS — I35.0 NONRHEUMATIC AORTIC VALVE STENOSIS: ICD-10-CM

## 2022-06-01 DIAGNOSIS — N18.31 TYPE 2 DIABETES MELLITUS WITH STAGE 3A CHRONIC KIDNEY DISEASE, WITHOUT LONG-TERM CURRENT USE OF INSULIN (HCC): Primary | ICD-10-CM

## 2022-06-01 PROCEDURE — G8417 CALC BMI ABV UP PARAM F/U: HCPCS | Performed by: NURSE PRACTITIONER

## 2022-06-01 PROCEDURE — 99214 OFFICE O/P EST MOD 30 MIN: CPT | Performed by: NURSE PRACTITIONER

## 2022-06-01 PROCEDURE — 1123F ACP DISCUSS/DSCN MKR DOCD: CPT | Performed by: NURSE PRACTITIONER

## 2022-06-01 PROCEDURE — 1036F TOBACCO NON-USER: CPT | Performed by: NURSE PRACTITIONER

## 2022-06-01 PROCEDURE — G8427 DOCREV CUR MEDS BY ELIG CLIN: HCPCS | Performed by: NURSE PRACTITIONER

## 2022-06-01 PROCEDURE — 3044F HG A1C LEVEL LT 7.0%: CPT | Performed by: NURSE PRACTITIONER

## 2022-06-01 RX ORDER — AMIODARONE HYDROCHLORIDE 200 MG/1
200 TABLET ORAL 2 TIMES DAILY
Qty: 60 TABLET | Refills: 3 | Status: SHIPPED | OUTPATIENT
Start: 2022-06-01 | End: 2022-06-08 | Stop reason: DRUGHIGH

## 2022-06-01 ASSESSMENT — PATIENT HEALTH QUESTIONNAIRE - PHQ9
SUM OF ALL RESPONSES TO PHQ QUESTIONS 1-9: 0
SUM OF ALL RESPONSES TO PHQ9 QUESTIONS 1 & 2: 0
2. FEELING DOWN, DEPRESSED OR HOPELESS: 0
SUM OF ALL RESPONSES TO PHQ QUESTIONS 1-9: 0
1. LITTLE INTEREST OR PLEASURE IN DOING THINGS: 0
SUM OF ALL RESPONSES TO PHQ QUESTIONS 1-9: 0
SUM OF ALL RESPONSES TO PHQ QUESTIONS 1-9: 0

## 2022-06-01 NOTE — PROGRESS NOTES
Meaghan Martinez (:  ) is a 80 y.o. male,Established patient, here for evaluation of the following chief complaint(s):  Diabetes         ASSESSMENT/PLAN:  1. Type 2 diabetes mellitus with stage 3a chronic kidney disease, without long-term current use of insulin ([facility] [address]  2. Primary hypertension  3. Nonrheumatic aortic valve stenosis  4. Paroxysmal atrial fibrillation (HCC)    DM doing good. Last a1c 6.9  Continue metformin  Avoid high carbs  htn stable on current meds  Follow up with cardio for aortic stenosis  Continue eliquis, amiodarone and digoxin for afib  Denies concerns at this time  ER for any cp. Follow up with CHF clinic  Lasix for any significant weight gain    Return in about 3 months (around 2022). Subjective   SUBJECTIVE/OBJECTIVE:  HPI    No changes in diabetes. Taking metformin XR 1000 mg daily. a1c shows DM stable. Denies side effects. CHF stable at this point. No weight gain. Does have some lasix as needed for weight gain. Is on several meds for afib as well. On blood thinner. Denies bleeding or black stools. Aortic stenosis causing symptoms. Seeing cardio. Lab Results   Component Value Date    LABA1C 6.9 (H) 2022     No results found for: EAG      Review of Systems   Constitutional: Positive for fatigue. Negative for activity change, appetite change, chills, diaphoresis, fever and unexpected weight change. HENT: Negative for congestion, ear pain, postnasal drip, sinus pressure and sore throat. Eyes: Negative for visual disturbance. Respiratory: Positive for shortness of breath. Negative for cough, chest tightness, wheezing and stridor. Cardiovascular: Negative for chest pain, palpitations and leg swelling. Gastrointestinal: Negative for abdominal distention, abdominal pain, constipation, diarrhea, nausea and vomiting. Endocrine: Negative for polydipsia, polyphagia and polyuria.    Genitourinary: Negative for decreased urine volume, difficulty urinating, dysuria, flank pain, frequency, hematuria and urgency. Musculoskeletal: Negative for arthralgias, back pain, gait problem, joint swelling, myalgias and neck pain. Skin: Negative for color change, pallor and rash. Neurological: Negative for dizziness, syncope, weakness, light-headedness, numbness and headaches. Hematological: Negative for adenopathy. Psychiatric/Behavioral: Negative for behavioral problems, self-injury and sleep disturbance. The patient is not nervous/anxious. Objective   Physical Exam  Vitals reviewed. Constitutional:       General: He is not in acute distress. Appearance: Normal appearance. He is well-developed. HENT:      Head: Normocephalic. Right Ear: External ear normal.      Left Ear: External ear normal.      Nose: Nose normal.      Right Sinus: No maxillary sinus tenderness. Left Sinus: No maxillary sinus tenderness. Mouth/Throat:      Pharynx: Uvula midline. Neck:      Trachea: Trachea normal.   Cardiovascular:      Rate and Rhythm: Normal rate and regular rhythm. Heart sounds: Normal heart sounds. No murmur heard. Pulmonary:      Effort: Pulmonary effort is normal. No respiratory distress. Breath sounds: Normal breath sounds. No decreased breath sounds, wheezing, rhonchi or rales. Chest:      Chest wall: No tenderness. Abdominal:      General: There is no distension. Palpations: Abdomen is soft. There is no mass. Tenderness: There is no abdominal tenderness. Musculoskeletal:         General: No tenderness or deformity. Normal range of motion. Cervical back: Normal range of motion and neck supple. Lymphadenopathy:      Cervical: No cervical adenopathy. Skin:     General: Skin is warm and dry. Neurological:      Mental Status: He is alert and oriented to person, place, and time. Motor: No abnormal muscle tone.       Coordination: Coordination normal.      Gait: Gait normal.   Psychiatric:         Mood and Affect: Mood normal.         Behavior: Behavior normal.         Thought Content: Thought content normal.         Judgment: Judgment normal.            On this date 6/1/2022 I have spent 35 minutes reviewing previous notes, test results and face to face with the patient discussing the diagnosis and importance of compliance with the treatment plan as well as documenting on the day of the visit. An electronic signature was used to authenticate this note.     --EMRE Richard - CNP

## 2022-06-06 ENCOUNTER — CARE COORDINATION (OUTPATIENT)
Dept: CARE COORDINATION | Age: 87
End: 2022-06-06

## 2022-06-06 ENCOUNTER — TELEPHONE (OUTPATIENT)
Dept: FAMILY MEDICINE CLINIC | Age: 87
End: 2022-06-06

## 2022-06-06 NOTE — TELEPHONE ENCOUNTER
Pt is on Eliquis 2.5 mg 2 times daily. He is need of assistance with this. Can you please talk with him.     Contact Information   [phone]

## 2022-06-06 NOTE — CARE COORDINATION
I called and spoke with Bulmaro in regards to getting him into the PAP program for his Eliquis, I am mailing him his portion and faxing the MD theirs.       [facility]   Medication Assistance  [address] [place] [address], and [employer]    (G) [phone] () [phone]

## 2022-06-07 ASSESSMENT — ENCOUNTER SYMPTOMS
BACK PAIN: 0
DIARRHEA: 0
STRIDOR: 0
ABDOMINAL DISTENTION: 0
ABDOMINAL PAIN: 0
COLOR CHANGE: 0
SHORTNESS OF BREATH: 1
SORE THROAT: 0
WHEEZING: 0
CHEST TIGHTNESS: 0
VOMITING: 0
NAUSEA: 0
COUGH: 0
SINUS PRESSURE: 0
CONSTIPATION: 0

## 2022-06-08 ENCOUNTER — HOSPITAL ENCOUNTER (OUTPATIENT)
Age: 87
Discharge: HOME | End: 2022-06-08
Payer: MEDICARE

## 2022-06-08 DIAGNOSIS — Z51.81 ENCOUNTER FOR MONITORING DIGOXIN THERAPY: Primary | ICD-10-CM

## 2022-06-08 DIAGNOSIS — Z79.899 ENCOUNTER FOR MONITORING DIGOXIN THERAPY: ICD-10-CM

## 2022-06-08 DIAGNOSIS — Z51.81 ENCOUNTER FOR MONITORING DIGOXIN THERAPY: ICD-10-CM

## 2022-06-08 DIAGNOSIS — Z79.899 ENCOUNTER FOR MONITORING DIGOXIN THERAPY: Primary | ICD-10-CM

## 2022-06-08 PROCEDURE — 80162 ASSAY OF DIGOXIN TOTAL: CPT

## 2022-06-08 PROCEDURE — 36415 COLL VENOUS BLD VENIPUNCTURE: CPT

## 2022-06-08 RX ORDER — AMIODARONE HYDROCHLORIDE 200 MG/1
200 TABLET ORAL DAILY
Qty: 60 TABLET | Refills: 3 | Status: SHIPPED
Start: 2022-06-08 | End: 2022-09-01 | Stop reason: SDUPTHER

## 2022-06-08 NOTE — TELEPHONE ENCOUNTER
Decrease Amiodarone to 200/day. Get Dig level next week or so. Asymptomatic? Falls, dizziness? If so stop dig if not continue to monitor check level.   Agree check BPs 1-2 hr after meds

## 2022-06-08 NOTE — TELEPHONE ENCOUNTER
Daughter called in  Patient concerned hr has been in 52's the past couple of weeks  Taking manual pulse  Also concern that bp in am 160/-170/80 prior to taking toprol in am  Checks bp in afternoon and is better   Suggested to take bp 1-2 hr after meds in am     Other recommendations?

## 2022-06-08 NOTE — TELEPHONE ENCOUNTER
Daughter Luanna Hamman notified and verbalized understanding   Patient is asymptomatic  He will go to [facility] to get lab

## 2022-06-09 LAB — DIGOXIN LEVEL: 0.6 NG/ML (ref 0.5–2)

## 2022-06-09 NOTE — TELEPHONE ENCOUNTER
Dig level was normal - continue meds same - call if any symptoms develop or if HR trends less than 50

## 2022-06-10 ENCOUNTER — CARE COORDINATION (OUTPATIENT)
Dept: CARE COORDINATION | Age: 87
End: 2022-06-10

## 2022-06-10 NOTE — CARE COORDINATION
I was able to fax in his competed application into [employer] for his Eliquis, once I get the approval or denial back I will let the patient know.         [address] [facility]   Medication Assistance  [address] [place] [address], and [facility]    (M) [phone]  (N) [phone]

## 2022-06-13 ENCOUNTER — CARE COORDINATION (OUTPATIENT)
Dept: CARE COORDINATION | Age: 87
End: 2022-06-13

## 2022-06-13 NOTE — CARE COORDINATION
Patient was approved into the [facility] program for his Eliquis June 2023, I called and let Bulmaro know the news and to call me with any questions or concerns.       [address] [place] [address]   Medication Assistance  [address] [place] [address], and [employer]    (A) [phone] (q) [phone]

## 2022-06-14 RX ORDER — METFORMIN HYDROCHLORIDE 500 MG/1
1000 TABLET, EXTENDED RELEASE ORAL
Qty: 180 TABLET | Refills: 3 | Status: SHIPPED | OUTPATIENT
Start: 2022-06-14 | End: 2022-10-11

## 2022-06-14 NOTE — TELEPHONE ENCOUNTER
Patient's daughter Wellington Paris called requesting a refill of patient's metformin to be sent to [facility] in [place].      Last appointment this department: 6/1/2022  Next appointment this department: 9/1/2022

## 2022-06-27 NOTE — PROGRESS NOTES
Hospitalist Progress Note      Patient:  Guanakito Guzman    XNESHA/XNI:[id number]  YOB: 1931  MRN: [id number]   Acct: [de-identified]     PCP: EMRE Hickey CNP  Date of Admission: 4/16/2022        Assessment and Plan:  1. Acute Hypoxic Respiratory Failure: secondary to decompensated HFpEF in setting of severe aortic stenosis. Pulmonary edema noted on CXR on arrival. Improved s/p BiPAP and diuresis. -Continue supplemental oxygen  -wean for SpO2 >92%  2. Acute Decompensated Diastolic HF NYHA class III. suspect 2/2 severe aortic stenosis. Now symptomatic. LVH noted on TTE  AS and G2DD per 12/21. Repeat TTE shows ZAHEER 1.0 w/ MG 20-25 EF 40-45 (now mid-range EF drop down from 60% 4 months ago) Suspect low flow/low gradient. May consider stress echo to confirm if needed on discharge. Of note he did get the COVID-vaccine booster #2 on 4/14, so may be consideration for myopericardial disease  -Will change metoprolol tartrate to succinate pending dose titration  -Strict I and O, fluid restrict, low NA diet  -Cardiology following.   -2x dose Lasix 40mg on arrival + 1x 4/17/22  -BiPAP support, patient did not have any hypoxia however arrived on CPAP due to work of breathing. And pulmonary congestion  -We will check an ABG  -Continue weaning as able  3. Paroxysmal Atrial Fibrillation: No previously documented episodes in Epic. CHADS2-VASc 5 HAS-BLED 3. Rate controlled on bisoprolol. Not on [address] as outpatient  -Continue Heparin gtt  -plan for MIS/DCCV 4/18/22  -Metoprolol tartrate 25mg BID  4. Mild Troponin Elevation, Suspect Non-MI trop elevation: due to decompensated diastolic hf, jose elias, AS, HTN. EKG shows known LVH however there is repull abnormality with T wave inversion in lead aVL and I.  -Trend troponins, patient denies chest pain at this time or previously. -If there is worsening troponins will aspirin load and start on statin  5.  Leukocytosis / Suspected CAP: Bibasilar infiltrates may be related to prior scarring, atelectasis, pulmonary edema, or pneumonia. However he does have a elevated white count but denies any fevers. Mildly elevated procal. WBC tredning down. Does endorse rhinorrhea chronically.   -5d Rocephin and 3d azithro, culture resp, blood cultures obtained.   -Check procal  6. Stage 1 JUAN C: Cr 1.6 on arrival (baseline 1.1) Suspect CRS type I in context of acute HFrEF. Chronic HCTZ and lisinopril use. UA shows no hydronephrosis or postobstructive etiology  -Will hold HCTZ, lisinopril + metformin  -UA with microscopy  -Diuresis  -Monitor I/O, avoid hypotension  7. Aortic Stenosis, Severe (LFLG) (ZAHEER 0.9, max gradient 47, mean 27, peak veloc 3.5)  -Avoid nitrates -Patient had drop in BP with nitro IV in ED  -Cardiology following consult.   -F/up echo, may need LHC and further eval for TAVR as his presentation may be related to symptomatic AS  8. Mild transaminitis: Suspect related to congestive hepatopathy, quite mild and will trend labs. -Further work-up if not improving after diuresis. 9. Recent Covid Booster #2 on 4/14/22  10. NIDDM2, with hyperglycemia: HbA1c 6.8 on arrival. on metformin PTA, hold. Glucose 295 on arrival, no prior steroids. -SSI, dm diet, hypo protocol.   -Consider farxiga on discharge given midrange EF  11. HTN (with accelerated HTN on arrival): on lisinopril, bisoprolol, and HCTZ pta. Favor HTN urgency 2/2 anxiety and SOB rather than hypertensive emergency.   -Hold antihypertensives with JUAN C. -If >569 systolic, will start on nitroprusside (d/w cardiology)  12. BPH: not on any meds pta. F/up renal US  13. Lactic Acidosis (resolved): mild, suspect secondary to metformin in setting of JUAN C. -Hold off fluids given fluid overload and hypertension.   -Repeat WNL.        Expected discharge date:  4/20/22    Disposition Plan: home    Chief Complaint: Shortness of breath    Hospital Course:   Per hx provided by Dr Uriostegui Letters:  Keegan Gaytan is a 80 y.o. male with PMHx of hypertension, diabetes, severe aortic stenosis, diastolic dysfunction with LVH who presents to [facility] with shortness of breath. Patient is on BiPAP during my examination and at times difficult to understand, so daughter assists with history. Patient reports that after waking up this morning he began to develop relatively rapid progression of shortness of breath, and subsequently called his daughter and eventually EMS for transfer to ED. He reports that he has been in his usual state of health, however his daughter reports that there have been some shortness of breath developing over the last week worse than his baseline shortness of breath. He denies any chest pain or palpitations, no dizziness or syncope. Reports a mild cough but thinks this is due to his acute on chronic nasal congestion for which she has been taking nasal spray. There has been no fevers or chills or sputum production. He does report orthopnea, dyspnea on exertion, but denies any change in his chronic lower extremity edema. He has been compliant with his medications of bisoprolol, lisinopril, hydrochlorothiazide. He has known severe aortic stenosis being followed by Dr. Doretha Chávez, which was previously noted to be asymptomatic. He is a non-smoker and denies any significant alcohol use. No recent change in diet. No recent NSAIDs or steroids. Of note, the patient did get his COVID booster #2 on 4/14 and attributes some of his symptoms to this. ED course: Patient arrived on noninvasive ventilation, will switch to BiPAP while here due to work of breathing and chest x-ray is obtained showing pulmonary edema pattern, he was treated for suspected flash pulmonary edema as his blood pressure was greater than 212 systolic on arrival.  He was started on a nitro drip with brisk decrease in his blood pressure, this was subsequently stopped. He remained on BiPAP during my examination and states he was feeling improved.   O2 saturations 99 to 100%. Tachypnea was noted, tachycardia was noted. There is no hypoxia documented. Labs are significant for an JUAN C with a creatinine of 1.6, glucose of 295, TSH of 4.4 within normal limit free T4, white blood cell count of 23, hemoglobin of 12.1, lactic acid of 2.4 that improved to 2.0, and a BNP of 2599 with a troponin of 0.015. His AST and ALT were 48 and 89 respectively. Subjective (past 24 hours):    Patient seen and evaluated at bedside. Multiple questions regarding TAVR and new onset Afib. Daughter present at bedside. Patient upset regarding frequent blood draws but otherwise has no complaints. Denies fevers, sweats or productive cough. Denies N/V/D     Plan for MIS/DCCV in AM      Medications:  Reviewed    Infusion Medications    heparin (PORCINE) Infusion      sodium chloride      dextrose       Scheduled Medications    metoprolol  5 mg IntraVENous Once    azithromycin  500 mg IntraVENous Q24H    heparin (porcine)  4,000 Units IntraVENous Once    cefTRIAXone (ROCEPHIN) IV  1,000 mg IntraVENous Q24H    sodium chloride flush  5-40 mL IntraVENous 2 times per day    insulin lispro  0-6 Units SubCUTAneous TID WC    insulin lispro  0-3 Units SubCUTAneous Nightly     PRN Meds: heparin (porcine), heparin (porcine), sodium chloride flush, sodium chloride, ondansetron **OR** ondansetron, polyethylene glycol, acetaminophen **OR** acetaminophen, potassium chloride **OR** potassium alternative oral replacement **OR** potassium chloride, magnesium sulfate, glucagon (rDNA), dextrose, dextrose bolus (hypoglycemia) **OR** dextrose bolus (hypoglycemia), glucose      Intake/Output Summary (Last 24 hours) at 4/17/2022 1137  Last data filed at 4/17/2022 1129  Gross per 24 hour   Intake 400.52 ml   Output 925 ml   Net -524.48 ml       Diet:  ADULT DIET; Regular; 4 carb choices (60 gm/meal);  Low Sodium (2 gm); 1500 ml  Diet NPO    Exam:  /89   Pulse 66   Temp 97.9 °F (36.6 °C) (Oral) Resp 18   Ht 5' 10\" (1.778 m)   Wt 211 lb 3.2 oz (95.8 kg)   SpO2 98%   BMI 30.30 kg/m²     General appearance: Elderly male who appears in acute distress, well-groomed  Eyes:  Pupils equal, round, and reactive to light. Conjunctivae/corneas clear. HENT: Head normal in appearance. External nares normal.  Oral mucosa moist without lesions. Hearing grossly intact. Neck: Supple, with full range of motion. Trachea midline. Unable to eval for JVD. Respiratory: Normal respiratory effort with bibasilar Rales, no wheezing or rhonchi. Cardiovascular: Tachycardia with difficulty hearing S1 and 2/murmur due to BiPAP. 1+ bilateral lower extremity edema  Abdomen: Soft, non-tender, non-distended with normal bowel sounds. Musculoskeletal: There is no joint swelling or tenderness. Normal tone. No abnormal movements. Skin: Warm and dry. No rashes or lesions. Neurologic:  No focal sensory/motor deficits in the upper and lower extremities. Cranial nerves:  grossly non-focal 2-12. Psychiatric: Alert and oriented, normal insight and thought content. Capillary Refill: Brisk,< 3 seconds. Peripheral Pulses: +2 palpable, equal bilaterally. Labs:   Recent Labs     04/16/22  1015 04/17/22  0921   WBC 23.4* 13.1*   HGB 12.1* 11.1*   HCT 37.9* 34.4*    145     Recent Labs     04/16/22  1015 04/17/22  0343    133*   K 3.7 4.3    97*   CO2 20* 24   BUN 37* 46*   CREATININE 1.6* 1.7*   CALCIUM 8.7 8.8     Recent Labs     04/16/22  1015 04/17/22  0343   AST 48* 28   ALT 89* 67*   BILIDIR  --  0.3   BILITOT 1.3* 0.7   ALKPHOS 81 62     No results for input(s): INR in the last 72 hours. No results for input(s): Aram Seed in the last 72 hours.     Microbiology:      Urinalysis:      Lab Results   Component Value Date    NITRU NEGATIVE 04/16/2022    WBCUA 0-2 04/16/2022    BACTERIA NONE SEEN 04/16/2022    RBCUA NONE SEEN 04/16/2022    BLOODU NEGATIVE 04/16/2022    SPECGRAV 1.009 04/16/2022 GLUCOSEU neg 06/21/2018       Radiology:  US RENAL COMPLETE   Final Result    No evidence of hydronephrosis. **This report has been created using voice recognition software. It may contain minor errors which are inherent in voice recognition technology. **      Final report electronically signed by Dr. Sukumar Stevens on 4/17/2022 8:55 AM      XR CHEST PORTABLE   Final Result   Impression:   No infiltrate or mass. Improvement in reticular densities vs prior. Small effusions. This document has been electronically signed by: Maria Antonia Gamboa MD on    04/17/2022 04:10 AM      XR CHEST PORTABLE   Final Result   Interstitial opacities throughout both lungs. These have worsened in the left midlung zone and left lung base. This can are present pulmonary edema, infiltrates are not excluded. **This report has been created using voice recognition software. It may contain minor errors which are inherent in voice recognition technology. **      Final report electronically signed by Dr. Sukumar Stevens on 4/16/2022 10:24 AM          DVT prophylaxis: [] Lovenox                                 [] SCDs                                 [] SQ Heparin                                 [] Encourage ambulation           [x] Already on Anticoagulation Heparin gtt     Code Status: Full Code    PT/OT Eval Status: ordered    Tele:   [x] yes             [] no        Electronically signed by Cary Robin DO PGY-2 on 4/17/2022 at 11:37 AM No

## 2022-06-27 NOTE — TELEPHONE ENCOUNTER
Daughter called in  Patient HR 47-55  C/o fatigue  Would like to be able to do more but fatigued with low HR

## 2022-06-28 NOTE — TELEPHONE ENCOUNTER
Spoke with Jose Luis Mcnair, notified. Dr Giselle Castillo, pt's Toprol is 200 mg QD. Are you changing it to 100 mg QD or BID?     (new script to [facility] in [place])

## 2022-06-29 RX ORDER — METOPROLOL SUCCINATE 100 MG/1
200 TABLET, EXTENDED RELEASE ORAL DAILY
Qty: 90 TABLET | Refills: 3 | Status: SHIPPED | OUTPATIENT
Start: 2022-06-29 | End: 2022-07-11

## 2022-07-11 ENCOUNTER — OFFICE VISIT (OUTPATIENT)
Dept: CARDIOLOGY CLINIC | Age: 87
End: 2022-07-11
Payer: MEDICARE

## 2022-07-11 VITALS
HEIGHT: 70 IN | WEIGHT: 199.6 LBS | DIASTOLIC BLOOD PRESSURE: 80 MMHG | OXYGEN SATURATION: 96 % | HEART RATE: 60 BPM | SYSTOLIC BLOOD PRESSURE: 162 MMHG | BODY MASS INDEX: 28.58 KG/M2

## 2022-07-11 DIAGNOSIS — I48.91 ATRIAL FIBRILLATION, UNSPECIFIED TYPE (HCC): ICD-10-CM

## 2022-07-11 DIAGNOSIS — I35.0 NONRHEUMATIC AORTIC VALVE STENOSIS: ICD-10-CM

## 2022-07-11 DIAGNOSIS — I10 ESSENTIAL HYPERTENSION: ICD-10-CM

## 2022-07-11 DIAGNOSIS — I50.22 CHF (CONGESTIVE HEART FAILURE), NYHA CLASS II, CHRONIC, SYSTOLIC (HCC): Primary | ICD-10-CM

## 2022-07-11 PROCEDURE — G8427 DOCREV CUR MEDS BY ELIG CLIN: HCPCS | Performed by: NURSE PRACTITIONER

## 2022-07-11 PROCEDURE — 99214 OFFICE O/P EST MOD 30 MIN: CPT | Performed by: NURSE PRACTITIONER

## 2022-07-11 PROCEDURE — 1036F TOBACCO NON-USER: CPT | Performed by: NURSE PRACTITIONER

## 2022-07-11 PROCEDURE — G8417 CALC BMI ABV UP PARAM F/U: HCPCS | Performed by: NURSE PRACTITIONER

## 2022-07-11 PROCEDURE — 1123F ACP DISCUSS/DSCN MKR DOCD: CPT | Performed by: NURSE PRACTITIONER

## 2022-07-11 RX ORDER — METOPROLOL SUCCINATE 100 MG/1
100 TABLET, EXTENDED RELEASE ORAL DAILY
Qty: 90 TABLET | Refills: 3 | Status: SHIPPED | OUTPATIENT
Start: 2022-07-11

## 2022-07-11 ASSESSMENT — ENCOUNTER SYMPTOMS
COUGH: 0
SHORTNESS OF BREATH: 0
ABDOMINAL DISTENTION: 0

## 2022-07-11 NOTE — PROGRESS NOTES
Heart Failure Clinic       Visit Date: 7/11/2022  Cardiologist:  Dr. Renny Bishop  Primary Care Physician: Dr. Sarahy Leo, APRN - CNP    Cris Saul is a 80 y.o. male who presents today for:  Chief Complaint   Patient presents with    Follow-up    Congestive Heart Failure       HPI:   Cris Saul is a 80 y.o. male who presents to the office for a follow up patient visit in the heart failure clinic. Accompanied by dtr, Elif Plants    TYPE HF: HFrEF (40-45% 4/2022 - drop from 60% 12/2021), Severe AS (ZAHEER 1.0-1.1, mean gradient 27)  Cause: Valvular/ICM  Device: no  HX: HTN, DM, Afib (Eliquis) s/p DCCV, CAD s/p PCI LAD (4/2022)      Hospitalization:    April 2022 x 1 wk - SOB, Bipap.  CXR +pulmonary edema, BP 200s.  BNP 2599.  WBCs 23. LA 2.4  Pt notes Covid booster 4/14. MIS/DCCV  [facility] - CAD LM and LAD - [facility] saw, pt refused OHS.  Multivessel PCI 4/21/22.  Staged RCA in 2 weeks. Diuretics stopped d/t JUAN C post cath  ED 4/24 - back in Afib -140s - IV Lopressor given  VV PCP 4/25 - started Lasix 40/day x 3 days d/t orthopnea, wt gain 2#, mild ankle swelling  OV CHF 4/26 - PCP started Lasix - feeling better  5/3-5/5 - [facility] -PCI RCA - creat 1.6.   10 day Holter applied - ISSUE w/ rhythm? - started on Digoxin and increased Amiodarone     Last OV 5/2022 -   Today: was going to ED today for SOB but got better once in a/c and cool breeze. Felt like nose stuffed up, congested - runny nose. Dtr states was no respiratory distress noted. All appeared to be upper airway congestion. Admits some anxiety added to it. Had bradycardia - some confusion on Toprol - taking 100mg/day currently. HR been 50-60s at home. Mild ankle edema, no wt gain (actually down few #).   Taking Lasix PRN = took dose this AM (first in quite some time)    Patient has:  Chest Pain: no  SOB: improved now  Orthopnea/PND: no  KIMMY: no  Edema: mild  Fatigue: same  Abdominal bloating: no  Cough: no  Appetite: good  Home weight: stable  Home blood pressure: 130-140s,     Past Medical History:   Diagnosis Date    Atrial fibrillation ([facility]     [facility]     Diabetes mellitus ([facility]     Hypertension     Type II or unspecified type diabetes mellitus without mention of complication, not stated as uncontrolled      Past Surgical History:   Procedure Laterality Date    TONSILLECTOMY AND ADENOIDECTOMY Bilateral     age 10      Family History   Problem Relation Age of Onset    Diabetes Paternal Aunt     Diabetes Paternal Uncle     Cancer Paternal Uncle     Diabetes Maternal Grandmother     Diabetes Maternal Grandfather     Diabetes Paternal Grandmother     Cancer Paternal Grandmother     Diabetes Paternal Grandfather      Social History     Tobacco Use    Smoking status: Never Smoker    Smokeless tobacco: Never Used   Substance Use Topics    Alcohol use: No     Alcohol/week: 0.0 standard drinks     Current Outpatient Medications   Medication Sig Dispense Refill    metoprolol succinate (TOPROL XL) 100 MG extended release tablet Take 1 tablet by mouth daily 90 tablet 3    metFORMIN (GLUCOPHAGE XR) 500 MG extended release tablet Take 2 tablets by mouth daily (with breakfast) 180 tablet 3    amiodarone (CORDARONE) 200 MG tablet Take 1 tablet by mouth daily 60 tablet 3    lisinopril (PRINIVIL;ZESTRIL) 20 MG tablet Take 1 tablet by mouth daily 30 tablet 5    nitroGLYCERIN (NITROSTAT) 0.4 MG SL tablet Place 1 tablet under the tongue every 5 minutes as needed for Chest pain up to max of 3 total doses.  If no relief after 1 dose, call 911. 25 tablet 3    furosemide (LASIX) 20 MG tablet Take 1 tablet by mouth daily as needed (for wt gain or swelling or SOB) 30 tablet 1    clopidogrel (PLAVIX) 75 MG tablet Take 1 tablet by mouth daily 30 tablet 3    atorvastatin (LIPITOR) 40 MG tablet Take 1 tablet by mouth nightly 30 tablet 3    apixaban (ELIQUIS) 2.5 MG TABS tablet Take 1 tablet by mouth 2 times daily 60 tablet 3    fluticasone (FLONASE) 50 MCG/ACT nasal spray 1 spray by Each Nostril route daily (Patient not taking: Reported on 7/11/2022) 16 g 0     No current facility-administered medications for this visit. Allergies   Allergen Reactions    Norvasc [Amlodipine Besylate]     Other Rash     States allergic to several BP meds (unsure of name)       SUBJECTIVE:   Review of Systems   Constitutional: Positive for fatigue. Negative for activity change and appetite change. Respiratory: Negative for cough and shortness of breath. Cardiovascular: Positive for leg swelling. Negative for chest pain and palpitations. Gastrointestinal: Negative for abdominal distention. Neurological: Negative for weakness, light-headedness and headaches. Hematological: Negative for adenopathy. Psychiatric/Behavioral: Negative for sleep disturbance. OBJECTIVE:   Today's Vitals:  BP (!) 162/80   Pulse 60   Ht 5' 10\" (1.778 m)   Wt 199 lb 9.6 oz (90.5 kg)   SpO2 96%   BMI 28.64 kg/m²     Physical Exam  Vitals reviewed. Constitutional:       General: He is not in acute distress. Appearance: Normal appearance. He is well-developed. He is not diaphoretic. HENT:      Head: Normocephalic and atraumatic. Eyes:      Conjunctiva/sclera: Conjunctivae normal.   Neck:      Comments: No JVD  Cardiovascular:      Rate and Rhythm: Normal rate and regular rhythm. Heart sounds: Normal heart sounds. No murmur heard. Pulmonary:      Effort: Pulmonary effort is normal. No respiratory distress. Breath sounds: Normal breath sounds. No wheezing or rales. Abdominal:      General: Bowel sounds are normal. There is no distension. Palpations: Abdomen is soft. Tenderness: There is no abdominal tenderness. Musculoskeletal:         General: Normal range of motion. Cervical back: Normal range of motion and neck supple. Right lower leg: Edema (trace) present.       Left lower leg: Edema (trace) present. Skin:     General: Skin is warm and dry. Capillary Refill: Capillary refill takes less than 2 seconds. Neurological:      Mental Status: He is alert and oriented to person, place, and time. Coordination: Coordination normal.   Psychiatric:         Behavior: Behavior normal.           Wt Readings from Last 3 Encounters:   07/11/22 199 lb 9.6 oz (90.5 kg)   06/01/22 204 lb (92.5 kg)   05/23/22 210 lb (95.3 kg)     BP Readings from Last 3 Encounters:   07/11/22 (!) 162/80   06/01/22 126/72   05/23/22 136/76     Pulse Readings from Last 3 Encounters:   07/11/22 60   06/01/22 64   05/23/22 62     Body mass index is 28.64 kg/m². ECHO:   MIS      Summary   No intra cardiac mass or thrombus. No embolic source.   No Intracardiac shunt   Left ventricle size is normal.   Normal left ventricle wall thickness.   There was moderate global hypokinesis of the left ventricle.   Systolic function was moderately reduced.   Ejection fraction is visually estimated in the range of 40% to 45%.   Left atrium moderately dilated   There is moderate-to-severe aortic stenosis with valve area of 1 to 1.1 sq   cm by Planimetry      Signature      ----------------------------------------------------------------   Electronically signed by Wallace Padilla MD (Interpreting   physician) on 04/18/2022 at 07:27 PM   ----------------------------------------------------------------        ECHO:   Summary   Technically difficult examination.   Left ventricular size is normal and systolic function is moderately   reduced. Ejection fraction was estimated at 40-45%. LV wall thickness is   within normal limits.   Moderately dilated left atrium.   Thickened and calcified aortic valve.   Leaflets exhibited severely reduced cuspal separation of the aortic valve.   Trivial aortic regurgitation is noted. There is moderate-to-severe aortic   stenosis with valve area of 1.0 cm2.  The maximum aortic valve gradient is   32 mmHg, the mean gradient is 21 mmHg. Consider DSE or MIS for further   evaluation.      Signature      ----------------------------------------------------------------   Electronically signed by Ayush Pool MD (Interpreting   [email or web address]) on 04/17/2022 at 09:40 AM     CATH 5/3/22  IMPRESSION:  1.  Successful PCI and stenting of the distal right coronary artery. 2.  LVEDP 16 mmHg.  The patient has chronic kidney disease and known  history of congestive heart failure. 3.  History of aortic stenosis.  Peak gradient across the aortic valve  29 mmHg.     RECOMMENDATIONS/PLAN OF CARE:  The patient will be admitted to the  hospital for observation overnight, monitored on telemetry.  Resume  Eliquis in the morning if no bleeding complications occur.  The patient  is currently on aspirin and Plavix.  May stop aspirin one week post PCI. Continue on Plavix 75 mg p.o. daily.  High-intensity statin therapy. Aggressive risk factor modification.  Outpatient followup in office with  primary cardiologist.  Louis Saucedo MD     D: 05/04/2022 8:26:02        [facility] 4/19/22  FINDINGS:  HEMODYNAMICS AND RIGHT HEART CATHETERIZATION:  Pulmonary arterial oxygen  saturation 66%.  Pulmonary arterial pressure 53/29.  Mean pulmonary  arterial pressure 39 mmHg.  Pulmonary capillary wedge pressure 35 mmHg. Right ventricular pressure 57/14.  Right atrial pressure 90 mmHg.  Left  ventricular end-diastolic pressure was 29 mmHg.  Upon pullback across  the aortic valve, the mean pressure gradient was measured at 24 mmHg.     LEFT VENTRICULOGRAM:  Mild global hypokinesis.  Ejection fraction  estimated at 45% to 50%.       CATH 4/21/22   IMPRESSION:  1.  Successful PCI and stenting of left anterior descending artery. 2.  Successful PCI and stenting of the left main coronary artery. 3.  Status post successful direct current cardioversion.     RECOMMENDATIONS:  Bedrest for four hours.  Monitor on telemetry,  optimize medical therapy for CAD.   Aspirin 81 mg p.o. daily, Plavix 75  mg p.o. daily.  The patient needs to be on oral anticoagulation for  history of atrial fibrillation.  May start Eliquis 2.5 mg p.o. b.i.d.  tomorrow in the morning.  Start amiodarone 200 mg p.o. daily.  Stop  amiodarone drip tonight at 08:00 p.m.  May resume heparin drip without  initial bolus five hours post the procedure if no bleeding complications  occur.  High intensity statin therapy.  Staged PCI of RCA in two weeks  post discharge.  Cardiac rehab referral.  Referral to congestive heart  failure clinic.        Vijay Hooker MD     D: 04/21/2022 11:31:54         Results reviewed:  BNP: No results found for: BNP  CBC:   Lab Results   Component Value Date/Time    WBC 10.3 05/05/2022 06:20 AM    RBC 4.59 05/05/2022 06:20 AM    HGB 12.2 05/05/2022 06:20 AM    HCT 38.9 05/05/2022 06:20 AM     05/05/2022 06:20 AM     CMP:    Lab Results   Component Value Date/Time     05/17/2022 10:50 AM    K 4.6 05/17/2022 10:50 AM    K 4.2 04/24/2022 11:31 AM     05/17/2022 10:50 AM    CO2 23 05/17/2022 10:50 AM    BUN 24 05/17/2022 10:50 AM    CREATININE 1.2 05/17/2022 10:50 AM    LABGLOM 57 05/17/2022 10:50 AM    GLUCOSE 177 05/17/2022 10:50 AM    CALCIUM 8.8 05/17/2022 10:50 AM     Hepatic Function Panel:    Lab Results   Component Value Date/Time    ALKPHOS 70 04/24/2022 11:31 AM    ALT 46 04/24/2022 11:31 AM    AST 30 04/24/2022 11:31 AM    PROT 6.8 04/24/2022 11:31 AM    BILITOT 0.9 04/24/2022 11:31 AM    BILIDIR 0.3 04/17/2022 03:43 AM    LABALBU 3.7 04/24/2022 11:31 AM     Magnesium:    Lab Results   Component Value Date/Time    MG 1.9 04/21/2022 03:26 AM     PT/INR:    Lab Results   Component Value Date/Time    INR 1.10 05/03/2022 08:19 PM     Lipids:    Lab Results   Component Value Date/Time    TRIG 78 05/03/2022 08:19 PM    HDL 38 05/03/2022 08:19 PM    LDLCALC 33 05/03/2022 08:19 PM       ASSESSMENT AND PLAN:      Diagnosis Orders   1.  CHF (congestive heart failure), NYHA class II, chronic, systolic (HCC)  Echo 2D w doppler w color complete    Basic Metabolic Panel    Brain Natriuretic Peptide   2. Atrial fibrillation, unspecified type ([address]     3. Essential hypertension     4. Nonrheumatic aortic valve stenosis       Continue:  GDMT:              ACE/ARB/ARNI - Lisinopril 20/day - consider changing Entresto              BB - Toprol 200/day              SGLT2 -  no - consider adding   AA - no - K+ runs high  Diuretic - Lasix 20 PRN = rarely taking  Vasodilator - no  Other - Plavix, Eliquis, Amio 200/day     HFrEF (40-45% 4/2022, down from 60% 12/2021)  ICM/Valvular  CAD s/p PCI LAD (4/2022) - s/p RCA PCI (5/3/22)  Mod-severe AS - Mookie Farley following, pt previously didn't want intervention - recent MIS showing ZAHEER 1.0-1.1 - pt not meet criteria  -repeat ECHO in sept for 90 post PCI  Afib - rate controlled   Bradycardia - Dig stopped and Toprol decreased recently    Stable, appears fairly Euvolemic   SOB exacerbation this AM appears be more upper airway/sinus related = instructed to try Flonase and Claritin. Appears some degree of anxiety culminated. Symptoms unclear. Repeat blood work this week - if stable will consider daily Lasix   BP little high today - stable at home  No med changes today   Continue diet/fluid adherence  Continue daily wts. Repeat TTE in Aug/Sept (90 day post PCI) - if no improvement in EF will attempt more aggressive optimization. F/U w/ Cardiology/[facility] in Oct  F/U in clinic in 6 months    Tolerating above noted HF meds, no ill side effects noted. Will continue to monitor kidney function and electrolytes. Will optimize as tolerated. Pt is compliant w/ medications. Total visit time of 30 minutes has been spent with patient on education of symptoms, management, medication, and plan of care; as well as review of chart: labs, ECHO, radiology reports, etc.   I personally spent more then 50% of the appt time face to face with the patient.   · Daily

## 2022-07-11 NOTE — PATIENT INSTRUCTIONS
You may receive a survey regarding the care you received during your visit. Your input is valuable to us. We encourage you to complete and return your survey. We hope you will choose us in the future for your healthcare needs.     Continue:  · Continue current medications  · Daily weights and record  · Fluid restriction of 2 Liters per day  · Limit sodium in diet to around 7977-3643 mg/day  · Monitor BP  · Activity as tolerated     Call the Heart Failure Clinic for any of the following symptoms: [phone]   Weight gain of 2-3 pounds in 1 day or 5 pounds in 1 week   Increased shortness of breath   Shortness of breath while laying down   Cough   Chest pain   Swelling in feet, ankles or legs   Tenderness or bloating in the abdomen   Fatigue    Decreased appetite or nausea    Confusion

## 2022-07-12 ENCOUNTER — HOSPITAL ENCOUNTER (OUTPATIENT)
Age: 87
Discharge: HOME | End: 2022-07-12
Payer: MEDICARE

## 2022-07-12 DIAGNOSIS — I50.22 CHF (CONGESTIVE HEART FAILURE), NYHA CLASS II, CHRONIC, SYSTOLIC (HCC): ICD-10-CM

## 2022-07-12 LAB
ANION GAP SERPL CALCULATED.3IONS-SCNC: 12 MEQ/L (ref 8–16)
BUN BLDV-MCNC: 16 MG/DL (ref 7–22)
CALCIUM SERPL-MCNC: 9.4 MG/DL (ref 8.5–10.5)
CHLORIDE BLD-SCNC: 103 MEQ/L (ref 98–111)
CO2: 26 MEQ/L (ref 23–33)
CREAT SERPL-MCNC: 1 MG/DL (ref 0.4–1.2)
GFR SERPL CREATININE-BSD FRML MDRD: 70 ML/MIN/1.73M2
GLUCOSE BLD-MCNC: 149 MG/DL (ref 70–108)
POTASSIUM SERPL-SCNC: 4.3 MEQ/L (ref 3.5–5.2)
PRO-BNP: 2839 PG/ML (ref 0–1800)
SODIUM BLD-SCNC: 141 MEQ/L (ref 135–145)

## 2022-07-12 PROCEDURE — 80048 BASIC METABOLIC PNL TOTAL CA: CPT

## 2022-07-12 PROCEDURE — 83880 ASSAY OF NATRIURETIC PEPTIDE: CPT

## 2022-07-12 PROCEDURE — 36415 COLL VENOUS BLD VENIPUNCTURE: CPT

## 2022-07-13 ENCOUNTER — TELEPHONE (OUTPATIENT)
Dept: CARDIOLOGY CLINIC | Age: 87
End: 2022-07-13

## 2022-07-13 DIAGNOSIS — I25.10 CAD IN NATIVE ARTERY: Primary | ICD-10-CM

## 2022-07-13 DIAGNOSIS — I50.22 CHF (CONGESTIVE HEART FAILURE), NYHA CLASS II, CHRONIC, SYSTOLIC (HCC): ICD-10-CM

## 2022-07-13 DIAGNOSIS — I25.5 ISCHEMIC CARDIOMYOPATHY: ICD-10-CM

## 2022-07-13 NOTE — TELEPHONE ENCOUNTER
----- Message from EMRE Zaragoza CNP sent at 7/13/2022 12:27 PM EDT -----  Regarding: FW:   Labs reviewed. Kidneys stable. BNP elevated, has been up, which indicates some fluid. With recent incident SOB would recommend him try taking Lasix 20 mg daily. Repeat BMP in 2-3 weeks.     ----- Message -----  From: Nico Us Incoming Lab Results From [facility]  Sent: 7/12/2022   3:01 PM EDT  To: EMRE Zaragoza CNP

## 2022-07-15 ENCOUNTER — CARE COORDINATION (OUTPATIENT)
Dept: CARE COORDINATION | Age: 87
End: 2022-07-15

## 2022-07-15 SDOH — ECONOMIC STABILITY: HOUSING INSECURITY: IN THE LAST 12 MONTHS, HOW MANY PLACES HAVE YOU LIVED?: 1

## 2022-07-15 SDOH — HEALTH STABILITY: PHYSICAL HEALTH: ON AVERAGE, HOW MANY DAYS PER WEEK DO YOU ENGAGE IN MODERATE TO STRENUOUS EXERCISE (LIKE A BRISK WALK)?: 0 DAYS

## 2022-07-15 SDOH — ECONOMIC STABILITY: FOOD INSECURITY: WITHIN THE PAST 12 MONTHS, YOU WORRIED THAT YOUR FOOD WOULD RUN OUT BEFORE YOU GOT MONEY TO BUY MORE.: NEVER TRUE

## 2022-07-15 SDOH — ECONOMIC STABILITY: INCOME INSECURITY: IN THE LAST 12 MONTHS, WAS THERE A TIME WHEN YOU WERE NOT ABLE TO PAY THE MORTGAGE OR RENT ON TIME?: NO

## 2022-07-15 SDOH — HEALTH STABILITY: PHYSICAL HEALTH: ON AVERAGE, HOW MANY MINUTES DO YOU ENGAGE IN EXERCISE AT THIS LEVEL?: 0 MIN

## 2022-07-15 SDOH — ECONOMIC STABILITY: FOOD INSECURITY: WITHIN THE PAST 12 MONTHS, THE FOOD YOU BOUGHT JUST DIDN'T LAST AND YOU DIDN'T HAVE MONEY TO GET MORE.: NEVER TRUE

## 2022-07-15 SDOH — ECONOMIC STABILITY: HOUSING INSECURITY
IN THE LAST 12 MONTHS, WAS THERE A TIME WHEN YOU DID NOT HAVE A STEADY PLACE TO SLEEP OR SLEPT IN A SHELTER (INCLUDING NOW)?: NO

## 2022-07-15 ASSESSMENT — SOCIAL DETERMINANTS OF HEALTH (SDOH)
IN A TYPICAL WEEK, HOW MANY TIMES DO YOU TALK ON THE PHONE WITH FAMILY, FRIENDS, OR NEIGHBORS?: MORE THAN THREE TIMES A WEEK
HOW OFTEN DO YOU ATTEND CHURCH OR RELIGIOUS SERVICES?: MORE THAN 4 TIMES PER YEAR
HOW OFTEN DO YOU ATTENT MEETINGS OF THE CLUB OR ORGANIZATION YOU BELONG TO?: NEVER
HOW OFTEN DO YOU GET TOGETHER WITH FRIENDS OR RELATIVES?: TWICE A WEEK
HOW HARD IS IT FOR YOU TO PAY FOR THE VERY BASICS LIKE FOOD, HOUSING, MEDICAL CARE, AND HEATING?: NOT HARD AT ALL
DO YOU BELONG TO ANY CLUBS OR ORGANIZATIONS SUCH AS CHURCH GROUPS UNIONS, FRATERNAL OR ATHLETIC GROUPS, OR SCHOOL GROUPS?: NO

## 2022-07-15 ASSESSMENT — LIFESTYLE VARIABLES
HOW OFTEN DO YOU HAVE A DRINK CONTAINING ALCOHOL: MONTHLY OR LESS
HOW MANY STANDARD DRINKS CONTAINING ALCOHOL DO YOU HAVE ON A TYPICAL DAY: 1 OR 2

## 2022-07-15 NOTE — CARE COORDINATION
Ambulatory Care Coordination Note  7/15/2022    ACC: Yanni Freeman RN    Summary Note: Patient was called for Care Coordination enrollment s/p recent list referral for assistance with the management of his CHF, DM, and healthcare needs. Care Coordination program was reviewed with patient and initial eval information obtained. Patient shared he recently had some c/o SOB but this has improved since his recent CHF clinic appointment and start of daily Lasix. Patient denied any c/o swelling being present and he reported his weight remains stable at 199 pounds. CHF education and importance of routine/daily weight monitoring as well as what to call and report reviewed with patient and he acknowledged understanding. Patient stated he does not monitor BS but \"A1C has been stable. \"  DM education was briefly reviewed. Patient shared he lives at home alone, but his daughter is near by and checks on him daily. Patient stated he monitors weight, BP, and pulse ox routinely and keeps a log book. Patient reported he is independent with all ADSs and he continues to drive but family will assist if needed. Patient denied any need for DME use at this time, and reported his home is handicap accessible if needed. Patient denied any recent falls. Patient declined any ADL, DME, or transportation assistance at this time and he was instructed to call with any changes. Patient shared he manages his own medications and his daughter assists him as needed. Patient shared he does not have med list available at this time so he is unable to review medications or complete Med Rec. Patient agreeable to completing Med Rec with [facility] in the near future. Patient stated he receives assistance with his Eliquis and all other medications are affordable. Patient declines pharmacy referral for full med review/education. Patient denied any other questions, concerns, or needs and he was encouraged to call with any that may develop.   Patient verbalized agreement to [facility] following up with him again in the future. Actions:   - Reviewed Care Coordination program   - Completed initial eval/SDOH assessment   - Reviewed CHF and DM education   - Educated on importance of routine weight monitoring and what to call and report   - Monitored for ADL, DME, and transportation needs   - Attempted Med Rec   - Patient declines pharmacy referral for med rec/education   - Patient is current with Med Assistance for Eliquis   - Monitored for additional needs          Plan of Care:   - Continue with Care Coordination f/u calls for assistance with the management of his CHF/DM and healthcare needs   - Complete CHF education   - Complete DM education   - Educate on the availability of same day appointments, urgent care/walk in clinic options, and after hours on call resources   - Review and verify Healthcare Decision Maker information   - Patient is do for Medicare AWV   - Patient is current with COVID-19 vaccine   - A1C 6.9% - May 2022  - Monitor for additional needs   - Monitor for readiness to discharge from [address] [place] [address] Coordination Protocol  Referral from Primary Care Provider: No  Week 1 - Initial Assessment     Do you have all of your prescriptions and are they filled?: Yes (Comment: July 2022 - receives Eliquis thru assistance program)  Barriers to medication adherence: None  Are you able to afford your medications?: With Assistance  Medication Assistance Program: Patient assistance with pharmaceutical company  How often do you have trouble taking your medications the way you have been told to take them?: I always take them as prescribed. Do you have Home O2 Therapy?: No      Ability to seek help/take action for Emergent Urgent situations i.e. fire, crime, inclement weather or health crisis. : Independent  Ability to ambulate to restroom: Independent  Ability handle personal hygeine needs (bathing/dressing/grooming): Independent  Ability to manage Medications: Independent  Ability to prepare Food Preparation: Independent  Ability to maintain home (clean home, laundry): Independent  Ability to drive and/or has transportation: Independent  Ability to do shopping: Independent  Ability to manage finances: Independent  Is patient able to live independently?: Yes     Current Housing: Private Residence        Per the Fall Risk Screening, did the patient have 2 or more falls or 1 fall with injury in the past year?: No     Frequent urination at night?: No  Do you use rails/bars?: Yes  Do you have a non-slip tub mat?: Yes     Are you experiencing loss of meaning?: No  Are you experiencing loss of hope and peace?: No     Thinking about your patient's physical health needs, are there any symptoms or problems (risk indicators) you are unsure about that require further investigation?: No identified areas of uncertainly or problems already being investigated   Are the patients physical health problems impacting on their mental well-being?: No identified areas of concern   Are there any problems with your patients lifestyle behaviors (alcohol, drugs, diet, exercise) that are impacting on physical or mental well-being?: No identified areas of concern   Do you have any other concerns about your patients mental well-being?  How would you rate their severity and impact on the patient?: Mild problems - don't interfere with function   How would you rate their home environment in terms of safety and stability (including domestic violence, insecure housing, neighbor harassment)?: Consistently safe, supportive, stable, no identified problems   How do daily activities impact on the patient's well-being? (include current or anticipated unemployment, work, caregiving, access to transportation or other): No identified problems or perceived positive benefits   How would you rate their social network (family, work, friends)?: Good participation with social networks   How would you rate their financial resources (including ability to afford all required medical care)?: Financially secure, resources adequate, no identified problems   How wells does the patient now understand their health and well-being (symptoms, signs or risk factors) and what they need to do to manage their health?: Reasonable to good understanding and already engages in managing health or is willing to undertake better management   How well do you think your patient can engage in healthcare discussions? (Barriers include language, deafness, aphasia, alcohol or drug problems, learning difficulties, concentration): Clear and open communication, no identified barriers   Do other services need to be involved to help this patient?: Other care/services not required at this time   Suggested Interventions and Community Resources  Diabetes Education: In Process (Comment: July 2022)    Disease Specific Clinic: Completed (Comment: Current with [facility] CHF clinic - July 2022)   Meals on Wheels: Declined   Other Services or Interventions: Pt. follows with [facility] CHF clinic and cardiology   Medication Assistance Program: Completed   Medi Set or Pill Pack: Completed   Palliative Care: Not Started   Pharmacist: Declined   Registered Dietician: Not Started   Social Work: Declined   Transportation Services: Declined   Zone Management Tools: In Process         Set up/Review an Education Plan, Set up/Review Goals                Prior to Admission medications    Medication Sig Start Date End Date Taking?  Authorizing Provider   metoprolol succinate (TOPROL XL) 100 MG extended release tablet Take 1 tablet by mouth daily 7/11/22   EMRE Deleon CNP   metFORMIN (GLUCOPHAGE XR) 500 MG extended release tablet Take 2 tablets by mouth daily (with breakfast) 6/14/22 9/12/22  Eligio Carlos MD   amiodarone (CORDARONE) 200 MG tablet Take 1 tablet by mouth daily 6/8/22   EMRE Deleon CNP patient-reported symptoms      Symptoms:  CHF associated dyspnea on exertion: Pos, CHF associated shortness of breath: Pos      Symptom course: improving  Patient-reported weight (lb): 199  Weight trend: stable  Salt intake watch compared to last visit: stable     ,   General Assessment    Do you have any symptoms that are causing concern?: No     , and Care Coordination Episodes    Type: Amb Care Management  Episode: Complex Care  Noted: 7/15/2022  Comments: List referral - DM/CHF

## 2022-07-17 ENCOUNTER — CARE COORDINATION (OUTPATIENT)
Dept: CARE COORDINATION | Age: 87
End: 2022-07-17

## 2022-07-22 ENCOUNTER — CARE COORDINATION (OUTPATIENT)
Dept: CARE COORDINATION | Age: 87
End: 2022-07-22

## 2022-07-22 NOTE — CARE COORDINATION
Ambulatory Care Coordination Note  7/22/2022    ACC: Mk Butts RN    Summary Note: Patient was called for continued Care Coordination follow up and education re: the management of his CHF, DM, and healthcare needs. Patient reported he is doing well today and he denied any current complaints or concerns. Patient stated his breathing remains at his baseline and he denied any c/o worsening SOB or cough being present. Patient reported he continues to monitor his weight daily and this remains stable at 193 today. Patient stated he is taking lasix as recently directed and is aware of need for f/u labs to be completed. CHF and DM zone handouts were reviewed with patient. Patient was educated on signs/symptoms to report as well as the importance of early symptom recognition. ACM reviewed importance of following up with providers re: change of symptoms. Patient verbalized understanding. ACM educated patient on the availability of same day appointments, urgent care/walk in clinic options, and after hours on call resources. Patient verbalized understanding. Patient denied any other questions, concerns, or needs and he was encouraged to call with any that may develop.           Actions:   - Reviewed CHF and DM education and zone information   - Educated on importance of daily weight monitoring   - Educated on signs/symptoms to report   - Educated on importance of early symptom recognition and follow up   - Educated on availability of same day appointments, urgent care/walk in clinic options, and after hours on call resources   - Monitored for additional needs          Plan of Care:   - Continue with Care Coordination f/u calls for assistance with the management of his CHF/DM and healthcare needs  - Complete CHF education - In Process   - Complete DM education - In Process  - Educate on the availability of same day appointments, urgent care/walk in clinic options, and after hours on call resources - Completed   - Review and verify Healthcare Decision Maker information  - Patient is do for Medicare AWV  - Patient is current with COVID-19 vaccine  - A1C 6.9% - May 2022  - Monitor for additional needs  - Monitor for readiness to discharge from Care Coordination      Lab Results       None            Care Coordination Interventions    Referral from Primary Care Provider: No  Suggested Interventions and Community Resources  Diabetes Education: Completed (Comment: July 2022)  Disease Specific Clinic: Completed (Comment: Current with [facility] - July 2022)  Meals on Wheels: Declined (Comment: Denied need - July 2022)  Medication Assistance Program: Completed (Comment: Current with program for Eliquis - July 2022)  Medi Set or Pill Pack: Completed (Comment: sets up weekly - July 2022)  Palliative Care: Not Started  Pharmacist: Declined (Comment: July 2022 - Instructed to call if he changes his mind)  Registered Dietician: Not Started  Social Work: Declined (Comment: Denied any resource needs at this time - July 2022)  Transportation Support: Declined  Zone Management Tools: Completed (Comment: CHF/DM - July/Aug. 2022)  Other Services or Interventions: Pt. follows with [facility] and cardiology          Goals Addressed                   This Visit's Progress       Care Coordination     Conditions and Symptoms   Improving     I will schedule office visits, as directed by my provider. I will keep my appointment or reschedule if I have to cancel. I will notify my provider of any barriers to my plan of care. I will follow my Zone Management tool to seek urgent or emergent care. I will notify my provider of any symptoms that indicate a worsening of my condition.     Barriers: overwhelmed by complexity of regimen, stress, and lack of education  Plan for overcoming my barriers: Ongoing education and monitoring of resource needs   Confidence: 8/10  Anticipated Goal Completion Date: 10/15/2022                Prior to Admission medications    Medication Sig Start Date End Date Taking? Authorizing Provider   metoprolol succinate (TOPROL XL) 100 MG extended release tablet Take 1 tablet by mouth daily 7/11/22   EMRE Iverson CNP   metFORMIN (GLUCOPHAGE XR) 500 MG extended release tablet Take 2 tablets by mouth daily (with breakfast) 6/14/22 9/12/22  Araceli Story MD   amiodarone (CORDARONE) 200 MG tablet Take 1 tablet by mouth daily 6/8/22   EMRE Iverson CNP   lisinopril (PRINIVIL;ZESTRIL) 20 MG tablet Take 1 tablet by mouth daily 5/18/22   EMRE Iverson CNP   fluticasone (FLONASE) 50 MCG/ACT nasal spray 1 spray by Each Nostril route daily  Patient not taking: Reported on 7/11/2022 5/6/22   Selena Diaz MD   nitroGLYCERIN (NITROSTAT) 0.4 MG SL tablet Place 1 tablet under the tongue every 5 minutes as needed for Chest pain up to max of 3 total doses. If no relief after 1 dose, call 911. 5/5/22   Sanya Strauss PA-C   furosemide (LASIX) 20 MG tablet Take 1 tablet by mouth daily as needed (for wt gain or swelling or SOB) 4/26/22   EMRE Iverson CNP   clopidogrel (PLAVIX) 75 MG tablet Take 1 tablet by mouth daily 4/23/22   Leslee Hendrix DO   atorvastatin (LIPITOR) 40 MG tablet Take 1 tablet by mouth nightly 4/23/22   Leslee Hendrix DO   apixaban (ELIQUIS) 2.5 MG TABS tablet Take 1 tablet by mouth 2 times daily 4/23/22   Leslee Hendrix DO       Future Appointments   Date Time Provider Satish Latif   9/1/2022  1:00 PM EMRE Flores CNP [facility] [place]   9/7/2022  2:30 PM [facility] ECHO RM2 STR[facility] ECHO Julius Chill   10/12/2022 12:15 PM Timmy Cooper MD N [facility] [place]   2/14/2023  2:00 PM EMRE Iverson CNP N [facility] CHF [facility] [place]   ,   Diabetes Assessment    Medic Alert ID: No  Meal Planning: Avoidance of concentrated sweets   How often do you test your blood sugar?: No Testing   Do you have barriers with adherence to non-pharmacologic self-management interventions? (Nutrition/Exercise/Self-Monitoring): No   Have you ever had to go to the ED for symptoms of low blood sugar?: No       No patient-reported symptoms   Do you have hyperglycemia symptoms?: No   Do you have hypoglycemia symptoms?: No   Blood Sugar Monitoring Regimen: Not Testing   Blood Sugar Trends: No Change        ,   Congestive Heart Failure Assessment    Are you currently restricting fluids?: 2000cc (Comment: per [facility] - July 2022)     No patient-reported symptoms      Symptoms:  None: Yes      Symptom course: stable  Patient-reported weight (lb): 193  Weight trend: fluctuating minimally  Salt intake watch compared to last visit: stable     ,   General Assessment    Do you have any symptoms that are causing concern?: No     , and Care Coordination Episodes    Type: Amb Care Management  Episode: Complex Care  Noted: 7/15/2022  Comments: List referral - DM/CHF

## 2022-07-28 ENCOUNTER — CARE COORDINATION (OUTPATIENT)
Dept: CARE COORDINATION | Age: 87
End: 2022-07-28

## 2022-07-28 NOTE — CARE COORDINATION
Ambulatory Care Coordination Note  7/28/2022    ACC: Pepper Patten RN    Summary Note: Patient called for continued Care Coordination follow up and education re: the management of his DM, CHF, and healthcare needs. Patient shared his SOB continues but is better and swelling has resolved. Patient stated weight remains stable and he continues to take lasix as directed. Patient is aware of need to have f/u labs completed and he denied any concerns with having this completed as ordered. Patient admits to some intermittent \"allergy\" symptoms but this also remains at his baseline. Patient was educated on the importance of ongoing weight monitoring as well as signs/symptoms he should call and report and he verbalized understanding. CHF and DM zone education was also briefly reviewed with signs/symptoms to report and patient acknowledged understanding. DM diet and low Na diet education was reviewed with patient as well as the importance of ongoing adherence. Patient acknowledged understanding and shared he continues to not use any salt with his cooking but admits to not reading labels as he is \"set in his ways. \"  Patient declines dietician referral at this time for additional support and education. Patient was instructed to call if he changes his mind. Patient denied any other questions, concerns, or needs and he was encouraged to call with any that may develop.           Actions:   - Reviewed CHF/DM zone education   - Reviewed signs/symptoms to report   - Reviewed importance of early symptom recognition and follow up   - Reviewed need to have f/u labs completed   - Educated on Low Na and DM diet and importance of ongoing adherence   - Offered dietician referral - declines at this time   - Monitored for additional needs          Plan of Care:   - Continue with Care Coordination f/u calls for assistance with the management of his CHF/DM and healthcare needs  - Complete CHF education - In Process   - Complete DM education - In Process  - Educate on the availability of same day appointments, urgent care/walk in clinic options, and after hours on call resources - Completed   - Review and verify Healthcare Decision Maker information  - Patient is do for Medicare AWV  - Patient is current with COVID-19 vaccine  - A1C 6.9% - May 2022  - Monitor for additional needs  - Monitor for readiness to discharge from Care Coordination      Lab Results       None            Care Coordination Interventions    Referral from Primary Care Provider: No  Suggested Interventions and Community Resources  Diabetes Education: Completed (Comment: July 2022)  Disease Specific Clinic: Completed (Comment: Current with [facility] - July 2022)  Meals on Wheels: Declined (Comment: Denied need - July 2022)  Medication Assistance Program: Completed (Comment[facility]  Medi Set or Pill Pack: Completed (Comment: sets up weekly - July 2022)  Palliative Care: Not Started  Pharmacist: Declined (Comment: July 2022 - Instructed to call if he changes his mind)  Registered Dietician: Declined (Comment: July 2022)  Social Work: Declined (Comment: Denied any resource needs at this time - July 2022)  Transportation Support: Declined  Zone Management Tools: Completed (Comment: CHF/DM - July/Aug. 2022)  Other Services or Interventions: Pt. follows with [facility] and cardiology          Goals Addressed                   This Visit's Progress       Care Coordination     Conditions and Symptoms   Improving     I will schedule office visits, as directed by my provider. I will keep my appointment or reschedule if I have to cancel. I will notify my provider of any barriers to my plan of care. I will follow my Zone Management tool to seek urgent or emergent care. I will notify my provider of any symptoms that indicate a worsening of my condition.     Barriers: overwhelmed by complexity of regimen, stress, and lack of education  Plan for overcoming my barriers: Ongoing education and monitoring of resource needs   Confidence: 8/10  Anticipated Goal Completion Date: 10/15/2022                Prior to Admission medications    Medication Sig Start Date End Date Taking? Authorizing Provider   metoprolol succinate (TOPROL XL) 100 MG extended release tablet Take 1 tablet by mouth daily 7/11/22   EMRE Marmolejo CNP   metFORMIN (GLUCOPHAGE XR) 500 MG extended release tablet Take 2 tablets by mouth daily (with breakfast) 6/14/22 9/12/22  Gayla Reyes MD   amiodarone (CORDARONE) 200 MG tablet Take 1 tablet by mouth daily 6/8/22   EMRE Marmolejo CNP   lisinopril (PRINIVIL;ZESTRIL) 20 MG tablet Take 1 tablet by mouth daily 5/18/22   EMRE Marmolejo CNP   fluticasone (FLONASE) 50 MCG/ACT nasal spray 1 spray by Each Nostril route daily  Patient not taking: Reported on 7/11/2022 5/6/22   Scot Sims MD   nitroGLYCERIN (NITROSTAT) 0.4 MG SL tablet Place 1 tablet under the tongue every 5 minutes as needed for Chest pain up to max of 3 total doses.  If no relief after 1 dose, call 911. 5/5/22   Shukri Arredondo PA-C   furosemide (LASIX) 20 MG tablet Take 1 tablet by mouth daily as needed (for wt gain or swelling or SOB) 4/26/22   EMRE Marmolejo CNP   clopidogrel (PLAVIX) 75 MG tablet Take 1 tablet by mouth daily 4/23/22   Debby Jett DO   atorvastatin (LIPITOR) 40 MG tablet Take 1 tablet by mouth nightly 4/23/22   Debby JohnieDO [place]   apixaban (ELIQUIS) 2.5 MG TABS tablet Take 1 tablet by mouth 2 times daily 4/23/22   Debby Jett DO       Future Appointments   Date Time Provider Satish Latif   9/1/2022  1:00 PM [facility] [place]   9/7/2022  2:30 PM STR ECHO RM2 STR[facility] ECHO [facility]   10/12/2022 12:15 PM Archie Rothman MD [facility]   2/14/2023  2:00 PM EMRE Marmolejo CNP N [facility] CHF [facility] [place]   ,   Diabetes Assessment    Medic Alert ID: No  Meal Planning: Avoidance of concentrated sweets   How often do you test your blood sugar?: No Testing   Do you have barriers with adherence to non-pharmacologic self-management interventions?  (Nutrition/Exercise/Self-Monitoring): No   Have you ever had to go to the ED for symptoms of low blood sugar?: No            ,   Congestive Heart Failure Assessment    Are you currently restricting fluids?: 2000cc (Comment: per [facility]F clinic - July 2022)  Do you understand a low sodium diet?: Yes  Do you understand how to read food labels?: No  Do you salt your food before tasting it?: No     No patient-reported symptoms      Symptoms:  CHF associated dyspnea on exertion: Pos, CHF associated leg swelling: Pos, CHF associated shortness of breath: Pos      Symptom course: improving  Weight trend: stable  Salt intake watch compared to last visit: stable     ,   General Assessment    Do you have any symptoms that are causing concern?: No     , and Care Coordination Episodes    Type: Amb Care Management  Episode: Complex Care  Noted: 7/15/2022  Comments: List referral - DM/CHF

## 2022-08-01 ENCOUNTER — HOSPITAL ENCOUNTER (OUTPATIENT)
Age: 87
Discharge: HOME | End: 2022-08-01
Payer: MEDICARE

## 2022-08-01 DIAGNOSIS — I25.5 ISCHEMIC CARDIOMYOPATHY: ICD-10-CM

## 2022-08-01 DIAGNOSIS — I25.10 CAD IN NATIVE ARTERY: ICD-10-CM

## 2022-08-01 PROCEDURE — 80048 BASIC METABOLIC PNL TOTAL CA: CPT

## 2022-08-01 PROCEDURE — 36415 COLL VENOUS BLD VENIPUNCTURE: CPT

## 2022-08-02 LAB
ANION GAP SERPL CALCULATED.3IONS-SCNC: 11 MEQ/L (ref 8–16)
BUN BLDV-MCNC: 24 MG/DL (ref 7–22)
CALCIUM SERPL-MCNC: 9 MG/DL (ref 8.5–10.5)
CHLORIDE BLD-SCNC: 104 MEQ/L (ref 98–111)
CO2: 25 MEQ/L (ref 23–33)
CREAT SERPL-MCNC: 1.1 MG/DL (ref 0.4–1.2)
GFR SERPL CREATININE-BSD FRML MDRD: 63 ML/MIN/1.73M2
GLUCOSE BLD-MCNC: 107 MG/DL (ref 70–108)
POTASSIUM SERPL-SCNC: 4.4 MEQ/L (ref 3.5–5.2)
SODIUM BLD-SCNC: 140 MEQ/L (ref 135–145)

## 2022-08-03 ENCOUNTER — CARE COORDINATION (OUTPATIENT)
Dept: CARE COORDINATION | Age: 87
End: 2022-08-03

## 2022-08-03 DIAGNOSIS — I50.22 CHF (CONGESTIVE HEART FAILURE), NYHA CLASS II, CHRONIC, SYSTOLIC (HCC): ICD-10-CM

## 2022-08-03 LAB — PRO-BNP: 886.6 PG/ML (ref 0–1800)

## 2022-08-03 RX ORDER — FUROSEMIDE 20 MG/1
20 TABLET ORAL DAILY
Qty: 90 TABLET | Refills: 1 | Status: SHIPPED | OUTPATIENT
Start: 2022-08-03 | End: 2022-09-01 | Stop reason: SDUPTHER

## 2022-08-03 NOTE — TELEPHONE ENCOUNTER
Spoke with daughter  Patient feeling better  He will continue lasix daily  He was concerned that a BNP was not checked this time    Informed daughter BNP not always done each time with lab work    Daughter questioning if you will want repeat labs again.   No future labs ordered

## 2022-08-03 NOTE — TELEPHONE ENCOUNTER
///////  Daughter notified of repeat labs prior to appointment with cardiology  Spoke with patient  He does not understand why a bnp was not ordered since that is why you had him take lasix daily  Attempted to discuss symptoms of SOB also noted as why lasix was started  Patient stated he did not have SOB   Please advise

## 2022-08-03 NOTE — CARE COORDINATION
Ambulatory Care Coordination Note  8/3/2022    ACC: Valerie Sultana RN    Summary Note: Patient was called for continued Care Coordination follow up and education re: the management of his CHF, DM, and healthcare needs. Patient shared he has been doing \"ok\" but he did become SOB with increased activity this AM.  Patient reported SOB has since resolved and breathing is back to his baseline. Patient denied any c/o worsening cough or swelling being present. Patient stated was educated on importance of breaking activities up and taking frequent rest breaks and patient verbalized understanding. Patient reported he had labs completed as ordered and followed up with cardiology so he is now to continue Lasix daily as he was recently instructed. Patient continues to monitor his weight daily and weight was 198 today. Importance of continued daily weight monitoring reviewed with patient and ACM educated on what patient should  report. Patient verbalized understanding. CHF education and zone information was also reviewed and patient was reminded of the importance of early symptom recognition and follow up. Patient verbalized understanding. Patient denied any other questions, concerns, or needs and he was encouraged to call with any that may develop.           Actions:   - Reviewed CHF education and zone information   - Reviewed  importance of daily weight monitoring   - Reviewed importance of early symptom recognition and follow up   - Educated on importance of breaking activities up with frequent rest breaks  - Verified current Lasix use  - Monitored for additional needs          Plan of Care:   - Continue with Care Coordination f/u calls for assistance with the management of his CHF/DM and healthcare needs  - Complete CHF education - In Process   - Complete DM education - In Process  - Educate on the availability of same day appointments, urgent care/walk in clinic options, and after hours on call resources - Completed   - Review and verify Healthcare Decision Maker information  - Patient is do for Medicare AWV  - Patient is current with COVID-19 vaccine  - A1C 6.9% - May 2022  - Monitor for additional needs  - Monitor for readiness to discharge from Care Coordination       Lab Results       None            Care Coordination Interventions    Referral from Primary Care Provider: No  Suggested Interventions and Community Resources  Diabetes Education: Completed (Comment: July 2022)  Disease Specific Clinic: Completed (Comment: Current with [facility] - July 2022)  Meals on Wheels: Declined (Comment: Denied need - July 2022)  Medication Assistance Program: Completed (Comment: Current with program for Eliquis - July 2022)  Medi Set or Pill Pack: Completed (Comment: sets up weekly - July 2022)  Palliative Care: Not Started  Pharmacist: Declined (Comment: July 2022 - Instructed to call if he changes his mind)  Registered Dietician: Declined (Comment: July 2022)  Social Work: Declined (Comment: Denied any resource needs at this time - July 2022)  Transportation Support: Declined  Zone Management Tools: Completed (Comment: CHF/DM - July/Aug. 2022)  Other Services or Interventions: Pt. follows with [facility]F clinic and cardiology          Goals Addressed                   This Visit's Progress       Care Coordination     Conditions and Symptoms   Improving     I will schedule office visits, as directed by my provider. I will keep my appointment or reschedule if I have to cancel. I will notify my provider of any barriers to my plan of care. I will follow my Zone Management tool to seek urgent or emergent care. I will notify my provider of any symptoms that indicate a worsening of my condition.     Barriers: overwhelmed by complexity of regimen, stress, and lack of education  Plan for overcoming my barriers: Ongoing education and monitoring of resource needs   Confidence: 8/10  Anticipated Goal Completion Date: 10/15/2022 Prior to Admission medications    Medication Sig Start Date End Date Taking? Authorizing Provider   furosemide (LASIX) 20 MG tablet Take 1 tablet by mouth in the morning. 8/3/22   EMRE Butler CNP   metoprolol succinate (TOPROL XL) 100 MG extended release tablet Take 1 tablet by mouth daily 7/11/22   EMRE Butler CNP   metFORMIN (GLUCOPHAGE XR) 500 MG extended release tablet Take 2 tablets by mouth daily (with breakfast) 6/14/22 9/12/22  David Rivera MD   amiodarone (CORDARONE) 200 MG tablet Take 1 tablet by mouth daily 6/8/22   EMRE Butler CNP   lisinopril (PRINIVIL;ZESTRIL) 20 MG tablet Take 1 tablet by mouth daily 5/18/22   EMRE Butler CNP   fluticasone (FLONASE) 50 MCG/ACT nasal spray 1 spray by Each Nostril route daily  Patient not taking: Reported on 7/11/2022 5/6/22   Huber Stevens MD   nitroGLYCERIN (NITROSTAT) 0.4 MG SL tablet Place 1 tablet under the tongue every 5 minutes as needed for Chest pain up to max of 3 total doses. If no relief after 1 dose, call 911. 5/5/22   Benny Weinstein PA-C   clopidogrel (PLAVIX) 75 MG tablet Take 1 tablet by mouth daily 4/23/22   Lonn Quails, DO   atorvastatin (LIPITOR) 40 MG tablet Take 1 tablet by mouth nightly 4/23/22   Lonn Quails, DO   apixaban (ELIQUIS) 2.5 MG TABS tablet Take 1 tablet by mouth 2 times daily 4/23/22   Lonn Quails, DO       Future Appointments   Date Time Provider Satish Latif   9/1/2022  1:00 PM [facility]   9/7/2022  2:30 PM STR ECHO RM2 STR[facility] ECHO [place] [facility]   10/12/2022 12:15 PM [facility]   2/14/2023  2:00 PM EMRE Butler CNP N SRPX CHF [facility]   ,   Diabetes Assessment    Medic Alert ID: No  Meal Planning: Avoidance of concentrated sweets   How often do you test your blood sugar?: No Testing   Do you have barriers with adherence to non-pharmacologic self-management interventions? (Nutrition/Exercise/Self-Monitoring): No   Have you ever had to go to the ED for symptoms of low blood sugar?: No       No patient-reported symptoms   Do you have hyperglycemia symptoms?: No   Do you have hypoglycemia symptoms?: No   Blood Sugar Monitoring Regimen: Not Testing   Blood Sugar Trends: No Change        ,   Congestive Heart Failure Assessment    Are you currently restricting fluids?: 2000cc (Comment: per [facility]F clinic - July 2022)  Do you understand a low sodium diet?: Yes  Do you understand how to read food labels?: No  Do you salt your food before tasting it?: No     No patient-reported symptoms      Symptoms:  CHF associated dyspnea on exertion: Pos      Symptom course: stable  Patient-reported weight (lb): 198  Weight trend: increasing steadily  Salt intake watch compared to last visit: stable     ,   General Assessment    Do you have any symptoms that are causing concern?: No     , and Care Coordination Episodes    Type: Amb Care Management  Episode: Complex Care  Noted: 7/15/2022  Comments: List referral - DM/CHF

## 2022-08-04 RX ORDER — CLOPIDOGREL BISULFATE 75 MG/1
75 TABLET ORAL DAILY
Qty: 30 TABLET | Refills: 3 | Status: SHIPPED | OUTPATIENT
Start: 2022-08-04 | End: 2022-09-01 | Stop reason: SDUPTHER

## 2022-08-04 RX ORDER — ATORVASTATIN CALCIUM 40 MG/1
40 TABLET, FILM COATED ORAL NIGHTLY
Qty: 30 TABLET | Refills: 3 | Status: SHIPPED | OUTPATIENT
Start: 2022-08-04 | End: 2022-09-01 | Stop reason: SDUPTHER

## 2022-08-09 ENCOUNTER — CARE COORDINATION (OUTPATIENT)
Dept: CARE COORDINATION | Age: 87
End: 2022-08-09

## 2022-08-09 NOTE — CARE COORDINATION
Ambulatory Care Coordination Note  8/9/2022    ACC: Jeff Sterling, RN    Summary Note: Patient was called for continued Care Coordination follow up and education re: the management of his DM, CHF, and healthcare needs. Patient shared he is currently out and about and not at home so call was kept brief. Patient reported he has been doing well and he denied any current complaints or concerns. Patient stated his breathing remains at his baseline and he denied any c/o worsening SOB or swelling being present. ACM briefly reviewed signs/symptoms patient should report as well as the importance of early symptom recognition and follow up. Patient acknowledged understanding. Patient denied any other questions, concerns, or needs and she was encouraged to call with any that may develop.            Actions:   - Briefly reviewed CHF/DM education and zone information   - Briefly reviewed signs/symptoms to report and importance of early symptom recognition and follow up   - Monitored for questions, concerns, or needs          Plan of Care:   - Continue with Care Coordination f/u calls for assistance with the management of his CHF/DM and healthcare needs  - Complete CHF education - In Process   - Complete DM education - In Process  - Educate on the availability of same day appointments, urgent care/walk in clinic options, and after hours on call resources - Completed   - Review and verify Healthcare Decision Maker information  - Patient is do for Medicare AWV  - Patient is current with COVID-19 vaccine  - A1C 6.9% - May 2022  - Monitor for additional needs  - Monitor for readiness to discharge from Care Coordination      Lab Results       None            Care Coordination Interventions    Referral from Primary Care Provider: No  Suggested Interventions and Community Resources  Diabetes Education: Completed (Comment: July 2022)  Disease Specific Clinic: Completed (Comment: Current with [facility] - July 2022)  Meals on Wheels: Declined (Comment: Denied need - July 2022)  Medication Assistance Program: Completed (Comment: Current with program for Eliquis - July 2022)  Medi Set or Pill Pack: Completed (Comment: sets up weekly - July 2022)  Palliative Care: Not Started  Pharmacist: Declined (Comment: July 2022 - Instructed to call if he changes his mind)  Registered Dietician: Declined (Comment: July 2022)  Social Work: Declined (Comment: Denied any resource needs at this time - July 2022)  Transportation Support: Declined  Zone Management Tools: Completed (Comment: CHF/DM - July/Aug. 2022)  Other Services or Interventions: Pt. follows with [facility] and cardiology          Goals Addressed                   This Visit's Progress       Care Coordination     Conditions and Symptoms   Improving     I will schedule office visits, as directed by my provider. I will keep my appointment or reschedule if I have to cancel. I will notify my provider of any barriers to my plan of care. I will follow my Zone Management tool to seek urgent or emergent care. I will notify my provider of any symptoms that indicate a worsening of my condition. Barriers: overwhelmed by complexity of regimen, stress, and lack of education  Plan for overcoming my barriers: Ongoing education and monitoring of resource needs   Confidence: 8/10  Anticipated Goal Completion Date: 10/15/2022                Prior to Admission medications    Medication Sig Start Date End Date Taking? Authorizing Provider   clopidogrel (PLAVIX) 75 MG tablet Take 1 tablet by mouth in the morning. 8/4/22   Leelee Duque MD   atorvastatin (LIPITOR) 40 MG tablet Take 1 tablet by mouth nightly 8/4/22   Leelee Duque MD   furosemide (LASIX) 20 MG tablet Take 1 tablet by mouth in the morning.  8/3/22   EMRE Lugo - CNP   metoprolol succinate (TOPROL XL) 100 MG extended release tablet Take 1 tablet by mouth daily 7/11/22   EMRE Lugo CNP   metFORMIN (GLUCOPHAGE XR) 500 MG extended release tablet Take 2 tablets by mouth daily (with breakfast) 6/14/22 9/12/22  Eligio Carlos MD   amiodarone (CORDARONE) 200 MG tablet Take 1 tablet by mouth daily 6/8/22   EMRE Deleon CNP   lisinopril (PRINIVIL;ZESTRIL) 20 MG tablet Take 1 tablet by mouth daily 5/18/22   EMRE Deleon CNP   fluticasone (FLONASE) 50 MCG/ACT nasal spray 1 spray by Each Nostril route daily  Patient not taking: Reported on 7/11/2022 5/6/22   Bernice Bryson MD   nitroGLYCERIN (NITROSTAT) 0.4 MG SL tablet Place 1 tablet under the tongue every 5 minutes as needed for Chest pain up to max of 3 total doses. If no relief after 1 dose, call 911. 5/5/22   Rock Nomi PA-C   apixaban (ELIQUIS) 2.5 MG TABS tablet Take 1 tablet by mouth 2 times daily 4/23/22   Wilma Lovell, DO       Future Appointments   Date Time Provider Satish Latif   9/1/2022  1:00 PM EMRE Newsome CNP [facility] [place]   9/7/2022  2:30 PM [facility] ECHO RM2 STRZ ECHO [place] HOD   10/12/2022 12:15 PM Christin Curtis MD N SRPX Heart [facility] [place] [place]   2/14/2023  2:00 PM EMRE Deleon CNP N [facility]PX CHF [facility] [place]   ,   Diabetes Assessment    Medic Alert ID: No  Meal Planning: Avoidance of concentrated sweets   How often do you test your blood sugar?: No Testing   Do you have barriers with adherence to non-pharmacologic self-management interventions?  (Nutrition/Exercise/Self-Monitoring): No   Have you ever had to go to the ED for symptoms of low blood sugar?: No       No patient-reported symptoms        ,   Congestive Heart Failure Assessment    Are you currently restricting fluids?: 2000cc (Comment: per [facility] CHF clinic - July 2022)  Do you understand a low sodium diet?: Yes  Do you understand how to read food labels?: No  Do you salt your food before tasting it?: No     No patient-reported symptoms      Symptoms:  None: Yes      Symptom course: stable  Salt intake watch compared to last visit: louis     ,   General Assessment    Do you have any symptoms that are causing concern?: No     , and Care Coordination Episodes    Type: Amb Care Management  Episode: Complex Care  Noted: 7/15/2022  Comments: List referral - DM/CHF

## 2022-08-24 ENCOUNTER — CARE COORDINATION (OUTPATIENT)
Dept: CARE COORDINATION | Age: 87
End: 2022-08-24

## 2022-08-24 NOTE — CARE COORDINATION
Ambulatory Care Coordination Note  8/24/2022    ACC: Nathalie Lynn RN    Summary Note: Patient was called for continued Care Coordination follow up and education re: the management of his CHF, DM, and healthcare needs. Patient shared he has been doing well and he denied any c/o worsening SOB, cough, or swelling being present. Patient stated his weight remains stable and was 194.6 today. CHF and DM education and zone information was reviewed with patient and he verbalized understanding. ACM reviewed signs/symptoms to report as well as the importance of early symptom recognition and follow up. Patient verbalized understanding. ACM reviewed DM and low Na diet education with patient as well as the importance of ongoing adherence, and patient acknowledged understanding. Patient shared he does have a living will and HC DPOA. Patient was encouraged to bring copy of documents with him to PCP appointment next week so they can be added to his chart and patient verbalized understanding. Upcoming appointment information was reviewed with patient and he verbalized understanding. Patient denied any other questions, concerns, or needs and he was encouraged to call with any that may develop.            Actions:   - Reviewed CHF and DM education and zone information   - Reviewed signs/symptoms to report   - Reviewed importance of early symptom recognition and follow up   - Reviewed upcoming appointment information   - [place] information and instructed patient to bring copy with him to upcoming appointment   - Reviewed upcoming appointment information   - Monitored for additional needs          Plan of Care:   - Continue with Care Coordination f/u calls for assistance with the management of his CHF/DM and healthcare needs  - Complete CHF education - In Process   - Complete DM education - In Process  - Educate on the availability of same day appointments, urgent care/walk in clinic options, and after hours on call resources - Completed   - Review and verify Healthcare Decision Maker information - Completed  - Patient is do for Medicare AWV  - Patient is current with COVID-19 vaccine  - A1C 6.9% - May 2022  - Monitor for additional needs  - Monitor for readiness to discharge from Care Coordination    Lab Results       None            Care Coordination Interventions    Referral from Primary Care Provider: No  Suggested Interventions and Community Resources  Diabetes Education: Completed (Comment: July 2022)  Disease Specific Clinic: Completed (Comment: Current with [facility] - July 2022)  Meals on Wheels: Declined (Comment: Denied need - July 2022)  Medication Assistance Program: Completed (Comment: Current with program for Eliquis - July 2022)  Medi Set or Pill Pack: Completed (Comment: sets up weekly - July 2022)  Palliative Care: Not Started  Pharmacist: Declined (Comment: July 2022 - Instructed to call if he changes his mind)  Registered Dietician: Declined (Comment: July 2022)  Social Work: Declined (Comment: Denied any resource needs at this time - July 2022)  Transportation Support: Declined  Zone Management Tools: Completed (Comment: CHF/DM - July/Aug. 2022)  Other Services or Interventions: Pt. follows with [facility] and cardiology          Goals Addressed                   This Visit's Progress       Care Coordination     Conditions and Symptoms   Improving     I will schedule office visits, as directed by my provider. I will keep my appointment or reschedule if I have to cancel. I will notify my provider of any barriers to my plan of care. I will follow my Zone Management tool to seek urgent or emergent care. I will notify my provider of any symptoms that indicate a worsening of my condition.     Barriers: overwhelmed by complexity of regimen, stress, and lack of education  Plan for overcoming my barriers: Ongoing education and monitoring of resource needs   Confidence: 8/10  Anticipated Goal Completion Date: 10/15/2022                Prior to Admission medications    Medication Sig Start Date End Date Taking? Authorizing Provider   clopidogrel (PLAVIX) 75 MG tablet Take 1 tablet by mouth in the morning. 8/4/22   Christin Curtis MD   atorvastatin (LIPITOR) 40 MG tablet Take 1 tablet by mouth nightly 8/4/22   Christin Curtis MD   furosemide (LASIX) 20 MG tablet Take 1 tablet by mouth in the morning. 8/3/22   Rick Sinks, APRN - CNP   metoprolol succinate (TOPROL XL) 100 MG extended release tablet Take 1 tablet by mouth daily 7/11/22   Rick Sinks, APRN - CNP   metFORMIN (GLUCOPHAGE XR) 500 MG extended release tablet Take 2 tablets by mouth daily (with breakfast) 6/14/22 9/12/22  Eligio Carlos MD   amiodarone (CORDARONE) 200 MG tablet Take 1 tablet by mouth daily 6/8/22   EMRE Deleon CNP   lisinopril (PRINIVIL;ZESTRIL) 20 MG tablet Take 1 tablet by mouth daily 5/18/22   Rick SinkEMRE martinez CNP   fluticasone (FLONASE) 50 MCG/ACT nasal spray 1 spray by Each Nostril route daily  Patient not taking: Reported on 7/11/2022 5/6/22   Bernice Bryson MD   nitroGLYCERIN (NITROSTAT) 0.4 MG SL tablet Place 1 tablet under the tongue every 5 minutes as needed for Chest pain up to max of 3 total doses.  If no relief after 1 dose, call 911. 5/5/22   Rock Nomi PA-C   apixaban (ELIQUIS) 2.5 MG TABS tablet Take 1 tablet by mouth 2 times daily 4/23/22   Wilma Lovell, DO       Future Appointments   Date Time Provider Satish Latif   9/1/2022  1:00 PM EMRE Newsome CNP Fam Med CG [facility]   9/7/2022  2:30 PM STR ECHO RM2 STRZ ECHO [place] HOD   10/12/2022 12:15 PM Christin Curtis MD N SRPX Heart [facility]   2/14/2023  2:00 PM EMRE Deleon - CNP N SRPX CHF P - [facility]   ,   Diabetes Assessment    Medic Alert ID: No  Meal Planning: Avoidance of concentrated sweets   How often do you test your blood sugar?: No Testing   Do you have barriers with adherence to non-pharmacologic self-management interventions?  (Nutrition/Exercise/Self-Monitoring): No   Have you ever had to go to the ED for symptoms of low blood sugar?: No       No patient-reported symptoms   Do you have hyperglycemia symptoms?: No   Do you have hypoglycemia symptoms?: No   Blood Sugar Monitoring Regimen: Not Testing   Blood Sugar Trends: No Change        ,   Congestive Heart Failure Assessment    Are you currently restricting fluids?: 2000cc (Comment: per [facility] CHF clinic - July 2022)  Do you understand a low sodium diet?: Yes  Do you understand how to read food labels?: No  Do you salt your food before tasting it?: No     No patient-reported symptoms      Symptoms:  CHF associated dyspnea on exertion: Pos (Comment: remains at baseline)      Symptom course: stable  Patient-reported weight (lb): 194.6  Weight trend: stable  Salt intake watch compared to last visit: stable     ,   General Assessment    Do you have any symptoms that are causing concern?: No     , and Care Coordination Episodes    Type: Amb Care Management  Episode: Complex Care  Noted: 7/15/2022  Comments: List referral - DM/CHF

## 2022-08-24 NOTE — ACP (ADVANCE CARE PLANNING)
Advance Care Planning   Healthcare Decision Maker:    Primary Decision Maker: Rik Puneet - Child - [phone]    Today we documented Decision Maker(s). The patient will provide ACP documents. Reviewed Healthcare Decision Maker information with patient. Patient stated he will bring copy of documents with him to upcoming appointment.

## 2022-08-26 ENCOUNTER — HOSPITAL ENCOUNTER (EMERGENCY)
Age: 87
Discharge: HOME | End: 2022-08-26
Attending: FAMILY MEDICINE
Payer: MEDICARE

## 2022-08-26 ENCOUNTER — CARE COORDINATION (OUTPATIENT)
Dept: CARE COORDINATION | Age: 87
End: 2022-08-26

## 2022-08-26 ENCOUNTER — APPOINTMENT (OUTPATIENT)
Dept: GENERAL RADIOLOGY | Age: 87
End: 2022-08-26
Payer: MEDICARE

## 2022-08-26 VITALS
BODY MASS INDEX: 27.92 KG/M2 | HEART RATE: 66 BPM | WEIGHT: 195 LBS | DIASTOLIC BLOOD PRESSURE: 88 MMHG | RESPIRATION RATE: 15 BRPM | OXYGEN SATURATION: 96 % | HEIGHT: 70 IN | TEMPERATURE: 98.1 F | SYSTOLIC BLOOD PRESSURE: 187 MMHG

## 2022-08-26 DIAGNOSIS — J81.0 ACUTE PULMONARY EDEMA (HCC): ICD-10-CM

## 2022-08-26 DIAGNOSIS — I50.43 ACUTE ON CHRONIC COMBINED SYSTOLIC AND DIASTOLIC CONGESTIVE HEART FAILURE (HCC): Primary | ICD-10-CM

## 2022-08-26 LAB
ALBUMIN SERPL-MCNC: 3.7 GM/DL (ref 3.4–5)
ALP BLD-CCNC: 107 U/L (ref 46–116)
ALT SERPL-CCNC: 33 U/L (ref 14–63)
AMORPHOUS: NORMAL
ANION GAP: 8 MEQ/L (ref 8–16)
AST SERPL-CCNC: 16 U/L (ref 15–37)
BACTERIA: NORMAL
BILIRUB SERPL-MCNC: 0.7 MG/DL (ref 0.2–1)
BILIRUBIN URINE: NEGATIVE
BLOOD, URINE: ABNORMAL
BUN BLDV-MCNC: 22 MG/DL (ref 7–18)
CASTS UA: NORMAL /LPF
CHARACTER, URINE: CLEAR
CHLORIDE BLD-SCNC: 101 MEQ/L (ref 98–107)
CO2: 28 MEQ/L (ref 21–32)
COLOR: YELLOW
CREAT SERPL-MCNC: 1.2 MG/DL (ref 0.6–1.3)
CRYSTALS, UA: NORMAL
DIFFERENTIAL, MANUAL: NORMAL
EKG ATRIAL RATE: 67 BPM
EKG P AXIS: 86 DEGREES
EKG P-R INTERVAL: 208 MS
EKG Q-T INTERVAL: 470 MS
EKG QRS DURATION: 138 MS
EKG QTC CALCULATION (BAZETT): 496 MS
EKG R AXIS: -50 DEGREES
EKG T AXIS: 77 DEGREES
EKG VENTRICULAR RATE: 67 BPM
EOSINOPHILS RELATIVE PERCENT: 8 % (ref 0–4)
EPITHELIAL CELLS, UA: NORMAL /HPF
GFR, ESTIMATED: 60 ML/MIN/1.73M2
GLUCOSE BLD-MCNC: 159 MG/DL (ref 74–106)
GLUCOSE, URINE: NEGATIVE MG/DL
HCT VFR BLD CALC: 39.4 % (ref 42–52)
HEMOGLOBIN: 13 GM/DL (ref 14–18)
KETONES, URINE: NEGATIVE
LEUKOCYTE ESTERASE, URINE: NEGATIVE
LYMPHOCYTES # BLD: 22 % (ref 15–47)
MCH RBC QN AUTO: 26 PG (ref 27–31)
MCHC RBC AUTO-ENTMCNC: 33 GM/DL (ref 33–37)
MCV RBC AUTO: 78.8 FL (ref 80–94)
MICROCYTES: SLIGHT
MONOCYTES: 6 % (ref 0–12)
MUCUS: NORMAL
NITRITE, URINE: NEGATIVE
NT PRO BNP: 2305 PG/ML (ref 0–1800)
PDW BLD-RTO: 13.2 % (ref 11.5–14.5)
PH UA: 7 (ref 5–9)
PLATELET # BLD: 136 THOU/MM3 (ref 130–400)
PLATELET ESTIMATE: ABNORMAL
PMV BLD AUTO: 10.2 FL (ref 7.4–10.4)
POC CALCIUM: 8.7 MG/DL (ref 8.5–10.1)
POTASSIUM SERPL-SCNC: 3.8 MEQ/L (ref 3.5–5.1)
PROTEIN UA: ABNORMAL MG/DL
RBC # BLD: 5 MILL/MM3 (ref 4.7–6.1)
RBC URINE: NORMAL /HPF
SARS-COV-2, NAAT: NOT  DETECTED
SEGS: 64 % (ref 43–75)
SODIUM BLD-SCNC: 137 MEQ/L (ref 136–145)
SPECIFIC GRAVITY UA: 1.01 (ref 1–1.03)
TOTAL PROTEIN: 7.3 GM/DL (ref 6.4–8.2)
TROPONIN, HIGH SENSITIVITY: 14.8 PG/ML (ref 0–76.1)
UROBILINOGEN, URINE: 1 EU/DL (ref 0.2–1)
WBC # BLD: 12.4 THOU/MM3 (ref 4.8–10.8)
WBC UA: NORMAL /HPF

## 2022-08-26 PROCEDURE — 99285 EMERGENCY DEPT VISIT HI MDM: CPT

## 2022-08-26 PROCEDURE — 87635 SARS-COV-2 COVID-19 AMP PRB: CPT

## 2022-08-26 PROCEDURE — 85025 COMPLETE CBC W/AUTO DIFF WBC: CPT

## 2022-08-26 PROCEDURE — 81001 URINALYSIS AUTO W/SCOPE: CPT

## 2022-08-26 PROCEDURE — 71045 X-RAY EXAM CHEST 1 VIEW: CPT

## 2022-08-26 PROCEDURE — 93010 ELECTROCARDIOGRAM REPORT: CPT | Performed by: INTERNAL MEDICINE

## 2022-08-26 PROCEDURE — 80053 COMPREHEN METABOLIC PANEL: CPT

## 2022-08-26 PROCEDURE — 81003 URINALYSIS AUTO W/O SCOPE: CPT

## 2022-08-26 PROCEDURE — 93005 ELECTROCARDIOGRAM TRACING: CPT | Performed by: FAMILY MEDICINE

## 2022-08-26 PROCEDURE — 6360000002 HC RX W HCPCS: Performed by: FAMILY MEDICINE

## 2022-08-26 PROCEDURE — 96374 THER/PROPH/DIAG INJ IV PUSH: CPT

## 2022-08-26 PROCEDURE — 84484 ASSAY OF TROPONIN QUANT: CPT

## 2022-08-26 PROCEDURE — 83880 ASSAY OF NATRIURETIC PEPTIDE: CPT

## 2022-08-26 RX ORDER — FUROSEMIDE 10 MG/ML
20 INJECTION INTRAMUSCULAR; INTRAVENOUS ONCE
Status: COMPLETED | OUTPATIENT
Start: 2022-08-26 | End: 2022-08-26

## 2022-08-26 RX ADMIN — FUROSEMIDE 20 MG: 10 INJECTION, SOLUTION INTRAMUSCULAR; INTRAVENOUS at 04:58

## 2022-08-26 ASSESSMENT — ENCOUNTER SYMPTOMS
COUGH: 0
NAUSEA: 0
VOMITING: 0
SHORTNESS OF BREATH: 1
COLOR CHANGE: 0
SORE THROAT: 0
WHEEZING: 0

## 2022-08-26 ASSESSMENT — PAIN - FUNCTIONAL ASSESSMENT
PAIN_FUNCTIONAL_ASSESSMENT: NONE - DENIES PAIN
PAIN_FUNCTIONAL_ASSESSMENT: NONE - DENIES PAIN

## 2022-08-26 NOTE — ED PROVIDER NOTES
(FLONASE) 50 MCG/ACT NASAL SPRAY    1 spray by Each Nostril route daily    FUROSEMIDE (LASIX) 20 MG TABLET    Take 1 tablet by mouth in the morning. LISINOPRIL (PRINIVIL;ZESTRIL) 20 MG TABLET    Take 1 tablet by mouth daily    METFORMIN (GLUCOPHAGE XR) 500 MG EXTENDED RELEASE TABLET    Take 2 tablets by mouth daily (with breakfast)    METOPROLOL SUCCINATE (TOPROL XL) 100 MG EXTENDED RELEASE TABLET    Take 1 tablet by mouth daily    NITROGLYCERIN (NITROSTAT) 0.4 MG SL TABLET    Place 1 tablet under the tongue every 5 minutes as needed for Chest pain up to max of 3 total doses. If no relief after 1 dose, call 911. ALLERGIES     is allergic to norvasc [amlodipine besylate] and other. FAMILY HISTORY     He indicated that the status of his maternal grandmother is unknown. He indicated that the status of his maternal grandfather is unknown. He indicated that the status of his paternal grandmother is unknown. He indicated that the status of his paternal grandfather is unknown. He indicated that the status of his paternal aunt is unknown. He indicated that the status of his paternal uncle is unknown.   family history includes Cancer in his paternal grandmother and paternal uncle; Diabetes in his maternal grandfather, maternal grandmother, paternal aunt, paternal grandfather, paternal grandmother, and paternal uncle. SOCIAL HISTORY      reports that he has never smoked. He has never used smokeless tobacco. He reports that he does not drink alcohol and does not use drugs. PHYSICAL EXAM     INITIAL VITALS:  height is 5' 10\" (1.778 m) and weight is 195 lb (88.5 kg). His oral temperature is 98.1 °F (36.7 °C). His blood pressure is 187/88 (abnormal) and his pulse is 66. His respiration is 15 and oxygen saturation is 96%. Physical Exam  Vitals reviewed. Constitutional:       General: He is not in acute distress. Appearance: He is not ill-appearing. Neck:      Vascular: No JVD.    Cardiovascular: Rate and Rhythm: Normal rate and regular rhythm. Heart sounds: Murmur heard. Pulmonary:      Effort: Pulmonary effort is normal.      Breath sounds: Normal breath sounds. Abdominal:      Palpations: Abdomen is soft. There is no hepatomegaly or splenomegaly. Musculoskeletal:      Cervical back: Neck supple. Right lower leg: No edema. Left lower leg: No edema. Skin:     General: Skin is dry. Findings: No erythema. Nails: There is no clubbing. Neurological:      General: No focal deficit present. Mental Status: He is alert. DIFFERENTIAL DIAGNOSIS:   CHF,ACS, complications associated with aortic stenosis,    DIAGNOSTIC RESULTS     EKG: All EKG's are interpreted by the Emergency Department Physician who either signs or Co-signs this chart in the absence of a cardiologist.  Normal sinus rhythm   Right bundle branch block   Left anterior fascicular block    Bifascicular block    LVH with repolarization abnormality ( R in aVL )   Abnormal ECG   When compared with ECG of 04-MAY-2022 11:12,   Sinus rhythm has replaced Atrial flutter   T wave inversion no longer evident in Anterior leads    RADIOLOGY: non-plain film images(s) such as CT, Ultrasound and MRI are read by the radiologist.  Chest Radiograph       Comparison: 4/21/22       Findings:   No cardiomegaly. Normal mediastinal contours. No pneumothorax. Interstitial prominence. No pleural effusion. Normal upper abdomen. No fracture. Impression   Impression:   Mild pulmonary edema. This document has been electronically signed by: May Saunders MD    on 08/26/2022 04:54 AM       LABS:   Labs Reviewed   CBC WITH AUTO DIFFERENTIAL - Abnormal; Notable for the following components:       Result Value    WBC 12.4 (*)     Hemoglobin 13.0 (*)     Hematocrit 39.4 (*)     MCV 78.8 (*)     MCH 26.0 (*)     All other components within normal limits   COMPREHENSIVE METABOLIC PANEL - Abnormal; Notable for the following components:    Glucose 159 (*)     BUN 22 (*)     All other components within normal limits   BRAIN NATRIURETIC PEPTIDE - Abnormal; Notable for the following components:    NT Pro-BNP 2305.0 (*)     All other components within normal limits   URINALYSIS - Abnormal; Notable for the following components:    Blood, Urine TRACE (*)     Protein, UA TRACE (*)     All other components within normal limits   GLOMERULAR FILTRATION RATE, ESTIMATED - Abnormal; Notable for the following components:    GFR, Estimated 60 (*)     All other components within normal limits   COVID-19, RAPID   TROPONIN   ANION GAP   URINALYSIS, MICRO       EMERGENCY DEPARTMENT COURSE:   Vitals:    Vitals:    08/26/22 0423 08/26/22 0425 08/26/22 0442 08/26/22 0457   BP: (!) 174/78  (!) 195/95 (!) 187/88   Pulse: 63 73 67 66   Resp: 19  12 15   Temp:       TempSrc:       SpO2: 96%  97% 96%   Weight:       Height:         Hypertensive. Vital signs otherwise stable. Pulse ox 97% on RA. Lungs clear. Prominent right sided murmer. EKG NSR, RBB. Labs, troponin normal limit. BNP 2305 compared with BNP of 8/1/22 of 886. Chest x ray show mild pulmonary edema. Lasix 20 mg IV given. Suggested admission based on complicated cardiac history. Patient declined. Will add lasix 20 mg twice daily of next few days. Recommended contacting cardiology today. Return to ED if symptoms of shortness of breath worsen. Nursing at bedside during discussion. I encouraged patient to return to ED if changes mind regarding admission. CRITICAL CARE:       CONSULTS:      PROCEDURES:  None    FINAL IMPRESSION      1. Acute on chronic combined systolic and diastolic congestive heart failure ([address]    2. Acute pulmonary edema (HCC)          DISPOSITION/PLAN   Home. Care instructions provided. Follow up with cardiology. Return to ED if increase shortness of breath.      PATIENT REFERRED TO:  Jerri Oakley MD  [facility] [place] [place] [id number] [phone]    Call today  If symptoms worsen      DISCHARGE MEDICATIONS:  New Prescriptions    No medications on file       (Please note that portions of this note were completed with a voice recognition program.  Efforts were made to edit the dictations but occasionally words are mis-transcribed.)    MD Swati Hatch MD  08/26/22 [id number]

## 2022-08-26 NOTE — ED TRIAGE NOTES
Pt comes into ER room 1 with some SHORTNESS of BREATH for the last 2 days worse when he lays down flat. The patient is concerned because he has a HX of heart disease, A-FIB, 3 stents, and CHF. He also says that he has had some increased leg edema and some weight gain as well. He also says that he has nasal congestion and he is plugged up. As he was placed into the bed and EKG done and IV line started. The patient thought that this was really all unnecessary  and that he has an appointment with his family doctor next week. Assessment as charted. Pt also has increased leg edema as well as some weight gain of 5 pounds or so.

## 2022-08-26 NOTE — ED NOTES
Pt stable A&O x 3 given discharge and follow up info. Pt voiced no concerns and discharged from ER to self to home. Pt ambulated out of ER with no complications .        Laisha Silva RN  08/26/22 0139

## 2022-08-26 NOTE — ED NOTES
Pt stable A&O x 3 given discharge and follow up info. Pt voiced no concerns and discharged from ER to self to home. Pt ambulated out of ER with no complications .        Ivon South RN  08/26/22 [id number]

## 2022-08-26 NOTE — ED NOTES
Dr. Yusef aPnchal RN and myself at bedside. Dr Natasha Yanes had a conversation at great length with the patient about his current state and Condition of CHF. The patient refused to be admitted. He was instructed to double his LASIX by taking 2 pills a day instead of just 1. He was also instructed to Call Dr. Kat Chisholm today. These instructions were also printed out and explained to him again at discharge as well.        Elmer Cantu RN  08/26/22 7622

## 2022-08-26 NOTE — CARE COORDINATION
Ambulatory Care Coordination Note  8/26/2022    ACC: Leonor Talamantes RN    Summary Note: Patient was called for ED f/u s/p [facility] ED visit earlier today for c/o worsening nocturnal SOB and swelling. Patient shared he is currently at [employer] for lunch and is unable to talk for very long. Patient stated he is feeling better today and he denied any questions re: his discharge instructions. Patient reported he is doubling his Lasix as instructed but is unsure of how long he is to do this - ACM will f/u with CHF clinic. Patient admits to not yet following up as directed and he was instructed to call cardiology as instructed and he denied any need for assistance. Patient is scheduled to f/u with PCP next week and he was educated on importance of keeping appointment as scheduled. ACM reviewed importance of ongoing heart healthy/DM diet and ongoing adherence. Patient acknowledged understanding. Patient stated his weight was 195.8 this AM.  Importance of ongoing weight and BS monitoring was also reviewed. Patient denied any other questions, concerns, or needs and she was encouraged to call with any that may develop.           Actions:   - Reviewed CHF and DM education and zone information   - Reviewed importance of routine weight and BS monitoring   - Reviewed signs/symptoms to report   - Reviewed importance of early symptom recognition and follow up   - Updated CHF clinic   - Monitored for questions re: [facility] ED discharge instruction   - Reviewed Lasix directions   - Reviewed importance of keeping appointments as scheduled   - Monitored for additional needs          Plan of Care:   - Continue with Care Coordination f/u calls for assistance with the management of his CHF/DM and healthcare needs  - Complete CHF education - In Process   - Complete DM education - In Process  - Educate on the availability of same day appointments, urgent care/walk in clinic options, and after hours on call resources - Completed - Review and verify Healthcare Decision Maker information - Completed  - Patient is do for Medicare AWV  - Patient is current with COVID-19 vaccine  - A1C 6.9% - May 2022  - Monitor for additional needs  - Monitor for readiness to discharge from [address] [place] [address]  ED Follow up Call    Reason for ED visit:  Worsening SOB and increased weight    Status:     improved    Did you call your PCP prior to going to the ED? No      Did you receive a discharge instructions from the Emergency Room? Yes  Review of Instructions:     Understands what to report/when to return?:  Yes   Understands discharge instructions?:  Yes   Following discharge instructions?:  Yes   If not why? N/A     Are there any new complaints of pain? No  New Pain Meds? N/A    Constipation prophylaxis needed? N/A    If you have a wound is the dressing clean, dry, and intact? N/A  Understands wound care regimen? N/A    Are there any other complaints/concerns that you wish to tell your provider? Denied    FU appts/Provider:    Future Appointments   Date Time Provider Satish Latif   9/1/2022  1:00 PM EMRE Elizondo CNP Fam Med [place]   9/7/2022  2:30 PM STR ECHO RM2 STRZ ECHO [place] HOD   10/12/2022 12:15 PM Archie Vick MD N SRPX Heart [place]   2/14/2023  2:00 PM EMRE Lugo CNP N SRPX CHF [place] - [place]       New Medications?:   No      Medication Reconciliation by phone - Patient currently not at home and unable to complete full Med Rec. Lasix instructions reviewed with patient. Understands Medications? Yes  Taking Medications? Yes  Can you swallow your pills? Yes    Any further needs in the home i.e. Equipment?   No    Link to services in community?:  N/A    Lab Results       None            Care Coordination Interventions    Referral from Primary Care Provider: No  Suggested Interventions and Community Resources  Diabetes Education: Completed (Comment: July 2022)  Disease Specific Clinic: Completed (Comment: Current with [facility] CHF clinic - July 2022)  Meals on Wheels: Declined (Comment: Denied need - July 2022)  Medication Assistance Program: Completed (Comment: Current with program for Eliquis - July 2022)  Medi Set or Pill Pack: Completed (Comment: sets up weekly - July 2022)  Palliative Care: Not Started  Pharmacist: Declined (Comment: July 2022 - Instructed to call if he changes his mind)  Registered Dietician: Declined (Comment: July 2022)  Social Work: Declined (Comment: Denied any resource needs at this time - July 2022)  Transportation Support: Declined  Zone Management Tools: Completed (Comment: CHF/DM - July/Aug. 2022)  Other Services or Interventions: Pt. follows with STR CHF clinic and cardiology          Goals Addressed                   This Visit's Progress       Care Coordination     Conditions and Symptoms   Worsening     I will schedule office visits, as directed by my provider. I will keep my appointment or reschedule if I have to cancel. I will notify my provider of any barriers to my plan of care. I will follow my Zone Management tool to seek urgent or emergent care. I will notify my provider of any symptoms that indicate a worsening of my condition. Barriers: overwhelmed by complexity of regimen, stress, and lack of education  Plan for overcoming my barriers: Ongoing education and monitoring of resource needs   Confidence: 8/10  Anticipated Goal Completion Date: 10/15/2022                Prior to Admission medications    Medication Sig Start Date End Date Taking? Authorizing Provider   clopidogrel (PLAVIX) 75 MG tablet Take 1 tablet by mouth in the morning. 8/4/22   Colton Nolen MD   atorvastatin (LIPITOR) 40 MG tablet Take 1 tablet by mouth nightly 8/4/22   Colton Nolen MD   furosemide (LASIX) 20 MG tablet Take 1 tablet by mouth in the morning.  8/3/22   Florentin Mendez APRN - CNP   metoprolol succinate (TOPROL XL) 100 MG extended release tablet Take 1 tablet by mouth daily 7/11/22   EMRE Perrin CNP   metFORMIN (GLUCOPHAGE XR) 500 MG extended release tablet Take 2 tablets by mouth daily (with breakfast) 6/14/22 9/12/22  Angely Pérez MD   amiodarone (CORDARONE) 200 MG tablet Take 1 tablet by mouth daily 6/8/22   EMRE Perrin CNP   lisinopril (PRINIVIL;ZESTRIL) 20 MG tablet Take 1 tablet by mouth daily 5/18/22   EMRE Perrin CNP   fluticasone (FLONASE) 50 MCG/ACT nasal spray 1 spray by Each Nostril route daily  Patient not taking: Reported on 7/11/2022 5/6/22   Светлана Dietrich MD   nitroGLYCERIN (NITROSTAT) 0.4 MG SL tablet Place 1 tablet under the tongue every 5 minutes as needed for Chest pain up to max of 3 total doses. If no relief after 1 dose, call 911. 5/5/22   Lela Galdamez PA-C   apixaban (ELIQUIS) 2.5 MG TABS tablet Take 1 tablet by mouth 2 times daily 4/23/22   Esther Galeano, DO       Future Appointments   Date Time Provider Satish Latif   9/1/2022  1:00 PM EMRE Harmon CNP Fam Med CG [facility] [place]   9/7/2022  2:30 PM STR ECHO RM2 STRZ ECHO [place] H[facility]   10/12/2022 12:15 PM Greg Goode [facility] [place]   2/14/2023  2:00 PM EMRE Perrin CNP [facility] [place]   ,   Diabetes Assessment    Medic Alert ID: No  Meal Planning: Avoidance of concentrated sweets   How often do you test your blood sugar?: No Testing   Do you have barriers with adherence to non-pharmacologic self-management interventions?  (Nutrition/Exercise/Self-Monitoring): No   Have you ever had to go to the ED for symptoms of low blood sugar?: No       No patient-reported symptoms        ,   Congestive Heart Failure Assessment    Are you currently restricting fluids?: 2000cc (Comment: per STR CHF clinic - July 2022)  Do you understand a low sodium diet?: Yes  Do you understand how to read food labels?: No  Do you salt your food before tasting it?: No     Swelling (worse than baseline) in hands, feet/legs or around abdomen, Shortness of breath (worse than baseline)      Symptoms:  CHF associated dyspnea on exertion: Pos, CHF associated leg swelling: Pos, CHF associated shortness of breath: Pos      Symptom course: no change  Patient-reported weight (lb): 195.8  Weight trend: fluctuating minimally  Salt intake watch compared to last visit: stable     ,   General Assessment    Do you have any symptoms that are causing concern?: Yes  Progression since Onset: Gradually Improving  Reported Symptoms: Shortness of Breath, Weight Gain     , and Care Coordination Episodes    Type: Amb Care Management  Episode: Complex Care  Noted: 7/15/2022  Comments: List referral - DM/CHF

## 2022-08-26 NOTE — CARE COORDINATION
Providers:  Please see note below. Patient with recent [facility] ED visit d/t c/o increased SOB at night as well as some weight gain. Patient stated weight was 195.8 today (was 194.6 2 days ago during CC call). Patient was instructed to take 20 mg Lasix BID at ED but he is unsure how long he is to do this or if he is to continue with new dose. Patient is scheduled to see PCP 9/1. Cardiology appointment scheduled for Oct. and CHF clinic early next year. Please advise. Thank you!

## 2022-08-29 ENCOUNTER — TELEPHONE (OUTPATIENT)
Dept: FAMILY MEDICINE CLINIC | Age: 87
End: 2022-08-29

## 2022-08-29 NOTE — TELEPHONE ENCOUNTER
Pt called to let us know he was in urgent care for CHF     Urgent care increased his lasix to BID   He was told to fu with CHF clinic to see how long he should continue this or if something else needs changed

## 2022-08-29 NOTE — CARE COORDINATION
Looks like Cristine Lewis responded and is calling pt. I will check him on Thursday when he has appt.

## 2022-08-29 NOTE — TELEPHONE ENCOUNTER
Per pt wt down to 189.3 and SOB better  He will cont lasix twice daily and get labs on 9/7 when he comes for his echo

## 2022-09-01 ENCOUNTER — OFFICE VISIT (OUTPATIENT)
Dept: FAMILY MEDICINE CLINIC | Age: 87
End: 2022-09-01
Payer: MEDICARE

## 2022-09-01 VITALS
BODY MASS INDEX: 27.69 KG/M2 | OXYGEN SATURATION: 98 % | DIASTOLIC BLOOD PRESSURE: 72 MMHG | SYSTOLIC BLOOD PRESSURE: 138 MMHG | HEART RATE: 59 BPM | WEIGHT: 193 LBS

## 2022-09-01 DIAGNOSIS — I50.22 CHRONIC SYSTOLIC (CONGESTIVE) HEART FAILURE (HCC): ICD-10-CM

## 2022-09-01 DIAGNOSIS — I10 ESSENTIAL HYPERTENSION: ICD-10-CM

## 2022-09-01 DIAGNOSIS — I48.0 PAROXYSMAL ATRIAL FIBRILLATION (HCC): ICD-10-CM

## 2022-09-01 DIAGNOSIS — E11.22 TYPE 2 DIABETES MELLITUS WITH STAGE 3A CHRONIC KIDNEY DISEASE, WITHOUT LONG-TERM CURRENT USE OF INSULIN (HCC): Primary | ICD-10-CM

## 2022-09-01 DIAGNOSIS — N18.31 TYPE 2 DIABETES MELLITUS WITH STAGE 3A CHRONIC KIDNEY DISEASE, WITHOUT LONG-TERM CURRENT USE OF INSULIN (HCC): Primary | ICD-10-CM

## 2022-09-01 LAB — HBA1C MFR BLD: 6.7 %

## 2022-09-01 PROCEDURE — 99214 OFFICE O/P EST MOD 30 MIN: CPT | Performed by: NURSE PRACTITIONER

## 2022-09-01 PROCEDURE — G8427 DOCREV CUR MEDS BY ELIG CLIN: HCPCS | Performed by: NURSE PRACTITIONER

## 2022-09-01 PROCEDURE — G8417 CALC BMI ABV UP PARAM F/U: HCPCS | Performed by: NURSE PRACTITIONER

## 2022-09-01 PROCEDURE — 3044F HG A1C LEVEL LT 7.0%: CPT | Performed by: NURSE PRACTITIONER

## 2022-09-01 PROCEDURE — 1123F ACP DISCUSS/DSCN MKR DOCD: CPT | Performed by: NURSE PRACTITIONER

## 2022-09-01 PROCEDURE — 83036 HEMOGLOBIN GLYCOSYLATED A1C: CPT | Performed by: NURSE PRACTITIONER

## 2022-09-01 PROCEDURE — 1036F TOBACCO NON-USER: CPT | Performed by: NURSE PRACTITIONER

## 2022-09-01 RX ORDER — FUROSEMIDE 20 MG/1
20 TABLET ORAL 2 TIMES DAILY
Qty: 180 TABLET | Refills: 1 | Status: SHIPPED | OUTPATIENT
Start: 2022-09-01

## 2022-09-01 RX ORDER — AMIODARONE HYDROCHLORIDE 200 MG/1
200 TABLET ORAL DAILY
Qty: 90 TABLET | Refills: 1 | Status: SHIPPED | OUTPATIENT
Start: 2022-09-01

## 2022-09-01 RX ORDER — ATORVASTATIN CALCIUM 40 MG/1
40 TABLET, FILM COATED ORAL NIGHTLY
Qty: 90 TABLET | Refills: 1 | Status: SHIPPED | OUTPATIENT
Start: 2022-09-01

## 2022-09-01 RX ORDER — CLOPIDOGREL BISULFATE 75 MG/1
75 TABLET ORAL DAILY
Qty: 90 TABLET | Refills: 1 | Status: SHIPPED | OUTPATIENT
Start: 2022-09-01

## 2022-09-01 RX ORDER — LISINOPRIL 20 MG/1
20 TABLET ORAL DAILY
Qty: 90 TABLET | Refills: 1 | Status: SHIPPED | OUTPATIENT
Start: 2022-09-01

## 2022-09-01 NOTE — PROGRESS NOTES
Angelic Jeronimo (:  [id number]) is a 80 y.o. male,Established patient, here for evaluation of the following chief complaint(s):  Follow-up         ASSESSMENT/PLAN:  1. Type 2 diabetes mellitus with stage 3a chronic kidney disease, without long-term current use of insulin (HCC)  -     POCT glycosylated hemoglobin (Hb A1C)  2. Essential hypertension  -     lisinopril (PRINIVIL;ZESTRIL) 20 MG tablet; Take 1 tablet by mouth daily, Disp-90 tablet, R-1Normal  3. Chronic systolic (congestive) heart failure  4. Paroxysmal atrial fibrillation (HCC)    Meds refilled  Be active  DM controlled with A1C 6.7  HTN stable on current meds  Follow up with CHF clinic as planned and obtain blood work next week  Monitor weights closely  Cont with lasix 20 mg BID  Follow up with cardio as planned for afib and aortic valve stenosis    Return in about 3 months (around 2022). Subjective   SUBJECTIVE/OBJECTIVE:  HPI    Weight steady. No sob. Improved from . Taking lasix 2 times daily. No potassium currently. Getting blood work shortly. Nesbitt have hx of aortic valve stenosis. Afib within last year. On eliquis and amiodarone. Occasional leg swelling when sitting at his computer for long period of time. Denies cp or sob currently. States does not watch diet at all. Is active but no additional exercise. A1c today 6.7.   is on lipitor for lipids. Htn has been stable on current meds. Wt Readings from Last 3 Encounters:   22 193 lb (87.5 kg)   22 195 lb (88.5 kg)   22 199 lb 9.6 oz (90.5 kg)         Review of Systems   Constitutional:  Negative for activity change, appetite change, chills, diaphoresis, fatigue, fever and unexpected weight change. HENT:  Negative for congestion, ear pain, postnasal drip, sinus pressure and sore throat. Eyes:  Negative for visual disturbance. Respiratory:  Negative for cough, chest tightness, shortness of breath, wheezing and stridor. Cardiovascular:  Negative for chest pain, palpitations and leg swelling. Gastrointestinal:  Negative for abdominal distention, abdominal pain, constipation, diarrhea, nausea and vomiting. Endocrine: Negative for polydipsia, polyphagia and polyuria. Genitourinary:  Negative for decreased urine volume, difficulty urinating, dysuria, flank pain, frequency, hematuria and urgency. Musculoskeletal:  Negative for arthralgias, back pain, gait problem, joint swelling, myalgias and neck pain. Skin:  Negative for color change, pallor and rash. Neurological:  Negative for dizziness, syncope, weakness, light-headedness, numbness and headaches. Hematological:  Negative for adenopathy. Psychiatric/Behavioral:  Negative for behavioral problems, self-injury and sleep disturbance. The patient is not nervous/anxious. Objective   Physical Exam  Vitals reviewed. Constitutional:       General: He is not in acute distress. Appearance: Normal appearance. He is well-developed. HENT:      Head: Normocephalic. Right Ear: Tympanic membrane and external ear normal.      Left Ear: Tympanic membrane and external ear normal.      Nose: Nose normal.      Right Sinus: No maxillary sinus tenderness. Left Sinus: No maxillary sinus tenderness. Mouth/Throat:      Pharynx: Uvula midline. Neck:      Trachea: Trachea normal.   Cardiovascular:      Rate and Rhythm: Normal rate and regular rhythm. Heart sounds: Murmur heard. Pulmonary:      Effort: Pulmonary effort is normal. No respiratory distress. Breath sounds: Normal breath sounds. No decreased breath sounds, wheezing, rhonchi or rales. Chest:      Chest wall: No tenderness. Abdominal:      General: There is no distension. Palpations: Abdomen is soft. There is no mass. Tenderness: There is no abdominal tenderness. Musculoskeletal:         General: No tenderness or deformity. Normal range of motion.       Cervical back: Normal range of motion and neck supple. Lymphadenopathy:      Cervical: No cervical adenopathy. Skin:     General: Skin is warm and dry. Neurological:      Mental Status: He is alert and oriented to person, place, and time. Motor: No abnormal muscle tone. Coordination: Coordination normal.      Gait: Gait normal.   Psychiatric:         Mood and Affect: Mood normal.         Behavior: Behavior normal.         Thought Content: Thought content normal.         Judgment: Judgment normal.          On this date 9/1/2022 I have spent 32 minutes reviewing previous notes, test results and face to face with the patient discussing the diagnosis and importance of compliance with the treatment plan as well as documenting on the day of the visit. An electronic signature was used to authenticate this note.     --EMRE Ferrari - CNP

## 2022-09-05 ASSESSMENT — ENCOUNTER SYMPTOMS
COLOR CHANGE: 0
NAUSEA: 0
DIARRHEA: 0
CONSTIPATION: 0
SHORTNESS OF BREATH: 0
WHEEZING: 0
VOMITING: 0
ABDOMINAL DISTENTION: 0
SINUS PRESSURE: 0
COUGH: 0
STRIDOR: 0
CHEST TIGHTNESS: 0
BACK PAIN: 0
ABDOMINAL PAIN: 0
SORE THROAT: 0

## 2022-09-07 ENCOUNTER — CARE COORDINATION (OUTPATIENT)
Dept: CARE COORDINATION | Age: 87
End: 2022-09-07

## 2022-09-07 ENCOUNTER — HOSPITAL ENCOUNTER (OUTPATIENT)
Dept: NON INVASIVE DIAGNOSTICS | Age: 87
Discharge: HOME | End: 2022-09-07
Payer: MEDICARE

## 2022-09-07 DIAGNOSIS — I50.22 CHF (CONGESTIVE HEART FAILURE), NYHA CLASS II, CHRONIC, SYSTOLIC (HCC): ICD-10-CM

## 2022-09-07 LAB
LV EF: 48 %
LVEF MODALITY: NORMAL

## 2022-09-07 PROCEDURE — 93306 TTE W/DOPPLER COMPLETE: CPT

## 2022-09-07 NOTE — CARE COORDINATION
Ambulatory Care Coordination Note  9/7/2022    ACC: Kendall Rahman RN    Summary Note: Patient was called for continued Care Coordination follow up and education re: the management of his CHF, DM, and healthcare needs. Patient shared he is feeling better and his breathing and swelling have returned to his  baseline. Patient reported he continues to take Lasix as recently directed. ACM reviewed need to have f/u labs completed as ordered and he verbalized understanding. Patient is scheduled for OP echo today and patient is aware. Patient denied any questions re: recent PCP visit. CHF education and zone information was reviewed in detail with patient. Patient was reminded of the importance of ongoing weight monitoring as well as signs/symptoms to report and he verbalized understanding. ACM reviewed importance of early symptom recognition and follow up to help prevent unplanned ED/hospital visits and patient acknowledged understanding. Patient stated weight was 190 today. Patient denied any c/o hypoglycemia/hyperglycemia being present, and DM education was briefly reviewed. Patient denied any other questions, concerns, or needs and he was encouraged to call with any that bryan develop.            Actions:   - Reviewed CHF and DM zone education  - Monitored for improvement of recent symptoms   - Reviewed recent Lasix instructions   - Reviewed signs/symptoms to report and importance of early symptom recognition and follow up   - Reviewed importance of ongoing weight monitoring   - Reviewed recent echo and lab instructions  - Monitored for questions re: recent PCP visit   - Monitored for additional needs          Plan of Care:   - Continue with Care Coordination f/u calls for assistance with the management of his CHF/DM and healthcare needs  - Complete CHF education - In Process   - Complete DM education - In Process  - Educate on the availability of same day appointments, urgent care/walk in clinic options, and after hours on call resources - Completed   - Review and verify Healthcare Decision Maker information - Completed  - Patient is do for Medicare AWV  - Patient is current with COVID-19 vaccine  - A1C 6.9% - May 2022  - Monitor for additional needs  - Monitor for readiness to discharge from Care Coordination      Lab Results       None            Care Coordination Interventions    Referral from Primary Care Provider: No  Suggested Interventions and Community Resources  Diabetes Education: Completed (Comment: July 2022)  Disease Specific Clinic: Completed (Comment: Current with STR CHF clinic - July 2022)  Meals on Wheels: Declined (Comment: Denied need - July 2022)  Medication Assistance Program: Completed (Comment: Current with program for Eliquis - July 2022)  Medi Set or Pill Pack: Completed (Comment: sets up weekly - July 2022)  Palliative Care: Not Started  Pharmacist: Declined (Comment: July 2022 - Instructed to call if he changes his mind)  Registered Dietician: Declined (Comment: July 2022)  Social Work: Declined (Comment: Denied any resource needs at this time - July 2022)  Transportation Support: Declined  Zone Management Tools: Completed (Comment: CHF/DM - July/Aug. 2022)  Other Services or Interventions: Pt. follows with STR CHF clinic and cardiology          Goals Addressed                   This Visit's Progress       Care Coordination     Conditions and Symptoms   Improving     I will schedule office visits, as directed by my provider. I will keep my appointment or reschedule if I have to cancel. I will notify my provider of any barriers to my plan of care. I will follow my Zone Management tool to seek urgent or emergent care. I will notify my provider of any symptoms that indicate a worsening of my condition.     Barriers: overwhelmed by complexity of regimen, stress, and lack of education  Plan for overcoming my barriers: Ongoing education and monitoring of resource needs   Confidence: 8/10  Anticipated Goal Completion Date: 10/15/2022                Prior to Admission medications    Medication Sig Start Date End Date Taking? Authorizing Provider   lisinopril (PRINIVIL;ZESTRIL) 20 MG tablet Take 1 tablet by mouth daily 9/1/22   EMRE Ojeda CNP   atorvastatin (LIPITOR) 40 MG tablet Take 1 tablet by mouth nightly 9/1/22   EMRE Ojeda CNP   clopidogrel (PLAVIX) 75 MG tablet Take 1 tablet by mouth daily 9/1/22   EMRE Ojeda CNP   amiodarone (CORDARONE) 200 MG tablet Take 1 tablet by mouth daily 9/1/22   EMRE Ojeda CNP   furosemide (LASIX) 20 MG tablet Take 1 tablet by mouth 2 times daily 9/1/22   EMRE Ojeda CNP   metoprolol succinate (TOPROL XL) 100 MG extended release tablet Take 1 tablet by mouth daily 7/11/22   EMRE Villatoro CNP   metFORMIN (GLUCOPHAGE XR) 500 MG extended release tablet Take 2 tablets by mouth daily (with breakfast) 6/14/22 9/12/22  Gokul Baker MD   fluticasone (FLONASE) 50 MCG/ACT nasal spray 1 spray by Each Nostril route daily  Patient not taking: No sig reported 5/6/22   Hesham Gunn MD   nitroGLYCERIN (NITROSTAT) 0.4 MG SL tablet Place 1 tablet under the tongue every 5 minutes as needed for Chest pain up to max of 3 total doses.  If no relief after 1 dose, call 911. 5/5/22   Destini Yu PA-C   apixaban (ELIQUIS) 2.5 MG TABS tablet Take 1 tablet by mouth 2 times daily 4/23/22   Shira Burkett,        Future Appointments   Date Time Provider Satish Latif   9/7/2022  2:30 PM STR ECHO RM2 STR[facility] ECHO [place]   10/12/2022 12:15 PM Guzman Guerin MD N SRPX Heart [facility] [place]   12/8/2022  1:00 PM EMRE Ojeda CNP Fam Med CG [facility] [place]   2/14/2023  2:00 PM Ravindra Alves, EMRE - CNP N SRPX CHF [facility] [place]   ,   Diabetes Assessment    Medic Alert ID: No  Meal Planning: Avoidance of concentrated sweets   How often do you test your blood sugar?: No Testing   Do you have barriers with adherence to non-pharmacologic self-management interventions?  (Nutrition/Exercise/Self-Monitoring): No   Have you ever had to go to the ED for symptoms of low blood sugar?: No       No patient-reported symptoms   Do you have hyperglycemia symptoms?: No   Do you have hypoglycemia symptoms?: No   Blood Sugar Monitoring Regimen: Not Testing   Blood Sugar Trends: No Change        ,   Congestive Heart Failure Assessment    Are you currently restricting fluids?: 2000cc (Comment: per STR CHF clinic - July 2022)  Do you understand a low sodium diet?: Yes  Do you understand how to read food labels?: No  Do you salt your food before tasting it?: No     No patient-reported symptoms      Symptoms:  CHF associated dyspnea on exertion: Neg, CHF associated leg swelling: Neg, CHF associated shortness of breath: Neg      Symptom course: improving  Patient-reported weight (lb): 190  Weight trend: stable  Salt intake watch compared to last visit: stable     ,   General Assessment    Do you have any symptoms that are causing concern?: No     , and Care Coordination Episodes    Type: Amb Care Management  Episode: Complex Care  Noted: 7/15/2022  Comments: List referral - DM/CHF

## 2022-09-08 ENCOUNTER — TELEPHONE (OUTPATIENT)
Dept: CARDIOLOGY CLINIC | Age: 87
End: 2022-09-08

## 2022-09-08 ENCOUNTER — HOSPITAL ENCOUNTER (OUTPATIENT)
Age: 87
Discharge: HOME | End: 2022-09-08
Payer: MEDICARE

## 2022-09-08 DIAGNOSIS — I50.22 CHF (CONGESTIVE HEART FAILURE), NYHA CLASS II, CHRONIC, SYSTOLIC (HCC): ICD-10-CM

## 2022-09-08 DIAGNOSIS — I35.0 NONRHEUMATIC AORTIC VALVE STENOSIS: Primary | ICD-10-CM

## 2022-09-08 LAB
ANION GAP SERPL CALCULATED.3IONS-SCNC: 8 MEQ/L (ref 8–16)
BUN BLDV-MCNC: 20 MG/DL (ref 7–22)
CALCIUM SERPL-MCNC: 9.1 MG/DL (ref 8.5–10.5)
CHLORIDE BLD-SCNC: 101 MEQ/L (ref 98–111)
CO2: 30 MEQ/L (ref 23–33)
CREAT SERPL-MCNC: 1.1 MG/DL (ref 0.4–1.2)
GFR SERPL CREATININE-BSD FRML MDRD: 63 ML/MIN/1.73M2
GLUCOSE BLD-MCNC: 119 MG/DL (ref 70–108)
POTASSIUM SERPL-SCNC: 4.4 MEQ/L (ref 3.5–5.2)
PRO-BNP: 1336 PG/ML (ref 0–1800)
SODIUM BLD-SCNC: 139 MEQ/L (ref 135–145)

## 2022-09-08 PROCEDURE — 80048 BASIC METABOLIC PNL TOTAL CA: CPT

## 2022-09-08 PROCEDURE — 36415 COLL VENOUS BLD VENIPUNCTURE: CPT

## 2022-09-08 PROCEDURE — 83880 ASSAY OF NATRIURETIC PEPTIDE: CPT

## 2022-09-08 NOTE — TELEPHONE ENCOUNTER
----- Message from EMRE Aguilar - CNP sent at 9/8/2022 12:34 PM EDT -----  Call and let pt know ECHO showing worsening AS, spoke w/ Rosaura Rojas.    Needs DSE

## 2022-09-09 NOTE — TELEPHONE ENCOUNTER
Updated patient and he would like to have an appointment with Dr. Margie Quiroz to discuss. Appointment made.

## 2022-09-11 ENCOUNTER — HOSPITAL ENCOUNTER (EMERGENCY)
Age: 87
Discharge: HOME | End: 2022-09-11
Attending: FAMILY MEDICINE
Payer: MEDICARE

## 2022-09-11 VITALS
HEART RATE: 60 BPM | SYSTOLIC BLOOD PRESSURE: 188 MMHG | WEIGHT: 190 LBS | BODY MASS INDEX: 27.2 KG/M2 | RESPIRATION RATE: 16 BRPM | TEMPERATURE: 98.3 F | OXYGEN SATURATION: 98 % | DIASTOLIC BLOOD PRESSURE: 79 MMHG | HEIGHT: 70 IN

## 2022-09-11 DIAGNOSIS — K08.89 PAIN, DENTAL: Primary | ICD-10-CM

## 2022-09-11 DIAGNOSIS — I10 ESSENTIAL HYPERTENSION: ICD-10-CM

## 2022-09-11 PROCEDURE — 99283 EMERGENCY DEPT VISIT LOW MDM: CPT

## 2022-09-11 RX ORDER — AMOXICILLIN 500 MG/1
500 CAPSULE ORAL 3 TIMES DAILY
Qty: 30 CAPSULE | Refills: 0 | Status: SHIPPED | OUTPATIENT
Start: 2022-09-11 | End: 2022-09-21

## 2022-09-11 ASSESSMENT — PAIN SCALES - GENERAL: PAINLEVEL_OUTOF10: 3

## 2022-09-11 ASSESSMENT — ENCOUNTER SYMPTOMS
VOMITING: 0
EYE DISCHARGE: 0
SORE THROAT: 0
NAUSEA: 0
EYE REDNESS: 0

## 2022-09-11 ASSESSMENT — PAIN - FUNCTIONAL ASSESSMENT: PAIN_FUNCTIONAL_ASSESSMENT: 0-10

## 2022-09-11 NOTE — ED NOTES
Pt complains of a loose tooth and jaw pain, pain started a week or so ago. Pt states he has a dentist appt this Thursday. Pt denies fever, vomiting or diarrhea. Pt alert, resp even and unlabored, skin pink, warm and dry.      Jeanette Rios RN  09/11/22 5521

## 2022-09-11 NOTE — ED NOTES
Pt resting, resp even and unlabored, skin pink, warm and dry. Pt given discharge instructions and verbalizes understanding, pt released.      Cara Donaldson RN  09/11/22 2509

## 2022-09-11 NOTE — ED PROVIDER NOTES
[facility]  eMERGENCY dEPARTMENT eNCOUnter          CHIEF COMPLAINT       Chief Complaint   Patient presents with    Dental Pain       Nurses Notes reviewed and I agree except as noted in the HPI. HISTORY OF PRESENT ILLNESS    Cris Ferguson is a 80 y.o. male who presents with left upper tooth pain. Duration of symptoms at least few days. Denies fever,chills. Notes no OTC alleviating measures. REVIEW OF SYSTEMS     Review of Systems   Constitutional:  Negative for chills and fever. HENT:  Positive for dental problem. Negative for ear pain and sore throat. Eyes:  Negative for discharge and redness. Gastrointestinal:  Negative for nausea and vomiting. Psychiatric/Behavioral:  Negative for agitation and behavioral problems. All other systems reviewed and are negative. PAST MEDICAL HISTORY    has a past medical history of Atrial fibrillation ([facility] [address] CHF (congestive heart failure) ([facility] [address] Diabetes mellitus ([facility] [address] Hypertension, and Type II or unspecified type diabetes mellitus without mention of complication, not stated as uncontrolled. SURGICAL HISTORY      has a past surgical history that includes Tonsillectomy and adenoidectomy (Bilateral).     CURRENT MEDICATIONS       Discharge Medication List as of 9/11/2022  3:16 PM        CONTINUE these medications which have NOT CHANGED    Details   lisinopril (PRINIVIL;ZESTRIL) 20 MG tablet Take 1 tablet by mouth daily, Disp-90 tablet, R-1Normal      atorvastatin (LIPITOR) 40 MG tablet Take 1 tablet by mouth nightly, Disp-90 tablet, R-1Normal      clopidogrel (PLAVIX) 75 MG tablet Take 1 tablet by mouth daily, Disp-90 tablet, R-1Normal      amiodarone (CORDARONE) 200 MG tablet Take 1 tablet by mouth daily, Disp-90 tablet, R-1Normal      furosemide (LASIX) 20 MG tablet Take 1 tablet by mouth 2 times daily, Disp-180 tablet, R-1Normal      metoprolol succinate (TOPROL XL) 100 MG extended release tablet Take 1 tablet by mouth daily, Disp-90 tablet, R-3Normal      metFORMIN (GLUCOPHAGE XR) 500 MG extended release tablet Take 2 tablets by mouth daily (with breakfast), Disp-180 tablet, R-3Normal      fluticasone (FLONASE) 50 MCG/ACT nasal spray 1 spray by Each Nostril route daily, Disp-16 g, R-0Normal      nitroGLYCERIN (NITROSTAT) 0.4 MG SL tablet Place 1 tablet under the tongue every 5 minutes as needed for Chest pain up to max of 3 total doses. If no relief after 1 dose, call 911., Disp-25 tablet, R-3Normal      apixaban (ELIQUIS) 2.5 MG TABS tablet Take 1 tablet by mouth 2 times daily, Disp-60 tablet, R-3Normal             ALLERGIES     is allergic to norvasc [amlodipine besylate] and other. FAMILY HISTORY     He indicated that the status of his maternal grandmother is unknown. He indicated that the status of his maternal grandfather is unknown. He indicated that the status of his paternal grandmother is unknown. He indicated that the status of his paternal grandfather is unknown. He indicated that the status of his paternal aunt is unknown. He indicated that the status of his paternal uncle is unknown.   family history includes Cancer in his paternal grandmother and paternal uncle; Diabetes in his maternal grandfather, maternal grandmother, paternal aunt, paternal grandfather, paternal grandmother, and paternal uncle. SOCIAL HISTORY      reports that he has never smoked. He has never used smokeless tobacco. He reports that he does not drink alcohol and does not use drugs. PHYSICAL EXAM     INITIAL VITALS:  height is 5' 10\" (1.778 m) and weight is 190 lb (86.2 kg). His oral temperature is 98.3 °F (36.8 °C). His blood pressure is 188/79 (abnormal) and his pulse is 60. His respiration is 16 and oxygen saturation is 98%. Physical Exam  Vitals and nursing note reviewed. Constitutional:       General: He is not in acute distress. Appearance: He is not ill-appearing.    HENT:      Nose: Nose normal.      Mouth/Throat: Dentition: Abnormal dentition. No gingival swelling or dental abscesses. Pharynx: Oropharynx is clear. Uvula midline. Comments: Left incisor necrotic tooth. Eyes:      Conjunctiva/sclera: Conjunctivae normal.      Pupils: Pupils are equal, round, and reactive to light. Neck:      Trachea: Trachea and phonation normal.   Musculoskeletal:      Cervical back: Full passive range of motion without pain, normal range of motion and neck supple. Lymphadenopathy:      Cervical: No cervical adenopathy. Neurological:      Mental Status: He is alert. DIFFERENTIAL DIAGNOSIS:   Dental decay,tooth necrosis     DIAGNOSTIC RESULTS     EKG: All EKG's are interpreted by the Emergency Department Physician who either signs or Co-signs this chart in the absence of a cardiologist.      RADIOLOGY: non-plain film images(s) such as CT, Ultrasound and MRI are read by the radiologist.    LABS:   Labs Reviewed - No data to display    EMERGENCY DEPARTMENT COURSE:   Vitals:    Vitals:    09/11/22 1449 09/11/22 1453   BP: (!) 196/84 (!) 188/79   Pulse: 60    Resp: 16    Temp: 98.3 °F (36.8 °C)    TempSrc: Oral    SpO2: 98%    Weight: 190 lb (86.2 kg)    Height: 5' 10\" (1.778 m)      Extensive upper denture decay. No mucosal swelling. No regional adenopathy. Will cover mouth nadia with antibiotic. Blood pressure elevated. Patient advised to follow up with PCP and monitor blood pressure as outpatient. Care instructions provided. PROCEDURES:  None    FINAL IMPRESSION      1. Pain, dental    2. Essential hypertension          DISPOSITION/PLAN   Home. Care instructions provided. Follow up with PCP regarding blood pressure. Follow up with dentist regarding teeth.      PATIENT REFERRED TO:  Gato George, APRN - CNP  [address] [place] [address] [phone]    Call in 1 day  If symptoms worsen, As needed      DISCHARGE MEDICATIONS:  Discharge Medication List as of 9/11/2022  3:16 PM        START taking these medications    Details   amoxicillin (AMOXIL) 500 MG capsule Take 1 capsule by mouth 3 times daily for 10 days, Disp-30 capsule, R-0Normal             (Please note that portions of this note were completed with a voice recognition program.  Efforts were made to edit the dictations but occasionally words are mis-transcribed.)    MD Tasha Holguin MD  09/11/22 1600

## 2022-09-12 ENCOUNTER — CARE COORDINATION (OUTPATIENT)
Dept: CARE COORDINATION | Age: 87
End: 2022-09-12

## 2022-09-12 ENCOUNTER — TELEPHONE (OUTPATIENT)
Dept: FAMILY MEDICINE CLINIC | Age: 87
End: 2022-09-12

## 2022-09-12 NOTE — CARE COORDINATION
Ambulatory Care Coordination  ED Follow up Call    Reason for ED visit:  dental pain 9/11   Status:     improved    Did you call your PCP prior to going to the ED? Yes      Did you receive a discharge instructions from the Emergency Room? Yes  Review of Instructions:     Understands what to report/when to return?:  Yes   Understands discharge instructions?:  Yes   Following discharge instructions?:  Yes   If not why? N/a    Are there any new complaints of pain? No  New Pain Meds? No    Constipation prophylaxis needed? N/A    If you have a wound is the dressing clean, dry, and intact? N/A  Understands wound care regimen? N/A    Are there any other complaints/concerns that you wish to tell your provider? no    FU appts/Provider:    Future Appointments   Date Time Provider Satish Latif   9/13/2022  8:30 AM Gage Kwon MD N SRPX Heart [facility]   9/21/2022  2:30 PM STR ECHO RM1 STRZ ECHO [place] [facility]   12/8/2022  1:00 PM Ignacio Acuña, APRN - CNP Fam Med CG [facility]   2/14/2023  2:00 PM Dileep Montague APRN - CNP N SRPX CHF [facility]           New Medications?:   Yes Amoxicillin     Medication Reconciliation by phone - no-new medication reviewed. Understands Medications? Yes  Taking Medications? Yes  Can you swallow your pills? Yes    Any further needs in the home i.e. Equipment? No    Link to services in community?:  No   Which services:  n/a  Spoke with [facility] today. Pt obtained atb and is aware to take it until it's gone. Reports that pain primarily in neck making it feel stiff. All other pain is gone. Has appt with new dentist on 9/14/22 for evaluation of tooth.

## 2022-09-13 ENCOUNTER — OFFICE VISIT (OUTPATIENT)
Dept: CARDIOLOGY CLINIC | Age: 87
End: 2022-09-13
Payer: MEDICARE

## 2022-09-13 VITALS
HEIGHT: 70 IN | HEART RATE: 60 BPM | WEIGHT: 190 LBS | BODY MASS INDEX: 27.2 KG/M2 | DIASTOLIC BLOOD PRESSURE: 74 MMHG | SYSTOLIC BLOOD PRESSURE: 152 MMHG

## 2022-09-13 DIAGNOSIS — I50.22 CHRONIC SYSTOLIC (CONGESTIVE) HEART FAILURE (HCC): ICD-10-CM

## 2022-09-13 DIAGNOSIS — I35.0 NONRHEUMATIC AORTIC VALVE STENOSIS: Primary | ICD-10-CM

## 2022-09-13 PROCEDURE — G8417 CALC BMI ABV UP PARAM F/U: HCPCS | Performed by: INTERNAL MEDICINE

## 2022-09-13 PROCEDURE — 1036F TOBACCO NON-USER: CPT | Performed by: INTERNAL MEDICINE

## 2022-09-13 PROCEDURE — 1123F ACP DISCUSS/DSCN MKR DOCD: CPT | Performed by: INTERNAL MEDICINE

## 2022-09-13 PROCEDURE — 99215 OFFICE O/P EST HI 40 MIN: CPT | Performed by: INTERNAL MEDICINE

## 2022-09-13 PROCEDURE — G8427 DOCREV CUR MEDS BY ELIG CLIN: HCPCS | Performed by: INTERNAL MEDICINE

## 2022-09-13 NOTE — PROGRESS NOTES
911., Disp: 25 tablet, Rfl: 3    apixaban (ELIQUIS) 2.5 MG TABS tablet, Take 1 tablet by mouth 2 times daily, Disp: 60 tablet, Rfl: 3    Past Medical History  Rafi Morrison  has a past medical history of Atrial fibrillation ([facility] [address] CHF (congestive heart failure) ([facility] [address] [facility] Hypertension, and Type II or unspecified type diabetes mellitus without mention of complication, not stated as uncontrolled. Social History  Rafi Morrison  reports that he has never smoked. He has never used smokeless tobacco. He reports that he does not drink alcohol and does not use drugs. Family History  Rafi Morrison family history includes Cancer in his paternal grandmother and paternal uncle; Diabetes in his maternal grandfather, maternal grandmother, paternal aunt, paternal grandfather, paternal grandmother, and paternal uncle. There is no family history of bicuspid aortic valve, aneurysms, heart transplant, pacemakers, defibrillators, or sudden cardiac death. Past Surgical History   Past Surgical History:   Procedure Laterality Date    TONSILLECTOMY AND ADENOIDECTOMY Bilateral     age 10        Review of Systems   Constitutional: Negative for chills and fever  HENT: Negative for congestion, sinus pressure, sneezing and sore throat. Eyes: Negative for pain, discharge, redness and itching. Respiratory: Negative for apnea, cough  Gastrointestinal: Negative for blood in stool, constipation, diarrhea   Endocrine: Negative for cold intolerance, heat intolerance, polydipsia. Genitourinary: Negative for dysuria, enuresis, flank pain and hematuria. Musculoskeletal: Negative for arthralgias, joint swelling and neck pain. Neurological: Negative for numbness and headaches. Psychiatric/Behavioral: Negative for agitation, confusion, decreased concentration and dysphoric mood.      Objective:     BP (!) 152/74   Pulse 60   Ht 5' 10\" (1.778 m)   Wt 190 lb (86.2 kg)   BMI 27.26 kg/m²     Wt Readings from Last 3 Encounters: 09/13/22 190 lb (86.2 kg)   09/11/22 190 lb (86.2 kg)   09/01/22 193 lb (87.5 kg)     BP Readings from Last 3 Encounters:   09/13/22 (!) 152/74   09/11/22 (!) 188/79   09/01/22 138/72       Nursing note and vitals reviewed. Physical Exam   Constitutional: Oriented to person, place, and time. Appears well-developed and well-nourished. HENT:   Head: Normocephalic and atraumatic. Eyes: EOM are normal. Pupils are equal, round, and reactive to light. Neck: Normal range of motion. Neck supple. No JVD present. Cardiovascular: Normal rate, regular rhythm, normal heart sounds and intact distal pulses. 3/6 CHIOMA. Pulmonary/Chest: Effort normal and breath sounds normal. No respiratory distress. No wheezes. No rales. Abdominal: Soft. Bowel sounds are normal. No distension. There is no tenderness. Musculoskeletal: Normal range of motion. No edema. Neurological: Alert and oriented to person, place, and time. No cranial nerve deficit. Coordination normal.   Skin: Skin is warm and dry. Psychiatric: Normal mood and affect.        No results found for: CKTOTAL, CKMB, CKMBINDEX    Lab Results   Component Value Date/Time    WBC 12.4 08/26/2022 04:00 AM    RBC 5.00 08/26/2022 04:00 AM    HGB 13.0 08/26/2022 04:00 AM    HCT 39.4 08/26/2022 04:00 AM    MCV 78.8 08/26/2022 04:00 AM    MCH 26.0 08/26/2022 04:00 AM    MCHC 33.0 08/26/2022 04:00 AM    RDW 13.2 08/26/2022 04:00 AM     08/26/2022 04:00 AM    MPV 10.2 08/26/2022 04:00 AM       Lab Results   Component Value Date/Time     09/08/2022 03:50 PM    K 4.4 09/08/2022 03:50 PM    K 4.2 04/24/2022 11:31 AM     09/08/2022 03:50 PM    CO2 30 09/08/2022 03:50 PM    BUN 20 09/08/2022 03:50 PM    LABALBU 3.7 08/26/2022 04:00 AM    CREATININE 1.1 09/08/2022 03:50 PM    CALCIUM 9.1 09/08/2022 03:50 PM    LABGLOM 63 09/08/2022 03:50 PM    GLUCOSE 119 09/08/2022 03:50 PM       Lab Results   Component Value Date/Time    ALKPHOS 107 08/26/2022 04:00 AM ALT 33 08/26/2022 04:00 AM    AST 16 08/26/2022 04:00 AM    PROT 7.3 08/26/2022 04:00 AM    BILITOT 0.7 08/26/2022 04:00 AM    BILIDIR 0.3 04/17/2022 03:43 AM    LABALBU 3.7 08/26/2022 04:00 AM       Lab Results   Component Value Date/Time    MG 1.9 04/21/2022 03:26 AM       Lab Results   Component Value Date    INR 1.10 05/03/2022    INR 1.20 (H) 04/19/2022    INR 1.18 (H) 04/17/2022         Lab Results   Component Value Date/Time    LABA1C 6.7 09/01/2022 12:54 PM       Lab Results   Component Value Date/Time    TRIG 78 05/03/2022 08:19 PM    HDL 38 05/03/2022 08:19 PM    LDLCALC 33 05/03/2022 08:19 PM       Lab Results   Component Value Date/Time    TSH 2.790 04/16/2022 02:08 PM         Testing Reviewed:      I have individually reviewed the cardiac test below:    ECHO: Results for orders placed during the hospital encounter of 09/07/22    Echo 2D w doppler w color complete    Narrative  Transthoracic Echocardiography Report (TTE)    Demographics    Patient Name    [facility] Gender               Male  M    MR #            [id number]          Race                     Ethnicity    Account #       [de-identified]          Room Number    Accession       [id number]         Date of Study        09/07/2022  Number    Date of Birth   01/30/1931         Referring Physician  Nathan Green CNP    Age             80 year(s)         Clora Bookbinder, Mortimer Rummer MD  Physician    Procedure    Type of Study    TTE procedure:ECHOCARDIOGRAM COMPLETE 2D W DOPPLER W COLOR. Procedure Date  Date: 09/07/2022 Start: 02:38 PM    Study Location: Echo Lab  Technical Quality: Limited visualization due to poor acoustical window. Indications:Evaluate aortic valve and Post PCI.     Additional Medical History:Hypertension, hyperlipidemia, diabetes type II,  atrial fibrillation, congestive heart failure    Patient Status: Routine    Height: 70 inches Weight: 193 regurgitation. Tricuspid Valve  The tricuspid valve structure was normal with normal leaflet separation. DOPPLER: There was no evidence of tricuspid stenosis. There was mild  tricuspid regurgitation. Assuming RAP = 10 mmHg, the estimated RVSP = 63  mmHg. Pulmonic Valve  The pulmonic valve leaflets were not well seen. DOPPLER: The transpulmonic  velocity was within the normal range with no evidence for regurgitation. Left Atrium  Left atrial size was mildly dilated. Left Ventricle  Normal left ventricular size and mildly reduced systolic function. There was mild global hypokinesis. Wall thickness was within normal limits. Ejection fraction was estimated at 45-50%. Features were consistent with a pseudonormal left ventricular filling  pattern, with concomitant abnormal relaxation and increased filling  pressure (grade 2 diastolic dysfunction). Right Atrium  Right atrial size was normal.    Right Ventricle  The right ventricular size was normal with normal systolic function and  wall thickness. Pericardial Effusion  The pericardium was normal in appearance with no evidence of a pericardial  effusion. Pleural Effusion  No evidence of pleural effusion. Aorta / Great Vessels  IVC size is within normal limits with normal respiratory phasic changes.     M-Mode/2D Measurements & Calculations    LV Diastolic   LV Systolic Dimension:    AV Cusp Separation: 0.6 cmLA  Dimension: 5.6 4.1 cm                    Dimension: 4.5 cmAO Root  cm             LV Volume Diastolic: 438  Dimension: 2.9 cmLA Area: 18.3  LV FS:26.8 %   ml                        cm^2  LV PW          LV Volume Systolic: 94.0  Diastolic: 1.1 ml  cm             LV EDV/LV EDV Index: 154  Septum         ml/75 m^2LV ESV/LV ESV    RV Diastolic Dimension: 2.6 cm  Diastolic: 1.1 Index: 55.9 ml/36 m^2  cm             EF Calculated: 51.8 %     LA/Aorta: 1.55  Ascending Aorta: 3.3 cm  LA volume/Index: 51.2 ml /25m^2    LVOT: 2 cm    Doppler Measurements & Calculations    MV Peak E-Wave:     AV Peak Velocity: 304    LVOT Peak Velocity: 78.3 cm/s  89.1 cm/s           cm/s                     LVOT Mean Velocity: 51.7 cm/s  MV Peak A-Wave: 48  AV Peak Gradient: 36.97  LVOT Peak Gradient: 2  cm/s                mmHg                     mmHgLVOT Mean Gradient: 1  MV E/A Ratio: 1.86  AV Mean Velocity: 227    mmHg  MV Peak Gradient:   cm/s  3.18 mmHg           AV Mean Gradient: 25     TV Peak E-Wave: 30 cm/s  mmHg                     TV Peak A-Wave: 58.7 cm/s  MV Deceleration     AV VTI: 87 cm  Time: 226 msec      AV Area                  TV Peak Gradient: 0.36 mmHg  MV P1/2t: 66 msec   (Continuity):0.73 cm^2   TR Velocity:365 cm/s  MVA by PHT:3.33                              TR Gradient:53.29 mmHg  cm^2                LVOT VTI: 20.1 cm        PV Peak Velocity: 81.8 cm/s  AV P1/2t: 751 msec       PV Peak Gradient: 2.68 mmHg  MV E' Septal        IVRT: 74 msec  Velocity: 6.9 cm/s  MV A' Septal                                 WA ED Velocity: 113 cm/s  Velocity: 4.2 cm/s  AV DVI (VTI): 0.23AV DVI  MV E' Lateral       (Vmax):0.26  Velocity: 8.5 cm/s  MV A' Lateral  Velocity: 4.4 cm/s  E/E' septal: 12.91  E/E' lateral: 10.48  MR Velocity: 566  cm/s    [id number]  [id number]       Assessment/Plan   Severe Symptomatic Aortic Stenosis - Given the STS scores, frailty, comorbidities, and the imaging findings, the patient is clinically a candidate for TAVR (See PreTAVR Assesment). Discussed transcatheter aortic valve replacement (TAVR) with the patient/family regarding risks, benefits, alternatives to the procedure. The patient understands the associated visits with CT surgery and also understands the need for TAVR CTA and DSE. The patient is FULL CODE. The POA is listed in the chart. We will schedule the above as appropriate.   Once complete and appropriate based on the findings, a procedure date will be aligned at [facility], and we will notify the patient of further instructions. We had a long discussion with myself, family, patient, and structural heart coordinators. The family and patient were explained the risks/benefits/alternatives of the procedure, how the procedure works, the work-up, post-procedural care, and all of the possible complications of the procedure, including, but not limited to vascular injury, debilitating stroke, need for permanent pacemaker, and death. The patient/family would like to pursue TAVR at our facility and we will work towards this goal.         The patient and family understand that should there be any findings or issues that arise during the evaluation that would preclude TAVR, that this will need to be evaluated prior to proceeding with a percutaneous approach. Further, a unified heart team approach will be performed prior to proceeding with TAVR which includes the patient's primary care givers, cardiologist, and the entire structural heart team (interventionalist, CT surgeons, structural heart NP). The patient and family appeared to understand everything, all of their questions were answered to their satisfaction and they are agreeable to proceed with further evaluation for TAVR. Thank you for allowing us to participate in the care of this patient. Please do not hesitate to call us with questions.        Disposition:  6 months, TAVR work-up    Electronically signed by Timmy Cooper MD   9/13/2022 at 9:26 AM EDT

## 2022-09-13 NOTE — PROGRESS NOTES
Patient denies having any chest pain,  dizziness, lightheadedness or palpitations. Pt c/o: shortness of breath a couple weeks ago. But not normally. Swelling in the ankles states that they are down right now.

## 2022-09-14 ENCOUNTER — TELEPHONE (OUTPATIENT)
Dept: CARDIOLOGY CLINIC | Age: 87
End: 2022-09-14

## 2022-09-14 NOTE — TELEPHONE ENCOUNTER
Pt was seen yesterday with Dr Zack Bryson.   Dr Collette Damon office (dentist) calling asking for clearance to pull a tooth and stop Eliquis and Plavix prior to procedure 10/10/22  Fax [phone]

## 2022-09-21 ENCOUNTER — CARE COORDINATION (OUTPATIENT)
Dept: CARE COORDINATION | Age: 87
End: 2022-09-21

## 2022-09-21 ENCOUNTER — HOSPITAL ENCOUNTER (OUTPATIENT)
Dept: NON INVASIVE DIAGNOSTICS | Age: 87
Discharge: HOME | End: 2022-09-21
Payer: MEDICARE

## 2022-09-21 DIAGNOSIS — I35.0 NONRHEUMATIC AORTIC VALVE STENOSIS: ICD-10-CM

## 2022-09-21 PROCEDURE — 93307 TTE W/O DOPPLER COMPLETE: CPT

## 2022-09-21 PROCEDURE — 93017 CV STRESS TEST TRACING ONLY: CPT | Performed by: INTERNAL MEDICINE

## 2022-09-21 PROCEDURE — 6360000002 HC RX W HCPCS

## 2022-09-21 NOTE — CARE COORDINATION
Ambulatory Care Coordination Note  9/21/2022    ACC: Felicia Markham RN    Summary Note: Patient was called for continued Care Coordination follow up and education re: the management of his CHF, DM, and healthcare needs. Patient reported he is unable to talk for long as he is currently on his way to his Dobutamine stress echo. Patient stated he has been doing \"ok\" and he denied any questions re: his recent appointments or upcoming procedures. ACM briefly reviewed signs/symptoms to report and the importance of early symptom recognition and follow up. Patient verbalized understanding. Patient denied any other questions, concerns, or needs and he was encouraged to call with any that may develop.           Actions:   - Reviewed signs/symptoms to report   - Reviewed importance of early symptom recognition and follow up   - Monitored for questions re: recent appointments   - Monitored for questions re: upcoming procedures   - Monitored for additional needs          Plan of Care:   - Continue with Care Coordination f/u calls for assistance with the management of his CHF/DM and healthcare needs  - Complete CHF education - In Process   - Complete DM education - In Process  - Educate on the availability of same day appointments, urgent care/walk in clinic options, and after hours on call resources - Completed   - Review and verify Healthcare Decision Maker information - Completed  - Patient is do for Medicare AWV  - Patient is current with COVID-19 vaccine  - A1C 6.9% - May 2022  - Monitor for additional needs  - Monitor for readiness to discharge from Care Coordination      Lab Results       None            Care Coordination Interventions    Referral from Primary Care Provider: No  Suggested Interventions and Community Resources  Diabetes Education: Completed (Comment: July 2022)  Disease Specific Clinic: Completed (Comment: Current with [facility] CHF clinic - July 2022)  Meals on Wheels: Declined (Comment: Denied need - July 2022)  Medication Assistance Program: Completed (Comment: Current with program for Eliquis - July 2022)  Medi Set or Pill Pack: Completed (Comment: sets up weekly - July 2022)  Palliative Care: Not Started  Pharmacist: Dylan Chaudhary (Comment: July 2022 - Instructed to call if he changes his mind)  Registered Dietician: Declined (Comment: July 2022)  Social Work: Declined (Comment: Denied any resource needs at this time - July 2022)  Transportation Support: Declined  Zone Management Tools: Completed (Comment: CHF/DM - July/Aug. 2022)  Other Services or Interventions: Pt. follows with [facility] CHF clinic and cardiology          Goals Addressed                   This Visit's Progress       Care Coordination     Conditions and Symptoms   Improving     I will schedule office visits, as directed by my provider. I will keep my appointment or reschedule if I have to cancel. I will notify my provider of any barriers to my plan of care. I will follow my Zone Management tool to seek urgent or emergent care. I will notify my provider of any symptoms that indicate a worsening of my condition. Barriers: overwhelmed by complexity of regimen, stress, and lack of education  Plan for overcoming my barriers: Ongoing education and monitoring of resource needs   Confidence: 8/10  Anticipated Goal Completion Date: 10/15/2022                Prior to Admission medications    Medication Sig Start Date End Date Taking?  Authorizing Provider   amoxicillin (AMOXIL) 500 MG capsule Take 1 capsule by mouth 3 times daily for 10 days 9/11/22 9/21/22  Hesham Gunn MD   lisinopril (PRINIVIL;ZESTRIL) 20 MG tablet Take 1 tablet by mouth daily 9/1/22   EMRE Ojeda CNP   atorvastatin (LIPITOR) 40 MG tablet Take 1 tablet by mouth nightly 9/1/22   EMRE Ojeda CNP   clopidogrel (PLAVIX) 75 MG tablet Take 1 tablet by mouth daily 9/1/22   EMRE Ojeda CNP   amiodarone (CORDARONE) 200 MG tablet Take 1 tablet by mouth daily 9/1/22   EMRE Harmon CNP   furosemide (LASIX) 20 MG tablet Take 1 tablet by mouth 2 times daily 9/1/22   EMRE Harmon CNP   metoprolol succinate (TOPROL XL) 100 MG extended release tablet Take 1 tablet by mouth daily 7/11/22   EMRE Perrin CNP   metFORMIN (GLUCOPHAGE XR) 500 MG extended release tablet Take 2 tablets by mouth daily (with breakfast) 6/14/22 9/13/22  Angely Pérez MD   nitroGLYCERIN (NITROSTAT) 0.4 MG SL tablet Place 1 tablet under the tongue every 5 minutes as needed for Chest pain up to max of 3 total doses. If no relief after 1 dose, call 911. 5/5/22   Lela Galdamez PA-C   apixaban (ELIQUIS) 2.5 MG TABS tablet Take 1 tablet by mouth 2 times daily 4/23/22   Esther Galeano, DO       Future Appointments   Date Time Provider Satish Latif   9/21/2022  2:30 PM [facility] ECHO RM1 [facility] ECHO [place] [facility]   12/8/2022  1:00 PM EMRE Harmon CNP [facility]   2/14/2023  2:00 PM EMRE Perrin CNP N SRPX CHF [facility]   ,   Diabetes Assessment    Medic Alert ID: No  Meal Planning: Avoidance of concentrated sweets   How often do you test your blood sugar?: No Testing   Do you have barriers with adherence to non-pharmacologic self-management interventions?  (Nutrition/Exercise/Self-Monitoring): No   Have you ever had to go to the ED for symptoms of low blood sugar?: No       No patient-reported symptoms   Do you have hyperglycemia symptoms?: No   Do you have hypoglycemia symptoms?: No   Blood Sugar Monitoring Regimen: Not Testing   Blood Sugar Trends: No Change        ,   Congestive Heart Failure Assessment    Are you currently restricting fluids?: 2000cc (Comment: per [facility] CH[facility] clinic - July 2022)  Do you understand a low sodium diet?: Yes  Do you understand how to read food labels?: No  Do you salt your food before tasting it?: No     No patient-reported symptoms      Symptoms:     Symptom course: stable  Salt intake watch compared to last visit: stable     , General Assessment    Do you have any symptoms that are causing concern?: Yes  Progression since Onset: Gradually Improving  Reported Symptoms: Other (Comment: dental concerns/pain)     , and Care Coordination Episodes    Type: Amb Care Management  Episode: Complex Care  Noted: 7/15/2022  Comments: List referral - DM/CHF

## 2022-09-26 NOTE — TELEPHONE ENCOUNTER
Orders received from Dr. Luzma Trivedi to obtain a CV/CT Surgery appointment, TAVR CTA, Carotid US, Cardiac Catheterization, Dental Clearance and CHF appointment for optimization. CTA scheduled on 10/5/2022 at 1300  Carotid US scheduled on 10/5/2022 at [address] scheduled on 10/10/2022 with Angel Luis Valdovinos do you need to see this patient before we schedule him for the TAVR? He was last seen on 7/11/2022 with no current complaints at this time.        Pre-TAVR workup:  Referring: Suni Martinez    ECHO: Obtained on 9/21/2022 with the below results:      EKG: Obtained on 8/26/2022 resulting in [address] Appointment: 9/13/22    CT/CV Surgery Appointment: 4/20/22    TAVR CTA: 10/5/22    Carotid US: 10/5/22   No significant findings    Cardiac Catheterization:  Obtained on 5/3/2022 with the below results:         CHF appointment: 10/11/2022    Dental Clearance: obtained    [id number]: Completed with the documentation below    STS score: Completed with the documentation below    Labs: Reviewed    Blood thinners: Eliquis and Plavix

## 2022-09-30 NOTE — TELEPHONE ENCOUNTER
Given its been almost 3 months since last OV and was in ED last month for CHF prefer to see him next week or so to ensure Euvolemic.

## 2022-10-05 ENCOUNTER — HOSPITAL ENCOUNTER (OUTPATIENT)
Dept: CT IMAGING | Age: 87
Discharge: HOME | End: 2022-10-05
Payer: MEDICARE

## 2022-10-05 ENCOUNTER — HOSPITAL ENCOUNTER (OUTPATIENT)
Dept: INTERVENTIONAL RADIOLOGY/VASCULAR | Age: 87
Discharge: HOME | End: 2022-10-05
Payer: MEDICARE

## 2022-10-05 DIAGNOSIS — I35.0 NONRHEUMATIC AORTIC VALVE STENOSIS: ICD-10-CM

## 2022-10-05 DIAGNOSIS — R55 SYNCOPE AND COLLAPSE: ICD-10-CM

## 2022-10-05 PROCEDURE — 74174 CTA ABD&PLVS W/CONTRAST: CPT

## 2022-10-05 PROCEDURE — 71275 CT ANGIOGRAPHY CHEST: CPT

## 2022-10-05 PROCEDURE — 93880 EXTRACRANIAL BILAT STUDY: CPT

## 2022-10-05 PROCEDURE — 6360000004 HC RX CONTRAST MEDICATION: Performed by: INTERNAL MEDICINE

## 2022-10-05 RX ADMIN — IOPAMIDOL 125 ML: 755 INJECTION, SOLUTION INTRAVENOUS at 11:57

## 2022-10-07 ENCOUNTER — CARE COORDINATION (OUTPATIENT)
Dept: CARE COORDINATION | Age: 87
End: 2022-10-07

## 2022-10-07 NOTE — CARE COORDINATION
Ambulatory Care Coordination Note  10/7/2022    ACC: Sol River    I spoke with the patient for continued Care Coordination follow up and education. Patient states he is doing okay. Breathing is at baseline. No coughing or wheezing. Denies edema. We discussed Zone management tools for chronic disease(s) and patient denies current questions re: s/sx at this time. Patient does have appointment with CHF office on Tuesday. Patient does not monitor BS at home. Patient denied any symptoms of hypo or hyperglycemia. Diabetic diet and importance of diet adherence reviewed with patient. Patient was also encouraged to work to remain active and reviewed how this also helps with BS control. Encouraged to use PCP office versus ED treatment for sx's of chronic conditions. Patient voiced understanding. I advised patient to contact PCP office if needed. No further needs at this time.      Lab Results       None            Care Coordination Interventions    Referral from Primary Care Provider: No  Suggested Interventions and Community Resources  Diabetes Education: Completed (Comment: July 2022)  Disease Specific Clinic: Completed (Comment: Current with [facility]F clinic - July 2022)  Meals on Wheels: Declined (Comment: Denied need - July 2022)  Medication Assistance Program: Completed (Comment: Current with program for Eliquis - July 2022)  Medi Set or Pill Pack: Completed (Comment: sets up weekly - July 2022)  Palliative Care: Not Started  Pharmacist: Hoda Evans (Comment: July 2022 - Instructed to call if he changes his mind)  Registered Dietician: Declined (Comment: July 2022)  Social Work: Declined (Comment: Denied any resource needs at this time - July 2022)  Transportation Support: Declined  Zone Management Tools: Completed (Comment: CHF/DM - July/Aug. 2022)  Other Services or Interventions: Pt. follows with [facility] CHF clinic and cardiology          Goals Addressed    None         Prior to Admission medications Medication Sig Start Date End Date Taking? Authorizing Provider   lisinopril (PRINIVIL;ZESTRIL) 20 MG tablet Take 1 tablet by mouth daily 9/1/22   EMRE Smith CNP   atorvastatin (LIPITOR) 40 MG tablet Take 1 tablet by mouth nightly 9/1/22   EMRE Smith CNP   clopidogrel (PLAVIX) 75 MG tablet Take 1 tablet by mouth daily 9/1/22   EMRE Smith CNP   amiodarone (CORDARONE) 200 MG tablet Take 1 tablet by mouth daily 9/1/22   EMRE Smith CNP   furosemide (LASIX) 20 MG tablet Take 1 tablet by mouth 2 times daily 9/1/22   EMRE Smith CNP   metoprolol succinate (TOPROL XL) 100 MG extended release tablet Take 1 tablet by mouth daily 7/11/22   EMRE Marrero CNP   metFORMIN (GLUCOPHAGE XR) 500 MG extended release tablet Take 2 tablets by mouth daily (with breakfast) 6/14/22 9/13/22  Bladimir Malone MD   nitroGLYCERIN (NITROSTAT) 0.4 MG SL tablet Place 1 tablet under the tongue every 5 minutes as needed for Chest pain up to max of 3 total doses.  If no relief after 1 dose, call 911. 5/5/22   Shadia Méndez PA-C   apixaban (ELIQUIS) 2.5 MG TABS tablet Take 1 tablet by mouth 2 times daily 4/23/22   Maddie Martin, DO       Future Appointments   Date Time Provider Satish Latif   10/11/2022 10:00 [facility] [place] [facility]   12/8/2022  1:00 [facility] [place]   2/14/2023  2:00 PM [facility] [place]

## 2022-10-10 NOTE — PROGRESS NOTES
Heart Failure Clinic       Visit Date: 10/11/2022  Cardiologist:  Dr. Lois Dye  Primary Care Physician: Dr. Homero Camp, APRN - CNP    Vincent Sarkar is a 80 y.o. male who presents today for:  Chief Complaint   Patient presents with    Congestive Heart Failure       HPI:   Vincent Sarkar is a 80 y.o. male who presents to the office for a follow up patient visit in the heart failure clinic. Accompanied by no one    TYPE HF: HFrEF (40-45% 4/2022 - drop from 60% 12/2021), Severe AS (ZAHEER 1.0-1.1, mean gradient 27)  Cause: Valvular/ICM  Device: no  HX: HTN, DM, Afib (Eliquis) s/p DCCV, CAD s/p PCI LAD (4/2022) PCI RCA (5/2022)      Hospitalization:  April 2022 x 1 wk - SOB, Bipap. CXR +pulmonary edema, BP 200s. BNP 2599. WBCs 23. LA 2.4     Last OV 7/2022 - wt 199#  Today: looks good overall.   No fluid on exam  Needs tooth removed - issue w/ holding blood thinner was r/s to 10/28  BP little high today  - 110-120s at home    Patient has:  Chest Pain: no  SOB: stable  Orthopnea/PND: no  KIMMY: no  Edema: no  Fatigue: same  Abdominal bloating: no  Cough: no  Appetite: good  Home weight: stable  Home blood pressure: stable    Past Medical History:   Diagnosis Date    Atrial fibrillation (HCC)     CHF (congestive heart failure) ([facility] [address]     Diabetes mellitus ([facility] [address]     Hypertension     Type II or unspecified type diabetes mellitus without mention of complication, not stated as uncontrolled      Past Surgical History:   Procedure Laterality Date    TONSILLECTOMY AND ADENOIDECTOMY Bilateral     age 10      Family History   Problem Relation Age of Onset    Diabetes Paternal Aunt     Diabetes Paternal Uncle     Cancer Paternal Uncle     Diabetes Maternal Grandmother     Diabetes Maternal Grandfather     Diabetes Paternal Grandmother     Cancer Paternal Grandmother     Diabetes Paternal Grandfather      Social History     Tobacco Use    Smoking status: Never    Smokeless tobacco: Never   Substance Use Topics Alcohol use: No     Alcohol/week: 0.0 standard drinks     Current Outpatient Medications   Medication Sig Dispense Refill    lisinopril (PRINIVIL;ZESTRIL) 20 MG tablet Take 1 tablet by mouth daily 90 tablet 1    atorvastatin (LIPITOR) 40 MG tablet Take 1 tablet by mouth nightly 90 tablet 1    clopidogrel (PLAVIX) 75 MG tablet Take 1 tablet by mouth daily 90 tablet 1    amiodarone (CORDARONE) 200 MG tablet Take 1 tablet by mouth daily 90 tablet 1    furosemide (LASIX) 20 MG tablet Take 1 tablet by mouth 2 times daily 180 tablet 1    metoprolol succinate (TOPROL XL) 100 MG extended release tablet Take 1 tablet by mouth daily 90 tablet 3    metFORMIN (GLUCOPHAGE XR) 500 MG extended release tablet Take 2 tablets by mouth daily (with breakfast) 180 tablet 3    nitroGLYCERIN (NITROSTAT) 0.4 MG SL tablet Place 1 tablet under the tongue every 5 minutes as needed for Chest pain up to max of 3 total doses. If no relief after 1 dose, call 911. 25 tablet 3    apixaban (ELIQUIS) 2.5 MG TABS tablet Take 1 tablet by mouth 2 times daily 60 tablet 3     No current facility-administered medications for this visit. Allergies   Allergen Reactions    Norvasc [Amlodipine Besylate]     Other Rash     States allergic to several BP meds (unsure of name)       SUBJECTIVE:   Review of Systems   Constitutional:  Positive for fatigue. Negative for activity change and appetite change. Respiratory:  Negative for cough and shortness of breath. Cardiovascular:  Negative for chest pain, palpitations and leg swelling. Gastrointestinal:  Negative for abdominal distention. Neurological:  Negative for weakness, light-headedness and headaches. Hematological:  Negative for adenopathy. Psychiatric/Behavioral:  Negative for sleep disturbance. OBJECTIVE:   Today's Vitals:  BP (!) 148/82   Pulse 65   Ht 5' 10\" (1.778 m)   Wt 197 lb (89.4 kg)   SpO2 100%   BMI 28.27 kg/m²     Physical Exam  Vitals reviewed.    Constitutional: General: He is not in acute distress. Appearance: Normal appearance. He is well-developed. He is not diaphoretic. HENT:      Head: Normocephalic and atraumatic. Eyes:      Conjunctiva/sclera: Conjunctivae normal.   Neck:      Comments: No JVD  Cardiovascular:      Rate and Rhythm: Normal rate and regular rhythm. Heart sounds: Murmur heard. Pulmonary:      Effort: Pulmonary effort is normal. No respiratory distress. Breath sounds: Normal breath sounds. No wheezing or rales. Abdominal:      General: Bowel sounds are normal. There is no distension. Palpations: Abdomen is soft. Tenderness: There is no abdominal tenderness. Musculoskeletal:         General: Normal range of motion. Cervical back: Normal range of motion and neck supple. Right lower leg: No edema. Left lower leg: No edema. Skin:     General: Skin is warm and dry. Capillary Refill: Capillary refill takes less than 2 seconds. Neurological:      Mental Status: He is alert and oriented to person, place, and time. Coordination: Coordination normal.   Psychiatric:         Behavior: Behavior normal.       Wt Readings from Last 3 Encounters:   10/11/22 197 lb (89.4 kg)   09/13/22 190 lb (86.2 kg)   09/11/22 190 lb (86.2 kg)     BP Readings from Last 3 Encounters:   10/11/22 (!) 148/82   09/13/22 (!) 152/74   09/11/22 (!) 188/79     Pulse Readings from Last 3 Encounters:   10/11/22 65   09/13/22 60   09/11/22 60     Body mass index is 28.27 kg/m². ECHO   Summary   Normal left ventricular size and mildly reduced systolic function. There as mild global hypokinesis. Wall thickness was within normal limits. Ejection fraction is 45-50%. The aortic valve was trileaflet with increased thickness, reduced   mobility, with calcification of all of the cusps.  DOPPLER: Transaortic   velocity was 4.1 m/s, mean gradient 37 mmHg, max gradient 67 mmHg, ZAHEER   0.68 cm2, consistent with with severe aortic stenosis. There was no   evidence of aortic regurgitation. IVC size is within normal limits with normal respiratory phasic changes. Signature      ----------------------------------------------------------------   Electronically signed by Luca Schroeder MD (Interpreting   physician) on 09/22/2022 at 05:31 PM      ECHO:   MIS      Summary   No intra cardiac mass or thrombus. No embolic source. No Intracardiac shunt   Left ventricle size is normal.   Normal left ventricle wall thickness. There was moderate global hypokinesis of the left ventricle. Systolic function was moderately reduced. Ejection fraction is visually estimated in the range of 40% to 45%. Left atrium moderately dilated   There is moderate-to-severe aortic stenosis with valve area of 1 to 1.1 sq   cm by Planimetry      Signature      ----------------------------------------------------------------   Electronically signed by Lucinda Kaur MD (Interpreting   physician) on 04/18/2022 at 07:27 PM   ----------------------------------------------------------------        ECHO:   Summary   Technically difficult examination. Left ventricular size is normal and systolic function is moderately   reduced. Ejection fraction was estimated at 40-45%. LV wall thickness is   within normal limits. Moderately dilated left atrium. Thickened and calcified aortic valve. Leaflets exhibited severely reduced cuspal separation of the aortic valve. Trivial aortic regurgitation is noted. There is moderate-to-severe aortic   stenosis with valve area of 1.0 cm2. The maximum aortic valve gradient is   32 mmHg, the mean gradient is 21 mmHg. Consider DSE or MIS for further   evaluation. Signature      ----------------------------------------------------------------   Electronically signed by Lexus Way MD (Interpreting   physician) on 04/17/2022 at 09:40 AM     CATH 5/3/22  IMPRESSION:  1.   Successful PCI and stenting of the distal right coronary artery. 2.  LVEDP 16 mmHg. The patient has chronic kidney disease and known  history of congestive heart failure. 3.  History of aortic stenosis. Peak gradient across the aortic valve  29 mmHg. RECOMMENDATIONS/PLAN OF CARE:  The patient will be admitted to the  hospital for observation overnight, monitored on telemetry. Resume  Eliquis in the morning if no bleeding complications occur. The patient  is currently on aspirin and Plavix. May stop aspirin one week post PCI. Continue on Plavix 75 mg p.o. daily. High-intensity statin therapy. Aggressive risk factor modification. Outpatient followup in office with  primary cardiologist.           Aubree Dubose MD     D: 05/04/2022 8:26:02        [facility] 4/19/22  FINDINGS:  HEMODYNAMICS AND RIGHT HEART CATHETERIZATION:  Pulmonary arterial oxygen  saturation 66%. Pulmonary arterial pressure 53/29. Mean pulmonary  arterial pressure 39 mmHg. Pulmonary capillary wedge pressure 35 mmHg. Right ventricular pressure 57/14. Right atrial pressure 90 mmHg. Left  ventricular end-diastolic pressure was 29 mmHg. Upon pullback across  the aortic valve, the mean pressure gradient was measured at 24 mmHg. LEFT VENTRICULOGRAM:  Mild global hypokinesis. Ejection fraction  estimated at 45% to 50%. CATH 4/21/22   IMPRESSION:  1. Successful PCI and stenting of left anterior descending artery. 2.  Successful PCI and stenting of the left main coronary artery. 3.  Status post successful direct current cardioversion. RECOMMENDATIONS:  Bedrest for four hours. Monitor on telemetry,  optimize medical therapy for CAD. Aspirin 81 mg p.o. daily, Plavix 75  mg p.o. daily. The patient needs to be on oral anticoagulation for  history of atrial fibrillation. May start Eliquis 2.5 mg p.o. b.i.d.  tomorrow in the morning. Start amiodarone 200 mg p.o. daily. Stop  amiodarone drip tonight at 08:00 p.m.   May resume heparin drip without  initial bolus five hours post the 100/day              SGLT2 -  no   AA - no - K+ runs high  Diuretic - Lasix 20 BID  Vasodilator - no  Other - Plavix, Eliquis, Amio 200/day     HFrEF (40-45% 4/2022, down from 60% 12/2021)  ICM/Valvular  CAD s/p PCI LAD (4/2022) - s/p RCA PCI (5/3/22)  Severe AS - recent ECHO showing ZAHEER 0.67 cm2, mean gradient 37mmHg - workup for TAVR   Afib - rate controlled   Bradycardia - Dig stopped and Toprol decreased - stable      Stable, appears Euvolemic  Lab reviewed - stable  BP little elevated -stable at home  No med changes today   Continue diet/fluid adherence  Continue daily wts. Awaiting appt for tooth extraction  Ok to proceed w/ TAVR from CHF standpoint  F/U w/ Cardiology  F/U in clinic in post TAVR    Tolerating above noted HF meds, no ill side effects noted. Will continue to monitor kidney function and electrolytes. Will optimize as tolerated. Pt is compliant w/ medications. Total visit time of 30 minutes has been spent with patient on education of symptoms, management, medication, and plan of care; as well as review of chart: labs, ECHO, radiology reports, etc.   I personally spent more then 50% of the appt time face to face with the patient. Daily weights  Fluid restriction of 2 Liters per day  Limit sodium in diet to around 2540-0932 mg/day  Monitor BP  Activity as tolerated     Patient was instructed to call the [address] for any changes in symptoms as noted in AVS.      No follow-ups on file. or sooner if needed     Patient given educational materials - see patient instructions. We discussed the importance of weighing oneself and recording daily. We also discussed the importance of a low sodium diet, higher sodium foods to avoid and better low sodium food options. Patient verbalizes understanding of plan of care using teach back method, and is agreeable to the treatment plan.        Electronically signed by EMRE Roman CNP on 10/11/2022 at 11:37 AM

## 2022-10-11 ENCOUNTER — OFFICE VISIT (OUTPATIENT)
Dept: CARDIOLOGY CLINIC | Age: 87
End: 2022-10-11
Payer: MEDICARE

## 2022-10-11 VITALS
OXYGEN SATURATION: 100 % | HEART RATE: 65 BPM | SYSTOLIC BLOOD PRESSURE: 148 MMHG | HEIGHT: 70 IN | BODY MASS INDEX: 28.2 KG/M2 | WEIGHT: 197 LBS | DIASTOLIC BLOOD PRESSURE: 82 MMHG

## 2022-10-11 DIAGNOSIS — I10 ESSENTIAL HYPERTENSION: ICD-10-CM

## 2022-10-11 DIAGNOSIS — I25.10 CORONARY ARTERY DISEASE INVOLVING NATIVE HEART WITHOUT ANGINA PECTORIS, UNSPECIFIED VESSEL OR LESION TYPE: ICD-10-CM

## 2022-10-11 DIAGNOSIS — I48.91 ATRIAL FIBRILLATION, UNSPECIFIED TYPE (HCC): ICD-10-CM

## 2022-10-11 DIAGNOSIS — I50.22 CHF (CONGESTIVE HEART FAILURE), NYHA CLASS II, CHRONIC, SYSTOLIC (HCC): Primary | ICD-10-CM

## 2022-10-11 PROCEDURE — 1123F ACP DISCUSS/DSCN MKR DOCD: CPT | Performed by: NURSE PRACTITIONER

## 2022-10-11 PROCEDURE — 1036F TOBACCO NON-USER: CPT | Performed by: NURSE PRACTITIONER

## 2022-10-11 PROCEDURE — 99214 OFFICE O/P EST MOD 30 MIN: CPT | Performed by: NURSE PRACTITIONER

## 2022-10-11 PROCEDURE — G8484 FLU IMMUNIZE NO ADMIN: HCPCS | Performed by: NURSE PRACTITIONER

## 2022-10-11 PROCEDURE — G8427 DOCREV CUR MEDS BY ELIG CLIN: HCPCS | Performed by: NURSE PRACTITIONER

## 2022-10-11 PROCEDURE — G8417 CALC BMI ABV UP PARAM F/U: HCPCS | Performed by: NURSE PRACTITIONER

## 2022-10-11 ASSESSMENT — ENCOUNTER SYMPTOMS
ABDOMINAL DISTENTION: 0
SHORTNESS OF BREATH: 0
COUGH: 0

## 2022-10-19 ENCOUNTER — CARE COORDINATION (OUTPATIENT)
Dept: CARE COORDINATION | Age: 87
End: 2022-10-19

## 2022-10-19 NOTE — CARE COORDINATION
Ambulatory Care Coordination Note  10/19/2022    ACC: Jose G Nath, RN    Patient was called for continued Care Coordination follow up and education re: the management of his DM, CHF, and healthcare needs. Patient shared he has been doing well and he denied any current complaints or worsening SOB, cough, or swelling being present. Patient stated weight has remained stable. Patient denied any questions re: recent CHF visit and reported he is scheduled for TAVR on 11/14. ACM educated on need to speak with family and arrange someone to bring him and stay with him after his procedure. Patient verbalized understanding. CHF and DM education and zone information was reviewed with patient. Patient was educated on signs/symptoms to report as well as the importance of early symptom recognition and follow up. Patient verbalized understanding. Importance of keeping CHF and DM managed and controlled reviewed with patient and he verbalized understanding. Patient denied any other questions, concerns, or needs and he was encouraged to call with any that may develop.            Actions:   - Reviewed CHF and DM zone education  - Reviewed signs/symptoms to report   - Reviewed importance of early symptom recognition and follow up   - Reviewed importance of ongoing weight monitoring and ongoing diet adherence   - Reviewed importance of keeping CHF/DM managed to prevent complications   - Monitored for additional needs          Plan of Care:   - Continue with Care Coordination f/u calls for assistance with the management of his CHF/DM and healthcare needs  - Complete CHF education - In Process   - Complete DM education - In Process  - Educate on the availability of same day appointments, urgent care/walk in clinic options, and after hours on call resources - Completed   - Review and verify Healthcare Decision Maker information - Completed  - Patient is do for Medicare AWV - Scheduled for 12/8/2022  - Patient is current with COVID-19 vaccine  - A1C 6.9% - May 2022  - Monitor for additional needs  - Monitor for readiness to discharge from [facility] patient enrollment in the Remote Patient Monitoring (RPM) program for in-home monitoring: NA. Lab Results       None            Care Coordination Interventions    Referral from Primary Care Provider: No  Suggested Interventions and Community Resources  Diabetes Education: Completed (Comment: July 2022)  Disease Specific Clinic: Completed (Comment: Current with [facility] clinic - July 2022)  Meals on Wheels: Declined (Comment: Denied need - July 2022)  Medication Assistance Program: Completed (Comment: Current with program for Eliquis - July 2022)  Medi Set or Pill Pack: Completed (Comment: sets up weekly - July 2022)  Palliative Care: Not Started  Pharmacist: Selina Hernandez (Comment: July 2022 - Instructed to call if he changes his mind)  Registered Dietician: Declined (Comment: July 2022)  Social Work: Declined (Comment: Denied any resource needs at this time - July 2022)  Transportation Support: Declined  Zone Management Tools: Completed (Comment: CHF/DM - July/Aug. 2022)  Other Services or Interventions: Pt. follows with [facility] clinic and cardiology          Goals Addressed                   This Visit's Progress       Care Coordination     Conditions and Symptoms   Improving     I will schedule office visits, as directed by my provider. I will keep my appointment or reschedule if I have to cancel. I will notify my provider of any barriers to my plan of care. I will follow my Zone Management tool to seek urgent or emergent care. I will notify my provider of any symptoms that indicate a worsening of my condition.     Barriers: overwhelmed by complexity of regimen, stress, and lack of education  Plan for overcoming my barriers: Ongoing education and monitoring of resource needs   Confidence: 8/10  Anticipated Goal Completion Date: 10/15/2022                Prior to Admission medications    Medication Sig Start Date End Date Taking? Authorizing Provider   lisinopril (PRINIVIL;ZESTRIL) 20 MG tablet Take 1 tablet by mouth daily 9/1/22   EMRE Myers CNP   atorvastatin (LIPITOR) 40 MG tablet Take 1 tablet by mouth nightly 9/1/22   EMRE Myers CNP   clopidogrel (PLAVIX) 75 MG tablet Take 1 tablet by mouth daily 9/1/22   EMRE Myers CNP   amiodarone (CORDARONE) 200 MG tablet Take 1 tablet by mouth daily 9/1/22   EMRE Myers CNP   furosemide (LASIX) 20 MG tablet Take 1 tablet by mouth 2 times daily 9/1/22   EMRE Myers CNP   metoprolol succinate (TOPROL XL) 100 MG extended release tablet Take 1 tablet by mouth daily 7/11/22   EMRE Strong CNP   metFORMIN (GLUCOPHAGE XR) 500 MG extended release tablet Take 2 tablets by mouth daily (with breakfast) 6/14/22 10/11/22  Adarsh Palmer MD   nitroGLYCERIN (NITROSTAT) 0.4 MG SL tablet Place 1 tablet under the tongue every 5 minutes as needed for Chest pain up to max of 3 total doses. If no relief after 1 dose, call 911. 5/5/22   Henrry Olivo PA-C   apixaban (ELIQUIS) 2.5 MG TABS tablet Take 1 tablet by mouth 2 times daily 4/23/22   Jose Luis Berman, DO       Future Appointments   Date Time Provider Satish Latif   12/8/2022  1:00 PM [facility]   2/14/2023  2:00 PM EMRE Strong CNP N SRPX CHF [facility]   ,   Diabetes Assessment    Medic Alert ID: No  Meal Planning: Avoidance of concentrated sweets   How often do you test your blood sugar?: No Testing   Do you have barriers with adherence to non-pharmacologic self-management interventions?  (Nutrition/Exercise/Self-Monitoring): No   Have you ever had to go to the ED for symptoms of low blood sugar?: No       No patient-reported symptoms   Do you have hyperglycemia symptoms?: No   Do you have hypoglycemia symptoms?: No   Blood Sugar Monitoring Regimen: Not Testing   Blood Sugar Trends: No Change        ,   Congestive Heart Failure Assessment    Are you currently restricting fluids?: 2000cc (Comment: per [facility]F clinic - July 2022)  Do you understand a low sodium diet?: Yes  Do you understand how to read food labels?: No  Do you salt your food before tasting it?: No     No patient-reported symptoms      Symptoms:  None: Yes      Symptom course: stable  Salt intake watch compared to last visit: stable     ,   General Assessment    Do you have any symptoms that are causing concern?: No     , and Care Coordination Episodes    Type: Amb Care Management  Episode: Complex Care  Noted: 7/15/2022  Comments: List referral - DM/CHF

## 2022-11-02 NOTE — CARE COORDINATION
Ambulatory Care Coordination Note  11/2/2022    ACC: Юлия Day RN    Patient was called for continued Care Coordination follow up and education re: the management of his CHF, DM, and healthcare needs. Patient shared he has been doing well and he denied any c/o worsening SOB or cough being present. Patient reported he continues to monitor his weight daily and weight has been stable with reading today being 190. CHF education, zone information, and Thanksgiving CHF education reviewed with patient. ACM reviewed importance of ongoing weight monitoring as well as signs/symptoms to report and the importance of early symptom recognition and follow up. Briefly reviewed diet education as well as the importance of ongoing diet adherence. Patient verbalized understanding. Patient shared he continues to be scheduled for TAVR and daughter will bring him and assist him as needed. Patient denied any concerns re: hypoglycemia/hyperglycemia. DM education and zone information was also reviewed. Patient was educated on the importance of routine A1C monitoring and eye exams. Patient was also educated on the importance of routine foot care and exams. Patient verbalized understanding. Patient stated he recently underwent a tooth extraction and this is healing well. Patient denied any c/o bleeding, swelling, or trouble eating. Patient denied any other questions, concerns, or needs and he was encouraged to call with any that may develop.           Actions:   - Reviewed CHF and DM education and zone information   - Educated on Thanksgiving CHF education   - Reviewed importance of routine weight monitoring   - Reviewed signs/symptoms to report and the importance of early symptom recognition and follow up  - Reviewed Low Na diet education and ongoing adherence   - Educated on the importance of routine A1C monitoring and eye exams   - Educated on importance of good foot care and routine monitoring  - Monitored for questions re: recent tooth extraction   - Monitored for questions/concerns re: upcoming TAVR  - Monitored for additional needs          Plan of Care:   - Continue with Care Coordination f/u calls for assistance with the management of his CHF/DM and healthcare needs  - Complete CHF education - In Process   - Complete DM education - In Process  - Educate on the availability of same day appointments, urgent care/walk in clinic options, and after hours on call resources - Completed   - Review and verify Healthcare Decision Maker information - Completed  - Patient is do for Medicare AWV - Scheduled for 12/8/2022  - Patient is current with COVID-19 vaccine  - A1C 6.9% - May 2022  - Monitor for additional needs  - Monitor for readiness to discharge from [place] [id number] Failure Education outreach Date/Time: 11/2/2022 4:17 PM    [facility] ([facility]) contacted the patient by telephone to perform Ambulatory Care Coordination. [facility] reviewed that a Health Healthy tips for the  2022 Thanksgiving  packet has been  sent via My Chart and the thru th mail per patient's request . [facility] reviewed CHF zones, daily weights, fluid restriction, the importance of low sodium diet, and healthy tips packet with the patient. Instructed patient to call their  PCP and CHF clinic  if they have a weight gain of 3 lbs in 2 days or 5 lbs in a week. Patient reminded that there is a physician on call 24 hours a day / 7 days a week should the patient have questions or concerns. The patient verbalized understanding. Offered patient enrollment in the Remote Patient Monitoring (RPM) program for in-home monitoring: NA.     Lab Results       None            Care Coordination Interventions    Referral from Primary Care Provider: No  Suggested Interventions and Community Resources  Diabetes Education: Completed (Comment: July 2022)  Disease Specific Clinic: Completed (Comment: Current with [facility] clinic - July 2022)  Meals on Wheels: Declined (Comment: Denied need - July 2022)  Medication Assistance Program: Completed (Comment: Current with program for Eliquis - July 2022)  Medi Set or Pill Pack: Completed (Comment: sets up weekly - July 2022)  Palliative Care: Not Started  Pharmacist: Sun Marquez (Comment: July 2022 - Instructed to call if he changes his mind)  Registered Dietician: Declined (Comment: July 2022)  Social Work: Declined (Comment: Denied any resource needs at this time - July 2022)  Transportation Support: Declined  Zone Management Tools: Completed (Comment: CHF/DM - July/Aug. 2022)  Other Services or Interventions: Pt. follows with [facility] clinic and cardiology          Goals Addressed                   This Visit's Progress       Care Coordination     Conditions and Symptoms   Improving     I will schedule office visits, as directed by my provider. I will keep my appointment or reschedule if I have to cancel. I will notify my provider of any barriers to my plan of care. I will follow my Zone Management tool to seek urgent or emergent care. I will notify my provider of any symptoms that indicate a worsening of my condition. Barriers: overwhelmed by complexity of regimen, stress, and lack of education  Plan for overcoming my barriers: Ongoing education and monitoring of resource needs   Confidence: 8/10  Anticipated Goal Completion Date: 10/15/2022                Prior to Admission medications    Medication Sig Start Date End Date Taking?  Authorizing Provider   lisinopril (PRINIVIL;ZESTRIL) 20 MG tablet Take 1 tablet by mouth daily 9/1/22   [facility] Jeff, APRN - CNP   atorvastatin (LIPITOR) 40 MG tablet Take 1 tablet by mouth nightly 9/1/22   [facility], APRN - CNP   clopidogrel (PLAVIX) 75 MG tablet Take 1 tablet by mouth daily 9/1/22   [facility] Batter, APRN - CNP   amiodarone (CORDARONE) 200 MG tablet Take 1 tablet by mouth daily 9/1/22   [facility] Jeff, APRN - CNP   furosemide (LASIX) 20 MG tablet Take 1 tablet by mouth 2 times daily 9/1/22   EMRE Egan CNP   metoprolol succinate (TOPROL XL) 100 MG extended release tablet Take 1 tablet by mouth daily 7/11/22   EMRE Cheek CNP   metFORMIN (GLUCOPHAGE XR) 500 MG extended release tablet Take 2 tablets by mouth daily (with breakfast) 6/14/22 10/11/22  Erika Mckeon MD   nitroGLYCERIN (NITROSTAT) 0.4 MG SL tablet Place 1 tablet under the tongue every 5 minutes as needed for Chest pain up to max of 3 total doses. If no relief after 1 dose, call 911. 5/5/22   Christian Chavez PA-C   apixaban (ELIQUIS) 2.5 MG TABS tablet Take 1 tablet by mouth 2 times daily 4/23/22   Geoff Laureano, DO       Future Appointments   Date Time Provider Satish Latif   12/8/2022  1:00 PM EMRE Egan CNP [facility] Med [email or web address] [place] [facility]   2/14/2023  2:00 PM EMRE Cheek CNP N SRPX CHF [email or web address] - [place]   ,   Diabetes Assessment    Medic Alert ID: No  Meal Planning: Avoidance of concentrated sweets   How often do you test your blood sugar?: No Testing   Do you have barriers with adherence to non-pharmacologic self-management interventions?  (Nutrition/Exercise/Self-Monitoring): No   Have you ever had to go to the ED for symptoms of low blood sugar?: No       No patient-reported symptoms   Do you have hyperglycemia symptoms?: No   Do you have hypoglycemia symptoms?: No   Blood Sugar Monitoring Regimen: Not Testing   Blood Sugar Trends: No Change        ,   Congestive Heart Failure Assessment    Are you currently restricting fluids?: 2000cc (Comment: per [facility]F clinic - July 2022)  Do you understand a low sodium diet?: Yes  Do you understand how to read food labels?: No  Do you salt your food before tasting it?: No     No patient-reported symptoms      Symptoms:  None: Yes      Symptom course: stable  Patient-reported weight (lb): 190  Weight trend: fluctuating minimally  Salt intake watch compared to last visit: stable     ,   General Assessment    Do you have any symptoms that are causing concern?: Yes  Progression since Onset: Rapidly Improving  Reported Symptoms: Other (Comment: recent tooth extraction)     , and Care Coordination Episodes    Type: Amb Care Management  Episode: Complex Care  Noted: 7/15/2022  Comments: List referral - DM/CHF

## 2022-11-08 NOTE — TELEPHONE ENCOUNTER
Orders received from Dr. Rajesh Lnua to schedule the patient for a TAVR procedure, hold Eliquis 2 days prior and have the patient hold the follow medications the morning of the TAVR procedure: Lisinopril, Lasix, Metoprolol, Metformin. Hold Eliquis starting on 11/14/2022  TAVR: 11/16/2022 at 0700 with an arrival of 0500  Discharge to an ECF of family's choosing [facility] Ramirez called ([phone]) from the [facility] requesting a  history, physical and face sheet sent to [phone]. At discharge the patient will be transported by her daughter and report can be called to [phone] and discharge instructions faxed to [phone]. Yves Branham from case management (3B) called and notified. Post TAVR instructions per Dr. Rajesh Luna: have the patient be seen in the [facility] 7 days post TAVR, obtain a CBC, BMP and ECHO prior to the 30 day post TAVR follow up appointment. 7 day post TAVR appointment:11/22/2022 at 56  CHF Clinic follow up appointment 12/5/2022 at 0900  CBC/BMP/ECHO:12/16/2022 at 1145  30 day post TAVR appointment:12/20/2022 at [facility] the patient's daughter was called with instructions and information was mailed out.

## 2022-11-09 NOTE — TELEPHONE ENCOUNTER
Confirmed date and time with Venus Burgess in the cath lab. Form faxed to cath lab and Angela Loaiza. Procedure date and time put on doc's schedule. Instructions, future appointments and lab orders mailed to patient.

## 2022-11-16 PROBLEM — I50.9 CHF (CONGESTIVE HEART FAILURE), NYHA CLASS I, UNSPECIFIED FAILURE CHRONICITY, UNSPECIFIED TYPE (HCC): Status: ACTIVE | Noted: 2022-01-01

## 2022-11-16 PROBLEM — I35.0 SEVERE AORTIC STENOSIS: Status: ACTIVE | Noted: 2022-01-01

## 2022-11-16 NOTE — PROGRESS NOTES
0500 Patient admitted to 2E02  Ambulatory for TAVR. Patient NPO. Patient accompanied by daughter, Anitra Lim. Vital signs obtained. Assessment and data collection intiated. Oriented to room. Policies and procedures for 2E explained. All questions answered with no further questions at this time. Fall prevention and safety precautions discussed with patient. 0 Spoke with patient regarding holding metformin after procedure due to dye reaction, voices understanding, instructed patient additional instructions on discharge. Patient comes from home alone, but plans to discharge to [facility] temporarily after discharge.

## 2022-11-16 NOTE — CARE COORDINATION
Case Management Assessment  Initial Evaluation    Date/Time of Evaluation: 11/16/2022 10:08 AM  Assessment Completed by: Leopoldo Ramirez RN    If patient is discharged prior to next notation, then this note serves as note for discharge by case management. Patient Name: Moriah Bragg                   YOB: 1931  Diagnosis: Severe aortic stenosis [I35.0]                   Date / Time: 11/16/2022  4:56 AM    Patient Admission Status: Inpatient     Current PCP: EMRE Flores CNP  PCP verified by CM? Yes    Chart Reviewed: Yes      Patient Orientation: Alert and Oriented    Patient Cognition: Alert  History Provided by: Patient, Child/Family    Hospitalization in the last 30 days (Readmission):  No    If yes, Readmission Assessment in CM Navigator will be completed. Advance Directives:     Code Status: Full Code     Primary Decision Maker: Jose Thompson - Child - [phone]    Discharge Planning  Patient lives with: Alone Type of Home: Other (Comment) (condo)   Primary Caregiver: Self  Patient Support Systems include: Children   Current Financial resources: Medicare  Current community resources: Other (Comment) (none)  Current services prior to admission: None   Type of Home Care services:  None    ADLS  Prior functional level: Assistance with the following:, Housework  Current functional level: Assistance with the following:, Housework      Family can provide assistance at DC: Yes  Would you like Case Management to discuss the discharge plan with any other family members/significant others, and if so, who?  Yes (daughter)  Plans to Return to Present Housing: No  Other Identified Issues/Barriers to RETURNING to current housing: needs someone to be with him 24 hrs a day during recovery  Potential Assistance needed at discharge: Bryant Arroyo  Patient expects to discharge to: [facility] Lopez  for transportation at discharge: Family    Financial  Payor: MEDICARE / Plan: MEDICARE PART A AND B / Product Type: *No Product type* /     Does insurance require precert for SNF: No    Potential assistance Purchasing Medications: No  Meds-to-Beds request: Yes      Bryant Garcia, [address] [facility] [address] [phone] Marissa So [phone]  [address] [facility] [address]  Phone: [phone] Fax: [phone]      Factors facilitating achievement of predicted outcomes: Family support, Motivated, Cooperative, Pleasant, and Sense of humor    Barriers to discharge: Medical complications    Additional Case Management Notes: Admit as inpatient for scheduled TAVR. Planning follow up ECHO tomorrow. 1 x Ancef prior to procedure. Procedure: 11/16 TAVR      The Plan for Transition of Care is related to the following treatment goals of Severe aortic stenosis [I35.0]    Patient Goals/Plan/Treatment Preferences: From condo alone. SW consulted as patient would like placed at [facility] at discharge. Seen together with [employer] student. Transportation/Food Security/Housekeeping Addressed: No issues identified.      Gregory Laughlin RN  Case Management Department

## 2022-11-16 NOTE — PROGRESS NOTES
Inpatient Cardiac Rehabilitation Consult    Received consult for Phase II Cardiac Rehabilitation. Patient needs cardiac rehab due to TAVR on 11/16/22. Patient not appropriate at this time due to patient being discharged to a skilled nursing facility.

## 2022-11-16 NOTE — PROCEDURES
[address] [place] [address]                            CARDIAC CATHETERIZATION    PATIENT NAME: Sharon Zhu               :        1931  MED REC NO:   [id number]                           ROOM:       0028  ACCOUNT NO:   [de-identified]                           ADMIT DATE: 2022  PROVIDER:     Sandor Encinas MD    DATE OF PROCEDURE:  2022    PROCEDURE PERFORMED:  Transcatheter aortic valve replacement. INDICATION FOR PROCEDURE:  Severe symptomatic aortic stenosis, NYHA  class III CHF, not a candidate for open heart surgery. DESCRIPTION OF PROCEDURE:  After informed consent was obtained from the  patient, he was brought to the cardiac catheterization laboratory and  prepped in a sterile fashion. Bilateral femoral arterial accesses and  right internal jugular vein were chosen as the primary points of access. Preprocedure timeout was completed. After infiltration of the right  neck with 2% lidocaine using micropuncture, modified Seldinger technique  under fluoroscopic guidance and ultrasound guidance, I was able to  insert a 6-Angolan sheath into the right internal jugular vein. I  floated a 5-Angolan balloon-tipped pacemaker into the right ventricular  apex. Pacing thresholds were checked. Pacemaker capture lasted less  than 1 mA output. I then turned my attention to the left femoral  artery. After infiltration of the left inguinal region with 2%  lidocaine using micropuncture, modified Seldinger technique under  fluoroscopic guidance and ultrasound guidance, I was able to insert a  5-Angolan long sheath into the left femoral artery. After infiltration  of the right inguinal region with 2% lidocaine using micropuncture and  modified Seldinger technique under fluoroscopic guidance and ultrasound  guidance, I was able to insert a 6-Angolan sheath into the right femoral  artery.   Angiography was performed demonstrating common femoral artery  puncture. I upsized the access to 18-Liberian sheath after deploying a  single Perclose suture leaving it incomplete over a 0.035 Supra Core  wire. Heparin IV was given. ACT was confirmed to be above 250 seconds. I then inserted a straight 5-Liberian pigtail via the left femoral artery  into the noncoronary cusp. Via the right femoral artery, using a  6-Liberian AL1 catheter and 0.035 straight-tipped guidewire, I crossed the  aortic valve without complication. I exchanged out for an angled  pigtail. Based on preprocedure CTA measurements, an Evolut Pro+ 34  valve was chosen and loaded per [facility] recommendations. This  was prepped and deemed to be appropriate on fluoroscopy. I then  exchanged out for a small Safari wire and placed in the left ventricular  apex. Hemodynamics were transduced as well. I then removed the sheath  under direct pressure. I then inserted an Evolut Pro+ valve into the  patient in a sheathless fashion. I crossed the arch in the CHACHA  projection. We then advanced the valve through the native annulus. We  then lined up in the cusp overlap view. We then slowly and steadily  began to release the unsheathed valve and then we paced the patient at  120 paces per minute. We then released the valve up to 80% and did an  angiogram confirming that we were actually above the noncoronary cusp  level. Therefore, we elected to do a full recapture and re-advance the  valve further and at this time, deployed again while pacing the patient  at 120 paces per minute and at 80%, an injection was taken and  confirming that we captured the noncoronary cusp. We checked in the  3-cusp view and the left coronary cusp was also captured. Therefore, no  further manipulation was done in the valve. We centralized the nose  cone and paced the patient around 120 paces per minute again, and slowly  and steadily released the valve.   Once the valve was released, TTE was  done demonstrating no LV to AO systolic gradient. No significant  perivalvular leak. No pericardial effusion. No change in regional wall  motion abnormalities. No change in mitral valve apparatus. I crossed  the valve once more confirming no LV to AO systolic gradient. No change  in EDP. Given these findings, no further intervention was undertaken. IV protamine was given. All equipments were removed from the right  femoral vein. I completed the Perclose suture. An 8-Micronesian Angio-Seal  was attempted to deploy, but it did not catch appropriately. Therefore,  I used a 5-Micronesian VCF catheter over a 0.035 Glidewire Advantage crossed  over the common iliac artery bifurcation. I placed 8 mm x 40 balloon  across the access site. I did a direct balloon inflation for dry  closure. A manual pressure was held. This allowed for good hemostasis  without any significant bleeding. We did a 5-minute inflation at  nominal pressure. Once the inflation was done, we pulled the balloon  back and injection was done through the balloon lumen demonstrating no  significant contrast extravasation. Another 10 minutes of pressure was  held manually and the patient tolerated the procedure well. I removed  the left femoral artery access site and closed it with a 6-Micronesian  Angio-Seal as well. IMMEDIATE COMPLICATIONS:  None. MEDICATIONS:  See EMR. ACCESS:  See above. ESTIMATED BLOOD LOSS:  Less than 50 mL. SUMMARY:  Successful percutaneous transfemoral transcatheter aortic  valve replacement with Evolut Pro+ 34. PLAN:  1. Bedrest.  2.  Optimal medical therapy. 3.  Risk factor management. 4.  Routine access site care. 5.  IV fluids. 6.  Inpatient admission. 7.  Restart home meds. 8.  DAPT. 9.  Oral anticoagulation to start tomorrow given the fact that he has a  history of atrial fibrillation. 10.  Avoid AV node blockers. 11.  Cardiac rehab.   12.  Transthoracic echocardiogram in the a.m.  13. Follow up in [place] [address]. in one week post-procedure. All the above was explained to the patient and the patient's family. They were agreeable and amenable to the above plan.         Liu Long MD    D: 11/16/2022 9:05:19       T: 11/16/2022 11:15:47     [email or web address]  Job#: [id number]     Doc#: [id number]    CC:

## 2022-11-16 NOTE — CARE COORDINATION
DISCHARGE/PLANNING EVALUATION  11/16/22, 11:36 AM EST    Reason for Referral: ECF  Mental Status: Patient was alert and oriented. Spoke with him and daughter Claude. Decision Making: independent  Family/Social/Home Environment: Patient is from home alone. His daughter lives six doors down and often stops by to help. Current Services including food security, transportation and housekeeping: Patient is independent and does all ADLs on own except for cleaning. Claude comes over to clean, and they have meals together often. Patient is still driving. Current Equipment:Patient has a shower chair and shower bars. Payment Source:Medicare, Medical [place]  Concerns or Barriers to Discharge: Patient was previously independent. He takes initiative for his health and has appropriate help. Post-acute [facility]) provider list was provided to patient. Patient was informed of their freedom to choose [facility] provider. Discussed and offered to show the patient the relevant [facility] Providers quality and resource use measures on Medicare Compare web site via computer based on patient's goals of care and treatment preferences. Questions regarding selection process were answered. NA, daughter Claude has already reached out to [place]. Teach Back Method used with Donato Anthony and Claude regarding care plan and discharge planning. Patient verbalized understanding of the plan of care and contribute to goal setting. Patient goals, treatment preferences and discharge plan: Patient is from home alone where his daughter, Claude, lives near and helps out. Rohinifredlarry has already reached out to [place].  will reach out to confirm acceptance. Claude will transport at discharge. Electronically signed by Gonzalez Rios on 11/16/2022 at 11:36 AM    11:50 AM Left a message for [facility] at [facility] to verify acceptance Claude spoke about.      1:29 PM Received a voicemail from [facility].  Patient is accepted and they are prepared for him tomorrow.

## 2022-11-16 NOTE — H&P
[facility]  Sedation/Analgesia History & Physical    Pt Name: Cipriano Gandhi  Account number: [de-identified]  MRN: [id number]  YOB: 1931  Provider Performing Procedure: Lorena Javier MD MD  Referring Provider: Lorena Javier MD   Primary Care Physician: EMRE Graham - CNP  Date: 11/16/2022    PRE-PROCEDURE    Code Status: FULL CODE  Brief History/Pre-Procedure Diagnosis:   Severe AS  NYHA III CHF    Consent: : I have discussed with the patient risks, benefits, and alternatives (and relevant risks, benefits, and side effects related to alternatives or not receiving care), and likelihood of the success. The patient and/or representative appear to understand and agree to proceed. The discussion encompasses risks, benefits, and side effects related to the alternatives and the risks related to not receiving the proposed care, treatment, and services. The indication, risks and benefits of the procedure and possible therapeutic consequences and alternatives were discussed with the patient. The patient was given the opportunity to ask questions and to have them answered to his/her satisfaction. Risks of the procedure include but are not limited to the following: Bleeding, hematoma including retroperitoneal hemmorhage, infection, pain and discomfort, injury to the aorta and other blood vessels, rhythm disturbance, low blood pressure, myocardial infarction, stroke, kidney damage/failure, myocardial perforation, allergic reactions to sedatives/contrast material, loss of pulse/vascular compromise requiring surgery, aneurysm/pseudoaneurysm formation, possible loss of a limb/hand/leg, needing blood transfusion, requiring emergent open heart surgery or emergent coronary intervention, the need for intubation/respiratory support, the requirement for defibrillation/cardioversion, and death. Alternatives to and omission of the suggested procedure were discussed.  The patient had no further questions and wished to proceed; the consent form was signed. MEDICAL HISTORY   has a past medical history of Atrial fibrillation ([facility] CHF (congestive heart failure) ([facility] [address] Diabetes mellitus ([facility] [address] Hypertension, and Type II or unspecified type diabetes mellitus without mention of complication, not stated as uncontrolled. SURGICAL HISTORY   has a past surgical history that includes Tonsillectomy and adenoidectomy (Bilateral). Additional information:       ALLERGIES   Allergies as of 11/16/2022 - Fully Reviewed 11/16/2022   Allergen Reaction Noted    Norvasc [amlodipine besylate]  08/12/2015    Other Rash 06/19/2015     Additional information:       MEDICATIONS     Current Facility-Administered Medications:     sodium chloride flush 0.9 % injection 5-40 mL, 5-40 mL, IntraVENous, 2 times per day, Shadia Méndez PA-C    sodium chloride flush 0.9 % injection 5-40 mL, 5-40 mL, IntraVENous, PRN, Es Ruvalcaba PA-C    0.9 % sodium chloride infusion, , IntraVENous, PRN, Shadia Méndez PA-C, Last Rate: 75 mL/hr at 11/16/22 0618, New Bag at 11/16/22 0618    chlorhexidine (HIBICLENS) 4 % liquid, , Topical, Once, Shadia Méndez PA-C  Prior to Admission medications    Medication Sig Start Date End Date Taking?  Authorizing Provider   lisinopril (PRINIVIL;ZESTRIL) 20 MG tablet Take 1 tablet by mouth daily 9/1/22   EMRE Smith CNP   atorvastatin (LIPITOR) 40 MG tablet Take 1 tablet by mouth nightly 9/1/22   EMRE Smith CNP   clopidogrel (PLAVIX) 75 MG tablet Take 1 tablet by mouth daily 9/1/22   EMRE Smith CNP   amiodarone (CORDARONE) 200 MG tablet Take 1 tablet by mouth daily 9/1/22   EMRE Smith CNP   furosemide (LASIX) 20 MG tablet Take 1 tablet by mouth 2 times daily 9/1/22   EMRE Smith CNP   metoprolol succinate (TOPROL XL) 100 MG extended release tablet Take 1 tablet by mouth daily 7/11/22   EMRE Marrero CNP   metFORMIN (GLUCOPHAGE XR) 500 MG extended release tablet Take 2 tablets by mouth daily (with breakfast) 6/14/22 11/16/22  Ezequiel Rothman MD   nitroGLYCERIN (NITROSTAT) 0.4 MG SL tablet Place 1 tablet under the tongue every 5 minutes as needed for Chest pain up to max of 3 total doses. If no relief after 1 dose, call 911. 5/5/22   Dwight Jackson PA-C   apixaban (ELIQUIS) 2.5 MG TABS tablet Take 1 tablet by mouth 2 times daily 4/23/22   Yoon Lundy DO     Additional information:       VITAL SIGNS   Vitals:    11/16/22 0533   BP: (!) 148/57   Pulse: 55   Resp: 14   Temp:    SpO2: 97%       PHYSICAL:   General: No acute distress  HEENT:  Unremarkable for age  Neck: without increased JVD, carotid pulses 2+ bilaterally without bruits  Heart: RRR, S1 & S2 WNL, S4 gallop, 3/6 CHIOMA  NYHA: 3  Lungs: Clear to auscultation    Abdomen: BS present, without HSM, masses, or tenderness    Extremities: without C,C,E.  Pulses 2+ bilaterally  Mental Status: Alert & Oriented        PLANNED PROCEDURE   []Cath  []PCI                []Pacemaker/AICD  []MIS             []Cardioversion []Peripheral angiography/PTA  [x]Other: TAVR     SEDATION  Planned agent:[x]Midazolam []Meperidine [x]Sublimaze []Morphine  []Diazepam  []Other:     ASA Classification:  []1 []2 [x]3 []4 []5  Class 1: A normal healthy patient  Class 2: Pt with mild to moderate systemic disease  Class 3: Severe systemic disease or disturbance  Class 4: Severe systemic disorders that are already life threatening. Class 5: Moribund pt with little chances of survival, for more than 24 hours. Mallampati I Airway Classification:   []1 []2 [x]3 []4    [x]Pre-procedure diagnostic studies complete and results available. Comment:    [x]Previous sedation/anesthesia experiences assessed. Comment:    [x]The patient is an appropriate candidate to undergo the planned procedure sedation and anesthesia.  (Refer to nursing sedation/analgesia documentation record)  [x]Formulation and discussion of sedation/procedure plan, risks, and expectations with patient and/or responsible adult completed. [x]Patient examined immediately prior to the procedure.  (Refer to nursing sedation/analgesia documentation record)    Logan Moody MD MD   Electronically signed 11/16/2022 at 7:20 AM

## 2022-11-16 NOTE — PLAN OF CARE
Problem: Chronic Conditions and Co-morbidities  Goal: Patient's chronic conditions and co-morbidity symptoms are monitored and maintained or improved  11/16/2022 1447 by Catalina Spears RN  Outcome: Progressing  Flowsheets (Taken 11/16/2022 0530 by Pamela Sherman RN)  Care Plan - Patient's Chronic Conditions and Co-Morbidity Symptoms are Monitored and Maintained or Improved: Monitor and assess patient's chronic conditions and comorbid symptoms for stability, deterioration, or improvement  11/16/2022 0648 by Pamela Sherman RN  Outcome: Progressing  Flowsheets (Taken 11/16/2022 0530)  Care Plan - Patient's Chronic Conditions and Co-Morbidity Symptoms are Monitored and Maintained or Improved: Monitor and assess patient's chronic conditions and comorbid symptoms for stability, deterioration, or improvement     Problem: Discharge Planning  Goal: Discharge to home or other facility with appropriate resources  11/16/2022 1447 by Catalina Spears RN  Outcome: Progressing  Flowsheets (Taken 11/16/2022 0530 by Pamela Sherman RN)  Discharge to home or other facility with appropriate resources: Identify barriers to discharge with patient and caregiver  11/16/2022 1144 by Vee Hercules  Outcome: Progressing  11/16/2022 0648 by Pamela Sherman RN  Outcome: Progressing  Flowsheets (Taken 11/16/2022 0530)  Discharge to home or other facility with appropriate resources: Identify barriers to discharge with patient and caregiver     Problem: Pain  Goal: Verbalizes/displays adequate comfort level or baseline comfort level  11/16/2022 1447 by Catalina Spears RN  Outcome: Progressing  Flowsheets (Taken 11/16/2022 0533 by Pamela Sherman RN)  Verbalizes/displays adequate comfort level or baseline comfort level:   Encourage patient to monitor pain and request assistance   Assess pain using appropriate pain scale  11/16/2022 0648 by Pamela Sherman RN  Outcome: Progressing  Flowsheets (Taken 11/16/2022 5)  Verbalizes/displays adequate comfort level or baseline comfort level:   Encourage patient to monitor pain and request assistance   Assess pain using appropriate pain scale     Problem: ABCDS Injury Assessment  Goal: Absence of physical injury  11/16/2022 1447 by Yariel Alejandra RN  Outcome: Progressing  Flowsheets (Taken 11/16/2022 1447)  Absence of Physical Injury: Implement safety measures based on patient assessment  11/16/2022 0648 by Elizabeth Ferrer RN  Outcome: Progressing   Care plan reviewed with patient. Patient verbalizes understanding of the care plan and contributed to goal setting.

## 2022-11-16 NOTE — PLAN OF CARE
Problem: Chronic Conditions and Co-morbidities  Goal: Patient's chronic conditions and co-morbidity symptoms are monitored and maintained or improved  Outcome: Progressing  Flowsheets (Taken 11/16/2022 0530)  Care Plan - Patient's Chronic Conditions and Co-Morbidity Symptoms are Monitored and Maintained or Improved: Monitor and assess patient's chronic conditions and comorbid symptoms for stability, deterioration, or improvement     Problem: Discharge Planning  Goal: Discharge to home or other facility with appropriate resources  Outcome: Progressing  Flowsheets (Taken 11/16/2022 0530)  Discharge to home or other facility with appropriate resources: Identify barriers to discharge with patient and caregiver     Problem: Pain  Goal: Verbalizes/displays adequate comfort level or baseline comfort level  Outcome: Progressing  Flowsheets (Taken 11/16/2022 0533)  Verbalizes/displays adequate comfort level or baseline comfort level:   Encourage patient to monitor pain and request assistance   Assess pain using appropriate pain scale     Problem: ABCDS Injury Assessment  Goal: Absence of physical injury  Outcome: Progressing   Care plan reviewed with patient. Patient verbalizes understanding of the plan of care and contributes to goal setting.

## 2022-11-16 NOTE — BRIEF OP NOTE
[address] [place] [address]  Sedation/Analgesia Post Sedation Record    Pt Name: Emily Justice  Account number: [de-identified]  MRN: [id number]  YOB: 1931  Procedure Performed By: MD MD Cathryn Abdul, [address]  Primary Care Physician: Mennie Councilman, APRN - CNP  Date: 11/16/2022    POST-PROCEDURE    Physicians/Assistants: MD MD Cathryn Abdul, CELSA    Procedure Performed:TAVR      Sedation/Anesthesia: Versed/ Fentanyl and 2% xylocaine local anesthesia. Estimated Blood Loss: < 50 ml. Specimens Removed: None         Disposition of Specimen: N/A        Complications: No Immediate Complications.        Post-procedure Diagnosis/Findings:     [id number]               MD MD Cathryn Abdul, CELSA  Electronically signed 11/16/2022 at 8:55 AM  Interventional Cardiology

## 2022-11-17 NOTE — PLAN OF CARE
Problem: Chronic Conditions and Co-morbidities  Goal: Patient's chronic conditions and co-morbidity symptoms are monitored and maintained or improved  11/17/2022 1119 by Chela Tolentino RN  Outcome: Progressing  Flowsheets (Taken 11/17/2022 0136 by Hallie Roche RN)  Care Plan - Patient's Chronic Conditions and Co-Morbidity Symptoms are Monitored and Maintained or Improved:   Monitor and assess patient's chronic conditions and comorbid symptoms for stability, deterioration, or improvement   Collaborate with multidisciplinary team to address chronic and comorbid conditions and prevent exacerbation or deterioration  11/17/2022 0136 by Hallie Roche RN  Outcome: Progressing  Flowsheets (Taken 11/17/2022 0136)  Care Plan - Patient's Chronic Conditions and Co-Morbidity Symptoms are Monitored and Maintained or Improved:   Monitor and assess patient's chronic conditions and comorbid symptoms for stability, deterioration, or improvement   Collaborate with multidisciplinary team to address chronic and comorbid conditions and prevent exacerbation or deterioration     Problem: Discharge Planning  Goal: Discharge to home or other facility with appropriate resources  11/17/2022 1119 by Chela Tolentino RN  Outcome: Progressing  Flowsheets (Taken 11/16/2022 0530 by Daisy Mcburney, RN)  Discharge to home or other facility with appropriate resources: Identify barriers to discharge with patient and caregiver  11/17/2022 0136 by Hallie Roche RN  Outcome: Progressing     Problem: Pain  Goal: Verbalizes/displays adequate comfort level or baseline comfort level  11/17/2022 1119 by Chela Tolentino RN  Outcome: Progressing  Flowsheets (Taken 11/17/2022 0136 by Hallie Roche RN)  Verbalizes/displays adequate comfort level or baseline comfort level:   Encourage patient to monitor pain and request assistance   Assess pain using appropriate pain scale   Implement non-pharmacological measures as appropriate and evaluate response  11/17/2022 0136 by Francisco Schimke, RN  Outcome: Progressing  Flowsheets (Taken 11/17/2022 0136)  Verbalizes/displays adequate comfort level or baseline comfort level:   Encourage patient to monitor pain and request assistance   Assess pain using appropriate pain scale   Implement non-pharmacological measures as appropriate and evaluate response  Note: Ongoing assessment & interventions provided throughout shift. Reminded patient to report any pain, pressure, or shortness of breath to the nurse. Problem: ABCDS Injury Assessment  Goal: Absence of physical injury  11/17/2022 1119 by Cleve Manuel RN  Outcome: Progressing  Flowsheets (Taken 11/16/2022 1447)  Absence of Physical Injury: Implement safety measures based on patient assessment  11/17/2022 0136 by Francisco Forde RN  Outcome: Progressing   Care plan reviewed with patient. Patient verbalizes understanding of the care plan and contributed to goal setting.

## 2022-11-17 NOTE — PROGRESS NOTES
Spoke with CHEYANNE Steward regarding patients prescriptions that were sent here. He is going to a Skilled nursing facility so we will not be filling his rxs here. They will need sent to the facilty directly.     [place], Outpatient [facility]acy 11/17/2022,8:48 AM

## 2022-11-17 NOTE — DISCHARGE INSTRUCTIONS
Post Transcatheter Aortic Valve Replacement (TAVR) Discharge Instructions     Your follow-up appointment and echocardiogram will be scheduled by Dr. Jim Rouse office and their office staff and will call you with the date and time. We are here for you! If you have any questions please call:                                        Chintan SCHWARZN, RN   [phone]      Do you have the help you need at home? Someone must be with you for the first 5-7 days or until you are seen in the office post TAVR. ACTIVITY:  Weigh yourself every day in the morning after using the bathroom. NO DRIVING UNTIL YOU RETURN TO THE DOCTOR'S OFFICE. You may ride in a car. Do not lift more than five to ten pounds for the first week you are home.  (A gallon of milk is 7 lbs)  Get up and get dressed every day. Do not stay in bed. Set a daily routine. This is an important step in re-gaining your strength. You may walk up and down a few stairs. Walk as much as you can. This will help facilitate the healing process. Plan rest periods during the day. Deep breathe, and use your incentive spirometer 10 times every hour while you are awake. Avoid work that increases muscle tension.        (straining with bowel movements, moving furniture)    WOUND CARE:  Remove Band-Aids after 48 hours of placement. May shower once Band-Aids are removed. No bathtubs, pools, or hot tubs for the first week you are home. Cleanse wounds with Hibiclens soap and water. Pat incision dry. Keep wounds open to air after Band-Aids are removed and keep dry. A small amount of bloody or clear drainage is normal. Call if there is any extensive bruising, oozing or hematoma. Place manual pressure over incision sites when coughing, sneezing, vomiting or straining for a bowl movement.   Watch for signs of infections: redness, incision hot to the touch, fever greater than 101 degrees, swelling at the groin or incision site, or discolored drainage from your incision. When coughing, sneezing, straining for a bowel movement, vomiting or dry heaving please place manual pressure the the bilateral groin sites to give support to the sutures. If pressure is not placed the chance of a spontaneous bleed could occur which could be serious. NUTRITION:   Low fat, low salt diet (guidelines for Heart Healthy eating)  Limit caffeine to 1-2 cups per day (coffee, tea, chocolate, soda)  Eat high fiber to avoid constipation and straining during bowel movements (fresh veggies and fruit, whole grain)  Limit alcohol to two servings a day ( 8 oz beer, 1 oz liquor, 4 oz wine)    HEALTHY HABITS:  No Smoking!!!! If you need help to stop smoking please call your doctor. Attempt to achieve and maintain your ideal body weight. We suggest that you walk as much as you can, but do not exhaust yourself. Set a goal and work your way up to it at a paced rate. MEDICATIONS:  DO NOT STOP TAKING PLAVIX OR ANY MEDICATION WITHOUT SPEAKING WITH YOUR CARDIOLOGIST! Take your pills as ordered at time of discharge. Do not stop taking any medication without first discussing it with your doctor. Avoid all over the counter medications, herbal, and natural supplements unless you have discussed them with your doctor. It is important to follow your doctor's orders, especially if blood thinning drugs are prescribed. Your doctor will monitor your medicine and advise you when or if you can stop taking it. WHO DO YOU NEED TO TELL ABOUT YOUR IMPLANTED VALVE? Regular check-ups by your cardiologist are very important. It is easier for patients with a heart valve replacement to get infections, which could lead to further heart damage. Before any dental work or surgery is done, tell your dentist or surgeon about your heart valve replacement. You may need to take antibiotics before and after some medical procedures to reduce risk of infection.     Always tell the doctor (or medical technician) that you have an implanted heart valve. Failure to do so may result in damage to the heart valve that could result in death. Before an MRI (magnetic resonance imaging) procedure, always notify the doctor (or medical technician) that you have an implanted heart valve. What symptoms or health problems do you need to look out for after you leave the hospital? Call your physician if you have any of these symptoms: (Phone Number: [phone])  Weight pound gain of 3 pounds in one day or 5 pounds in one week. Weight gain is NOT normal.    Swelling of the legs, ankles or feet  Increasing shortness of breath  Change in the color or temperature of your lower legs and feet  Develop abdominal pain or unrelieved nausea and/or vomiting  Develop any redness, incision, or limited movement of your arms or legs  Signs of infection: redness, incision hot to the touch, fevers greater than 101 degrees, or colored drainage from your incision  Seroma is an accumulation of fluid in the tissues at the groin surgical site. Symptoms may include: a lump near the groin surgical site, clear fluid draining from the site, redness, warmth, or swelling.

## 2022-11-17 NOTE — PLAN OF CARE
Problem: Chronic Conditions and Co-morbidities  Goal: Patient's chronic conditions and co-morbidity symptoms are monitored and maintained or improved  11/17/2022 0136 by Sari Shelton RN  Outcome: Progressing  Flowsheets (Taken 11/17/2022 0136)  Care Plan - Patient's Chronic Conditions and Co-Morbidity Symptoms are Monitored and Maintained or Improved:   Monitor and assess patient's chronic conditions and comorbid symptoms for stability, deterioration, or improvement   Collaborate with multidisciplinary team to address chronic and comorbid conditions and prevent exacerbation or deterioration  11/16/2022 1447 by Maite Farmer RN  Outcome: Progressing  Flowsheets (Taken 11/16/2022 0530 by Cara Lai RN)  Care Plan - Patient's Chronic Conditions and Co-Morbidity Symptoms are Monitored and Maintained or Improved: Monitor and assess patient's chronic conditions and comorbid symptoms for stability, deterioration, or improvement     Problem: Discharge Planning  Goal: Discharge to home or other facility with appropriate resources  11/17/2022 0136 by Sari Shelton RN  Outcome: Progressing  11/16/2022 1447 by Maite Farmer RN  Outcome: Progressing  Flowsheets (Taken 11/16/2022 0530 by Cara Lai RN)  Discharge to home or other facility with appropriate resources: Identify barriers to discharge with patient and caregiver  11/16/2022 1144 by Delores Hood  Outcome: Progressing     Problem: Pain  Goal: Verbalizes/displays adequate comfort level or baseline comfort level  11/17/2022 0136 by Sari Shelton RN  Outcome: Progressing  Flowsheets (Taken 11/17/2022 0136)  Verbalizes/displays adequate comfort level or baseline comfort level:   Encourage patient to monitor pain and request assistance   Assess pain using appropriate pain scale   Implement non-pharmacological measures as appropriate and evaluate response  Note: Ongoing assessment & interventions provided throughout shift.   Reminded patient to report any pain, pressure, or shortness of breath to the nurse. 11/16/2022 1447 by Breanna Olivarez RN  Outcome: Progressing  Flowsheets (Taken 11/16/2022 0533 by Joshua Cabot, RN)  Verbalizes/displays adequate comfort level or baseline comfort level:   Encourage patient to monitor pain and request assistance   Assess pain using appropriate pain scale     Problem: ABCDS Injury Assessment  Goal: Absence of physical injury  11/17/2022 0136 by Sana Schuster RN  Outcome: Progressing  11/16/2022 1447 by Breanna Olivarez RN  Outcome: Progressing  Flowsheets (Taken 11/16/2022 1447)  Absence of Physical Injury: Implement safety measures based on patient assessment     Care plan reviewed with patient. Patient verbalizes understanding of the care plan and contributed to goal setting.

## 2022-11-17 NOTE — PROGRESS NOTES
Discussed discharge summary with patient. Instructed patient about medications & follow up appointments. Patient denies any additional questions. Patient was discharged with all belongings. No distress noted. Patient discharged to nursing home. Taken down to the vehicle by transporter per wheelchair.

## 2022-11-17 NOTE — DISCHARGE INSTR - COC
Continuity of Care Form    Patient Name: Jake Sierra   :    MRN:  [id number]    Admit date:  2022  Discharge date:  22    Code Status Order: Full Code   Advance Directives:     Admitting Physician:  Chasity Joshua MD  PCP: Andrew Sarkar, APRN - CNP    Discharging Nurse:  [facility]/Room#: 3B-28/028-A  Discharging Unit Phone Number: [phone]    Emergency Contact:   Extended Emergency Contact Information  Primary Emergency Contact: Kanika Griffin  Address: [address] [place] [address] Phone: [phone]  Relation: Child    Past Surgical History:  Past Surgical History:   Procedure Laterality Date    TONSILLECTOMY AND ADENOIDECTOMY Bilateral     age 10        Immunization History:   Immunization History   Administered Date(s) Administered    COVID-19, PFIZER GRAY top, DO NOT Dilute, (age 15 y+), IM, 30 mcg/0.3 mL 2022    COVID-19, PFIZER PURPLE top, DILUTE for use, (age 15 y+), 30mcg/0.3mL 2021, 2021, 10/06/2021       Active Problems:  Patient Active Problem List   Diagnosis Code    Type 2 diabetes mellitus with stage 3a chronic kidney disease, without long-term current use of insulin ([address] E11.22, N18.31    BPH (benign prostatic hyperplasia) N40.0    Essential hypertension I10    Nonrheumatic aortic valve stenosis I35.0    Left atrial enlargement I51.7    Decompensated heart failure ([address] I50.9    JUAN C (acute kidney injury) ([address] N17.9    Flash pulmonary edema ([address] J81.0    Encounter for cardioversion procedure Z01.89    Atrial fibrillation with RVR ([address] P57.92    Metabolic acidosis O23.95    Coronary artery disease involving native coronary artery of native heart without angina pectoris I25.10    Type 2 diabetes mellitus with chronic kidney disease E11.22    Chronic kidney disease (CKD) stage G2/A1, mildly decreased glomerular filtration rate (GFR) between 60-89 mL/min/1.73 square meter and albuminuria creatinine ratio less than 30 mg/g N18.2    Anginal equivalent (HCC) I20.8    CANTRELL (dyspnea on exertion) R06.09    Ischemic cardiomyopathy I25.5    CAD in native artery I25.10    NSTEMI (non-ST elevated myocardial infarction) (HCC) I21.4    Chronic systolic (congestive) heart failure I50.22    Severe aortic stenosis I35.0    CHF (congestive heart failure), NYHA class I, unspecified failure chronicity, unspecified type ([facility]) I50.9       Isolation/Infection:   Isolation            No Isolation          Patient Infection Status       Infection Onset Added Last Indicated Last Indicated By Review Planned Expiration Resolved Resolved By    None active    Resolved    COVID-19 (Rule Out) 08/26/22 08/26/22 08/26/22 COVID-19, Rapid (Ordered)   08/26/22 Rule-Out Test Resulted    COVID-19 (Rule Out) 04/16/22 04/16/22 04/16/22 Respiratory Panel, Molecular, with COVID-19 (Restricted: peds pts or suitable admitted adults) (Ordered)   04/16/22 Rule-Out Test Resulted            Nurse Assessment:  Last Vital Signs: BP (!) 141/66   Pulse 57   Temp 98.7 °F (37.1 °C) (Oral)   Resp 15   Ht 5' 10\" (1.778 m)   Wt 192 lb (87.1 kg)   SpO2 94%   BMI 27.55 kg/m²     Last documented pain score (0-10 scale): Pain Level: 0  Last Weight:   Wt Readings from Last 1 Encounters:   11/16/22 192 lb (87.1 kg)     Mental Status:  oriented, alert, coherent, logical, thought processes intact, and able to concentrate and follow conversation    IV Access:  - None    Nursing Mobility/ADLs:  Walking   Assisted  Transfer  Assisted  Bathing  Assisted  Dressing  Assisted  Toileting  Assisted  Feeding  Independent  Med Admin  Assisted  Med Delivery   whole    Wound Care Documentation and Therapy:        Elimination:  Continence:    Bowel: Yes  Bladder: Yes  Urinary Catheter: None   Colostomy/Ileostomy/Ileal Conduit: No       Date of Last BM: 11/16/22    Intake/Output Summary (Last 24 hours) at 11/17/2022 0725  Last data filed at 11/17/2022 0450  Gross per 24 hour   Intake 450 ml   Output 700 ml   Net -250 ml     I/O last 3 completed shifts: In: 450 [P.O.:450]  Out: 700 [Urine:700]    Safety Concerns:     None    Impairments/Disabilities:      None    Nutrition Therapy:  Current Nutrition Therapy:   - Oral Diet:  General    Routes of Feeding: Oral  Liquids: Thin Liquids  Daily Fluid Restriction: no  Last Modified Barium Swallow with Video (Video Swallowing Test): not done    Treatments at the Time of Hospital Discharge:   Respiratory Treatments: none  Oxygen Therapy:  is not on home oxygen therapy. Ventilator:    - No ventilator support    Rehab Therapies: Physical Therapy and Occupational Therapy  Weight Bearing Status/Restrictions: No weight bearing restrictions  Other Medical Equipment (for information only, NOT a DME order):  walker and hospital bed  Other Treatments: none      Patient's personal belongings (please select all that are sent with patient):  Cell phone    RN SIGNATURE:  Electronically signed by Luis Felipe Alegre RN on 11/17/22 at 10:55 AM EST    CASE MANAGEMENT/SOCIAL WORK SECTION    Inpatient Status Date: 11/16/2022    Readmission Risk Assessment Score:  Readmission Risk              Risk of Unplanned Readmission:  13           Discharging to Facility/ Agency   Name: [facility]  [address] [place] [address] (if applicable)   Name:  Address:  Dialysis Schedule:  Phone:  Fax:    / signature: Electronically signed by GISELLE Azul on 11/17/22 at 7:40 AM EST    PHYSICIAN SECTION    Prognosis: Good    Condition at Discharge: Stable    Rehab Potential (if transferring to Rehab): Good    Recommended Labs or Other Treatments After Discharge: Follow up in office in 1 week.     Physician Certification: I certify the above information and transfer of Emily Justice  is necessary for the continuing treatment of the diagnosis listed and that he requires [facility] for less 30 days.      Update Admission H&P: No change in H&P    PHYSICIAN SIGNATURE:  Electronically signed by Jade Rob M.D. on 11/17/22 at 9:07 AM EST

## 2022-11-17 NOTE — PROCEDURES
The skin was prepped and a 14 cardiac event monitor was applied. The patient was instructed on the documentation of symptoms and the purpose of the monitor as well as the things to avoid while wearing the monitor. The patient was instructed to remove and return the monitor on 12/01/2022.   The serial number of the monitor that was applied is [id number]

## 2022-11-17 NOTE — CARE COORDINATION
11/17/22, 11:47 AM EST    Patient goals/plan/ treatment preferences discussed by  and . Patient goals/plan/ treatment preferences reviewed with patient/ family. Patient/ family verbalize understanding of discharge plan and are in agreement with goal/plan/treatment preferences. Understanding was demonstrated using the teach back method. AVS provided by RN at time of discharge, which includes all necessary medical information pertaining to the patients current course of illness, treatment, post-discharge goals of care, and treatment preferences. Services At/After Discharge: [facility] (SNF), Aide services, Nursing service, OT, and PT       IMM Letter  IMM Letter given to Patient/Family/Significant other/Guardian/POA/by[de-identified] Enrike Ellis RN CM  IMM Letter date given[de-identified] 11/16/22  IMM Letter time given[de-identified] 56     Bulmaro will be discharged to [facility] today. He will be skilled at the facility under his Medicare benefit. He will be transported by ambulance. Discharge instructions are attached to blue discharge packet. Called Marco Antonio Robbins at the [facility] to make her aware patient will be discharged this afternoon and family will be transporting.

## 2022-11-17 NOTE — DISCHARGE SUMMARY
Structural Heart/Cardiology Discharge Summary       Name:  Johnie Hale  YOB: 1931  Medical Record Number:  [id number]    Date of Admission:  11/16/2022  Date of Discharge:  11/17/2022    Admitting physician: Shaheed Gaffney MD  Discharge to: Home  Condition at d/c: [facility] Problem List:  Patient Active Problem List   Diagnosis    Type 2 diabetes mellitus with stage 3a chronic kidney disease, without long-term current use of insulin (HCC)    BPH (benign prostatic hyperplasia)    Essential hypertension    Nonrheumatic aortic valve stenosis    Left atrial enlargement    Decompensated heart failure ([address]    JUAN C (acute kidney injury) ([address]    Flash pulmonary edema ([address]    Encounter for cardioversion procedure    Atrial fibrillation with RVR ([address]    Metabolic acidosis    Coronary artery disease involving native coronary artery of native heart without angina pectoris    Type 2 diabetes mellitus with chronic kidney disease    Chronic kidney disease (CKD) stage G2/A1, mildly decreased glomerular filtration rate (GFR) between 60-89 mL/min/1.73 square meter and albuminuria creatinine ratio less than 30 mg/g    Anginal equivalent ([address]    CANTRELL (dyspnea on exertion)    Ischemic cardiomyopathy    CAD in native artery    NSTEMI (non-ST elevated myocardial infarction) ([address]    Chronic systolic (congestive) heart failure    Severe aortic stenosis    CHF (congestive heart failure), NYHA class I, unspecified failure chronicity, unspecified type [facility])       Procedures: TAVR 11/16/2022    Hospital Course: Underwent successful TAVR on 11/16/2022. Hgb remained stable post procedure. No rhythm issues post-procedure.      Discharge physical examination:   Vitals:    11/17/22 0730   BP: (!) 156/66   Pulse: 56   Resp: 16   Temp: 98.6 °F (37 °C)   SpO2: 95%     General Appearance: alert ,conversing, in no acute distress  Cardiovascular: normal rate, regular rhythm, normal S1 and S2, no murmurs, rubs, clicks, or gallops  Pulmonary/Chest: clear to auscultation bilaterally- no wheezes, rales or rhonchi, normal air movement, no respiratory distress  Neurological: alert, oriented, normal speech, no focal findings or movement disorder noted. Pulses: Bilateral radial, femoral, DP, and PT pulses noted  Lower Extremity: No edema noted. Groin incisions healing appropriately-No signs of infection or hematoma. Discharge Medications:         Medication List        CHANGE how you take these medications      amiodarone 200 MG tablet  Commonly known as: CORDARONE  Take 1 tablet by mouth daily  Start taking on: November 24, 2022  What changed: These instructions start on November 24, 2022. If you are unsure what to do until then, ask your doctor or other care provider. metFORMIN 500 MG extended release tablet  Commonly known as: Glucophage XR  Take 2 tablets by mouth daily (with breakfast)  Start taking on: November 19, 2022  What changed: These instructions start on November 19, 2022. If you are unsure what to do until then, ask your doctor or other care provider. metoprolol succinate 100 MG extended release tablet  Commonly known as: TOPROL XL  Take 1 tablet by mouth daily  Start taking on: November 24, 2022  What changed: These instructions start on November 24, 2022. If you are unsure what to do until then, ask your doctor or other care provider.             CONTINUE taking these medications      apixaban 2.5 MG Tabs tablet  Commonly known as: ELIQUIS  Take 1 tablet by mouth 2 times daily     atorvastatin 40 MG tablet  Commonly known as: LIPITOR  Take 1 tablet by mouth nightly     clopidogrel 75 MG tablet  Commonly known as: PLAVIX  Take 1 tablet by mouth daily     furosemide 20 MG tablet  Commonly known as: LASIX  Take 1 tablet by mouth 2 times daily     lisinopril 20 MG tablet  Commonly known as: PRINIVIL;ZESTRIL  Take 1 tablet by mouth daily     nitroGLYCERIN 0.4 MG SL tablet  Commonly known as: Nitrostat  Place 1 tablet under the tongue every 5 minutes as needed for Chest pain up to max of 3 total doses. If no relief after 1 dose, call 911. Where to Get Your Medications        These medications were sent to [address], [address]  [address] [place], [place] [address] [facility] [address]      Phone: [phone]   metFORMIN 500 MG extended release tablet  metoprolol succinate 100 MG extended release tablet       Information about where to get these medications is not yet available    Ask your nurse or doctor about these medications  amiodarone 200 MG tablet             Follow Up Plan  1. Follow up with Cardiology in 1 week for incision check and post TAVR f/u.   2. Follow up with primary care provider in 1 week  3. Continue Plavix and Eliquis. Beta-blocker and Amiodarone will be put on hold until seen at one week follow up office visit. 4. Pt is fully agreeable to discharge plans. All questions were thoroughly answered.      Electronically signed by Lucie Boudreaux PA-C on 11/17/2022 at 8:39 AM

## 2022-11-18 NOTE — CARE COORDINATION
Per chart patient discharged to the [facility]. ACM spoke with Victoria Wooten at [facility] and verified patient was discharged and admitted to their facility and he is currently receiving skilled care with no discharge plans at this time. Will discharge from Care Coordination at this time d/t current SNF status. PCP updated.

## 2022-11-21 NOTE — TELEPHONE ENCOUNTER
Care Transitions Initial Follow Up Call    Outreach made within 2 business days of discharge: No    Patient: Khalida Montes De Oca Patient :    MRN: [id number]  Reason for Admission: There are no discharge diagnoses documented for the most recent discharge. Discharge Date: 22         PATIENT DISCHARGED TO [address] [place] [address].

## 2022-11-22 NOTE — PROGRESS NOTES
Structural Heart/Cardiology Follow up    11/22/2022 10:51 AM    Patient's Name/Date of Birth: Rimma Hurt / [birth year] (9 y.o.)    PCP: EMRE Peacock CNP    Date: November 22, 2022     CC:   Chief Complaint   Patient presents with    Follow-up     S/p TAVR 11.16.2022 with Dr. Vasquez DUKE  We had the pleasure of seeing Rimma Hurt in the office today, as you know this is a very pleasant 80y.o. year old male with a history of aortic stenosis who underwent a successful TAVR on 11/16/22. The pt denies chest pressure, palpitations, SOB, fever, chills, N/V/D. PastMedical History: Natty Syed  has a past medical history of Atrial fibrillation ([address] CHF (congestive heart failure) ([address] Diabetes mellitus ([address] Hypertension, and Type II or unspecified type diabetes mellitus without mention of complication, not stated as uncontrolled. Past Surgical History:  The patient  has a past surgical history that includes Tonsillectomy and adenoidectomy (Bilateral). Allergies: The patient is allergic to norvasc [amlodipine besylate] and other.     Medications:    Current Outpatient Medications:     metFORMIN (GLUCOPHAGE XR) 500 MG extended release tablet, Take 2 tablets by mouth daily (with breakfast), Disp: 180 tablet, Rfl: 3    lisinopril (PRINIVIL;ZESTRIL) 20 MG tablet, Take 1 tablet by mouth daily, Disp: 90 tablet, Rfl: 1    atorvastatin (LIPITOR) 40 MG tablet, Take 1 tablet by mouth nightly, Disp: 90 tablet, Rfl: 1    clopidogrel (PLAVIX) 75 MG tablet, Take 1 tablet by mouth daily, Disp: 90 tablet, Rfl: 1    furosemide (LASIX) 20 MG tablet, Take 1 tablet by mouth 2 times daily, Disp: 180 tablet, Rfl: 1    apixaban (ELIQUIS) 2.5 MG TABS tablet, Take 1 tablet by mouth 2 times daily, Disp: 60 tablet, Rfl: 3    [START ON 11/24/2022] amiodarone (CORDARONE) 200 MG tablet, Take 1 tablet by mouth daily (Patient not taking: Reported on 11/22/2022), Disp: 90 tablet, Rfl: 1    [START ON 11/24/2022] metoprolol succinate (TOPROL XL) 100 MG extended release tablet, Take 1 tablet by mouth daily (Patient not taking: Reported on 11/22/2022), Disp: 90 tablet, Rfl: 3    nitroGLYCERIN (NITROSTAT) 0.4 MG SL tablet, Place 1 tablet under the tongue every 5 minutes as needed for Chest pain up to max of 3 total doses. If no relief after 1 dose, call 911. (Patient not taking: Reported on 11/22/2022), Disp: 25 tablet, Rfl: 3    Family History: This patient's family history includes Cancer in his paternal grandmother and paternal uncle; Diabetes in his maternal grandfather, maternal grandmother, paternal aunt, paternal grandfather, paternal grandmother, and paternal uncle. Social History: Omar Tesfaye  reports that he has never smoked. He has never used smokeless tobacco. He reports that he does not drink alcohol and does not use drugs. ROS:   Constitutional: Negative for activity change, chills, fatigue, fever and unexpected weight change. Respiratory: Negative for apnea, shortness of breath, wheezing and stridor. Cardiovascular: Negative for chest pain, palpitations and leg swelling. Gastrointestinal: Negative for hematochezia, melana, constipation, and N/V/D. Musculoskeletal: Negative for myalgias  Skin: Negative for color change, rash and wound. Neurological: Negative for dizziness or syncope. Vitals:    11/22/22 1042   BP: 137/78   Pulse: (!) 120   SpO2: 97%   Weight: 199 lb (90.3 kg)   Height: 5' 10\" (1.778 m)       Physical Exam:   General Appearance: alert ,conversing, in no acute distress  Cardiovascular: normal rate, regular rhythm, normal S1 and S2, no murmurs, rubs, clicks, or gallops  Pulmonary/Chest: clear to auscultation bilaterally- no wheezes, rales or rhonchi, normal air movement, no respiratory distress  Abdomen:soft, non-tender, non-distended, normal bowel sounds, no bruits,   Extremities: no cyanosis, clubbing or edema. Pulses: Bilateral radial, femoral, DP, and PT pulses intact.  No femoral bruits noted. Skin: warm and dry, no rash or erythema  Head: normocephalic and atraumatic  Neck: supple and non-tender without mass, no thyromegaly, no JVD   Musculoskeletal: normal range of motion, no joint swelling, deformity or tenderness. Neurological: alert, oriented, normal speech, no focal findings or movement disorder noted. Groin: Wounds healing appropriately. No signs of infection or hematoma. Assessment:   Aortic Stenosis-----S/P TAVR on 11/16/22    Plan:   Follow up with Dr. Jose D Lugo in 1 month with a CBC, BMP, and Echo. Pt is ok to go home today as he will be starting on his home BB and Amiodarone. Issues discussed in detail with the patient, who understands and has no further questions. Time spent with patient: 35 minutes, of which more than 50% was spent counseling/coordinating the patient's care.       The plan of care was discussed in detail with Dr. Jose D Lugo and Dr. Lucina Duncan

## 2022-11-23 NOTE — CARE COORDINATION
[facility] Discharge Call    2022    Patient: Emily Justice Patient :    MRN: [id number]  Reason for Admission: TAVR  Discharge Date: 22 RARS: Readmission Risk Score: 15.2         Discharge Facility: [address] [place] [address]      Acute Care Course:   Pt ot [facility] from  to  with a TAVR. He then went to [address] [place] [address] for 5 days with a d/c home with no HHC    HFU made:   had the f/u with cardiology for his TAVR    Sig Hx:   CAD, MI hx, CHF, HTN, afib with rvr, CKD2, CANTRELL, BPH,     DME:     Conversation:   [employer] and he stated he was doing fine. He walks by self and manages his own ADLS. He states only wnent to [facility] because he needed to be monitored 24 hours a day for 5 days. States he is self sufficient. He declined HHC. He went to Cardiology and I reviewed the AVS. He has restarted his meds and he has a supply. Reviewed meds but no 1111F as cardio already did one. He manages his own pills but daughter aware of what he takes. He cooks for himself but is happy when his daughter brings over a meal. Some swelling with legs but no more than usual. He weighs himself daily. He at times has constipation and takes a stool softener. States he is doing well. Follow up plan:   Will Hand off to [address] [place] [address] Ne Transition      Do you have any ongoing symptoms?: No   Do you have all of your prescriptions and are they filled?: Yes (Comment: 2022 - receives [employer] thru assistance program)   Do you have any questions related to your medications?: No   Have you scheduled your follow up appointment?: Yes   How are you going to get to your appointment?: Car - family or friend to transport (Comment: had appt on 22)   Were you discharged with any Home Care or [address] [place], [place] [address]e or do you currently have any active services?: No         Do you have support at home?: Alone   Do you feel like you have everything you need to keep you well at home?: Yes   Patient DME: Other   Other Patient DME: Has grab bars and shower bench in bathroom. Pt. has home BP cuff and pulse ox   Care Transitions Interventions    Registered Dietician: Declined             Future Appointments   Date Time Provider Satish Latif   2022  9:00 AM EMRE Xiao CNP N SRPX CHF [facility] [place]   2022  1:00 PM EMRE Chan CNP Fam Med CG [facility] [place]   2022 11:45 AM STR ECHO RM3 STRZ ECHO [place] [facility]   2022  9:30 AM EMRE Ravi CNP N SRPX Heart [facility] [place]   2023  2:00 PM EMRE Xiao CNP N SRPX CHF [facility] [place]     Transitions of Care Initial Call    Was this an external facility discharge? Yes, 22  Discharge Facility: [address] [place] [address] to be reviewed by the provider   Additional needs identified to be addressed with provider: No  none             Method of communication with provider : none    Advance Care Planning:   Does patient have an Advance Directive: not on file. Care Transition Nurse contacted the patient by telephone to perform post hospital discharge assessment. Verified name and  with patient as identifiers. Provided introduction to self, and explanation of the CTN role. CTN reviewed discharge instructions, medical action plan and red flags with patient who verbalized understanding. Patient given an opportunity to ask questions and does not have any further questions or concerns at this time. Were discharge instructions available to patient? Yes. Reviewed appropriate site of care based on symptoms and resources available to patient including: PCP. The patient agrees to contact the PCP office for questions related to their healthcare. Medication reconciliation was performed with patient, who verbalizes understanding of administration of home medications. Advised obtaining a 90-day supply of all daily and as-needed medications.      Was

## 2022-11-25 NOTE — ED NOTES
Family member presents to window states pt is not breathing out in car. Pt sitting in front seat unresponsive, no pulse noted, no respirations noted. Pt placed on cart and brought to trauma 2. CPR started and pt being bagged with BVM. Daughter states pt had a aortic valve replacement last week, pt called daughter this am and states he was having a hard time breathing. Cardiac monitor applied showing asystole, skin pale, warm and dry.      Otilio Mota RN  11/25/22 8615

## 2022-11-25 NOTE — ED NOTES
Family in room, CPR stopped, no pulse, no spontaneous respirations noted. Pt pronounced at this time, monitor asystole in 2 leads.       Rodriguez More RN  11/25/22 2817

## 2022-11-25 NOTE — ED PROVIDER NOTES
[facility]  eMERGENCY dEPARTMENT eNCOUnter          CHIEF COMPLAINT       Chief Complaint   Patient presents with    Code         Nurses Notes reviewed and I agree except as noted in the HPI. HISTORY OF PRESENT ILLNESS    Nelia Crigler is a 80 y.o. male who presents not breathing. Apparently family were told that the patient was having trouble breathing around 7 AM.  Family arrived to the house around 715. The patient was still breathing at that time and was able to walk to the car. On the way from there home to [facility] care center patient became unresponsive they did not feel a pulse that was approximately 730. Patient arrived approximately 15 minutes later and he was unresponsive. REVIEW OF SYSTEMS     Review of Systems   Unable to perform ROS: Patient unresponsive       PAST MEDICAL HISTORY    has a past medical history of Atrial fibrillation ([address] CHF (congestive heart failure) ([address] Diabetes mellitus ([address] Hypertension, and Type II or unspecified type diabetes mellitus without mention of complication, not stated as uncontrolled. SURGICAL HISTORY      has a past surgical history that includes Tonsillectomy and adenoidectomy (Bilateral).     CURRENT MEDICATIONS       Previous Medications    AMIODARONE (CORDARONE) 200 MG TABLET    Take 1 tablet by mouth daily    APIXABAN (ELIQUIS) 2.5 MG TABS TABLET    Take 1 tablet by mouth 2 times daily    ATORVASTATIN (LIPITOR) 40 MG TABLET    Take 1 tablet by mouth nightly    CLOPIDOGREL (PLAVIX) 75 MG TABLET    Take 1 tablet by mouth daily    FUROSEMIDE (LASIX) 20 MG TABLET    Take 1 tablet by mouth 2 times daily    LISINOPRIL (PRINIVIL;ZESTRIL) 20 MG TABLET    Take 1 tablet by mouth daily    METFORMIN (GLUCOPHAGE XR) 500 MG EXTENDED RELEASE TABLET    Take 2 tablets by mouth daily (with breakfast)    METOPROLOL SUCCINATE (TOPROL XL) 100 MG EXTENDED RELEASE TABLET    Take 1 tablet by mouth daily    NITROGLYCERIN (NITROSTAT) 0.4 MG SL TABLET    Place 1 tablet under the tongue every 5 minutes as needed for Chest pain up to max of 3 total doses. If no relief after 1 dose, call 911. ALLERGIES     is allergic to norvasc [amlodipine besylate] and other. FAMILY HISTORY     He indicated that the status of his maternal grandmother is unknown. He indicated that the status of his maternal grandfather is unknown. He indicated that the status of his paternal grandmother is unknown. He indicated that the status of his paternal grandfather is unknown. He indicated that the status of his paternal aunt is unknown. He indicated that the status of his paternal uncle is unknown.   family history includes Cancer in his paternal grandmother and paternal uncle; Diabetes in his maternal grandfather, maternal grandmother, paternal aunt, paternal grandfather, paternal grandmother, and paternal uncle. SOCIAL HISTORY      reports that he has never smoked. He has never used smokeless tobacco. He reports that he does not drink alcohol and does not use drugs. PHYSICAL EXAM     INITIAL VITALS:  vitals were not taken for this visit. Physical Exam  Vitals and nursing note reviewed. Constitutional:       Appearance: He is obese. Comments: Patient unresponsive   HENT:      Head: Normocephalic and atraumatic. Mouth/Throat:      Pharynx: Oropharynx is clear. Eyes:      Comments: pupils fixed   Abdominal:      General: Abdomen is flat. There is no distension. Palpations: Abdomen is soft. There is no mass. Skin:     Findings: Bruising present.         DIFFERENTIAL DIAGNOSIS:   Cardiac arrest not otherwise specified    DIAGNOSTIC RESULTS     EKG: All EKG's are interpreted by the Emergency Department Physician who either signs or Co-signs this chart in the absence of a cardiologist.      RADIOLOGY: non-plain film images(s) such as CT, Ultrasound and MRI are read by the radiologist.    LABS:   Labs Reviewed   POCT GLUCOSE - Abnormal; Notable for the following components:       Result Value    POC Glucose 185 (*)     All other components within normal limits       EMERGENCY DEPARTMENT COURSE:   Vitals: There were no vitals filed for this visit. Upon arrival the patient was greeted in the car by nursing who noted that the patient did not have a pulse and was not breathing. The patient had CPR commenced at that point. Upon arrival to the trauma bay the patient had leads placed. Continued chest compressions with bag valve mask started. Patient on monitor showed asystole. IO placed in right anterior tibia. Epi 1 mg IO provided. CPR continued. Endo tracheal airway placed. Color change noted. Breaths heard in lungs by EMS. CPR continued. Epi 1 mg provided every 3-5 minutes x 2 additional rounds. 1 amp Bicarb given. Patient family were informed of current resusitative efforts. Daughter requested that resuscitation efforts be halted. Time of death at 8:00 am.       CRITICAL CARE:   Approximately 30 minutes without procedures    CONSULTS:  [facility]  (Dr. Javier Nielsen) notified. PROCEDURES:  Patient Name: Jake Sierra   Medical Record Number: [id number]  Date: 11/25/2022   Time: 8:48 AM   Room/Bed: 2TR/TR2  Intubation Procedure Note  Indication: Respiratory failure    Consent: Unable to be obtained due to the emergent nature of this procedure. Medications Used: None    Procedure: The patient was placed in the appropriate position. Cricoid pressure was not required. Intubation was performed by indirect laryngoscopy using a bronchoscope and a 7.5 cuffed endotracheal tube. The tube was then secured appropriately at a distance of 25 cm to the dental ridge. Initial confirmation of placement included bilateral breath sounds and an end tidal CO2 detector. A chest x-ray to verify correct placement of the tube was not obtained because family requested efforts be stopped.  .  procedure .     Complications: None    Electronically Signed by: [email or web address]  Intraosseous placed in the right tibia for immediate access of resuscitative measures and medications and fluid. FINAL IMPRESSION      1. Death due to cardiac arrest [facility])          DISPOSITION/PLAN   . [facility]  notified.  home notified. Primary care provider JR smith will be signing the death certificate. PATIENT REFERRED TO:  No follow-up provider specified.     DISCHARGE MEDICATIONS:  New Prescriptions    No medications on file       (Please note that portions of this note were completed with a voice recognition program.  Efforts were made to edit the dictations but occasionally words are mis-transcribed.)    MD Willard Fountain MD  22

## 2022-11-25 NOTE — ED NOTES
Jacob machine placed on pt, CPR continues, no pulse, asystole on monitor.      Juan Carlos Horton RN  11/25/22 8689

## 2022-11-26 NOTE — ED NOTES
Family member presents to front window stating patient in vehicle not breathing. Patient in passenger seat of vehicle slumped over. No pulse, no respirations, pale, diaphoretic. Front seat dropped back and CPR initiated. Daughter in drivers seat and unsure of patients CODE status. Called for extra help and an ER cot.       Dayana Rodriguez RN  11/25/22 2013

## 2022-11-28 ENCOUNTER — CARE COORDINATION (OUTPATIENT)
Dept: CARE COORDINATION | Age: 87
End: 2022-11-28

## 2022-12-09 NOTE — PROCEDURES
[address] [id number]                                 EVENT MONITOR    PATIENT NAME: Marshall Reich               :        1931  MED REC NO:   [id number]                           ROOM:       0028  ACCOUNT NO:   [de-identified]                           ADMIT DATE: 2022  PROVIDER:     Guanaco Causey M.D.    TEST TYPE:  Event monitor    CLINICAL HISTORY AND INDICATION:  This is a patient with valvular heart  disease. EVENT MONITOR DESCRIPTION:  Event monitor was attached to the patient  between 2022 and 2022. EVENT MONITOR FINDINGS:  Baseline rhythm showed sinus rhythm. Episodes  of wide-complex tachycardia were noted, could be atrial fibrillation  with aberrancy, cannot completely exclude nonsustained V-tach; however,  this is most likely atrial fibrillation. No pauses. CONCLUSION:  1. Sinus rhythm as an initial rhythm; however, the patient had  intermittent wide-complex tachycardia, most likely atrial fibrillation. 2.  No pauses. 3.  Clinical correlation is recommended.         Edyta Huber M.D.    D: 2022 14:18:01       T: 2022 21:37:43     [email or web address]  Job#: [id number]     Doc#: [id number]    CC:

## 2023-02-21 ENCOUNTER — TELEPHONE (OUTPATIENT)
Dept: CARDIOLOGY CLINIC | Age: 88
End: 2023-02-21

## 2024-03-14 NOTE — PATIENT INSTRUCTIONS
You may receive a survey regarding the care you received during your visit. Your input is valuable to us. We encourage you to complete and return your survey. We hope you will choose us in the future for your healthcare needs.     Continue:  Continue current medications  Daily weights and record  Fluid restriction of 2 Liters per day  Limit sodium in diet to around 8806-3586 mg/day  Monitor BP  Activity as tolerated     Call the [facility] for any of the following symptoms: [phone]  Weight gain of 2-3 pounds in 1 day or 5 pounds in 1 week  Increased shortness of breath  Shortness of breath while laying down  Cough  Chest pain  Swelling in feet, ankles or legs  Tenderness or bloating in the abdomen  Fatigue   Decreased appetite or nausea   Confusion
[Negative] : Psychiatric